# Patient Record
Sex: FEMALE | Race: WHITE | Employment: OTHER | URBAN - METROPOLITAN AREA
[De-identification: names, ages, dates, MRNs, and addresses within clinical notes are randomized per-mention and may not be internally consistent; named-entity substitution may affect disease eponyms.]

---

## 2017-01-11 PROCEDURE — 87077 CULTURE AEROBIC IDENTIFY: CPT | Performed by: INTERNAL MEDICINE

## 2017-01-11 PROCEDURE — 87186 SC STD MICRODIL/AGAR DIL: CPT | Performed by: INTERNAL MEDICINE

## 2017-01-11 PROCEDURE — 81001 URINALYSIS AUTO W/SCOPE: CPT | Performed by: INTERNAL MEDICINE

## 2017-01-11 PROCEDURE — 87086 URINE CULTURE/COLONY COUNT: CPT | Performed by: INTERNAL MEDICINE

## 2017-01-18 ENCOUNTER — HOSPITAL ENCOUNTER (EMERGENCY)
Facility: HOSPITAL | Age: 77
Discharge: HOME | End: 2017-01-18
Attending: EMERGENCY MEDICINE
Payer: MEDICARE

## 2017-01-18 VITALS
OXYGEN SATURATION: 98 % | HEART RATE: 60 BPM | WEIGHT: 180 LBS | DIASTOLIC BLOOD PRESSURE: 62 MMHG | BODY MASS INDEX: 29.99 KG/M2 | RESPIRATION RATE: 16 BRPM | TEMPERATURE: 99 F | HEIGHT: 65 IN | SYSTOLIC BLOOD PRESSURE: 112 MMHG

## 2017-01-18 DIAGNOSIS — R82.79 URINE CULTURE POSITIVE: Primary | ICD-10-CM

## 2017-01-18 LAB
BASOPHILS # BLD AUTO: 0.07 X10(3) UL (ref 0–0.1)
BASOPHILS NFR BLD AUTO: 0.7 %
BILIRUB UR QL STRIP.AUTO: NEGATIVE
BUN BLD-MCNC: 20 MG/DL (ref 8–20)
CALCIUM BLD-MCNC: 9.9 MG/DL (ref 8.3–10.3)
CHLORIDE: 102 MMOL/L (ref 101–111)
CLARITY UR REFRACT.AUTO: CLEAR
CO2: 24 MMOL/L (ref 22–32)
COLOR UR AUTO: YELLOW
CREAT BLD-MCNC: 0.93 MG/DL (ref 0.55–1.02)
EOSINOPHIL # BLD AUTO: 0.11 X10(3) UL (ref 0–0.3)
EOSINOPHIL NFR BLD AUTO: 1.2 %
ERYTHROCYTE [DISTWIDTH] IN BLOOD BY AUTOMATED COUNT: 13.6 % (ref 11.5–16)
GLUCOSE BLD-MCNC: 260 MG/DL (ref 70–99)
GLUCOSE UR STRIP.AUTO-MCNC: NEGATIVE MG/DL
HCT VFR BLD AUTO: 40.3 % (ref 34–50)
HGB BLD-MCNC: 13.4 G/DL (ref 12–16)
HYALINE CASTS #/AREA URNS AUTO: PRESENT /LPF
IMMATURE GRANULOCYTE COUNT: 0.03 X10(3) UL (ref 0–1)
IMMATURE GRANULOCYTE RATIO %: 0.3 %
KETONES UR STRIP.AUTO-MCNC: NEGATIVE MG/DL
LYMPHOCYTES # BLD AUTO: 1.49 X10(3) UL (ref 0.9–4)
LYMPHOCYTES NFR BLD AUTO: 15.9 %
MCH RBC QN AUTO: 28.8 PG (ref 27–33.2)
MCHC RBC AUTO-ENTMCNC: 33.3 G/DL (ref 31–37)
MCV RBC AUTO: 86.5 FL (ref 81–100)
MONOCYTES # BLD AUTO: 0.45 X10(3) UL (ref 0.1–0.6)
MONOCYTES NFR BLD AUTO: 4.8 %
NEUTROPHIL ABS PRELIM: 7.25 X10 (3) UL (ref 1.3–6.7)
NEUTROPHILS # BLD AUTO: 7.25 X10(3) UL (ref 1.3–6.7)
NEUTROPHILS NFR BLD AUTO: 77.1 %
NITRITE UR QL STRIP.AUTO: NEGATIVE
PH UR STRIP.AUTO: 5 [PH] (ref 4.5–8)
PLATELET # BLD AUTO: 253 10(3)UL (ref 150–450)
POTASSIUM SERPL-SCNC: 4.8 MMOL/L (ref 3.6–5.1)
PROT UR STRIP.AUTO-MCNC: NEGATIVE MG/DL
RBC # BLD AUTO: 4.66 X10(6)UL (ref 3.8–5.1)
RBC UR QL AUTO: NEGATIVE
RED CELL DISTRIBUTION WIDTH-SD: 42.7 FL (ref 35.1–46.3)
SODIUM SERPL-SCNC: 135 MMOL/L (ref 136–144)
SP GR UR STRIP.AUTO: 1.01 (ref 1–1.03)
UROBILINOGEN UR STRIP.AUTO-MCNC: <2 MG/DL
WBC # BLD AUTO: 9.4 X10(3) UL (ref 4–13)

## 2017-01-18 PROCEDURE — 36415 COLL VENOUS BLD VENIPUNCTURE: CPT

## 2017-01-18 PROCEDURE — 99283 EMERGENCY DEPT VISIT LOW MDM: CPT

## 2017-01-18 PROCEDURE — 87086 URINE CULTURE/COLONY COUNT: CPT | Performed by: EMERGENCY MEDICINE

## 2017-01-18 PROCEDURE — 85025 COMPLETE CBC W/AUTO DIFF WBC: CPT | Performed by: EMERGENCY MEDICINE

## 2017-01-18 PROCEDURE — 81001 URINALYSIS AUTO W/SCOPE: CPT | Performed by: EMERGENCY MEDICINE

## 2017-01-18 PROCEDURE — 80048 BASIC METABOLIC PNL TOTAL CA: CPT | Performed by: EMERGENCY MEDICINE

## 2017-01-18 NOTE — ED INITIAL ASSESSMENT (HPI)
sts her PCP, Dr. Lourdes Delaney, sent pt to ER for unresolved UTI. Reports hospitalized for kidney infection end of Dec. Pt denies urinary s/s.

## 2017-01-18 NOTE — ED PROVIDER NOTES
Patient Seen in: [facility] Emergency Department    History   Patient presents with:  Urinary Symptoms (urologic)    Stated Complaint: KIDNEY INFECTION    HPI    79-year-old female presents emergency room for evaluation of urinary tract infection, p History    COLONOSCOPY,DIAGNOSTIC  2003    Comment wnl    COLONOSCOPY,BIOPSY  1999    Comment adenomatous polyp    ANGIOPLASTY (CORONARY)  2006    Comment w/2 drug eluting stents    CHOLECYSTECTOMY  1997    HYSTERECTOMY      Comment w/ BSO    OTHER SURGICA age 78   • Diabetes Paternal Grandmother    • gastric cancer [Other] [OTHER] Maternal Grandmother          Smoking Status: Never Smoker                      Smokeless Status: Never Used                        Alcohol Use: No                Review of Sy W/ DIFFERENTIAL - Abnormal; Notable for the following:     Neutrophil Absolute Prelim 7.25 (*)     Neutrophil Absolute 7.25 (*)     All other components within normal limits   CBC WITH DIFFERENTIAL WITH PLATELET    Narrative:      The following orders were [id number]  [phone]    Schedule an appointment as soon as possible for a visit in 2 days        Medications Prescribed:  Discharge Medication List as of 1/18/2017  7:04 PM

## 2017-01-19 NOTE — ED NOTES
Rounded on pt lying on cart, sts she is comfortable. Denies any needs. Updated on POC.  remains at bedside.

## 2017-02-02 ENCOUNTER — PRIOR ORIGINAL RECORDS (OUTPATIENT)
Dept: OTHER | Age: 77
End: 2017-02-02

## 2017-02-13 ENCOUNTER — PRIOR ORIGINAL RECORDS (OUTPATIENT)
Dept: OTHER | Age: 77
End: 2017-02-13

## 2017-02-16 PROCEDURE — 87086 URINE CULTURE/COLONY COUNT: CPT | Performed by: INTERNAL MEDICINE

## 2017-02-16 PROCEDURE — 87077 CULTURE AEROBIC IDENTIFY: CPT | Performed by: INTERNAL MEDICINE

## 2017-02-16 PROCEDURE — 87186 SC STD MICRODIL/AGAR DIL: CPT | Performed by: INTERNAL MEDICINE

## 2017-02-28 ENCOUNTER — PRIOR ORIGINAL RECORDS (OUTPATIENT)
Dept: OTHER | Age: 77
End: 2017-02-28

## 2017-03-02 NOTE — H&P
[facility]    PATIENT'S NAME: Aaron Sales   ATTENDING PHYSICIAN: Anastasiia Baldwin M.D.    PATIENT ACCOUNT#:   [de-identified]    LOCATION:    MEDICAL RECORD #:   [id number]       YOB: 1940  ADMISSION DATE:       03/10/2017    HISTORY AND midline back pain, urinary incontinence, weakness in both lower extremities, neuropathy, urinary tract infection, hematuria, angioneurotic edema, colonic polyps, carpal tunnel syndrome, menopause, muscle weakness.     PAST SURGICAL HISTORY:  Colonoscopy, an

## 2017-03-03 ENCOUNTER — PRIOR ORIGINAL RECORDS (OUTPATIENT)
Dept: OTHER | Age: 77
End: 2017-03-03

## 2017-03-06 ENCOUNTER — PRIOR ORIGINAL RECORDS (OUTPATIENT)
Dept: OTHER | Age: 77
End: 2017-03-06

## 2017-03-06 ENCOUNTER — LABORATORY ENCOUNTER (OUTPATIENT)
Dept: LAB | Facility: HOSPITAL | Age: 77
End: 2017-03-06
Payer: MEDICARE

## 2017-03-06 DIAGNOSIS — M17.12 OSTEOARTHRITIS OF LEFT KNEE: ICD-10-CM

## 2017-03-06 LAB
ANTIBODY SCREEN: NEGATIVE
RH BLOOD TYPE: POSITIVE

## 2017-03-06 PROCEDURE — 86850 RBC ANTIBODY SCREEN: CPT

## 2017-03-06 PROCEDURE — 87081 CULTURE SCREEN ONLY: CPT

## 2017-03-06 PROCEDURE — 36415 COLL VENOUS BLD VENIPUNCTURE: CPT

## 2017-03-06 PROCEDURE — 86900 BLOOD TYPING SEROLOGIC ABO: CPT

## 2017-03-06 PROCEDURE — 86901 BLOOD TYPING SEROLOGIC RH(D): CPT

## 2017-03-08 PROBLEM — M17.12 PRIMARY OSTEOARTHRITIS OF LEFT KNEE: Status: ACTIVE | Noted: 2017-03-08

## 2017-03-09 ENCOUNTER — ANESTHESIA EVENT (OUTPATIENT)
Dept: SURGERY | Facility: HOSPITAL | Age: 77
End: 2017-03-09

## 2017-03-10 ENCOUNTER — APPOINTMENT (OUTPATIENT)
Dept: GENERAL RADIOLOGY | Facility: HOSPITAL | Age: 77
DRG: 470 | End: 2017-03-10
Attending: ORTHOPAEDIC SURGERY
Payer: MEDICARE

## 2017-03-10 ENCOUNTER — HOSPITAL ENCOUNTER (INPATIENT)
Facility: HOSPITAL | Age: 77
LOS: 2 days | Discharge: HOME HEALTH | DRG: 470 | End: 2017-03-12
Attending: ORTHOPAEDIC SURGERY | Admitting: ORTHOPAEDIC SURGERY
Payer: MEDICARE

## 2017-03-10 ENCOUNTER — ANESTHESIA (OUTPATIENT)
Dept: SURGERY | Facility: HOSPITAL | Age: 77
End: 2017-03-10

## 2017-03-10 ENCOUNTER — SURGERY (OUTPATIENT)
Age: 77
End: 2017-03-10

## 2017-03-10 DIAGNOSIS — M17.12 OSTEOARTHRITIS OF LEFT KNEE: Primary | ICD-10-CM

## 2017-03-10 DIAGNOSIS — D64.9 ANEMIA, UNSPECIFIED TYPE: ICD-10-CM

## 2017-03-10 LAB
CREAT BLD-MCNC: 0.53 MG/DL (ref 0.55–1.02)
GLUCOSE BLD-MCNC: 119 MG/DL (ref 65–99)
GLUCOSE BLD-MCNC: 130 MG/DL (ref 65–99)
GLUCOSE BLD-MCNC: 153 MG/DL (ref 65–99)
GLUCOSE BLD-MCNC: 219 MG/DL (ref 65–99)
GLUCOSE BLD-MCNC: 231 MG/DL (ref 65–99)

## 2017-03-10 PROCEDURE — 82565 ASSAY OF CREATININE: CPT | Performed by: PHYSICIAN ASSISTANT

## 2017-03-10 PROCEDURE — 0SRD0J9 REPLACEMENT OF LEFT KNEE JOINT WITH SYNTHETIC SUBSTITUTE, CEMENTED, OPEN APPROACH: ICD-10-PCS | Performed by: ORTHOPAEDIC SURGERY

## 2017-03-10 PROCEDURE — 82962 GLUCOSE BLOOD TEST: CPT

## 2017-03-10 PROCEDURE — 3E0T3CZ INTRODUCTION OF REGIONAL ANESTHETIC INTO PERIPHERAL NERVES AND PLEXI, PERCUTANEOUS APPROACH: ICD-10-PCS | Performed by: ANESTHESIOLOGY

## 2017-03-10 PROCEDURE — 88305 TISSUE EXAM BY PATHOLOGIST: CPT | Performed by: ORTHOPAEDIC SURGERY

## 2017-03-10 PROCEDURE — 76942 ECHO GUIDE FOR BIOPSY: CPT | Performed by: ORTHOPAEDIC SURGERY

## 2017-03-10 PROCEDURE — 88311 DECALCIFY TISSUE: CPT | Performed by: ORTHOPAEDIC SURGERY

## 2017-03-10 PROCEDURE — 73560 X-RAY EXAM OF KNEE 1 OR 2: CPT

## 2017-03-10 DEVICE — PSN TIB STM 5 DEG SZ F L: Type: IMPLANTABLE DEVICE | Site: KNEE | Status: FUNCTIONAL

## 2017-03-10 DEVICE — PSN ASF PS 10MM PLY L 6-9EF: Type: IMPLANTABLE DEVICE | Site: KNEE | Status: FUNCTIONAL

## 2017-03-10 DEVICE — PSN FEM PS CMT CCR STD SZ8 L: Type: IMPLANTABLE DEVICE | Site: KNEE | Status: FUNCTIONAL

## 2017-03-10 DEVICE — PSN ALL POLY PAT PLY 35MM: Type: IMPLANTABLE DEVICE | Site: KNEE | Status: FUNCTIONAL

## 2017-03-10 DEVICE — CEMENT BONE ZIM PALICOS R +G: Type: IMPLANTABLE DEVICE | Site: KNEE | Status: FUNCTIONAL

## 2017-03-10 RX ORDER — NALOXONE HYDROCHLORIDE 0.4 MG/ML
80 INJECTION, SOLUTION INTRAMUSCULAR; INTRAVENOUS; SUBCUTANEOUS AS NEEDED
Status: DISCONTINUED | OUTPATIENT
Start: 2017-03-10 | End: 2017-03-10 | Stop reason: HOSPADM

## 2017-03-10 RX ORDER — HYDROMORPHONE HYDROCHLORIDE 1 MG/ML
0.4 INJECTION, SOLUTION INTRAMUSCULAR; INTRAVENOUS; SUBCUTANEOUS EVERY 2 HOUR PRN
Status: ACTIVE | OUTPATIENT
Start: 2017-03-10 | End: 2017-03-12

## 2017-03-10 RX ORDER — KETOROLAC TROMETHAMINE 30 MG/ML
15 INJECTION, SOLUTION INTRAMUSCULAR; INTRAVENOUS EVERY 6 HOURS
Status: COMPLETED | OUTPATIENT
Start: 2017-03-10 | End: 2017-03-11

## 2017-03-10 RX ORDER — SODIUM CHLORIDE, SODIUM LACTATE, POTASSIUM CHLORIDE, CALCIUM CHLORIDE 600; 310; 30; 20 MG/100ML; MG/100ML; MG/100ML; MG/100ML
INJECTION, SOLUTION INTRAVENOUS CONTINUOUS
Status: DISCONTINUED | OUTPATIENT
Start: 2017-03-10 | End: 2017-03-12

## 2017-03-10 RX ORDER — HYDROMORPHONE HYDROCHLORIDE 1 MG/ML
0.5 INJECTION, SOLUTION INTRAMUSCULAR; INTRAVENOUS; SUBCUTANEOUS EVERY 2 HOUR PRN
Status: ACTIVE | OUTPATIENT
Start: 2017-03-10 | End: 2017-03-12

## 2017-03-10 RX ORDER — CYCLOBENZAPRINE HCL 5 MG
5 TABLET ORAL EVERY 8 HOURS PRN
Status: DISCONTINUED | OUTPATIENT
Start: 2017-03-10 | End: 2017-03-12

## 2017-03-10 RX ORDER — OXYCODONE HCL 10 MG/1
10 TABLET, FILM COATED, EXTENDED RELEASE ORAL
Status: COMPLETED | OUTPATIENT
Start: 2017-03-10 | End: 2017-03-11

## 2017-03-10 RX ORDER — HYDROMORPHONE HYDROCHLORIDE 1 MG/ML
0.2 INJECTION, SOLUTION INTRAMUSCULAR; INTRAVENOUS; SUBCUTANEOUS EVERY 2 HOUR PRN
Status: ACTIVE | OUTPATIENT
Start: 2017-03-10 | End: 2017-03-12

## 2017-03-10 RX ORDER — MELATONIN
325
Status: DISCONTINUED | OUTPATIENT
Start: 2017-03-11 | End: 2017-03-12

## 2017-03-10 RX ORDER — MIDAZOLAM HYDROCHLORIDE 1 MG/ML
1 INJECTION INTRAMUSCULAR; INTRAVENOUS EVERY 5 MIN PRN
Status: DISCONTINUED | OUTPATIENT
Start: 2017-03-10 | End: 2017-03-10 | Stop reason: HOSPADM

## 2017-03-10 RX ORDER — GLIPIZIDE 10 MG/1
10 TABLET ORAL
COMMUNITY
End: 2017-10-12

## 2017-03-10 RX ORDER — CYCLOBENZAPRINE HCL 5 MG
5 TABLET ORAL 3 TIMES DAILY PRN
Status: DISCONTINUED | OUTPATIENT
Start: 2017-03-10 | End: 2017-03-10

## 2017-03-10 RX ORDER — GLIPIZIDE 5 MG/1
10 TABLET ORAL
Status: DISCONTINUED | OUTPATIENT
Start: 2017-03-10 | End: 2017-03-10

## 2017-03-10 RX ORDER — OXYCODONE HYDROCHLORIDE 5 MG/1
5 TABLET ORAL EVERY 4 HOURS PRN
Status: DISPENSED | OUTPATIENT
Start: 2017-03-10 | End: 2017-03-12

## 2017-03-10 RX ORDER — ATORVASTATIN CALCIUM 20 MG/1
20 TABLET, FILM COATED ORAL NIGHTLY
COMMUNITY
End: 2017-12-02

## 2017-03-10 RX ORDER — DIPHENHYDRAMINE HYDROCHLORIDE 50 MG/ML
25 INJECTION INTRAMUSCULAR; INTRAVENOUS ONCE AS NEEDED
Status: ACTIVE | OUTPATIENT
Start: 2017-03-10 | End: 2017-03-10

## 2017-03-10 RX ORDER — SODIUM CHLORIDE 9 MG/ML
INJECTION, SOLUTION INTRAVENOUS CONTINUOUS
Status: DISCONTINUED | OUTPATIENT
Start: 2017-03-10 | End: 2017-03-12

## 2017-03-10 RX ORDER — ZOLPIDEM TARTRATE 5 MG/1
5 TABLET ORAL NIGHTLY PRN
Status: DISCONTINUED | OUTPATIENT
Start: 2017-03-10 | End: 2017-03-12

## 2017-03-10 RX ORDER — OXYCODONE HCL 10 MG/1
TABLET, FILM COATED, EXTENDED RELEASE ORAL
Status: DISPENSED
Start: 2017-03-10 | End: 2017-03-10

## 2017-03-10 RX ORDER — METOCLOPRAMIDE HYDROCHLORIDE 5 MG/ML
10 INJECTION INTRAMUSCULAR; INTRAVENOUS AS NEEDED
Status: DISCONTINUED | OUTPATIENT
Start: 2017-03-10 | End: 2017-03-10 | Stop reason: HOSPADM

## 2017-03-10 RX ORDER — DEXTROSE MONOHYDRATE 25 G/50ML
50 INJECTION, SOLUTION INTRAVENOUS
Status: DISCONTINUED | OUTPATIENT
Start: 2017-03-10 | End: 2017-03-10

## 2017-03-10 RX ORDER — DOCUSATE SODIUM 100 MG/1
100 CAPSULE, LIQUID FILLED ORAL 2 TIMES DAILY
Status: DISCONTINUED | OUTPATIENT
Start: 2017-03-10 | End: 2017-03-12

## 2017-03-10 RX ORDER — DOCUSATE SODIUM 100 MG/1
100 CAPSULE, LIQUID FILLED ORAL 2 TIMES DAILY
Qty: 60 CAPSULE | Refills: 1 | Status: SHIPPED | OUTPATIENT
Start: 2017-03-10 | End: 2017-06-01

## 2017-03-10 RX ORDER — SODIUM CHLORIDE, SODIUM LACTATE, POTASSIUM CHLORIDE, CALCIUM CHLORIDE 600; 310; 30; 20 MG/100ML; MG/100ML; MG/100ML; MG/100ML
INJECTION, SOLUTION INTRAVENOUS CONTINUOUS
Status: DISCONTINUED | OUTPATIENT
Start: 2017-03-10 | End: 2017-03-10

## 2017-03-10 RX ORDER — ONDANSETRON 2 MG/ML
4 INJECTION INTRAMUSCULAR; INTRAVENOUS AS NEEDED
Status: DISCONTINUED | OUTPATIENT
Start: 2017-03-10 | End: 2017-03-10 | Stop reason: HOSPADM

## 2017-03-10 RX ORDER — FERROUS SULFATE 325(65) MG
325 TABLET ORAL
Qty: 30 TABLET | Refills: 1 | Status: SHIPPED | OUTPATIENT
Start: 2017-03-10 | End: 2017-06-01

## 2017-03-10 RX ORDER — MEPERIDINE HYDROCHLORIDE 25 MG/ML
12.5 INJECTION INTRAMUSCULAR; INTRAVENOUS; SUBCUTANEOUS AS NEEDED
Status: DISCONTINUED | OUTPATIENT
Start: 2017-03-10 | End: 2017-03-10 | Stop reason: HOSPADM

## 2017-03-10 RX ORDER — HYDROMORPHONE HYDROCHLORIDE 1 MG/ML
0.4 INJECTION, SOLUTION INTRAMUSCULAR; INTRAVENOUS; SUBCUTANEOUS EVERY 5 MIN PRN
Status: DISCONTINUED | OUTPATIENT
Start: 2017-03-10 | End: 2017-03-10 | Stop reason: HOSPADM

## 2017-03-10 RX ORDER — DEXTROSE MONOHYDRATE 25 G/50ML
50 INJECTION, SOLUTION INTRAVENOUS
Status: DISCONTINUED | OUTPATIENT
Start: 2017-03-10 | End: 2017-03-12

## 2017-03-10 RX ORDER — OXYCODONE HYDROCHLORIDE 10 MG/1
10 TABLET ORAL EVERY 4 HOURS PRN
Status: DISPENSED | OUTPATIENT
Start: 2017-03-10 | End: 2017-03-12

## 2017-03-10 RX ORDER — DIPHENHYDRAMINE HYDROCHLORIDE 50 MG/ML
12.5 INJECTION INTRAMUSCULAR; INTRAVENOUS EVERY 4 HOURS PRN
Status: DISCONTINUED | OUTPATIENT
Start: 2017-03-10 | End: 2017-03-12

## 2017-03-10 RX ORDER — OXYCODONE HCL 10 MG/1
10 TABLET, FILM COATED, EXTENDED RELEASE ORAL
Status: COMPLETED | OUTPATIENT
Start: 2017-03-10 | End: 2017-03-10

## 2017-03-10 RX ORDER — HYDROMORPHONE HYDROCHLORIDE 1 MG/ML
0.3 INJECTION, SOLUTION INTRAMUSCULAR; INTRAVENOUS; SUBCUTANEOUS EVERY 2 HOUR PRN
Status: ACTIVE | OUTPATIENT
Start: 2017-03-10 | End: 2017-03-12

## 2017-03-10 RX ORDER — ONDANSETRON 2 MG/ML
4 INJECTION INTRAMUSCULAR; INTRAVENOUS EVERY 4 HOURS PRN
Status: DISCONTINUED | OUTPATIENT
Start: 2017-03-10 | End: 2017-03-12

## 2017-03-10 RX ORDER — LISINOPRIL 10 MG/1
10 TABLET ORAL DAILY
Status: DISCONTINUED | OUTPATIENT
Start: 2017-03-11 | End: 2017-03-12

## 2017-03-10 RX ORDER — BISACODYL 10 MG
10 SUPPOSITORY, RECTAL RECTAL
Status: DISCONTINUED | OUTPATIENT
Start: 2017-03-10 | End: 2017-03-12

## 2017-03-10 RX ORDER — SODIUM PHOSPHATE, DIBASIC AND SODIUM PHOSPHATE, MONOBASIC 7; 19 G/133ML; G/133ML
1 ENEMA RECTAL ONCE AS NEEDED
Status: ACTIVE | OUTPATIENT
Start: 2017-03-10 | End: 2017-03-10

## 2017-03-10 RX ORDER — POLYETHYLENE GLYCOL 3350 17 G/17G
17 POWDER, FOR SOLUTION ORAL DAILY PRN
Status: DISCONTINUED | OUTPATIENT
Start: 2017-03-10 | End: 2017-03-12

## 2017-03-10 RX ORDER — ATORVASTATIN CALCIUM 20 MG/1
20 TABLET, FILM COATED ORAL NIGHTLY
Status: DISCONTINUED | OUTPATIENT
Start: 2017-03-10 | End: 2017-03-12

## 2017-03-10 RX ORDER — METOCLOPRAMIDE HYDROCHLORIDE 5 MG/ML
10 INJECTION INTRAMUSCULAR; INTRAVENOUS EVERY 6 HOURS PRN
Status: ACTIVE | OUTPATIENT
Start: 2017-03-10 | End: 2017-03-12

## 2017-03-10 RX ORDER — TORSEMIDE 20 MG/1
20 TABLET ORAL DAILY
Status: DISCONTINUED | OUTPATIENT
Start: 2017-03-11 | End: 2017-03-10

## 2017-03-10 RX ORDER — OXYCODONE HYDROCHLORIDE 10 MG/1
20 TABLET ORAL EVERY 4 HOURS PRN
Status: ACTIVE | OUTPATIENT
Start: 2017-03-10 | End: 2017-03-12

## 2017-03-10 RX ORDER — OXYCODONE HYDROCHLORIDE 15 MG/1
15 TABLET ORAL EVERY 4 HOURS PRN
Status: ACTIVE | OUTPATIENT
Start: 2017-03-10 | End: 2017-03-12

## 2017-03-10 RX ORDER — DEXTROSE MONOHYDRATE 25 G/50ML
50 INJECTION, SOLUTION INTRAVENOUS
Status: DISCONTINUED | OUTPATIENT
Start: 2017-03-10 | End: 2017-03-10 | Stop reason: HOSPADM

## 2017-03-10 RX ORDER — DIPHENHYDRAMINE HCL 25 MG
25 CAPSULE ORAL EVERY 4 HOURS PRN
Status: DISCONTINUED | OUTPATIENT
Start: 2017-03-10 | End: 2017-03-12

## 2017-03-10 NOTE — INTERVAL H&P NOTE
Pre-op Diagnosis: OSTEOARTHRITIS LEFT KNEE    The above referenced H&P was reviewed by LON Campos on 3/10/2017, the patient was examined and no significant changes have occurred in the patient's condition since the H&P was performed.   I discussed wi

## 2017-03-10 NOTE — CONSULTS
[facility] Hospitalist Initial Consult       Reason for consult: Medical Management sp left total knee arthroplasty      History of Present Illness: Patient is a 78year old female with PMH sig for CAD, HTN, HL, T2DM who presents sp left TKA.    Pt tolerated the p [id number]   • Type 2 diabetes mellitus, uncontrolled ([facility]    • Diabetes ([facility]    • Coronary atherosclerosis    • High blood pressure    • Vitamin D deficiency 10/6/2016   • Uncontrolled type 2 diabetes mellitus with diabetic polyneuropathy, with long-term kg/m2  SpO2 96%  General:  Alert, no distress, appears stated age. Head:  Normocephalic, without obvious abnormality, atraumatic. Eyes:  Sclera anicteric, No conjunctival pallor, EOMs intact. Nose: Nares normal. Septum midline.  Mucosa normal. No rena levels    CAD/HTN/HL  -Continue ACE-I, Statin  -Resume ASA when ok with ortho  -resume diuretic on discharge    Prevention: Xarelto, SCDs, bowel regimen      Outpatient records and previous hospital records reviewed.      Thank you for allowing me to partic

## 2017-03-10 NOTE — PHYSICAL THERAPY NOTE
Attempted to see patient for P. T. Evaluation this time. Patient presented with left Le numbness & lack of motor control. Unable to progress with evaluation this time. RN - Lashell Medina was aware of this attempt.  Will continue with evaluation in morning of 03/10/1

## 2017-03-10 NOTE — ANESTHESIA PREPROCEDURE EVALUATION
PRE-OP EVALUATION    Patient Name: Víctor Enciso    Pre-op Diagnosis: primary OSTEOARTHRITIS LEFT KNEE    Procedure(s):  LEFT TOTAL KNEE ARTHROPLASTY    Surgeon(s) and Role:     Jewell Meneses MD - Primary    Pre-op vitals reviewed. Temp: 98 °F (36. (REGLAN) injection 10 mg 10 mg Intravenous PRN   Naloxone HCl (NARCAN) 0.4 MG/ML injection 80 mcg 80 mcg Intravenous PRN   Midazolam HCl (VERSED) 2 MG/2ML injection 1 mg 1 mg Intravenous Q5 Min PRN   Meperidine HCl (DEMEROL) 25 MG/ML injection 12.5 mg 12. 5 motion abnormalities. IMPRESSION:  1. Normal SPECT myocardial perfusion imaging.    2.   Normal EKG response to Lexiscan.     3. Normal LV cavity dimension and function with ejection fraction of 58%. No wall motion abnormalities.   4.   No prognosti polyneuropathy associated with type 2 diabetes mellitus ([facility]     Hyperlipidemia due to type 2 diabetes mellitus ([facility]     Hypertension complicating diabetes ([facility]     Benign positional vertigo     Vitamin D deficiency     Uncontrolled type 2 diabetes arnaldo 03/01/2017   HCT 40.3 01/18/2017   MCV 85.4 03/01/2017   MCV 86.5 01/18/2017   MCH 28.0 03/01/2017   MCH 28.8 01/18/2017   MCHC 32.8 03/01/2017   MCHC 33.3 01/18/2017   RDW 13.8 03/01/2017   RDW 13.6 01/18/2017    03/01/2017   .0 01/18/2017 adductor canal block for post operative pain.   Plan/risks discussed with: patient and spouse                Present on Admission:   **None**

## 2017-03-10 NOTE — ANESTHESIA POSTPROCEDURE EVALUATION
[address] Patient Status:  Surgery Admit   Age/Gender 78year old female MRN [id number]   [facility] SURGERY Attending Nadeem Little MD   Hosp Day # 0 PCP Ines Duvall MD       Anesthesia Post-op Note    Procedure(

## 2017-03-10 NOTE — BRIEF OP NOTE
[facility] SURGERY  Brief Op Note     Junious Mingle Location: OR   CSN [id number] MRN [id number]   Admission Date 3/10/2017 Operation Date 3/10/2017   Attending Physician Connie Krueger MD Operating Physician LON Roy       Pre-Operative Marisela Asif

## 2017-03-11 LAB
ERYTHROCYTE [DISTWIDTH] IN BLOOD BY AUTOMATED COUNT: 13.8 % (ref 11.5–16)
GLUCOSE BLD-MCNC: 105 MG/DL (ref 65–99)
GLUCOSE BLD-MCNC: 201 MG/DL (ref 65–99)
GLUCOSE BLD-MCNC: 202 MG/DL (ref 65–99)
GLUCOSE BLD-MCNC: 302 MG/DL (ref 65–99)
HCT VFR BLD AUTO: 34.4 % (ref 34–50)
HGB BLD-MCNC: 11.3 G/DL (ref 12–16)
MCH RBC QN AUTO: 28.7 PG (ref 27–33.2)
MCHC RBC AUTO-ENTMCNC: 32.8 G/DL (ref 31–37)
MCV RBC AUTO: 87.3 FL (ref 81–100)
PLATELET # BLD AUTO: 167 10(3)UL (ref 150–450)
RBC # BLD AUTO: 3.94 X10(6)UL (ref 3.8–5.1)
RED CELL DISTRIBUTION WIDTH-SD: 43.8 FL (ref 35.1–46.3)
WBC # BLD AUTO: 10.3 X10(3) UL (ref 4–13)

## 2017-03-11 PROCEDURE — 97162 PT EVAL MOD COMPLEX 30 MIN: CPT

## 2017-03-11 PROCEDURE — 97116 GAIT TRAINING THERAPY: CPT

## 2017-03-11 PROCEDURE — 97535 SELF CARE MNGMENT TRAINING: CPT

## 2017-03-11 PROCEDURE — 97166 OT EVAL MOD COMPLEX 45 MIN: CPT

## 2017-03-11 PROCEDURE — 97150 GROUP THERAPEUTIC PROCEDURES: CPT

## 2017-03-11 PROCEDURE — 82962 GLUCOSE BLOOD TEST: CPT

## 2017-03-11 PROCEDURE — 85027 COMPLETE CBC AUTOMATED: CPT | Performed by: PHYSICIAN ASSISTANT

## 2017-03-11 PROCEDURE — 97530 THERAPEUTIC ACTIVITIES: CPT

## 2017-03-11 RX ORDER — TRAMADOL HYDROCHLORIDE 50 MG/1
50 TABLET ORAL EVERY 6 HOURS PRN
Status: DISCONTINUED | OUTPATIENT
Start: 2017-03-12 | End: 2017-03-12

## 2017-03-11 RX ORDER — CELECOXIB 100 MG/1
100 CAPSULE ORAL 2 TIMES DAILY
Status: COMPLETED | OUTPATIENT
Start: 2017-03-11 | End: 2017-03-11

## 2017-03-11 NOTE — PROGRESS NOTES
[facility]  Progress Note    Maricruz Beltran Patient Status:  Inpatient    1940 MRN [id number]   [facility] 3SW-A Attending Mac Agustin MD   Hosp Day # 1 PCP Crista Naylor MD     SUBJECTIVE:  INTERVAL HISTORY: S/P  1  Procedu

## 2017-03-11 NOTE — OCCUPATIONAL THERAPY NOTE
OCCUPATIONAL THERAPY EVALUATION - INPATIENT     Room Number: 362/362-A  Evaluation Date: 3/11/2017  Type of Evaluation: Initial  Presenting Problem: s/p L TKA on 3/10/17     Physician Order: IP Consult to Occupational Therapy  Reason for Therapy: ADL/IADL BSO    OTHER SURGICAL HISTORY      Comment vein stripping    OTHER SURGICAL HISTORY      Comment R Knee arthroscopy    REVISE MEDIAN N/CARPAL TUNNEL SURG      Comment left carpal tunnel release    ENDOVENOUS LASER VEIN ADDON      Comment Right Greater Saph shoulder internal rotation due to prior fracture, clasps bra in front     Upper extremity strength is within functional limits     COORDINATION  Gross Motor    [facility]    Fine Motor    WFL      ADDITIONAL TESTS                                    NEUROLOGICAL F Patient is a 78year old female admitted 3/10/2017 and is now s/p L TKA. In this OT evaluation patient presents with the following impairments: pain, BADL/IADL dysfunction, decreased endurance, balance, strength, ROM and functional mobility.   These de

## 2017-03-11 NOTE — CM/SW NOTE
03/11/17 1100   CM/SW Referral Data   Referral Source Physician   Reason for Referral Discharge planning   Informant Patient;Edward Staff   Pertinent Medical Hx   Primary Care Physician Name dr Segovia Bolus Drug/Alcohol Use n

## 2017-03-11 NOTE — PHYSICAL THERAPY NOTE
PHYSICAL THERAPY KNEE TREATMENT NOTE - INPATIENT     Room Number: 362/362-A     Session: 1 and 2   Number of Visits to Meet Established Goals: 5    Presenting Problem: L TKA    Problem List  Active Problems:    Osteoarthritis of left knee      Past Medica meniscus    OTHER      Comment lipoma removed from left arm    OTHER      Comment trigger finger release left hand middle finger    COLONOSCOPY,DIAGNOSTIC  10/10/13    Comment normal    COLONOSCOPY  10/10/2013    Comment Procedure: COLONOSCOPY;  Surgeon: Ramez Phillip railing needs cueing in sequnecing with steps    Skilled Therapy Provided:   AM:Seen for eval and group session with exes as below. Gt training done with increase time and WBAT on the L LE with decrease heel toe pattern.  Assisted in restroom use and cued o RECOMMENDATIONS  PT Discharge Recommendations: Home with home health PT    PLAN  PT Treatment Plan: Bed mobility; Body mechanics; Patient education; Family education;Gait training;Range of motion;Strengthening;Stair training;Transfer training;Balance training

## 2017-03-11 NOTE — PHYSICAL THERAPY NOTE
PHYSICAL THERAPY KNEE EVALUATION - INPATIENT     Room Number: 362/362-A  Evaluation Date: 3/11/2017  Type of Evaluation: Initial  Physician Order: PT Eval and Treat    Presenting Problem: L TKA  Reason for Therapy: Mobility Dysfunction and Discharge Planni N/CARPAL TUNNEL SURG      Comment left carpal tunnel release    ENDOVENOUS LASER VEIN ADDON      Comment Right Greater Saph V  per Dr. Ulysses Mcneil (PBP)      Comment left knee repair torn meniscus    OTHER      Comment lipoma removed from le '6-Clicks' INPATIENT SHORT FORM - BASIC MOBILITY  How much difficulty does the patient currently have. ..  -   Turning over in bed (including adjusting bedclothes, sheets and blankets)?: A Little   -   Sitting down on and standing up from a chair with arms with pain s/p sx. These impairments manifest themselves as functional limitations in  bed mobilities,transfers and ambulation requiring the use of an AD and assistance . The patient is below her baseline and would benefit from skilled inpatient PT to addre

## 2017-03-11 NOTE — PROGRESS NOTES
[facility] Hospitalist Progress Note                                                                   [address]  6/14/1940    CC: f/u s/p tka    SUBJECTIVE:    Sitting in chair.  States PT w DiphenhydrAMINE HCl, oxyCODONE HCl **OR** oxyCODONE HCl **OR** oxyCODONE HCl **OR** oxyCODONE HCl, HYDROmorphone HCl PF **OR** HYDROmorphone HCl PF **OR** HYDROmorphone HCl PF **OR** HYDROmorphone HCl PF, glucose **OR** Glucose-Vitamin C **OR** dextrose **

## 2017-03-11 NOTE — PLAN OF CARE
DISCHARGE PLANNING    • Discharge to home or other facility with appropriate resources Progressing        METABOLIC/FLUID AND ELECTROLYTES - ADULT    • Glucose maintained within prescribed range Progressing        PAIN - ADULT    • Verbalizes/displays adeq

## 2017-03-11 NOTE — PROGRESS NOTES
Post Op Day 1 Ortho Note    Status Post Nerve Block:  Type of Nerve Block: Left adductor canal TKA  Single Injection Nerve Block    Post op review: No evidence of immediate block related complications, No paresthesia noted, Able to lift leg(s), Able to alex

## 2017-03-11 NOTE — HOME CARE LIAISON
Received referral for [facility]. Met with patient who is agreeable to [facility]. Agency brochure provided to patient. Referral sent to [facility] via [address]. [facility] has accepted pt and will provide skilled nurse and physical therapy.        Thank you for this r

## 2017-03-11 NOTE — OPERATIVE REPORT
[facility]    PATIENT'S NAME: Emily Bradley   ATTENDING PHYSICIAN: Abram Moraes M.D. OPERATING PHYSICIAN: Abram Moraes M.D.    PATIENT ACCOUNT#:   [de-identified]    LOCATION:  [address] [place] [address]  MEDICAL RECORD #:   [id number]       DATE OF BIRTH:  2 the medial compartment, with areas of eburnated bone present over the medial femoral condyle and the medial tibial plateau. There was evidence of chondrocalcinosis.   The ACL and PCL were sacrificed, the infrapatellar fat pad removed, the suprapatellar syn drill hole.   The outrigger was attached to the intramedullary device and rotated externally to match the natural external rotation of the tibial tubercle and the tibial crest.  Depth gauge was used to measure the amount of bone to be resected, which was 10 The trials were removed. Drill holes were created for the lugs of the femoral component. A large drill was used to drill for a central stem of the tibial tray. The size F punch was used to punch for the tibial tray.   The bone was prepared with pulsatil Ksenia 57 [id number]  KFW/    cc: CHRISTOPHE Price M.D.

## 2017-03-12 VITALS
SYSTOLIC BLOOD PRESSURE: 154 MMHG | RESPIRATION RATE: 18 BRPM | WEIGHT: 183 LBS | BODY MASS INDEX: 30.49 KG/M2 | TEMPERATURE: 99 F | HEART RATE: 68 BPM | OXYGEN SATURATION: 97 % | HEIGHT: 65 IN | DIASTOLIC BLOOD PRESSURE: 61 MMHG

## 2017-03-12 LAB
ERYTHROCYTE [DISTWIDTH] IN BLOOD BY AUTOMATED COUNT: 13.7 % (ref 11.5–16)
GLUCOSE BLD-MCNC: 263 MG/DL (ref 65–99)
GLUCOSE BLD-MCNC: 288 MG/DL (ref 65–99)
HCT VFR BLD AUTO: 33.3 % (ref 34–50)
HGB BLD-MCNC: 11.2 G/DL (ref 12–16)
MCH RBC QN AUTO: 28.8 PG (ref 27–33.2)
MCHC RBC AUTO-ENTMCNC: 33.6 G/DL (ref 31–37)
MCV RBC AUTO: 85.6 FL (ref 81–100)
PLATELET # BLD AUTO: 175 10(3)UL (ref 150–450)
RBC # BLD AUTO: 3.89 X10(6)UL (ref 3.8–5.1)
RED CELL DISTRIBUTION WIDTH-SD: 42.8 FL (ref 35.1–46.3)
WBC # BLD AUTO: 9.8 X10(3) UL (ref 4–13)

## 2017-03-12 PROCEDURE — 82962 GLUCOSE BLOOD TEST: CPT

## 2017-03-12 PROCEDURE — 97150 GROUP THERAPEUTIC PROCEDURES: CPT

## 2017-03-12 PROCEDURE — 97110 THERAPEUTIC EXERCISES: CPT

## 2017-03-12 PROCEDURE — 97535 SELF CARE MNGMENT TRAINING: CPT

## 2017-03-12 PROCEDURE — 85027 COMPLETE CBC AUTOMATED: CPT | Performed by: PHYSICIAN ASSISTANT

## 2017-03-12 PROCEDURE — 97116 GAIT TRAINING THERAPY: CPT

## 2017-03-12 NOTE — PROGRESS NOTES
[facility]  Progress Note    Kapil Bond Patient Status:  Inpatient    1940 MRN [id number]   [facility] 3SW-A Attending Brenton Garcia MD   Hosp Day # 2 PCP Joanne Redmond MD     SUBJECTIVE:  INTERVAL HISTORY: S/P  2  Procedu

## 2017-03-12 NOTE — PLAN OF CARE
DISCHARGE PLANNING    • Discharge to home or other facility with appropriate resources Progressing        Impaired Activities of Daily Living    • Achieve highest/safest level of independence in self care Progressing        METABOLIC/FLUID AND ELECTROLYTES

## 2017-03-12 NOTE — OCCUPATIONAL THERAPY NOTE
OCCUPATIONAL THERAPY TREATMENT NOTE - INPATIENT     Room Number: 362/362-A  Session: 1  Number of Visits to Meet Established Goals: 3    Presenting Problem: s/p L TKA on 3/10/17     History related to current admission: Pt was admitted from home and is now REVISE MEDIAN N/CARPAL TUNNEL SURG      Comment left carpal tunnel release    ENDOVENOUS LASER VEIN ADDON      Comment Right Greater Saph V  per Dr. Forman Close (PBP)      Comment left knee repair torn meniscus    OTHER      Comment lipom assist as he did after prior sx, attempted to trail RLE under LLE however pt declined), sit to stand (sup w/ rw, no cueing, increased time), functional mobility in room (sup w/ rw).   Pt reports that  will assist w/ pants, shoes and socks and that sh preference for  to help  Patient will transfer from sit to stand:  with supervision- Met 3/12  Patient will transfer to toilet:  with supervision- Met 3/12

## 2017-03-12 NOTE — DISCHARGE SUMMARY
Discharge Summary  Patient ID:  Daniel Da Silva  [id number]  46 year old  6/14/1940    Admit date: 3/10/2017    Discharge date and time: 3/12/17    Attending Physician: No att. providers found     Reason for admission: OSTEOARTHRITIS LEFT KNEE    S/p left

## 2017-03-12 NOTE — PLAN OF CARE
Low grade temp earlier today =100.3, encouraged IS , Dr. Elvira Patton aware, last temp at 1800=98.3.

## 2017-03-12 NOTE — PROGRESS NOTES
03/11/17 1903   Clinical Encounter Type   Visited With Patient   Routine Visit Introduction   Continue Visiting No   Surgical Visit Post-op   Patient's Supportive Strategies/Resources  provided emotional support, including active listening and a

## 2017-03-12 NOTE — PHYSICAL THERAPY NOTE
PHYSICAL THERAPY KNEE TREATMENT NOTE - INPATIENT     Room Number: 362/362-A     Session: 3 and 4   Number of Visits to Meet Established Goals: 5    Presenting Problem: L TKA    Problem List  Active Problems:    Osteoarthritis of left knee      Past Medica meniscus    OTHER      Comment lipoma removed from left arm    OTHER      Comment trigger finger release left hand middle finger    COLONOSCOPY,DIAGNOSTIC  10/10/13    Comment normal    COLONOSCOPY  10/10/2013    Comment Procedure: COLONOSCOPY;  Surgeon: Carlos Concepcion phase clearance)  Stoop/Curb Assistance: Minimum assistance  Comment : B railing needs cueing in sequnecing with steps    Skilled Therapy Provided: AM:  Pt and spouse present for group session. Wheeled to gym in recliner.   Performed ex with (S) and assist Treatment Plan: Bed mobility; Body mechanics; Patient education; Family education;Gait training;Range of motion;Strengthening;Stair training;Transfer training;Balance training  Rehab Potential : Good  Frequency (Obs): BID    CURR   Goal #1       Patient is ab

## 2017-03-12 NOTE — PROGRESS NOTES
[facility]    Post Op Day 2 Ortho Note    Assessed patient in chair. Patient rates pain 3/10 at rest and 5/10 with activity. Patient states Tramadol is working well to manage pain; denies itching/nausea/dizziness.     Patient able to bear weight on

## 2017-03-12 NOTE — PROGRESS NOTES
[facility] Hospitalist Progress Note                                                                   [address]  6/14/1940    CC: f/u s/p tka    SUBJECTIVE:    Sitting in chair.  States she bisacodyl, ondansetron HCl, Metoclopramide HCl, Prochlorperazine Edisylate, diphenhydrAMINE **OR** DiphenhydrAMINE HCl, HYDROmorphone HCl PF **OR** HYDROmorphone HCl PF **OR** HYDROmorphone HCl PF **OR** HYDROmorphone HCl PF, glucose **OR** Glucose-Vitamin

## 2017-03-13 PROBLEM — Z47.89 ORTHOPEDIC AFTERCARE: Status: ACTIVE | Noted: 2017-03-13

## 2017-03-13 NOTE — CM/SW NOTE
03/13/17 0700   Discharge disposition   Discharged to: Home-Health   Name of Facillity/Home Care/Hospice Residential   Discharge transportation Private car   DC 3/12/17

## 2017-03-24 ENCOUNTER — LAB ENCOUNTER (OUTPATIENT)
Dept: LAB | Age: 77
End: 2017-03-24
Attending: HOSPITALIST
Payer: COMMERCIAL

## 2017-03-24 ENCOUNTER — PRIOR ORIGINAL RECORDS (OUTPATIENT)
Dept: OTHER | Age: 77
End: 2017-03-24

## 2017-03-24 DIAGNOSIS — D64.9 ANEMIA, UNSPECIFIED TYPE: ICD-10-CM

## 2017-03-24 PROCEDURE — 85025 COMPLETE CBC W/AUTO DIFF WBC: CPT

## 2017-03-24 PROCEDURE — 36415 COLL VENOUS BLD VENIPUNCTURE: CPT

## 2017-03-25 LAB
BASOPHILS # BLD AUTO: 0.07 X10(3) UL (ref 0–0.1)
BASOPHILS NFR BLD AUTO: 0.5 %
EOSINOPHIL # BLD AUTO: 0.14 X10(3) UL (ref 0–0.3)
EOSINOPHIL NFR BLD AUTO: 0.9 %
ERYTHROCYTE [DISTWIDTH] IN BLOOD BY AUTOMATED COUNT: 13.8 % (ref 11.5–16)
HCT VFR BLD AUTO: 40.4 % (ref 34–50)
HGB BLD-MCNC: 13.1 G/DL (ref 12–16)
IMMATURE GRANULOCYTE COUNT: 0.15 X10(3) UL (ref 0–1)
IMMATURE GRANULOCYTE RATIO %: 1 %
LYMPHOCYTES # BLD AUTO: 1.97 X10(3) UL (ref 0.9–4)
LYMPHOCYTES NFR BLD AUTO: 13.3 %
MCH RBC QN AUTO: 28 PG (ref 27–33.2)
MCHC RBC AUTO-ENTMCNC: 32.4 G/DL (ref 31–37)
MCV RBC AUTO: 86.3 FL (ref 81–100)
MONOCYTES # BLD AUTO: 0.78 X10(3) UL (ref 0.1–0.6)
MONOCYTES NFR BLD AUTO: 5.3 %
NEUTROPHIL ABS PRELIM: 11.72 X10 (3) UL (ref 1.3–6.7)
NEUTROPHILS # BLD AUTO: 11.72 X10(3) UL (ref 1.3–6.7)
NEUTROPHILS NFR BLD AUTO: 79 %
PLATELET # BLD AUTO: 558 10(3)UL (ref 150–450)
RBC # BLD AUTO: 4.68 X10(6)UL (ref 3.8–5.1)
RED CELL DISTRIBUTION WIDTH-SD: 42.6 FL (ref 35.1–46.3)
WBC # BLD AUTO: 14.8 X10(3) UL (ref 4–13)

## 2017-03-25 NOTE — PROGRESS NOTES
Quick Note:    Please explain the following to home health nurse:  1. Anemia better  2. Elevated wbc count.  Recheck CBC on Tuesday    Call if fever or chills      ______

## 2017-04-03 ENCOUNTER — OFFICE VISIT (OUTPATIENT)
Dept: PHYSICAL THERAPY | Age: 77
End: 2017-04-03
Attending: ORTHOPAEDIC SURGERY
Payer: MEDICARE

## 2017-04-03 PROCEDURE — 97163 PT EVAL HIGH COMPLEX 45 MIN: CPT

## 2017-04-03 PROCEDURE — 97530 THERAPEUTIC ACTIVITIES: CPT

## 2017-04-03 NOTE — PROGRESS NOTES
POST-OP KNEE EVALUATION:   Referring Physician: Dr. Marina ref.  provider found  Diagnosis: S/p L TKA 3/10/17     Date of Service: 4/3/2017     PATIENT SUMMARY   Gonzalez Giron is a 78year old y/o female who presents to therapy today s/p L TKA on 3/10/17 w post operative. She has good gait pattern with light use of RW.  She is impaired in strength, balance, gait, stairs, and ROM and Stephen Jensend would benefit from skilled Physical Therapy to address the above impairments to return to PLOF    Precautions:  None  OB during gait and terminal knee extension in stance  · Pt will improve knee AROM flexion to >120 degrees to improve ability to perform stairs reciprocally   · Pt will improve quad strength to 5/5 to ascend 1 flight of stairs reciprocally without UE assist  ·

## 2017-04-05 ENCOUNTER — OFFICE VISIT (OUTPATIENT)
Dept: PHYSICAL THERAPY | Age: 77
End: 2017-04-05
Attending: ORTHOPAEDIC SURGERY
Payer: MEDICARE

## 2017-04-05 PROCEDURE — 97110 THERAPEUTIC EXERCISES: CPT

## 2017-04-05 PROCEDURE — 97116 GAIT TRAINING THERAPY: CPT

## 2017-04-05 PROCEDURE — 97530 THERAPEUTIC ACTIVITIES: CPT

## 2017-04-06 ENCOUNTER — OFFICE VISIT (OUTPATIENT)
Dept: PHYSICAL THERAPY | Age: 77
End: 2017-04-06
Attending: ORTHOPAEDIC SURGERY
Payer: MEDICARE

## 2017-04-06 PROCEDURE — 97110 THERAPEUTIC EXERCISES: CPT

## 2017-04-06 PROCEDURE — 97530 THERAPEUTIC ACTIVITIES: CPT

## 2017-04-06 NOTE — PROGRESS NOTES
Dx: S/p L TKA 3/10/17          Authorized # of Visits:  Jordan Durant; medicare          Next MD visit: to be assessed  Fall Risk: moderate    Precautions: none             Subjective: Pt reports pain 4/10 at start of therapy session.  She feels more swollen an stretch with quad set 10 sec hold 10 rpes        Heel slides 10 reps 10 sec hold x 2 sets Swiss ball knee flexion 10 reps manual OP        Seated manual knee flexion stretch 10 sec hold x 5 reps  Swiss ball TKE push down 10 sec hold 10 reps        Sit to s

## 2017-04-10 ENCOUNTER — OFFICE VISIT (OUTPATIENT)
Dept: PHYSICAL THERAPY | Age: 77
End: 2017-04-10
Attending: ORTHOPAEDIC SURGERY
Payer: MEDICARE

## 2017-04-10 PROCEDURE — 97530 THERAPEUTIC ACTIVITIES: CPT

## 2017-04-10 PROCEDURE — 97110 THERAPEUTIC EXERCISES: CPT

## 2017-04-10 NOTE — PROGRESS NOTES
Dx: S/p L TKA 3/10/17          Authorized # of Visits:  Kelsey Gusman; medicare          Next MD visit: 4/27/17  Fall Risk: moderate    Precautions: none             Subjective: Pt reports pain 0/10 at start of therapy session.  She had discomfort on and off ov quad L 10 reps x 2 sets X 10 reps x 2 sets       Swiss ball rolls 10 reps Supine manual knee extension stretch with quad set 10 sec hold 10 rpes Gait with SPC- 2 point pattern 10 min       Heel slides 10 reps 10 sec hold x 2 sets Swiss ball knee flexion 10

## 2017-04-11 PROBLEM — E11.40 UNCONTROLLED TYPE 2 DIABETES WITH NEUROPATHY (HCC): Status: ACTIVE | Noted: 2017-04-11

## 2017-04-11 PROBLEM — I10 ESSENTIAL HYPERTENSION: Status: ACTIVE | Noted: 2017-04-11

## 2017-04-11 PROBLEM — IMO0002 UNCONTROLLED TYPE 2 DIABETES WITH NEUROPATHY: Status: ACTIVE | Noted: 2017-04-11

## 2017-04-11 PROBLEM — E11.65 UNCONTROLLED TYPE 2 DIABETES WITH NEUROPATHY (HCC): Status: ACTIVE | Noted: 2017-04-11

## 2017-04-12 ENCOUNTER — OFFICE VISIT (OUTPATIENT)
Dept: PHYSICAL THERAPY | Age: 77
End: 2017-04-12
Attending: ORTHOPAEDIC SURGERY
Payer: MEDICARE

## 2017-04-12 PROCEDURE — 97110 THERAPEUTIC EXERCISES: CPT

## 2017-04-12 PROCEDURE — 97530 THERAPEUTIC ACTIVITIES: CPT

## 2017-04-12 NOTE — PROGRESS NOTES
Dx: S/p L TKA 3/10/17          Authorized # of Visits:  Destini Christopher; medicare          Next MD visit: 4/27/17  Fall Risk: moderate    Precautions: none             Subjective: Pt reports muscle soreness after last therapy session ~ 24 hours.  Repots no knee p sec hold x 10 reps OP on L X 30 sec hold x  X 3 sets      Manual patellar and incision mobilization 5 min on L Seated long arc quad L 10 reps x 2 sets X 10 reps x 2 sets  Steps ups 6 inch 15 reps on L      Swiss ball rolls 10 reps Supine manual knee extens

## 2017-04-13 ENCOUNTER — OFFICE VISIT (OUTPATIENT)
Dept: PHYSICAL THERAPY | Age: 77
End: 2017-04-13
Attending: ORTHOPAEDIC SURGERY
Payer: MEDICARE

## 2017-04-13 PROCEDURE — 97140 MANUAL THERAPY 1/> REGIONS: CPT

## 2017-04-13 PROCEDURE — 97110 THERAPEUTIC EXERCISES: CPT

## 2017-04-13 PROCEDURE — 97530 THERAPEUTIC ACTIVITIES: CPT

## 2017-04-13 NOTE — PROGRESS NOTES
Dx: S/p L TKA 3/10/17          Authorized # of Visits:  Shahida Brar; medicare          Next MD visit: 4/27/17  Fall Risk: moderate    Precautions: none             Subjective: Pt reports feeling more tightness this AM upon sitting with knee flexion   Objecti reps Seated manual knee flexion 10 sec hold x 10 reps OP on L X 30 sec hold x  X 3 sets X 3 sets     Manual patellar and incision mobilization 5 min on L Seated long arc quad L 10 reps x 2 sets X 10 reps x 2 sets  Steps ups 6 inch 15 reps on L Sit to stand stair, knee extension stretch   HEP: supine quad set with towel prop, SLR, calf+ gastroc stretch; heel slides; seated self assisted knee flexion stretch; sit to stand; step ups  Charges:  There ex: 1 Manual: 1; There act: 1     Total Timed Treatment: 45 min

## 2017-04-17 ENCOUNTER — OFFICE VISIT (OUTPATIENT)
Dept: PHYSICAL THERAPY | Age: 77
End: 2017-04-17
Attending: ORTHOPAEDIC SURGERY
Payer: MEDICARE

## 2017-04-17 PROCEDURE — 97530 THERAPEUTIC ACTIVITIES: CPT

## 2017-04-17 PROCEDURE — 97140 MANUAL THERAPY 1/> REGIONS: CPT

## 2017-04-17 PROCEDURE — 97110 THERAPEUTIC EXERCISES: CPT

## 2017-04-17 NOTE — PROGRESS NOTES
Dx: S/p L TKA 3/10/17          Authorized # of Visits:  Jam Ruelas; medicare          Next MD visit: 4/27/17  Fall Risk: moderate    Precautions: none             Subjective: Pt reports she wishes swelling would stay down.    Objective: Knee flexion: (seated 10 sets Gait without SPC 5 min, VCs for heel strike    SLR 10 reps Seated manual knee flexion 10 sec hold x 10 reps OP on L X 30 sec hold x  X 3 sets X 3 sets Seated knee flexion stretch 30 sec hold x 10 reps on the L    Manual patellar and incision mobili reps x 2 sets X 10 reps x 2 sets VCs for WB equally -  Pt education: stair negotiation at home, stretching      // Bars step ups 4 inch 10 reps on L; raising foot to stair 10 reps VCs to avoid circumduction - - Pt education: HEP recommendations, progress,

## 2017-04-19 ENCOUNTER — APPOINTMENT (OUTPATIENT)
Dept: PHYSICAL THERAPY | Age: 77
End: 2017-04-19
Attending: ORTHOPAEDIC SURGERY
Payer: MEDICARE

## 2017-04-20 ENCOUNTER — OFFICE VISIT (OUTPATIENT)
Dept: PHYSICAL THERAPY | Age: 77
End: 2017-04-20
Attending: ORTHOPAEDIC SURGERY
Payer: MEDICARE

## 2017-04-20 PROCEDURE — 97530 THERAPEUTIC ACTIVITIES: CPT

## 2017-04-20 PROCEDURE — 97110 THERAPEUTIC EXERCISES: CPT

## 2017-04-20 PROCEDURE — 97140 MANUAL THERAPY 1/> REGIONS: CPT

## 2017-04-20 NOTE — PROGRESS NOTES
Dx: S/p L TKA 3/10/17          Authorized # of Visits:  Bobby Meyers; medicare          Next MD visit: 4/27/17  Fall Risk: moderate    Precautions: none             Subjective: Pt reports knee is doing ok. Just intermittent swelling.  Stairs at home 8 inches a seat 7  Bike recumbent L0; nearly full revolution 10 min seat 7  X 5 min seat 7 full revolutions   Supine quad set with TKE and pillow prop 10 sec hold 10 reps Seated knee flexion stretch 30 sec hold x 3 sets on L X 30 sec hold x 3 sets X 3 sets X 10 sets x 2 sets Shuttle DLP L6 30 reps;  Shuttle SLP L 5 on L 30 reps    Gait training with SPC 10 min: VCs for sequencing, Supervision assistance HS stretch 30 sec hold x 3 sets Manual Extension stretch with OP 10 sec hold 10 reps x 2 sets Balance narrow foam 2 m

## 2017-04-24 ENCOUNTER — OFFICE VISIT (OUTPATIENT)
Dept: PHYSICAL THERAPY | Age: 77
End: 2017-04-24
Attending: ORTHOPAEDIC SURGERY
Payer: MEDICARE

## 2017-04-24 PROCEDURE — 97530 THERAPEUTIC ACTIVITIES: CPT

## 2017-04-24 PROCEDURE — 97140 MANUAL THERAPY 1/> REGIONS: CPT

## 2017-04-24 PROCEDURE — 97110 THERAPEUTIC EXERCISES: CPT

## 2017-04-24 NOTE — PROGRESS NOTES
Dx: S/p L TKA 3/10/17          Authorized # of Visits:  Lori Hutton; medicare          Next MD visit: 4/27/17  Fall Risk: moderate    Precautions: none             Subjective: Pt has been toying with her stairs. She can do 4-5 reciprocally.    Objective: Knee flexion stretch 30 sec hold x 10 reps on the L X 10 reps  X 15 reps    Steps ups 6 inch 15 reps on L Sit to stand, raised surface, no UE assist, VCs demonstrative cues for equal WB 10 reps x 2 sets Supine knee extension stretch 10 sec hold 10 reps X 10 rep ups  4/17/2017: stairs at home leading with LLE  Charges:  There ex: 1 Manual: 1; There act: 1     Total Timed Treatment: 45 min  Total Treatment Time: 45 min

## 2017-04-26 ENCOUNTER — OFFICE VISIT (OUTPATIENT)
Dept: PHYSICAL THERAPY | Age: 77
End: 2017-04-26
Attending: ORTHOPAEDIC SURGERY
Payer: MEDICARE

## 2017-04-26 PROCEDURE — 97110 THERAPEUTIC EXERCISES: CPT

## 2017-04-26 PROCEDURE — 97530 THERAPEUTIC ACTIVITIES: CPT

## 2017-04-26 PROCEDURE — 97140 MANUAL THERAPY 1/> REGIONS: CPT

## 2017-04-26 NOTE — PROGRESS NOTES
Dx: S/p L TKA 3/10/17          Authorized # of Visits:  Isaura Naylor; medicare          Next MD visit: 4/27/17  Fall Risk: moderate    Precautions: none           Progress Note:   Pt attended 10 visits in Physical Therapy. Subjective: Pt reports no pain.  Sh findings, precautions, and treatment options and has agreed to actively participate in planning and for this course of care. Thank you for your referral. If you have any questions, please contact me at Dept: [phone].     Sincerely,  Electronically s reps; Shuttle SLP L 5 on L 30 reps  Step down with pulling leg up and down - retro with end range active flexion functionally 10 reps x 2 sets -   Balance narrow foam 2 min   Seated TKE 20 reps Step ups and down 6 inch 10 reps Reciprocal stairs in [facility]

## 2017-05-03 ENCOUNTER — OFFICE VISIT (OUTPATIENT)
Dept: PHYSICAL THERAPY | Age: 77
End: 2017-05-03
Attending: ORTHOPAEDIC SURGERY
Payer: MEDICARE

## 2017-05-03 PROCEDURE — 97140 MANUAL THERAPY 1/> REGIONS: CPT

## 2017-05-03 PROCEDURE — 97110 THERAPEUTIC EXERCISES: CPT

## 2017-05-03 PROCEDURE — 97530 THERAPEUTIC ACTIVITIES: CPT

## 2017-05-03 NOTE — PROGRESS NOTES
Dx: S/p L TKA 3/10/17          Authorized # of Visits:  Keyon Myers; medicare          Next MD visit: 4/27/17  Fall Risk: moderate    Precautions: none           Subjective: Pt reports no pain. She went to physician and she is on schedule.  She feels the need X level 2 hills- seat 7 full revolution X level 3  Seat 6, level 2 hills 5 min    X 3 sets X 10 sets Gait without SPC 5 min, VCs for heel strike X with and without use of ' X 300' X 500' Seated knee flexion stretch 30 sec hold x 3 sets   X 3 sets X inch 10 reps Reciprocal stairs in gym SPC on L; rail on R, no VCs, improved pace 1 FOS X 1 FOS Shuttle SLP L5 20 reps each side; L 6 20 reps L   X 10 reps x 2 sets X 2 sets Manual patellar mobilization 5 min Reciprocal stairs 1 FOS x 1 reps- SPC on L, rail

## 2017-05-09 ENCOUNTER — OFFICE VISIT (OUTPATIENT)
Dept: PHYSICAL THERAPY | Age: 77
End: 2017-05-09
Attending: ORTHOPAEDIC SURGERY
Payer: MEDICARE

## 2017-05-09 PROCEDURE — 97530 THERAPEUTIC ACTIVITIES: CPT

## 2017-05-09 PROCEDURE — 97140 MANUAL THERAPY 1/> REGIONS: CPT

## 2017-05-09 PROCEDURE — 97110 THERAPEUTIC EXERCISES: CPT

## 2017-05-09 NOTE — PROGRESS NOTES
Dx: S/p L TKA 3/10/17          Authorized # of Visits:  Tanya Castro; medicare          Next MD visit: ~6/8/17  Fall Risk: moderate    Precautions: none           Subjective: Pt reports pain only if she stops taking advil on and off during the day.  She feels revolutions X level 2 hills- seat 7 full revolution X level 3  Seat 6, level 2 hills 5 min  X level 2 hills 5 min seat 6   X with and without use of ' X 300' X 500' Seated knee flexion stretch 30 sec hold x 3 sets SLR 10 reps x 2 sets R/L   X 10 rep for balance, balance and impairments   Pt education: HEP recommendations, progress with stairs, swelling and strategies to reduce swelling.   Pt education: HEP, progress, plan of care Pt education: progress, plan for continued therapy, impairments, goals, S

## 2017-05-11 ENCOUNTER — OFFICE VISIT (OUTPATIENT)
Dept: PHYSICAL THERAPY | Age: 77
End: 2017-05-11
Attending: ORTHOPAEDIC SURGERY
Payer: MEDICARE

## 2017-05-11 PROCEDURE — 97530 THERAPEUTIC ACTIVITIES: CPT

## 2017-05-11 PROCEDURE — 97140 MANUAL THERAPY 1/> REGIONS: CPT

## 2017-05-11 PROCEDURE — 97110 THERAPEUTIC EXERCISES: CPT

## 2017-05-11 NOTE — PROGRESS NOTES
Dx: S/p L TKA 3/10/17          Authorized # of Visits:  [employer]; medicare          Next MD visit: ~6/8/17  Fall Risk: moderate    Precautions: none           Subjective: Pt reports good days and bad day when it comes to \"feeling her knee. \" Some days sh 300' X 500' Seated knee flexion stretch 30 sec hold x 3 sets SLR 10 reps x 2 sets R/L Seated posterior Tibial glide gr III-IV 5 min on the L   X 10 reps  X 15 reps X 15 reps Seated posterior Tibial glide gr III-IV 5 min on the L Swiss ball rolls 20 reps  S Reciprocal stairs 1 FOS x 1 reps- SPC on L, rail on R -  Reassessment 10 min Semi tandem stance 30 sec hold x 3 sets each side - -   -  - - Pt education: HEP recommenations and benefits Pt education: reduced frequency after this week, progress, recommend

## 2017-05-15 ENCOUNTER — OFFICE VISIT (OUTPATIENT)
Dept: PHYSICAL THERAPY | Age: 77
End: 2017-05-15
Attending: ORTHOPAEDIC SURGERY
Payer: MEDICARE

## 2017-05-15 PROCEDURE — 97110 THERAPEUTIC EXERCISES: CPT

## 2017-05-15 PROCEDURE — 97140 MANUAL THERAPY 1/> REGIONS: CPT

## 2017-05-15 PROCEDURE — 97530 THERAPEUTIC ACTIVITIES: CPT

## 2017-05-15 NOTE — PROGRESS NOTES
Dx: S/p L TKA 3/10/17          Authorized # of Visits:  Tanya Castro; medicare          Next MD visit: ~6/8/17  Fall Risk: moderate    Precautions: none           Subjective: Pt was able to get in and out of her sons pool, exercise, walk etc. She notes most l hills- seat 7 full revolution X level 3  Seat 6, level 2 hills 5 min  X level 2 hills 5 min seat 6 X level 3 hills 5 min seat 6  X Level 4 hills 5 min seat 6   X with and without use of ' X 300' X 500' Seated knee flexion stretch 30 sec hold x 3 set environment, talking X 500'    Step ups and down 6 inch 10 reps Reciprocal stairs in gym SPC on L; rail on R, no VCs, improved pace 1 FOS X 1 FOS Shuttle SLP L5 20 reps each side; L 6 20 reps L Standing balance Normal MARYLU on foam, 2 min; head turns 2 min S

## 2017-05-18 ENCOUNTER — APPOINTMENT (OUTPATIENT)
Dept: PHYSICAL THERAPY | Age: 77
End: 2017-05-18
Attending: ORTHOPAEDIC SURGERY
Payer: MEDICARE

## 2017-05-22 ENCOUNTER — APPOINTMENT (OUTPATIENT)
Dept: PHYSICAL THERAPY | Age: 77
End: 2017-05-22
Attending: ORTHOPAEDIC SURGERY
Payer: MEDICARE

## 2017-05-24 ENCOUNTER — OFFICE VISIT (OUTPATIENT)
Dept: PHYSICAL THERAPY | Age: 77
End: 2017-05-24
Attending: ORTHOPAEDIC SURGERY
Payer: MEDICARE

## 2017-05-24 PROCEDURE — 97530 THERAPEUTIC ACTIVITIES: CPT

## 2017-05-24 PROCEDURE — 97110 THERAPEUTIC EXERCISES: CPT

## 2017-05-24 PROCEDURE — 97140 MANUAL THERAPY 1/> REGIONS: CPT

## 2017-05-24 NOTE — PROGRESS NOTES
Dx: S/p L TKA 3/10/17          Authorized # of Visits:  Marjie Grounds; medicare          Next MD visit: ~6/8/17  Fall Risk: moderate    Precautions: none        Discharge Summary; Pt attended 15 session in Physical Therapy      Subjective: Pt denies issues. advised of these findings, precautions, and treatment options and has agreed to actively participate in planning and for this course of care. Thank you for your referral. If you have any questions, please contact me at Dept: [phone].     Sincerely, sets Sit to stand 25 reps Shuttle DLP L5 20 reps; DLP L5 20 reps x 2 sets each side, actually harder subjectively on the RLE Shuttle DLP L 5 20 reps x 2 sets; SLP 20 reps x 2 sets   Sit to stand 10 reps x 2 sets various tactile and verbal  Step up and over progress, plan of care Pt education: progress, plan for continued therapy, impairments, goals, SPC - - -  -   - - -  - -     HEP (written handouts provided) and pt education: Knee flexion stretch on stair, knee extension stretch   HEP: supine quad set with

## 2017-05-31 ENCOUNTER — APPOINTMENT (OUTPATIENT)
Dept: PHYSICAL THERAPY | Age: 77
End: 2017-05-31
Attending: ORTHOPAEDIC SURGERY
Payer: MEDICARE

## 2017-10-12 ENCOUNTER — PRIOR ORIGINAL RECORDS (OUTPATIENT)
Dept: OTHER | Age: 77
End: 2017-10-12

## 2017-10-12 PROBLEM — E11.40 UNCONTROLLED TYPE 2 DIABETES WITH NEUROPATHY (HCC): Status: RESOLVED | Noted: 2017-04-11 | Resolved: 2017-10-12

## 2017-10-12 PROBLEM — IMO0002 UNCONTROLLED TYPE 2 DIABETES WITH NEUROPATHY: Status: RESOLVED | Noted: 2017-04-11 | Resolved: 2017-10-12

## 2017-10-12 PROBLEM — E11.65 UNCONTROLLED TYPE 2 DIABETES WITH NEUROPATHY (HCC): Status: RESOLVED | Noted: 2017-04-11 | Resolved: 2017-10-12

## 2017-10-12 PROCEDURE — 82043 UR ALBUMIN QUANTITATIVE: CPT | Performed by: INTERNAL MEDICINE

## 2017-10-12 PROCEDURE — 36415 COLL VENOUS BLD VENIPUNCTURE: CPT | Performed by: INTERNAL MEDICINE

## 2017-10-12 PROCEDURE — 82570 ASSAY OF URINE CREATININE: CPT | Performed by: INTERNAL MEDICINE

## 2017-11-13 ENCOUNTER — HOSPITAL ENCOUNTER (OUTPATIENT)
Dept: MAMMOGRAPHY | Age: 77
Discharge: HOME | End: 2017-11-13
Attending: INTERNAL MEDICINE
Payer: MEDICARE

## 2017-11-13 DIAGNOSIS — Z12.39 SCREENING FOR BREAST CANCER: ICD-10-CM

## 2017-11-13 PROCEDURE — 77067 SCR MAMMO BI INCL CAD: CPT | Performed by: INTERNAL MEDICINE

## 2017-11-13 PROCEDURE — 77063 BREAST TOMOSYNTHESIS BI: CPT | Performed by: INTERNAL MEDICINE

## 2017-11-26 PROBLEM — M17.12 PRIMARY OSTEOARTHRITIS OF LEFT KNEE: Status: RESOLVED | Noted: 2017-03-08 | Resolved: 2017-11-26

## 2017-11-26 PROBLEM — M17.12 OSTEOARTHRITIS OF LEFT KNEE: Status: RESOLVED | Noted: 2017-03-10 | Resolved: 2017-11-26

## 2017-11-26 PROBLEM — Z47.89 ORTHOPEDIC AFTERCARE: Status: RESOLVED | Noted: 2017-03-13 | Resolved: 2017-11-26

## 2018-02-08 ENCOUNTER — PRIOR ORIGINAL RECORDS (OUTPATIENT)
Dept: OTHER | Age: 78
End: 2018-02-08

## 2018-02-12 ENCOUNTER — PRIOR ORIGINAL RECORDS (OUTPATIENT)
Dept: OTHER | Age: 78
End: 2018-02-12

## 2018-02-12 ENCOUNTER — MYAURORA ACCOUNT LINK (OUTPATIENT)
Dept: OTHER | Age: 78
End: 2018-02-12

## 2018-02-13 LAB
HEMATOCRIT: 40.4 %
HEMOGLOBIN A1C: 8.3 %
HEMOGLOBIN: 13.1 G/DL
PLATELETS: 558 K/UL
RED BLOOD COUNT: 4.68 X 10-6/U
WHITE BLOOD COUNT: 14.8 X 10-3/U

## 2018-03-27 ENCOUNTER — PRIOR ORIGINAL RECORDS (OUTPATIENT)
Dept: OTHER | Age: 78
End: 2018-03-27

## 2018-05-09 PROCEDURE — 82570 ASSAY OF URINE CREATININE: CPT | Performed by: INTERNAL MEDICINE

## 2018-05-09 PROCEDURE — 82043 UR ALBUMIN QUANTITATIVE: CPT | Performed by: INTERNAL MEDICINE

## 2018-08-28 ENCOUNTER — OFFICE VISIT (OUTPATIENT)
Dept: NEUROLOGY | Facility: CLINIC | Age: 78
End: 2018-08-28
Payer: MEDICARE

## 2018-08-28 VITALS
HEART RATE: 66 BPM | SYSTOLIC BLOOD PRESSURE: 130 MMHG | RESPIRATION RATE: 16 BRPM | HEIGHT: 65 IN | WEIGHT: 196 LBS | BODY MASS INDEX: 32.65 KG/M2 | DIASTOLIC BLOOD PRESSURE: 67 MMHG

## 2018-08-28 DIAGNOSIS — R53.1 WEAKNESS: ICD-10-CM

## 2018-08-28 DIAGNOSIS — R26.81 GAIT INSTABILITY: ICD-10-CM

## 2018-08-28 DIAGNOSIS — G62.9 NEUROPATHY: Primary | ICD-10-CM

## 2018-08-28 PROCEDURE — 99215 OFFICE O/P EST HI 40 MIN: CPT | Performed by: OTHER

## 2018-08-28 NOTE — PROGRESS NOTES
[facility] Progress Note    HPI  Patient presents with:  Weakness: Follow up on weakness in the legs    As per my initial H&P from 2/11/15:  \"Evangelina DOAN Chris Gambino is a 69year old, who presents for evaluation of dizziness and imbalance.   P in the right occiput, with no radiation and no sensitivity to light / sound or nausea/emesis.  She also rarely notes spasm of the left side of her neck  Of note, she states she has a history of \"migraine\" headaches in the past with more severe pain and lo 12/06:  PTCA to LAD,  12/08 Nuclear stress neg, EF 62%   • CORONARY ARTERY DISEASE     11/06: ant ischemia on stress,   12/06: PTCA to distal LAD,    12/08 nuclear stress neg, EF 62%   • Coronary atherosclerosis    • Diabetes ([address]    • High blood pressure History   Problem Relation Age of Onset   • colon cancer [OTHER] Mother      age 68   • Heart Disorder Father      age 76   • Diabetes Paternal Grandmother    • gastric cancer [OTHER] Maternal Grandmother      Social History    Marital status:  Disp: 360 tablet, Rfl: 3  •  FREESTYLE LITE TEST In Vitro Strip, Test 3 times daily. Dx:E11. 9. IDDM, Disp: 300 strip, Rfl: 3  •  torsemide (DEMADEX) 20 MG Oral Tab, Take 20 mg by mouth daily. , Disp: , Rfl:     Review of Systems:  No chest pain or palpitatio response was absent  2. Right median sensory response was abnormal due to prolonged   peak latency, reduced amplitude, and slowed conduction velocity.    3. Right ulnar sensory response was abnormal due to prolonged   peak latency, reduced amplitude, and sl entrapment neuropathy vs lower cervical   radiculopathy in the right UE.      Labs:  None New since last visit:       Prior as noted below:    Component      Latest Ref Rng 7/27/2015   HEMOGLOBIN A1C      4.3 - 5.6 %    Cartridge Lot#          Cartridge Exp broad-based disc bulge. There is mild endplate hypertrophy. There is mild facet joint degenerative changes. The AP diameter of the canal is narrowed to 7 mm. There is moderate lateral recess, subarticular zone, neural  foraminal narrowing bilaterally.   L4- semiovale most likely reflect the sequela of chronic microvascular ischemic disease. Less likely differential   diagnostic considerations include demyelinating disease, postinfectious disease, and chronic migraine headaches.     MRA brain:  FINDINGS:   INTE involves the mid right kidney medially most consistent with a cyst.   BONES: Vertebral body height maintained. 6 mm focus of increased T1 and T2 signal within the superior L3 endplate most consistent with an intraosseous hemangioma.    CORD/CAUDA EQUINA: No injections as needed as she does not seem to tolerate even low dose of oral neuropathic pain medications     Also advised of fall safety recommendations, and to avoid walking to the bathroom with the lights off, remove all throw rugs    (G62.9) Neuropathy

## 2018-08-28 NOTE — PATIENT INSTRUCTIONS
Set up appointment with PT for gait training   Have EMG done   Refill policies:    • Allow 2-3 business days for refills; controlled substances may take longer.   • Contact your pharmacy at least 5 days prior to running out of medication and have them send insurance authorization, patient may be responsible for the entire amount billed. Precertification and Prior Authorizations: If your physician has recommended that you have a procedure or additional testing performed.   [facility] (

## 2018-08-31 ENCOUNTER — LAB ENCOUNTER (OUTPATIENT)
Dept: LAB | Age: 78
End: 2018-08-31
Attending: Other
Payer: MEDICARE

## 2018-08-31 DIAGNOSIS — R26.81 GAIT INSTABILITY: ICD-10-CM

## 2018-08-31 DIAGNOSIS — R53.1 WEAKNESS: ICD-10-CM

## 2018-08-31 LAB — CK SERPL-CCNC: 175 IU/L (ref 26–192)

## 2018-08-31 PROCEDURE — 36415 COLL VENOUS BLD VENIPUNCTURE: CPT

## 2018-08-31 PROCEDURE — 82550 ASSAY OF CK (CPK): CPT

## 2018-09-19 ENCOUNTER — OFFICE VISIT (OUTPATIENT)
Dept: PHYSICAL THERAPY | Age: 78
End: 2018-09-19
Attending: Other
Payer: MEDICARE

## 2018-09-19 DIAGNOSIS — R26.81 GAIT INSTABILITY: ICD-10-CM

## 2018-09-19 DIAGNOSIS — G62.9 NEUROPATHY: ICD-10-CM

## 2018-09-19 DIAGNOSIS — R53.1 WEAKNESS: ICD-10-CM

## 2018-09-19 PROCEDURE — 97162 PT EVAL MOD COMPLEX 30 MIN: CPT

## 2018-09-19 PROCEDURE — 97112 NEUROMUSCULAR REEDUCATION: CPT

## 2018-09-19 NOTE — PROGRESS NOTES
FALL SCREEN EVALUATION   Referring Physician: Dr. Daly Amezcua  Diagnosis: gait instability, fall risk, neuropathy, weakness, and chronic LBP, stenosis     Date of Service: 9/19/2018     PATIENT SUMMARY   Lo Quesada is a 70year old y/o female who prese describes prior level of function it has been about 5 mo since she has walked without a cane- she has a lot of anxiety about falling . Pt goals include to get more confident and improve standing/walking tolerance .   Past medical history was reviewed with MAURA 8   71-76 y/o Men: 6, Women: 5   80-81 y/o Men: 8, Women: 5   80-81 y/o Men: 6, Women: 8   80-81 y/o Men 7, Women: 4]    Gait stumbles intermittently with attempts not to use SPC and with speed changes and with direction changes.  She is able to catch h demonstrate ability to lift bag of groceries from floor at indep level with no LOB. · Pt will demonstrate ability to negotiate 1 FOS with use of rail as needed.    · Pt will perform TUG in </=20 seconds with least restrictive AD, demonstrating improved ga

## 2018-09-24 ENCOUNTER — OFFICE VISIT (OUTPATIENT)
Dept: PHYSICAL THERAPY | Age: 78
End: 2018-09-24
Attending: Other
Payer: MEDICARE

## 2018-09-24 PROCEDURE — 97530 THERAPEUTIC ACTIVITIES: CPT

## 2018-09-24 PROCEDURE — 97110 THERAPEUTIC EXERCISES: CPT

## 2018-09-24 PROCEDURE — 97112 NEUROMUSCULAR REEDUCATION: CPT

## 2018-09-24 NOTE — PROGRESS NOTES
Dx: gait instability, fall risk, neuropathy, weakness, and chronic LBP, stenosis           Authorized # of Visits:  Medicare: 30 days or 10/19/18         Next MD visit: October 1st- EMG; none after this   Fall Risk: high          Precautions: none Lateral walks 6 laps          Forward/retro walking 6 laps          Narrow MARYLU 30 sec hold x 2 sets         HEP (written handouts provided) and pt education: Noemi Quijano, heel and toe raises, narrow MARYLU    Charges:  There ex: 1; neuro alejandro: 1 there act:

## 2018-09-26 ENCOUNTER — OFFICE VISIT (OUTPATIENT)
Dept: PHYSICAL THERAPY | Age: 78
End: 2018-09-26
Attending: Other
Payer: MEDICARE

## 2018-09-26 PROCEDURE — 97112 NEUROMUSCULAR REEDUCATION: CPT

## 2018-09-26 PROCEDURE — 97110 THERAPEUTIC EXERCISES: CPT

## 2018-09-26 NOTE — PROGRESS NOTES
Dx: gait instability, fall risk, neuropathy, weakness, and chronic LBP, stenosis           Authorized # of Visits:  Medicare: 30 days or 10/19/18         Next MD visit: October 1st- EMG; none after this   Fall Risk: high          Precautions: none Standing HS stretch 30 sec hold x 2 sets R/L        Forward/retro walking 6 laps  Lateral walks 6 laps light UE assist        Narrow MARYLU 30 sec hold x 2 sets Rockerboard 2 min forward/back with UE assist         Narrow MARYLU 30 sec hold x 2 sets; Narrow MARYLU

## 2018-09-28 ENCOUNTER — PRIOR ORIGINAL RECORDS (OUTPATIENT)
Dept: OTHER | Age: 78
End: 2018-09-28

## 2018-09-29 PROBLEM — E78.5 HYPERLIPIDEMIA, UNSPECIFIED HYPERLIPIDEMIA TYPE: Status: ACTIVE | Noted: 2018-09-29

## 2018-09-29 PROBLEM — E11.29 MICROALBUMINURIA DUE TO TYPE 2 DIABETES MELLITUS: Status: ACTIVE | Noted: 2018-09-29

## 2018-09-29 PROBLEM — R80.9 MICROALBUMINURIA DUE TO TYPE 2 DIABETES MELLITUS (HCC): Status: ACTIVE | Noted: 2018-09-29

## 2018-09-29 PROBLEM — R80.9 MICROALBUMINURIA DUE TO TYPE 2 DIABETES MELLITUS: Status: ACTIVE | Noted: 2018-09-29

## 2018-09-29 PROBLEM — E11.29 MICROALBUMINURIA DUE TO TYPE 2 DIABETES MELLITUS (HCC): Status: ACTIVE | Noted: 2018-09-29

## 2018-10-01 ENCOUNTER — PROCEDURE VISIT (OUTPATIENT)
Dept: NEUROLOGY | Facility: CLINIC | Age: 78
End: 2018-10-01
Payer: MEDICARE

## 2018-10-01 ENCOUNTER — OFFICE VISIT (OUTPATIENT)
Dept: PHYSICAL THERAPY | Age: 78
End: 2018-10-01
Attending: Other
Payer: MEDICARE

## 2018-10-01 DIAGNOSIS — G62.9 NEUROPATHY: Primary | ICD-10-CM

## 2018-10-01 DIAGNOSIS — R26.81 GAIT INSTABILITY: ICD-10-CM

## 2018-10-01 PROCEDURE — 97112 NEUROMUSCULAR REEDUCATION: CPT

## 2018-10-01 PROCEDURE — 95886 MUSC TEST DONE W/N TEST COMP: CPT | Performed by: OTHER

## 2018-10-01 PROCEDURE — 95910 NRV CNDJ TEST 7-8 STUDIES: CPT | Performed by: OTHER

## 2018-10-01 PROCEDURE — 97110 THERAPEUTIC EXERCISES: CPT

## 2018-10-01 NOTE — PROGRESS NOTES
Dx: gait instability, fall risk, neuropathy, weakness, and chronic LBP, stenosis           Authorized # of Visits:  Medicare: 30 days or 10/19/18         Next MD visit: October 1st- EMG; none after this   Fall Risk: high          Precautions: none 1# R/L       Seated PF 20 reps R/L X 20 reps R/L X 25 reps R/L       Seated DF 20 reps R/L X 20 reps R/L X 25 reps R/L       Seated piriformis 30 sec hold x 2 sets R/L X 30 sec hold x 2 sets X 30 sec hold x 2 sets       Standing calf stretch over towel julius

## 2018-10-04 ENCOUNTER — OFFICE VISIT (OUTPATIENT)
Dept: PHYSICAL THERAPY | Age: 78
End: 2018-10-04
Attending: Other
Payer: MEDICARE

## 2018-10-04 PROCEDURE — 97110 THERAPEUTIC EXERCISES: CPT

## 2018-10-04 PROCEDURE — 97530 THERAPEUTIC ACTIVITIES: CPT

## 2018-10-04 PROCEDURE — 97112 NEUROMUSCULAR REEDUCATION: CPT

## 2018-10-04 NOTE — PROGRESS NOTES
Dx: gait instability, fall risk, neuropathy, weakness, and chronic LBP, stenosis           Authorized # of Visits:  Medicare: 30 days or 10/19/18         Next MD visit: Follow up via phone after ENG  Fall Risk: high          Precautions: none             S sets X 30 sec hold x 2 sets R/L      Standing calf stretch over towel step over 15 reps R/L X 15 reps R/L X 15 reps R/L- encouraged for longer hold 5-10 sec and TCs provided to keep heel down Seated flexion swiss ball stretch 10 reps x 2 sets t/o session

## 2018-10-08 ENCOUNTER — OFFICE VISIT (OUTPATIENT)
Dept: PHYSICAL THERAPY | Age: 78
End: 2018-10-08
Attending: Other
Payer: MEDICARE

## 2018-10-08 PROCEDURE — 97530 THERAPEUTIC ACTIVITIES: CPT

## 2018-10-08 PROCEDURE — 97110 THERAPEUTIC EXERCISES: CPT

## 2018-10-08 PROCEDURE — 97112 NEUROMUSCULAR REEDUCATION: CPT

## 2018-10-08 NOTE — PROCEDURES
[facility]  [address], [address] [facility]  Ph: [phone]  FAX: [phone]        Full Name: Jasen Sahu Gender: Female  Patient ID: [id number] YOB: 1940      Visit Date: 10/1/2018 10:53 ms mV % ms mVms  cm ms m/s   R Median - APB      Wrist APB 5.42 5.7 100 6.09 20.0 Wrist - APB 7        Elbow APB 10.16 5.1 88.9 6.67 18.4 Elbow - Wrist 22 4.74 46   R Ulnar - ADM      Wrist ADM 2.92 5.5 100 7.03 14.2 Wrist - ADM 7        B. Elbow ADM 7.29 5. Right common peroneal motor response was absent. 6. Right tibial motor response was absent.    7. Right median motor response was abnormal due to prolonged distal motor latency, with normal amplitude, and mildly slowed conduction velocity; F wave was p

## 2018-10-08 NOTE — PROGRESS NOTES
Dx: gait instability, fall risk, neuropathy, weakness, and chronic LBP, stenosis           Authorized # of Visits:  Medicare: 30 days or 10/19/18         Next MD visit: Follow up via phone after ENG  Fall Risk: high          Precautions: none             S Step level 6; 5 min  X 5 min  X 5 min  X 5 min  X 5 min      Seated marches 15 reps R/L X 20 reps R/L X 25 reps R/L X 20 reps R/L 2# X 25 reps R/L 2#      Seated long arc quads 15 reps R/L X 20 reps R/L X 20 reps 1# R/L X 20 reps 2# R/L X 25 reps R/L 2#

## 2018-10-10 ENCOUNTER — OFFICE VISIT (OUTPATIENT)
Dept: PHYSICAL THERAPY | Age: 78
End: 2018-10-10
Attending: Other
Payer: MEDICARE

## 2018-10-10 PROCEDURE — 97530 THERAPEUTIC ACTIVITIES: CPT

## 2018-10-10 PROCEDURE — 97112 NEUROMUSCULAR REEDUCATION: CPT

## 2018-10-10 PROCEDURE — 97116 GAIT TRAINING THERAPY: CPT

## 2018-10-10 NOTE — PROGRESS NOTES
Dx: gait instability, fall risk, neuropathy, weakness, and chronic LBP, stenosis           Authorized # of Visits:  Medicare: 30 days or 10/19/18         Next MD visit: Follow up via phone after ENG  Fall Risk: high          Precautions: none             S in // bars (10' each) 6 laps no UE assist forward; light retro    Seated long arc quads 15 reps R/L X 20 reps R/L X 20 reps 1# R/L X 20 reps 2# R/L X 25 reps R/L 2#  Lateral walks 6 laps no UE assist    Seated PF 20 reps R/L X 20 reps R/L X 25 reps R/L X 2 provided) and pt education: MING, florina, heel and toe raises, narrow AMRYLU  10/4/2018 walking with RW in open environment- stretch for LBP  10/10/2018 narrow MARYLU with (head turns horizontal and vertical, reaching outside MARYLU); walking practice with quad can

## 2018-10-12 ENCOUNTER — TELEPHONE (OUTPATIENT)
Dept: NEUROLOGY | Facility: CLINIC | Age: 78
End: 2018-10-12

## 2018-10-12 NOTE — TELEPHONE ENCOUNTER
Called patient; neuropathy noted on EMG  But not demyelinating - no clear radiculopathy but would not rule this out; she states pain in back - advised to consider pain management as well as PT - patient will consider      Did not want to try gabapentin;

## 2018-10-15 ENCOUNTER — OFFICE VISIT (OUTPATIENT)
Dept: PHYSICAL THERAPY | Age: 78
End: 2018-10-15
Attending: Other
Payer: MEDICARE

## 2018-10-15 PROCEDURE — 97110 THERAPEUTIC EXERCISES: CPT

## 2018-10-15 PROCEDURE — 97116 GAIT TRAINING THERAPY: CPT

## 2018-10-15 PROCEDURE — 97112 NEUROMUSCULAR REEDUCATION: CPT

## 2018-10-15 NOTE — PROGRESS NOTES
Dx: gait instability, fall risk, neuropathy, weakness, and chronic LBP, stenosis           Authorized # of Visits:  Medicare: 30 days or 10/19/18         Next MD visit: Follow up via phone after ENG  Fall Risk: high          Precautions: none           Pro HEP,   progress balance, work on picking up objects  Patient/Family/Caregiver was advised of these findings, precautions, and treatment options and has agreed to actively participate in planning and for this course of care.     Thank you for your referral. hold x 2 sets Walking forward/retro in // bars (10' each) 6 laps no UE assist forward; light retro X 6 laps  No UE assist Standing narrow head turns horizontal 90 sec; vertical 90 sec -     Forward/retro walking 6 laps  Lateral walks 6 laps light UE assist

## 2018-10-18 ENCOUNTER — OFFICE VISIT (OUTPATIENT)
Dept: PHYSICAL THERAPY | Age: 78
End: 2018-10-18
Attending: Other
Payer: MEDICARE

## 2018-10-18 PROCEDURE — 97116 GAIT TRAINING THERAPY: CPT

## 2018-10-18 PROCEDURE — 97112 NEUROMUSCULAR REEDUCATION: CPT

## 2018-10-18 PROCEDURE — 97530 THERAPEUTIC ACTIVITIES: CPT

## 2018-10-18 NOTE — PROGRESS NOTES
Dx: gait instability, fall risk, neuropathy, weakness, and chronic LBP, stenosis           Authorized # of Visits:  Medicare: 30 days or 10/19/18         Next MD visit: Follow up via phone after ENG  Fall Risk: high          Precautions: none           Dis Patient/Family/Caregiver was advised of these findings, precautions, and treatment options and has agreed to actively participate in planning and for this course of care.     Thank you for your referral. If you have any questions, please contact me at Dep Lateral walks 6 laps  Standing HS stretch 30 sec hold x 2 sets R/L Manual 30 sec hold x 2 sets Walking forward/retro in // bars (10' each) 6 laps no UE assist forward; light retro X 6 laps  No UE assist Standing narrow head turns horizontal 90 sec; sravani

## 2019-02-07 ENCOUNTER — MYAURORA ACCOUNT LINK (OUTPATIENT)
Dept: OTHER | Age: 79
End: 2019-02-07

## 2019-02-07 ENCOUNTER — PRIOR ORIGINAL RECORDS (OUTPATIENT)
Dept: OTHER | Age: 79
End: 2019-02-07

## 2019-02-07 LAB
ALT (SGPT): 28 U/L
AST (SGOT): 18 U/L
BILIRUBIN TOTAL: 0.55 MG/DL
BUN: 13 MG/DL
CALCIUM: 9.5 MG/DL
CHLORIDE: 105 MEQ/L
CHOLESTEROL, TOTAL: 131 MG/DL
CREATININE, SERUM: 0.6 MG/DL
GLUCOSE: 178 MG/DL
GLUCOSE: 178 MG/DL
HDL CHOLESTEROL: 49 MG/DL
HEMOGLOBIN A1C: 8.9 %
LDL CHOLESTEROL: 69 MG/DL
POTASSIUM, SERUM: 4.6 MEQ/L
PROTEIN, TOTAL: 6.6 G/DL
SGOT (AST): 18 IU/L
SGPT (ALT): 28 IU/L
SODIUM: 140 MEQ/L
THYROID STIMULATING HORMONE: 4.34 MLU/L
TRIGLYCERIDES: 65 MG/DL

## 2019-02-28 VITALS
BODY MASS INDEX: 34.99 KG/M2 | HEART RATE: 78 BPM | SYSTOLIC BLOOD PRESSURE: 128 MMHG | HEIGHT: 65 IN | WEIGHT: 210 LBS | DIASTOLIC BLOOD PRESSURE: 72 MMHG

## 2019-02-28 VITALS
SYSTOLIC BLOOD PRESSURE: 110 MMHG | RESPIRATION RATE: 16 BRPM | DIASTOLIC BLOOD PRESSURE: 66 MMHG | BODY MASS INDEX: 31.34 KG/M2 | HEIGHT: 66 IN | WEIGHT: 195 LBS | HEART RATE: 66 BPM

## 2019-02-28 VITALS
BODY MASS INDEX: 33.49 KG/M2 | HEIGHT: 65 IN | WEIGHT: 201 LBS | HEART RATE: 60 BPM | SYSTOLIC BLOOD PRESSURE: 132 MMHG | DIASTOLIC BLOOD PRESSURE: 60 MMHG

## 2019-03-01 VITALS
RESPIRATION RATE: 16 BRPM | WEIGHT: 180 LBS | BODY MASS INDEX: 29.99 KG/M2 | DIASTOLIC BLOOD PRESSURE: 72 MMHG | HEART RATE: 76 BPM | HEIGHT: 65 IN | SYSTOLIC BLOOD PRESSURE: 140 MMHG

## 2019-03-01 VITALS
DIASTOLIC BLOOD PRESSURE: 56 MMHG | HEIGHT: 65 IN | BODY MASS INDEX: 30.66 KG/M2 | SYSTOLIC BLOOD PRESSURE: 126 MMHG | HEART RATE: 88 BPM | WEIGHT: 184 LBS

## 2019-04-05 PROCEDURE — 87086 URINE CULTURE/COLONY COUNT: CPT | Performed by: INTERNAL MEDICINE

## 2019-04-05 PROCEDURE — 87077 CULTURE AEROBIC IDENTIFY: CPT | Performed by: INTERNAL MEDICINE

## 2019-04-05 PROCEDURE — 87186 SC STD MICRODIL/AGAR DIL: CPT | Performed by: INTERNAL MEDICINE

## 2019-04-12 RX ORDER — ASPIRIN 325 MG
325 TABLET ORAL DAILY
Status: ON HOLD | COMMUNITY
End: 2019-04-30

## 2019-04-17 ENCOUNTER — HOSPITAL ENCOUNTER (OUTPATIENT)
Dept: PHYSICAL THERAPY | Facility: HOSPITAL | Age: 79
Discharge: HOME | End: 2019-04-17
Attending: ORTHOPAEDIC SURGERY
Payer: MEDICARE

## 2019-04-17 ENCOUNTER — LABORATORY ENCOUNTER (OUTPATIENT)
Dept: LAB | Facility: HOSPITAL | Age: 79
End: 2019-04-17
Attending: ORTHOPAEDIC SURGERY
Payer: MEDICARE

## 2019-04-17 DIAGNOSIS — M48.061 SPINAL STENOSIS OF LUMBAR REGION WITHOUT NEUROGENIC CLAUDICATION: ICD-10-CM

## 2019-04-17 PROCEDURE — 85730 THROMBOPLASTIN TIME PARTIAL: CPT

## 2019-04-17 PROCEDURE — 36415 COLL VENOUS BLD VENIPUNCTURE: CPT

## 2019-04-17 PROCEDURE — 81001 URINALYSIS AUTO W/SCOPE: CPT

## 2019-04-17 PROCEDURE — 87086 URINE CULTURE/COLONY COUNT: CPT

## 2019-04-17 PROCEDURE — 85610 PROTHROMBIN TIME: CPT

## 2019-04-17 PROCEDURE — 85025 COMPLETE CBC W/AUTO DIFF WBC: CPT

## 2019-04-17 PROCEDURE — 80053 COMPREHEN METABOLIC PANEL: CPT

## 2019-04-23 RX ORDER — GLIPIZIDE 10 MG/1
10 TABLET, FILM COATED, EXTENDED RELEASE ORAL DAILY
COMMUNITY
Start: 2016-11-01

## 2019-04-23 RX ORDER — MELATONIN
COMMUNITY
End: 2020-02-25 | Stop reason: ALTCHOICE

## 2019-04-23 RX ORDER — LISINOPRIL 10 MG/1
TABLET ORAL
COMMUNITY
Start: 2010-01-28

## 2019-04-23 RX ORDER — INSULIN GLARGINE 100 [IU]/ML
46 INJECTION, SOLUTION SUBCUTANEOUS DAILY
COMMUNITY
Start: 2015-06-18

## 2019-04-23 RX ORDER — ASPIRIN 325 MG
TABLET ORAL
COMMUNITY
Start: 2010-01-28

## 2019-04-23 RX ORDER — TORSEMIDE 20 MG/1
TABLET ORAL
COMMUNITY
Start: 2018-08-10 | End: 2019-05-18 | Stop reason: SDUPTHER

## 2019-04-23 RX ORDER — ATORVASTATIN CALCIUM 20 MG/1
20 TABLET, FILM COATED ORAL DAILY
COMMUNITY
Start: 2015-03-27

## 2019-04-29 ENCOUNTER — APPOINTMENT (OUTPATIENT)
Dept: GENERAL RADIOLOGY | Facility: HOSPITAL | Age: 79
DRG: 519 | End: 2019-04-29
Attending: ORTHOPAEDIC SURGERY
Payer: MEDICARE

## 2019-04-29 ENCOUNTER — HOSPITAL ENCOUNTER (INPATIENT)
Facility: HOSPITAL | Age: 79
LOS: 2 days | Discharge: HOME HEALTH | DRG: 519 | End: 2019-05-01
Attending: ORTHOPAEDIC SURGERY | Admitting: ORTHOPAEDIC SURGERY
Payer: MEDICARE

## 2019-04-29 ENCOUNTER — ANESTHESIA EVENT (OUTPATIENT)
Dept: SURGERY | Facility: HOSPITAL | Age: 79
DRG: 519 | End: 2019-04-29
Payer: MEDICARE

## 2019-04-29 ENCOUNTER — ANESTHESIA (OUTPATIENT)
Dept: SURGERY | Facility: HOSPITAL | Age: 79
DRG: 519 | End: 2019-04-29
Payer: MEDICARE

## 2019-04-29 DIAGNOSIS — M48.061 SPINAL STENOSIS OF LUMBAR REGION WITHOUT NEUROGENIC CLAUDICATION: Primary | ICD-10-CM

## 2019-04-29 PROCEDURE — 00BT0ZZ EXCISION OF SPINAL MENINGES, OPEN APPROACH: ICD-10-PCS | Performed by: ORTHOPAEDIC SURGERY

## 2019-04-29 PROCEDURE — 83036 HEMOGLOBIN GLYCOSYLATED A1C: CPT | Performed by: INTERNAL MEDICINE

## 2019-04-29 PROCEDURE — 85027 COMPLETE CBC AUTOMATED: CPT | Performed by: PHYSICIAN ASSISTANT

## 2019-04-29 PROCEDURE — 82962 GLUCOSE BLOOD TEST: CPT

## 2019-04-29 PROCEDURE — 88311 DECALCIFY TISSUE: CPT | Performed by: ORTHOPAEDIC SURGERY

## 2019-04-29 PROCEDURE — 01NB0ZZ RELEASE LUMBAR NERVE, OPEN APPROACH: ICD-10-PCS | Performed by: ORTHOPAEDIC SURGERY

## 2019-04-29 PROCEDURE — 72020 X-RAY EXAM OF SPINE 1 VIEW: CPT | Performed by: ORTHOPAEDIC SURGERY

## 2019-04-29 PROCEDURE — 88304 TISSUE EXAM BY PATHOLOGIST: CPT | Performed by: ORTHOPAEDIC SURGERY

## 2019-04-29 DEVICE — DURAGENXS MATRIX DURAL REGENERATION MATRIX IS AN ABSORBABLE IMPLANT FOR REPAIR OF DURAL DEFECTS. DURAGENXS MATRIX IS AN EASY TO HANDLE, SOFT, WHITE, PLIABLE, NONFRIABLE, POROUS COLLAGEN MATRIX. DURAGENXS MATRIX IS SUPPLIED STERILE, NONPYROGENIC, FOR SINGLE USE IN DOUBLE PEEL PACKAGES IN A VARIETY OF SIZES.
Type: IMPLANTABLE DEVICE | Site: BACK | Status: FUNCTIONAL
Brand: DURAGEN XS™ DURAL REGENERATION MATRIX

## 2019-04-29 RX ORDER — MORPHINE SULFATE 4 MG/ML
2 INJECTION, SOLUTION INTRAMUSCULAR; INTRAVENOUS EVERY 2 HOUR PRN
Status: DISCONTINUED | OUTPATIENT
Start: 2019-04-29 | End: 2019-05-01

## 2019-04-29 RX ORDER — ONDANSETRON 2 MG/ML
4 INJECTION INTRAMUSCULAR; INTRAVENOUS ONCE
Status: COMPLETED | OUTPATIENT
Start: 2019-04-29 | End: 2019-04-29

## 2019-04-29 RX ORDER — GLIPIZIDE 10 MG/1
10 TABLET ORAL
COMMUNITY
End: 2020-02-11

## 2019-04-29 RX ORDER — BUPIVACAINE HYDROCHLORIDE AND EPINEPHRINE 5; 5 MG/ML; UG/ML
INJECTION, SOLUTION EPIDURAL; INTRACAUDAL; PERINEURAL AS NEEDED
Status: DISCONTINUED | OUTPATIENT
Start: 2019-04-29 | End: 2019-04-29 | Stop reason: HOSPADM

## 2019-04-29 RX ORDER — DEXTROSE MONOHYDRATE 25 G/50ML
50 INJECTION, SOLUTION INTRAVENOUS
Status: DISCONTINUED | OUTPATIENT
Start: 2019-04-29 | End: 2019-04-29 | Stop reason: HOSPADM

## 2019-04-29 RX ORDER — HYDROMORPHONE HYDROCHLORIDE 1 MG/ML
0.4 INJECTION, SOLUTION INTRAMUSCULAR; INTRAVENOUS; SUBCUTANEOUS EVERY 5 MIN PRN
Status: DISCONTINUED | OUTPATIENT
Start: 2019-04-29 | End: 2019-04-29 | Stop reason: HOSPADM

## 2019-04-29 RX ORDER — SODIUM PHOSPHATE, DIBASIC AND SODIUM PHOSPHATE, MONOBASIC 7; 19 G/133ML; G/133ML
1 ENEMA RECTAL ONCE AS NEEDED
Status: DISCONTINUED | OUTPATIENT
Start: 2019-04-29 | End: 2019-05-01

## 2019-04-29 RX ORDER — VANCOMYCIN HYDROCHLORIDE 1 G/20ML
INJECTION, POWDER, LYOPHILIZED, FOR SOLUTION INTRAVENOUS AS NEEDED
Status: DISCONTINUED | OUTPATIENT
Start: 2019-04-29 | End: 2019-04-29 | Stop reason: HOSPADM

## 2019-04-29 RX ORDER — DOCUSATE SODIUM 100 MG/1
100 CAPSULE, LIQUID FILLED ORAL 2 TIMES DAILY
Status: DISCONTINUED | OUTPATIENT
Start: 2019-04-29 | End: 2019-05-01

## 2019-04-29 RX ORDER — MORPHINE SULFATE 4 MG/ML
4 INJECTION, SOLUTION INTRAMUSCULAR; INTRAVENOUS EVERY 2 HOUR PRN
Status: DISCONTINUED | OUTPATIENT
Start: 2019-04-29 | End: 2019-05-01

## 2019-04-29 RX ORDER — BISACODYL 10 MG
10 SUPPOSITORY, RECTAL RECTAL
Status: DISCONTINUED | OUTPATIENT
Start: 2019-04-29 | End: 2019-05-01

## 2019-04-29 RX ORDER — DIPHENHYDRAMINE HCL 25 MG
25 CAPSULE ORAL EVERY 4 HOURS PRN
Status: DISCONTINUED | OUTPATIENT
Start: 2019-04-29 | End: 2019-05-01

## 2019-04-29 RX ORDER — LABETALOL HYDROCHLORIDE 5 MG/ML
5 INJECTION, SOLUTION INTRAVENOUS EVERY 5 MIN PRN
Status: DISCONTINUED | OUTPATIENT
Start: 2019-04-29 | End: 2019-04-29 | Stop reason: HOSPADM

## 2019-04-29 RX ORDER — METFORMIN HYDROCHLORIDE 500 MG/1
1000 TABLET, EXTENDED RELEASE ORAL 2 TIMES DAILY
COMMUNITY
End: 2020-01-16 | Stop reason: DRUGHIGH

## 2019-04-29 RX ORDER — MORPHINE SULFATE 15 MG/1
15 TABLET ORAL EVERY 4 HOURS PRN
Status: DISCONTINUED | OUTPATIENT
Start: 2019-04-29 | End: 2019-04-30

## 2019-04-29 RX ORDER — DEXTROSE MONOHYDRATE 25 G/50ML
50 INJECTION, SOLUTION INTRAVENOUS
Status: DISCONTINUED | OUTPATIENT
Start: 2019-04-29 | End: 2019-05-01

## 2019-04-29 RX ORDER — ONDANSETRON 2 MG/ML
4 INJECTION INTRAMUSCULAR; INTRAVENOUS AS NEEDED
Status: DISCONTINUED | OUTPATIENT
Start: 2019-04-29 | End: 2019-04-29 | Stop reason: HOSPADM

## 2019-04-29 RX ORDER — DOXYCYCLINE HYCLATE 50 MG/1
50 CAPSULE ORAL 2 TIMES DAILY
COMMUNITY
End: 2020-01-16 | Stop reason: DRUGHIGH

## 2019-04-29 RX ORDER — CEFAZOLIN SODIUM/WATER 2 G/20 ML
2 SYRINGE (ML) INTRAVENOUS ONCE
Status: COMPLETED | OUTPATIENT
Start: 2019-04-29 | End: 2019-04-29

## 2019-04-29 RX ORDER — OXYCODONE HYDROCHLORIDE 10 MG/1
10 TABLET ORAL EVERY 4 HOURS PRN
Status: DISCONTINUED | OUTPATIENT
Start: 2019-04-29 | End: 2019-04-30

## 2019-04-29 RX ORDER — LISINOPRIL 10 MG/1
10 TABLET ORAL DAILY
Status: DISCONTINUED | OUTPATIENT
Start: 2019-04-30 | End: 2019-05-01

## 2019-04-29 RX ORDER — CELECOXIB 200 MG/1
200 CAPSULE ORAL ONCE
Status: COMPLETED | OUTPATIENT
Start: 2019-04-29 | End: 2019-04-29

## 2019-04-29 RX ORDER — BACITRACIN 50000 [USP'U]/1
INJECTION, POWDER, LYOPHILIZED, FOR SOLUTION INTRAMUSCULAR AS NEEDED
Status: DISCONTINUED | OUTPATIENT
Start: 2019-04-29 | End: 2019-04-29 | Stop reason: HOSPADM

## 2019-04-29 RX ORDER — DIAZEPAM 5 MG/1
5 TABLET ORAL EVERY 6 HOURS PRN
Status: DISCONTINUED | OUTPATIENT
Start: 2019-04-29 | End: 2019-05-01

## 2019-04-29 RX ORDER — POLYETHYLENE GLYCOL 3350 17 G/17G
17 POWDER, FOR SOLUTION ORAL DAILY PRN
Status: DISCONTINUED | OUTPATIENT
Start: 2019-04-29 | End: 2019-05-01

## 2019-04-29 RX ORDER — METOCLOPRAMIDE HYDROCHLORIDE 5 MG/ML
10 INJECTION INTRAMUSCULAR; INTRAVENOUS EVERY 6 HOURS PRN
Status: DISCONTINUED | OUTPATIENT
Start: 2019-04-29 | End: 2019-05-01

## 2019-04-29 RX ORDER — SODIUM CHLORIDE, SODIUM LACTATE, POTASSIUM CHLORIDE, CALCIUM CHLORIDE 600; 310; 30; 20 MG/100ML; MG/100ML; MG/100ML; MG/100ML
INJECTION, SOLUTION INTRAVENOUS CONTINUOUS
Status: DISCONTINUED | OUTPATIENT
Start: 2019-04-29 | End: 2019-05-01

## 2019-04-29 RX ORDER — SENNOSIDES 8.6 MG
17.2 TABLET ORAL NIGHTLY
Status: DISCONTINUED | OUTPATIENT
Start: 2019-04-29 | End: 2019-05-01

## 2019-04-29 RX ORDER — ATORVASTATIN CALCIUM 20 MG/1
20 TABLET, FILM COATED ORAL DAILY
Status: DISCONTINUED | OUTPATIENT
Start: 2019-04-29 | End: 2019-05-01

## 2019-04-29 RX ORDER — MIDAZOLAM HYDROCHLORIDE 1 MG/ML
1 INJECTION INTRAMUSCULAR; INTRAVENOUS EVERY 5 MIN PRN
Status: DISCONTINUED | OUTPATIENT
Start: 2019-04-29 | End: 2019-04-29 | Stop reason: HOSPADM

## 2019-04-29 RX ORDER — OXYCODONE HYDROCHLORIDE 5 MG/1
5 TABLET ORAL EVERY 4 HOURS PRN
Status: DISCONTINUED | OUTPATIENT
Start: 2019-04-29 | End: 2019-04-30

## 2019-04-29 RX ORDER — MORPHINE SULFATE 4 MG/ML
1 INJECTION, SOLUTION INTRAMUSCULAR; INTRAVENOUS EVERY 2 HOUR PRN
Status: DISCONTINUED | OUTPATIENT
Start: 2019-04-29 | End: 2019-05-01

## 2019-04-29 RX ORDER — SODIUM CHLORIDE 9 MG/ML
INJECTION, SOLUTION INTRAVENOUS CONTINUOUS
Status: DISCONTINUED | OUTPATIENT
Start: 2019-04-29 | End: 2019-05-01

## 2019-04-29 RX ORDER — LISINOPRIL 10 MG/1
10 TABLET ORAL DAILY
COMMUNITY
End: 2020-04-12

## 2019-04-29 RX ORDER — ATORVASTATIN CALCIUM 20 MG/1
20 TABLET, FILM COATED ORAL DAILY
COMMUNITY
End: 2019-05-29 | Stop reason: SDUPTHER

## 2019-04-29 RX ORDER — ONDANSETRON 2 MG/ML
4 INJECTION INTRAMUSCULAR; INTRAVENOUS EVERY 4 HOURS PRN
Status: ACTIVE | OUTPATIENT
Start: 2019-04-29 | End: 2019-04-30

## 2019-04-29 RX ORDER — GABAPENTIN 600 MG/1
600 TABLET ORAL ONCE
Status: COMPLETED | OUTPATIENT
Start: 2019-04-29 | End: 2019-04-29

## 2019-04-29 RX ORDER — DIPHENHYDRAMINE HYDROCHLORIDE 50 MG/ML
25 INJECTION INTRAMUSCULAR; INTRAVENOUS EVERY 4 HOURS PRN
Status: DISCONTINUED | OUTPATIENT
Start: 2019-04-29 | End: 2019-05-01

## 2019-04-29 RX ORDER — SODIUM CHLORIDE, SODIUM LACTATE, POTASSIUM CHLORIDE, CALCIUM CHLORIDE 600; 310; 30; 20 MG/100ML; MG/100ML; MG/100ML; MG/100ML
INJECTION, SOLUTION INTRAVENOUS CONTINUOUS
Status: DISCONTINUED | OUTPATIENT
Start: 2019-04-29 | End: 2019-04-29 | Stop reason: HOSPADM

## 2019-04-29 RX ORDER — NALOXONE HYDROCHLORIDE 0.4 MG/ML
80 INJECTION, SOLUTION INTRAMUSCULAR; INTRAVENOUS; SUBCUTANEOUS AS NEEDED
Status: DISCONTINUED | OUTPATIENT
Start: 2019-04-29 | End: 2019-04-29 | Stop reason: HOSPADM

## 2019-04-29 RX ORDER — TIZANIDINE 2 MG/1
2 TABLET ORAL 3 TIMES DAILY PRN
Status: DISCONTINUED | OUTPATIENT
Start: 2019-04-29 | End: 2019-05-01

## 2019-04-29 RX ORDER — CEFAZOLIN SODIUM/WATER 2 G/20 ML
2 SYRINGE (ML) INTRAVENOUS EVERY 8 HOURS
Status: COMPLETED | OUTPATIENT
Start: 2019-04-29 | End: 2019-04-30

## 2019-04-29 NOTE — ANESTHESIA POSTPROCEDURE EVALUATION
[address] Patient Status:  Surgery Admit - Inpt   Age/Gender 70year old female MRN [id number]   [facility] SURGERY Attending Pratik Crawford MD   Hosp Day # 0 PCP Susanna Hernandez MD       Anesthesia Post-op Note    Pro

## 2019-04-29 NOTE — ANESTHESIA PREPROCEDURE EVALUATION
PRE-OP EVALUATION    Patient Name: Rosi Pond    Pre-op Diagnosis: Spinal stenosis of lumbar region without neurogenic claudication [M48.061]    Procedure(s):  Lumbar 3-Lumbar 4, Lumbar 4-Lumbar 5, Lumbar 5-Sacral 1 decompression with discectomy (LANTUS SOLOSTAR) 100 UNIT/ML Subcutaneous Solution Pen-injector INJECT 46 UNITS INTO THE SKIN ONCE DAILY Disp: 45 mL Rfl: 1   MetFORMIN HCl  MG Oral Tablet 24 Hr TAKE TWO TABLETS BY MOUTH TWICE DAILY Disp: 360 tablet Rfl: 1   [DISCONTINUED] LISINOPR 30-39. 9)     Lumbar radiculopathy     Lumbar facet arthropathy     Sacroiliitis, not elsewhere classified (HCC)     Long-term insulin use (HCC)     Benign positional vertigo     Vitamin D deficiency     Uncontrolled type 2 diabetes mellitus with diabetic p Niki Mckeon MD at [facility] MAIN OR   • REVISE MEDIAN N/CARPAL TUNNEL SURG      left carpal tunnel release   • SACROILIAC JOINT INJECTION BILATERAL Bilateral 3/21/2016    Performed by Niki Mckeon MD at [address] [place] [address]

## 2019-04-29 NOTE — H&P
78,F/ who presents for a pre-operative physical exam. Patient is to have Lumbar 3-Lumbar 4, Lumbar 4-Lumbar 5, Lumbar 5-Sacral 1 decompression with discectomy to be done by Dr. Bindu Au at [facility] on  4/29/19.       HPI:   Pt complains of  Low back pain     Co OSTEOARTHRITIS       1/06 Rt menisectomy   • Osteoarthritis     • Overweight and obesity     • Personal history of colonic polyps       2/99 adenoma, 4/03 negative   • Type 2 diabetes mellitus, uncontrolled ([facility] [address]     • Uncontrolled type 2 diabetes mellitus MEALS , Disp: 180 tablet, Rfl: 2  •  MetFORMIN HCl  MG Oral Tablet 24 Hr, TAKE TWO TABLETS BY MOUTH TWICE DAILY, Disp: 360 tablet, Rfl: 1  •  LISINOPRIL 10 MG Oral Tab, TAKE ONE TABLET BY MOUTH ONE TIME DAILY, Disp: 90 tablet, Rfl: 0  •  ATORVASTATIN Not on file        Active member of club or organization: Not on file        Attends meetings of clubs or organizations: Not on file        Relationship status: Not on file      Intimate partner violence:        Fear of current or ex partner: Not on file 98%   Weight: 195 lb 3.2 oz (88.5 kg)            GENERAL: well developed, well nourished,in no apparent distress  SKIN: no rashes,no suspicious lesions  HEENT: atraumatic, normocephalic,ears and throat are clear  EYES:PERRLA, EOMI, conjunctiva are clear  N

## 2019-04-29 NOTE — PROGRESS NOTES
Patient stated that she cannot take Aspart insulin because it's giving her severe headaches. Dr. Nataly Simpson notified, short acting Insulin discontinued.

## 2019-04-29 NOTE — BRIEF OP NOTE
Pre-Operative Diagnosis: Spinal stenosis of lumbar region without neurogenic claudication [M48.061]     Post-Operative Diagnosis: same      Procedure Performed:   Procedure(s):  Lumbar 3-Lumbar 4, Lumbar 4-Lumbar 5, Lumbar 5-Sacral 1 decompression with dis

## 2019-04-29 NOTE — PROGRESS NOTES
S: She has no back pain or leg pain. No numbness    Inspection:  Awake alert No acute distress. No difficulty breathing     Blood pressure 135/82, pulse 61, temperature 98.5 °F (36.9 °C), temperature source Oral, resp.  rate 16, height 5' 5\" (1.651 m), we

## 2019-04-29 NOTE — PROGRESS NOTES
NURSING ADMISSION NOTE      Patient admitted via bed from PACU post Lumbar 3-Lumbar 4, Lumbar 4-Lumbar 5, Lumbar 5-Sacral 1 decompression with discectomy by Dr. Kailey Byrd. Oriented to room.   Received awake, alert and oriented x 4, accompanied by her kimberly

## 2019-04-30 PROCEDURE — 97530 THERAPEUTIC ACTIVITIES: CPT

## 2019-04-30 PROCEDURE — 85027 COMPLETE CBC AUTOMATED: CPT | Performed by: PHYSICIAN ASSISTANT

## 2019-04-30 PROCEDURE — 97535 SELF CARE MNGMENT TRAINING: CPT

## 2019-04-30 PROCEDURE — 82962 GLUCOSE BLOOD TEST: CPT

## 2019-04-30 PROCEDURE — 97165 OT EVAL LOW COMPLEX 30 MIN: CPT

## 2019-04-30 PROCEDURE — 97161 PT EVAL LOW COMPLEX 20 MIN: CPT

## 2019-04-30 RX ORDER — GLIPIZIDE 5 MG/1
10 TABLET ORAL
Status: DISCONTINUED | OUTPATIENT
Start: 2019-04-30 | End: 2019-05-01

## 2019-04-30 RX ORDER — HYDROCODONE BITARTRATE AND ACETAMINOPHEN 10; 325 MG/1; MG/1
1 TABLET ORAL EVERY 6 HOURS PRN
Status: DISCONTINUED | OUTPATIENT
Start: 2019-04-30 | End: 2019-05-01

## 2019-04-30 RX ORDER — HYDROCODONE BITARTRATE AND ACETAMINOPHEN 10; 325 MG/1; MG/1
2 TABLET ORAL EVERY 6 HOURS PRN
Status: DISCONTINUED | OUTPATIENT
Start: 2019-04-30 | End: 2019-05-01

## 2019-04-30 RX ORDER — ASPIRIN 325 MG
325 TABLET ORAL DAILY
Qty: 1 TABLET | Refills: 0 | Status: SHIPPED | OUTPATIENT
Start: 2019-05-05

## 2019-04-30 NOTE — PROGRESS NOTES
Patient and  Taryn Lucas) attended discharge spine education class. Printed discharge education sheet provided and reviewed. Teach back done. Questions solicited and answered. Tolerated activity well.

## 2019-04-30 NOTE — PHYSICAL THERAPY NOTE
PHYSICAL THERAPY EVALUATION - INPATIENT     Room Number: 381/381-A  Evaluation Date: 4/30/2019  Type of Evaluation: Initial  Physician Order: PT Eval and Treat    Presenting Problem: S/p L3-L4,L4-L5,L5-S1 Decompression with discectomy on 04/29/19  Re OF JOINT UNLISTED Right     KNEE   • CATARACT  05/2018    Bilateral   • CATH DRUG ELUTING STENT      X2   • CHOLECYSTECTOMY  1997   • COLONOSCOPY N/A 10/10/2013    Performed by Bobo Bueno MD at [address] [facility] ENDOSCOPY   • [address] [place] [address] Rolling walker;Cane  Patient Regularly Uses: None    Prior Level of [place]: Patient was independent with ADLs & self care. Ambulated with cane or RW as needed. H/o Falls reported - last one was in December 2018.   had TKA in February 2019, walk right   ACTIVITY TOLERANCE     AM-PAC '6-Clicks' INPATIENT SHORT FORM - BASIC MOBILITY  How much difficulty does the patient currently have. ..  -   Turning over in bed (including adjusting bedclothes, sheets and blankets)?: A Little   -   Sitting down on a conservation  Functional activity tolerated  Gait training  Neuromuscular re-educate  Transfer training  stairs training    Patient End of Session: Up in chair;Needs met;Call light within reach;RN aware of session/findings; All patient questions and concern training;Balance training  Rehab Potential : Good  Frequency (Obs): Daily  Number of Visits to Meet Established Goals: 2      CURRENT GOALS    Goal #1 Patient is able to demonstrate supine - sit EOB @ level: modified independent     Goal #2 Patient is able

## 2019-04-30 NOTE — CM/SW NOTE
04/30/19 1500   CM/SW Screening   Referral Source Physician   Information Source Chart review;Nursing rounds   Patient's Mental Status Alert;Oriented   Patient's [address]   Patient lives with Spouse   Patient Status Prior to Admission   In

## 2019-04-30 NOTE — CONSULTS
[facility] Hospitalist Initial Consult       Reason for consult: Medical Management sp lumbar surgery      History of Present Illness: Patient is a 70year old female with PMH sig for CAD, DM2, OA who presents sp TKA.    They tolerated the procedure well without a repair torn meniscus   • ANGIOPLASTY (CORONARY)  2006 w/2 drug eluting stents   • ARTHROSCOPY OF JOINT UNLISTED Right     KNEE   • CATARACT  05/2018    Bilateral   • CATH DRUG ELUTING STENT      X2   • CHOLECYSTECTOMY  1997   • COLONOSCOPY N/A 10/10/201 Used    Alcohol use: No      Alcohol/week: 0.0 oz       Fam Hx  Family History   Problem Relation Age of Onset   • Other (colon cancer) Mother         age 68   • Heart Disorder Father         age 76   • Diabetes Paternal Grandmother    • Other (gastric can 04/29/19  1735 04/29/19  2105 04/30/19  0816   PGLU 116* 103* 127* 230* 190*       No results for input(s): TROP in the last 168 hours. ASSESSMENT / PLAN:    sp L2-5 decompression and removal of mass  - Plan per ortho. Continue PT/OT.   Pain is currentl

## 2019-04-30 NOTE — OPERATIVE REPORT
[facility]    PATIENT'S NAME: Teresitakyle CuellarGabi   ATTENDING PHYSICIAN: Felicia Campbell M.D. OPERATING PHYSICIAN: Felicia Campbell M.D.    PATIENT ACCOUNT#:   [de-identified]    LOCATION:  [address] [place] [address]  MEDICAL RECORD #:   [id number]       DATE OF BIRTH:  0 entirely intact. Self-retaining retractors were applied. We first began at the proximal level of our decompression and removed portions of the interspinous ligament and spinous processes. This was done at all indicated levels.   Curettes were used to g decompression, all areas were free of neurologic compression. Attention then turned to the L4-5 level where the cranial and caudal nerve roots were both explored.   This was done by elevating a plane with the Vu ball and curettes between the ligament foraminotomies, the cranial and caudal nerve roots at this level. Contralateral decompression was undertaken thus creating a bilateral decompression and bilateral microforaminotomy and hemilaminotomy using undercutting technique.   An undercutting facet sp

## 2019-04-30 NOTE — PLAN OF CARE
Patient has been reporting moderate lumbar pain , the pre-op weakness in her legs still present.  She has been medicated with Norco with good pain relieve, incision was soaked this morning with bloody drainage, dressing was changed to pressure dressing and

## 2019-04-30 NOTE — PROGRESS NOTES
S: Moderate back pain with no leg pain. No improvement of her weakness, and still has some numbness to bilateral legs. H/o peripheral neuropathy. No headaches. Some trouble walking yesterday due to increased back pain.       Inspection:  Awake alert No

## 2019-05-01 ENCOUNTER — APPOINTMENT (OUTPATIENT)
Dept: ULTRASOUND IMAGING | Facility: HOSPITAL | Age: 79
DRG: 519 | End: 2019-05-01
Attending: PHYSICIAN ASSISTANT
Payer: MEDICARE

## 2019-05-01 VITALS
TEMPERATURE: 98 F | HEIGHT: 65 IN | BODY MASS INDEX: 32.58 KG/M2 | WEIGHT: 195.56 LBS | HEART RATE: 65 BPM | SYSTOLIC BLOOD PRESSURE: 105 MMHG | RESPIRATION RATE: 18 BRPM | DIASTOLIC BLOOD PRESSURE: 55 MMHG | OXYGEN SATURATION: 95 %

## 2019-05-01 PROCEDURE — 82962 GLUCOSE BLOOD TEST: CPT

## 2019-05-01 PROCEDURE — 97116 GAIT TRAINING THERAPY: CPT

## 2019-05-01 PROCEDURE — 93970 EXTREMITY STUDY: CPT | Performed by: PHYSICIAN ASSISTANT

## 2019-05-01 PROCEDURE — 97530 THERAPEUTIC ACTIVITIES: CPT

## 2019-05-01 NOTE — CM/SW NOTE
AVS sent to agency at this time     05/01/19 1429   Discharge disposition   Expected discharge disposition Home-Health   Name of Facillity/Home Care/Hospice   ([facility]/Terence Cardenas - Srinath Perrin)   Discharge transportation Private car

## 2019-05-01 NOTE — PLAN OF CARE
Patient's pain has been well controlled with Po pain medication, bilateral lower extremities remain weak, and numb, has +2 edema on her feet. Able to ambulate with minimal assistance, voiding freely.  VS remain stable, possible discharge later today if haley

## 2019-05-01 NOTE — OCCUPATIONAL THERAPY NOTE
OCCUPATIONAL THERAPY          OT treatment attempted, the patient is off the unit for testing to r/o LE DVT.  Will re-attempt later as appropriate

## 2019-05-01 NOTE — PLAN OF CARE
Problem: POD 2 L3-S1 decompression Discectomy    Data: Pt is A+Ox4, on room air, VSS. , SCD's & B[employer] applied. MF dressing is C/D/I w/ no drainage present. Change dressing in morning to assess drainage status.  Instructed Pt to use IS 10x per Hr while aw

## 2019-05-01 NOTE — PROGRESS NOTES
NURSING DISCHARGE NOTE    Discharged Home via Wheelchair. Accompanied by Spouse  Belongings Taken by patient/family.   A dressing change teaching was done  And all instructions were discussed with both the patient and the spouse and verbalized Gudelia

## 2019-05-01 NOTE — PROGRESS NOTES
S: Moderate back pain with no leg pain. She still has numbness in bilateral lower legs. Still has weakness in bilateral feet. H/o peripheral neuropathy. No headaches. She walked better with PT, and wants to go home with home health.      Inspection:  A

## 2019-05-01 NOTE — OCCUPATIONAL THERAPY NOTE
OCCUPATIONAL THERAPY EVALUATION - INPATIENT     Room Number: 381/381-A  Evaluation Date: 5/1/2019  Type of Evaluation: Initial  Presenting Problem: (L3-S1 decomp/discectomy)    Physician Order: IP Consult to Occupational Therapy  Reason for Therapy: ADL/IA Bilateral   • CATH DRUG ELUTING STENT      X2   • CHOLECYSTECTOMY  1997   • COLONOSCOPY N/A 10/10/2013    Performed by Nella Eason MD at [facility] ENDOSCOPY   • [address]    adenomatous polyp   • COLONOSCOPY,DIAGNOSTIC  2003    wnl   • C Occupation/Status: (retired)  Hand Dominance: Right  Drives: Yes  Patient Regularly Uses: None    Prior Level of Function: independent, uses cane in community, as needed. Drives. Spouse can assist ,  he uses cane.      SUBJECTIVE       Patient self-stat assistance    Skilled Therapy Provided:   OT role/goals explained, initial assessment completed  Patient was educated on spine precautions, energy conservation, sequencing, and safety for ADLs. Logroll completed with min cues and supervision.   OT educated with this level of impairment often benefit from home at DC. Recommend home with family assistance, patient will benefit form 1 more OT session to reinforce LB dressing with AE.      Patient Complexity  Occupational Profile/Medical History LOW - Brief hist

## 2019-05-01 NOTE — PHYSICAL THERAPY NOTE
PHYSICAL THERAPY TREATMENT NOTE - INPATIENT    Room Number: 381/381-A     Session: 1  Number of Visits to Meet Established Goals: 2    Presenting Problem: S/p L3-L4,L4-L5,L5-S1 Decompression with discectomy on 04/29/19    Problem List  Active Problems: COLONOSCOPY,DIAGNOSTIC  2003    wnl   • COLONOSCOPY,DIAGNOSTIC  10/10/13    normal   • ENDOVENOUS LASER VEIN ADDON      Right Greater Saph V  per Dr. Chinyere Licona   • HYSTERECTOMY      w/ BSO   • KNEE REPLACEMENT SURGERY  03/17/2018    left   • KNEE TOTAL REPLA Activity promotion; Body mechanics;Breathing techniques;Relaxation;Repositioning    BALANCE                                                                                                                     Static Sitting: Good  Dynamic Sitting: Fair + above transfers safely during this session. Once patient was standing with RW, she ambulated with modified independence. Decreased gait speed & bilateral foot drag @ times noted. Continues to demonstrate decreased strength in bilateral dorsiflexors. Patient Potential : Good  Frequency (Obs): Daily  CURRENT GOALS     Goal #1 Patient is able to demonstrate supine - sit EOB @ level: modified independent  Needed  to assist - 05/01/19   Goal #2 Patient is able to demonstrate transfers EOB to/from Chair/Whee

## 2019-05-01 NOTE — CM/SW NOTE
Per RN Haja Perez, pt is now agreeable to home health nursing and PT. [facility] De Souza with [facility] but they are currently not able to staff pts address in [facility]. Referral sent via [facility] to [facility] [phone].      Samia Colin RN

## 2019-05-16 ENCOUNTER — OFFICE VISIT (OUTPATIENT)
Dept: PHYSICAL THERAPY | Age: 79
End: 2019-05-16
Attending: PHYSICIAN ASSISTANT
Payer: MEDICARE

## 2019-05-16 DIAGNOSIS — Z98.890 S/P LUMBAR DISCECTOMY: ICD-10-CM

## 2019-05-16 PROCEDURE — 87186 SC STD MICRODIL/AGAR DIL: CPT | Performed by: EMERGENCY MEDICINE

## 2019-05-16 PROCEDURE — 97161 PT EVAL LOW COMPLEX 20 MIN: CPT

## 2019-05-16 PROCEDURE — 97110 THERAPEUTIC EXERCISES: CPT

## 2019-05-16 PROCEDURE — 87086 URINE CULTURE/COLONY COUNT: CPT | Performed by: EMERGENCY MEDICINE

## 2019-05-16 PROCEDURE — 87088 URINE BACTERIA CULTURE: CPT | Performed by: EMERGENCY MEDICINE

## 2019-05-16 NOTE — PROGRESS NOTES
SPINE EVALUATION:   Referring Physician: Dr. Devi Quiñonez  Diagnosis: s/p L3-S1 discectomy and decompression      Date of Service: 5/16/2019     PATIENT SUMMARY   Raquel Corcoran is a 70year old y/o female who presents to therapy today s/p Lumbar 3-Lumbar history was reviewed with pt.  Significant findings include   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     idiopathic angioedema   • Back problem     sciatica   • Carpal tunnel syndrome    • CORONARY ARTERY DIS stand upright with minimal pain  Incision: intact and scabbing, no drainage  Edema: 2+ pitting BLE  Gait: RW; slightly forward flexed, shuffling, otherwise unremarkable   Neuro Screen: Sensation diminished digits 1-5 bilaterally and forefoot    Hip and kne flight of stairs reciprocally with use of handrail (10 visits)  · Pt will be independent and compliant with comprehensive HEP to maintain progress achieved in PT (ongoing)    Frequency / Duration: Patient will be seen for 1-2 x/week or a total of 10 visits

## 2019-05-21 ENCOUNTER — OFFICE VISIT (OUTPATIENT)
Dept: PHYSICAL THERAPY | Age: 79
End: 2019-05-21
Attending: PHYSICIAN ASSISTANT
Payer: MEDICARE

## 2019-05-21 PROCEDURE — 97116 GAIT TRAINING THERAPY: CPT

## 2019-05-21 PROCEDURE — 97110 THERAPEUTIC EXERCISES: CPT

## 2019-05-21 NOTE — PROGRESS NOTES
Dx: s/p L3-S1 discectomy and decompression          Authorized # of Visits:  Medicare         Next MD visit: Dr. Josesito Beatty June 4th   Fall Risk: high         Precautions: spinal precautions             Subjective: Pt states no pain or pain medication required s education: DF, PF, SAQ, florina  5/21/2019 hip abduction yellow band; hip adduction   Charges: there ex: 1; gait: 1;      Total Timed Treatment: 30 min  Total Treatment Time: 30 min

## 2019-05-22 PROCEDURE — 87086 URINE CULTURE/COLONY COUNT: CPT | Performed by: INTERNAL MEDICINE

## 2019-05-22 PROCEDURE — 87186 SC STD MICRODIL/AGAR DIL: CPT | Performed by: INTERNAL MEDICINE

## 2019-05-22 PROCEDURE — 87077 CULTURE AEROBIC IDENTIFY: CPT | Performed by: INTERNAL MEDICINE

## 2019-05-22 RX ORDER — TORSEMIDE 20 MG/1
TABLET ORAL
Qty: 90 TABLET | Refills: 0 | Status: SHIPPED | OUTPATIENT
Start: 2019-05-22 | End: 2019-09-28 | Stop reason: SDUPTHER

## 2019-05-23 ENCOUNTER — OFFICE VISIT (OUTPATIENT)
Dept: PHYSICAL THERAPY | Age: 79
End: 2019-05-23
Attending: PHYSICIAN ASSISTANT
Payer: MEDICARE

## 2019-05-23 PROCEDURE — 97110 THERAPEUTIC EXERCISES: CPT

## 2019-05-23 PROCEDURE — 97116 GAIT TRAINING THERAPY: CPT

## 2019-05-23 NOTE — PROGRESS NOTES
Dx: s/p L3-S1 discectomy and decompression          Authorized # of Visits:  Medicare         Next MD visit: Dr. Donavan Blum June 4th   Fall Risk: high         Precautions: spinal precautions             Subjective: Pt states no soreness after last therapy milan Seated hip abduction yellow band 20 reps  X 20 reps         Seated hip adduction 20 reps 2 sec hold X 20 reps         Gait- 350' in fitness center with RW lightly for balance  X 500' in fitness center with RW for balance        -  -        Pt education

## 2019-05-28 ENCOUNTER — OFFICE VISIT (OUTPATIENT)
Dept: PHYSICAL THERAPY | Age: 79
End: 2019-05-28
Attending: PHYSICIAN ASSISTANT
Payer: MEDICARE

## 2019-05-28 PROCEDURE — 97116 GAIT TRAINING THERAPY: CPT

## 2019-05-28 PROCEDURE — 97110 THERAPEUTIC EXERCISES: CPT

## 2019-05-28 NOTE — PROGRESS NOTES
Dx: s/p L3-S1 discectomy and decompression          Authorized # of Visits:  Medicare         Next MD visit: Dr. Karlos Hathaway June 4th   Fall Risk: high         Precautions: spinal precautions             Subjective: Pt states no soreness after last therapy milan 20 reps X 20 reps  X 25 reps        Seated hip abduction yellow band 20 reps  X 20 reps  X 25 reps        Seated hip adduction 20 reps 2 sec hold X 20 reps  Step ups 4 inch with BUE assist 10 reps x 1 set R/L; with 1 UE assist contralaterally 10 reps R/L

## 2019-05-30 ENCOUNTER — OFFICE VISIT (OUTPATIENT)
Dept: PHYSICAL THERAPY | Age: 79
End: 2019-05-30
Attending: PHYSICIAN ASSISTANT
Payer: MEDICARE

## 2019-05-30 PROCEDURE — 97110 THERAPEUTIC EXERCISES: CPT

## 2019-05-30 PROCEDURE — 97116 GAIT TRAINING THERAPY: CPT

## 2019-05-30 NOTE — PROGRESS NOTES
Dx: s/p L3-S1 discectomy and decompression          Authorized # of Visits:  Medicare         Next MD visit: Dr. Frida Jacob June 4th   Fall Risk: high         Precautions: spinal precautions             Subjective: Pt states no soreness after last therapy milan Seated LAQ 20 reps R/L X 20 reps  X 25 reps  X 25 reps       Seated yellow band knee flexion 20 reps X 20 reps  X 25 reps  X 25 reps      Seated hip abduction yellow band 20 reps  X 20 reps  X 25 reps  X blue band 20 reps      Seated hip adduction 20 rep

## 2019-05-31 PROCEDURE — 87086 URINE CULTURE/COLONY COUNT: CPT | Performed by: INTERNAL MEDICINE

## 2019-05-31 PROCEDURE — 81003 URINALYSIS AUTO W/O SCOPE: CPT | Performed by: INTERNAL MEDICINE

## 2019-06-04 ENCOUNTER — OFFICE VISIT (OUTPATIENT)
Dept: PHYSICAL THERAPY | Age: 79
End: 2019-06-04
Attending: PHYSICIAN ASSISTANT
Payer: MEDICARE

## 2019-06-04 PROCEDURE — 97110 THERAPEUTIC EXERCISES: CPT

## 2019-06-04 PROCEDURE — 97116 GAIT TRAINING THERAPY: CPT

## 2019-06-04 NOTE — PROGRESS NOTES
Dx: s/p L3-S1 discectomy and decompression          Authorized # of Visits:  Medicare         Next MD visit: Dr. Radha Kenyon June 4th   Fall Risk: high         Precautions: spinal precautions           Progress Note:   Pt attended 6 visits in Physical Therapy   S visits)  · Pt will be independent and compliant with comprehensive HEP to maintain progress achieved in PT (ongoing)    Plan: Continue per plan of care to address balance, and return to normal ADLs as precautions are lifted and appropriate with new primary Gait: 500' with RW X 500' with RW VCs for upright posture     -  - -  -  -      Pt education: HEP update Pt education; HEP update Pt education: HEP review, recommendations for dme and fall risk  Pt edcuation; HEP update, band progression Pt education: Plan

## 2019-07-22 NOTE — DISCHARGE SUMMARY
[facility]  Discharge Summary    [facility] Patient Status:  Inpatient    1940 MRN [id number]   [facility] 3SW-A Attending No att. providers found   [facility] Day # 2 PCP Sathya Cisneros MD     Date of Admission: 2019    Date NURSE NOTES:DR. Ovalles seen patient. with new orders  wound care evaluation  protocol  
right great  toe with blister and no open wound . Redness abdominal fold and groin area  
skin intact.  endorsed to incoming nurse .will continue to monitor. Occupational Therapy making steady progressive gains. Once deemed stable by all services the patient was discharged on the above date. Standard discharge instructions were given and they were asked to follow up in clinic in 2 weeks.      Disposition: Home

## 2019-07-24 ENCOUNTER — TELEPHONE (OUTPATIENT)
Dept: NEUROLOGY | Facility: CLINIC | Age: 79
End: 2019-07-24

## 2019-09-13 PROBLEM — F32.A DEPRESSION, UNSPECIFIED DEPRESSION TYPE: Status: ACTIVE | Noted: 2019-09-13

## 2019-10-01 RX ORDER — TORSEMIDE 20 MG/1
TABLET ORAL
Qty: 90 TABLET | Refills: 1 | Status: SHIPPED | OUTPATIENT
Start: 2019-10-01 | End: 2020-04-15

## 2020-01-01 ENCOUNTER — EXTERNAL RECORD (OUTPATIENT)
Dept: HEALTH INFORMATION MANAGEMENT | Facility: OTHER | Age: 80
End: 2020-01-01

## 2020-02-06 ENCOUNTER — APPOINTMENT (OUTPATIENT)
Dept: CARDIOLOGY | Age: 80
End: 2020-02-06

## 2020-02-25 ENCOUNTER — OFFICE VISIT (OUTPATIENT)
Dept: CARDIOLOGY | Age: 80
End: 2020-02-25

## 2020-02-25 ENCOUNTER — APPOINTMENT (OUTPATIENT)
Dept: CARDIOLOGY | Age: 80
End: 2020-02-25

## 2020-02-25 ENCOUNTER — ANCILLARY PROCEDURE (OUTPATIENT)
Dept: CARDIOLOGY | Age: 80
End: 2020-02-25
Attending: CLINICAL NURSE SPECIALIST

## 2020-02-25 VITALS
WEIGHT: 199 LBS | HEIGHT: 65 IN | HEART RATE: 86 BPM | BODY MASS INDEX: 33.15 KG/M2 | SYSTOLIC BLOOD PRESSURE: 108 MMHG | DIASTOLIC BLOOD PRESSURE: 60 MMHG

## 2020-02-25 DIAGNOSIS — E78.00 PURE HYPERCHOLESTEROLEMIA: ICD-10-CM

## 2020-02-25 DIAGNOSIS — R60.0 EDEMA OF BOTH LEGS: ICD-10-CM

## 2020-02-25 DIAGNOSIS — I49.1 PREMATURE ATRIAL BEATS: ICD-10-CM

## 2020-02-25 DIAGNOSIS — I10 ESSENTIAL HYPERTENSION: ICD-10-CM

## 2020-02-25 DIAGNOSIS — I25.10 CORONARY ARTERY DISEASE INVOLVING NATIVE CORONARY ARTERY OF NATIVE HEART WITHOUT ANGINA PECTORIS: ICD-10-CM

## 2020-02-25 DIAGNOSIS — I87.2 CHRONIC VENOUS INSUFFICIENCY: ICD-10-CM

## 2020-02-25 DIAGNOSIS — I25.10 CORONARY ARTERY DISEASE INVOLVING NATIVE CORONARY ARTERY OF NATIVE HEART WITHOUT ANGINA PECTORIS: Primary | ICD-10-CM

## 2020-02-25 PROCEDURE — 99214 OFFICE O/P EST MOD 30 MIN: CPT | Performed by: CLINICAL NURSE SPECIALIST

## 2020-02-25 PROCEDURE — 93000 ELECTROCARDIOGRAM COMPLETE: CPT | Performed by: CLINICAL NURSE SPECIALIST

## 2020-02-25 PROCEDURE — 93227 XTRNL ECG REC<48 HR R&I: CPT | Performed by: INTERNAL MEDICINE

## 2020-02-25 SDOH — HEALTH STABILITY: PHYSICAL HEALTH: ON AVERAGE, HOW MANY MINUTES DO YOU ENGAGE IN EXERCISE AT THIS LEVEL?: 0 MIN

## 2020-02-25 SDOH — HEALTH STABILITY: PHYSICAL HEALTH: ON AVERAGE, HOW MANY DAYS PER WEEK DO YOU ENGAGE IN MODERATE TO STRENUOUS EXERCISE (LIKE A BRISK WALK)?: 0 DAYS

## 2020-02-25 ASSESSMENT — ENCOUNTER SYMPTOMS
SUSPICIOUS LESIONS: 0
FEVER: 0
ALLERGIC/IMMUNOLOGIC COMMENTS: NO NEW FOOD ALLERGIES
WEIGHT LOSS: 0
BRUISES/BLEEDS EASILY: 0
CHILLS: 0
COUGH: 0
HEMOPTYSIS: 0
WEIGHT GAIN: 0
HEMATOCHEZIA: 0

## 2020-03-04 ENCOUNTER — TELEPHONE (OUTPATIENT)
Dept: CARDIOLOGY | Age: 80
End: 2020-03-04

## 2020-03-10 ENCOUNTER — TELEPHONE (OUTPATIENT)
Dept: CARDIOLOGY | Age: 80
End: 2020-03-10

## 2020-04-13 RX ORDER — TORSEMIDE 20 MG/1
TABLET ORAL
Qty: 90 TABLET | Refills: 0 | OUTPATIENT
Start: 2020-04-13

## 2020-04-15 RX ORDER — TORSEMIDE 20 MG/1
TABLET ORAL
Qty: 90 TABLET | Refills: 3 | Status: SHIPPED | OUTPATIENT
Start: 2020-04-15 | End: 2021-07-09

## 2020-08-04 ENCOUNTER — TELEPHONE (OUTPATIENT)
Dept: CARDIOLOGY | Age: 80
End: 2020-08-04

## 2020-08-13 ENCOUNTER — APPOINTMENT (OUTPATIENT)
Dept: CARDIOLOGY | Age: 80
End: 2020-08-13

## 2020-08-15 ENCOUNTER — LAB ENCOUNTER (OUTPATIENT)
Dept: LAB | Facility: HOSPITAL | Age: 80
DRG: 454 | End: 2020-08-15
Attending: ORTHOPAEDIC SURGERY
Payer: MEDICARE

## 2020-08-15 DIAGNOSIS — M48.061 SPINAL STENOSIS OF LUMBAR REGION WITHOUT NEUROGENIC CLAUDICATION: ICD-10-CM

## 2020-08-16 ENCOUNTER — ANESTHESIA EVENT (OUTPATIENT)
Dept: SURGERY | Facility: HOSPITAL | Age: 80
DRG: 454 | End: 2020-08-16
Payer: MEDICARE

## 2020-08-16 LAB — SARS-COV-2 RNA RESP QL NAA+PROBE: NOT DETECTED

## 2020-08-17 ENCOUNTER — HOSPITAL ENCOUNTER (INPATIENT)
Facility: HOSPITAL | Age: 80
LOS: 2 days | Discharge: HOME HEALTH | DRG: 454 | End: 2020-08-19
Attending: ORTHOPAEDIC SURGERY | Admitting: ORTHOPAEDIC SURGERY
Payer: MEDICARE

## 2020-08-17 ENCOUNTER — APPOINTMENT (OUTPATIENT)
Dept: GENERAL RADIOLOGY | Facility: HOSPITAL | Age: 80
DRG: 454 | End: 2020-08-17
Attending: ORTHOPAEDIC SURGERY
Payer: MEDICARE

## 2020-08-17 ENCOUNTER — ANESTHESIA (OUTPATIENT)
Dept: SURGERY | Facility: HOSPITAL | Age: 80
DRG: 454 | End: 2020-08-17
Payer: MEDICARE

## 2020-08-17 DIAGNOSIS — M48.061 SPINAL STENOSIS OF LUMBAR REGION WITHOUT NEUROGENIC CLAUDICATION: Primary | ICD-10-CM

## 2020-08-17 LAB
ATRIAL RATE: 49 BPM
GLUCOSE BLD-MCNC: 182 MG/DL (ref 70–99)
GLUCOSE BLD-MCNC: 183 MG/DL (ref 70–99)
GLUCOSE BLD-MCNC: 201 MG/DL (ref 70–99)
GLUCOSE BLD-MCNC: 201 MG/DL (ref 70–99)
Q-T INTERVAL: 398 MS
QRS DURATION: 94 MS
QTC CALCULATION (BEZET): 390 MS
R AXIS: -11 DEGREES
T AXIS: 36 DEGREES
VENTRICULAR RATE: 58 BPM

## 2020-08-17 PROCEDURE — 0SG00AJ FUSION OF LUMBAR VERTEBRAL JOINT WITH INTERBODY FUSION DEVICE, POSTERIOR APPROACH, ANTERIOR COLUMN, OPEN APPROACH: ICD-10-PCS | Performed by: ORTHOPAEDIC SURGERY

## 2020-08-17 PROCEDURE — 82962 GLUCOSE BLOOD TEST: CPT

## 2020-08-17 PROCEDURE — 95938 SOMATOSENSORY TESTING: CPT | Performed by: ORTHOPAEDIC SURGERY

## 2020-08-17 PROCEDURE — 93010 ELECTROCARDIOGRAM REPORT: CPT | Performed by: INTERNAL MEDICINE

## 2020-08-17 PROCEDURE — 88304 TISSUE EXAM BY PATHOLOGIST: CPT | Performed by: ORTHOPAEDIC SURGERY

## 2020-08-17 PROCEDURE — 0SG00J1 FUSION OF LUMBAR VERTEBRAL JOINT WITH SYNTHETIC SUBSTITUTE, POSTERIOR APPROACH, POSTERIOR COLUMN, OPEN APPROACH: ICD-10-PCS | Performed by: ORTHOPAEDIC SURGERY

## 2020-08-17 PROCEDURE — 4A11X4G MONITORING OF PERIPHERAL NERVOUS ELECTRICAL ACTIVITY, INTRAOPERATIVE, EXTERNAL APPROACH: ICD-10-PCS | Performed by: ORTHOPAEDIC SURGERY

## 2020-08-17 PROCEDURE — 95940 IONM IN OPERATNG ROOM 15 MIN: CPT | Performed by: ORTHOPAEDIC SURGERY

## 2020-08-17 PROCEDURE — 76000 FLUOROSCOPY <1 HR PHYS/QHP: CPT | Performed by: ORTHOPAEDIC SURGERY

## 2020-08-17 PROCEDURE — 95861 NEEDLE EMG 2 EXTREMITIES: CPT | Performed by: ORTHOPAEDIC SURGERY

## 2020-08-17 PROCEDURE — 93005 ELECTROCARDIOGRAM TRACING: CPT

## 2020-08-17 DEVICE — RELINE MAS MOD REDUCTION EXT: Type: IMPLANTABLE DEVICE | Site: BACK | Status: FUNCTIONAL

## 2020-08-17 DEVICE — RELINE MAS MOD SCREW 6.5X45 2C: Type: IMPLANTABLE DEVICE | Site: BACK | Status: FUNCTIONAL

## 2020-08-17 DEVICE — RELINE MAS TI ROD 5.5X30 LRDTC: Type: IMPLANTABLE DEVICE | Site: BACK | Status: FUNCTIONAL

## 2020-08-17 DEVICE — OSTEOCEL PRO MEDIUM: Type: IMPLANTABLE DEVICE | Site: BACK | Status: FUNCTIONAL

## 2020-08-17 DEVICE — RELINE LCK SCRW 5.5 OPEN TULIP: Type: IMPLANTABLE DEVICE | Site: BACK | Status: FUNCTIONAL

## 2020-08-17 DEVICE — OSTEOCEL PRO LARGE BULK BUY: Type: IMPLANTABLE DEVICE | Site: BACK | Status: FUNCTIONAL

## 2020-08-17 RX ORDER — MORPHINE SULFATE 4 MG/ML
4 INJECTION, SOLUTION INTRAMUSCULAR; INTRAVENOUS EVERY 2 HOUR PRN
Status: DISCONTINUED | OUTPATIENT
Start: 2020-08-17 | End: 2020-08-19

## 2020-08-17 RX ORDER — DOCUSATE SODIUM 100 MG/1
100 CAPSULE, LIQUID FILLED ORAL 2 TIMES DAILY
Status: DISCONTINUED | OUTPATIENT
Start: 2020-08-17 | End: 2020-08-19

## 2020-08-17 RX ORDER — LISINOPRIL 10 MG/1
10 TABLET ORAL DAILY
Status: DISCONTINUED | OUTPATIENT
Start: 2020-08-17 | End: 2020-08-19

## 2020-08-17 RX ORDER — BUPIVACAINE HYDROCHLORIDE AND EPINEPHRINE 5; 5 MG/ML; UG/ML
INJECTION, SOLUTION EPIDURAL; INTRACAUDAL; PERINEURAL AS NEEDED
Status: DISCONTINUED | OUTPATIENT
Start: 2020-08-17 | End: 2020-08-17 | Stop reason: HOSPADM

## 2020-08-17 RX ORDER — BACITRACIN 50000 [USP'U]/1
INJECTION, POWDER, LYOPHILIZED, FOR SOLUTION INTRAMUSCULAR AS NEEDED
Status: DISCONTINUED | OUTPATIENT
Start: 2020-08-17 | End: 2020-08-17 | Stop reason: HOSPADM

## 2020-08-17 RX ORDER — MORPHINE SULFATE 4 MG/ML
2 INJECTION, SOLUTION INTRAMUSCULAR; INTRAVENOUS EVERY 2 HOUR PRN
Status: DISCONTINUED | OUTPATIENT
Start: 2020-08-17 | End: 2020-08-19

## 2020-08-17 RX ORDER — INSULIN ASPART 100 [IU]/ML
INJECTION, SOLUTION INTRAVENOUS; SUBCUTANEOUS
Status: COMPLETED
Start: 2020-08-17 | End: 2020-08-17

## 2020-08-17 RX ORDER — INSULIN ASPART 100 [IU]/ML
INJECTION, SOLUTION INTRAVENOUS; SUBCUTANEOUS ONCE
Status: COMPLETED | OUTPATIENT
Start: 2020-08-17 | End: 2020-08-17

## 2020-08-17 RX ORDER — NALOXONE HYDROCHLORIDE 0.4 MG/ML
80 INJECTION, SOLUTION INTRAMUSCULAR; INTRAVENOUS; SUBCUTANEOUS AS NEEDED
Status: DISCONTINUED | OUTPATIENT
Start: 2020-08-17 | End: 2020-08-17 | Stop reason: HOSPADM

## 2020-08-17 RX ORDER — HYDROCODONE BITARTRATE AND ACETAMINOPHEN 5; 325 MG/1; MG/1
2 TABLET ORAL AS NEEDED
Status: DISCONTINUED | OUTPATIENT
Start: 2020-08-17 | End: 2020-08-17 | Stop reason: HOSPADM

## 2020-08-17 RX ORDER — POLYETHYLENE GLYCOL 3350 17 G/17G
17 POWDER, FOR SOLUTION ORAL DAILY PRN
Status: DISCONTINUED | OUTPATIENT
Start: 2020-08-17 | End: 2020-08-19

## 2020-08-17 RX ORDER — MORPHINE SULFATE 4 MG/ML
1 INJECTION, SOLUTION INTRAMUSCULAR; INTRAVENOUS EVERY 2 HOUR PRN
Status: DISCONTINUED | OUTPATIENT
Start: 2020-08-17 | End: 2020-08-19

## 2020-08-17 RX ORDER — ATORVASTATIN CALCIUM 20 MG/1
20 TABLET, FILM COATED ORAL EVERY EVENING
Status: DISCONTINUED | OUTPATIENT
Start: 2020-08-17 | End: 2020-08-19

## 2020-08-17 RX ORDER — SODIUM PHOSPHATE, DIBASIC AND SODIUM PHOSPHATE, MONOBASIC 7; 19 G/133ML; G/133ML
1 ENEMA RECTAL ONCE AS NEEDED
Status: DISCONTINUED | OUTPATIENT
Start: 2020-08-17 | End: 2020-08-19

## 2020-08-17 RX ORDER — TIZANIDINE 2 MG/1
2 TABLET ORAL 3 TIMES DAILY PRN
Status: DISCONTINUED | OUTPATIENT
Start: 2020-08-17 | End: 2020-08-19

## 2020-08-17 RX ORDER — METOCLOPRAMIDE HYDROCHLORIDE 5 MG/ML
10 INJECTION INTRAMUSCULAR; INTRAVENOUS AS NEEDED
Status: DISCONTINUED | OUTPATIENT
Start: 2020-08-17 | End: 2020-08-17 | Stop reason: HOSPADM

## 2020-08-17 RX ORDER — ONDANSETRON 2 MG/ML
4 INJECTION INTRAMUSCULAR; INTRAVENOUS ONCE
Status: COMPLETED | OUTPATIENT
Start: 2020-08-17 | End: 2020-08-17

## 2020-08-17 RX ORDER — GABAPENTIN 600 MG/1
600 TABLET ORAL ONCE
Status: COMPLETED | OUTPATIENT
Start: 2020-08-17 | End: 2020-08-17

## 2020-08-17 RX ORDER — BISACODYL 10 MG
10 SUPPOSITORY, RECTAL RECTAL
Status: DISCONTINUED | OUTPATIENT
Start: 2020-08-17 | End: 2020-08-19

## 2020-08-17 RX ORDER — METOCLOPRAMIDE HYDROCHLORIDE 5 MG/ML
10 INJECTION INTRAMUSCULAR; INTRAVENOUS EVERY 6 HOURS PRN
Status: DISCONTINUED | OUTPATIENT
Start: 2020-08-17 | End: 2020-08-19

## 2020-08-17 RX ORDER — PHENYLEPHRINE HCL 10 MG/ML
VIAL (ML) INJECTION AS NEEDED
Status: DISCONTINUED | OUTPATIENT
Start: 2020-08-17 | End: 2020-08-17 | Stop reason: SURG

## 2020-08-17 RX ORDER — REMIFENTANIL HYDROCHLORIDE 1 MG/ML
INJECTION, POWDER, LYOPHILIZED, FOR SOLUTION INTRAVENOUS AS NEEDED
Status: DISCONTINUED | OUTPATIENT
Start: 2020-08-17 | End: 2020-08-17

## 2020-08-17 RX ORDER — ONDANSETRON 2 MG/ML
4 INJECTION INTRAMUSCULAR; INTRAVENOUS AS NEEDED
Status: DISCONTINUED | OUTPATIENT
Start: 2020-08-17 | End: 2020-08-17 | Stop reason: HOSPADM

## 2020-08-17 RX ORDER — OXYCODONE HYDROCHLORIDE 5 MG/1
5 TABLET ORAL EVERY 4 HOURS PRN
Status: DISCONTINUED | OUTPATIENT
Start: 2020-08-17 | End: 2020-08-18

## 2020-08-17 RX ORDER — CEFAZOLIN SODIUM/WATER 2 G/20 ML
2 SYRINGE (ML) INTRAVENOUS EVERY 8 HOURS
Status: COMPLETED | OUTPATIENT
Start: 2020-08-17 | End: 2020-08-18

## 2020-08-17 RX ORDER — DEXAMETHASONE SODIUM PHOSPHATE 4 MG/ML
8 VIAL (ML) INJECTION AS NEEDED
Status: DISCONTINUED | OUTPATIENT
Start: 2020-08-17 | End: 2020-08-17 | Stop reason: HOSPADM

## 2020-08-17 RX ORDER — CEFAZOLIN SODIUM/WATER 2 G/20 ML
2 SYRINGE (ML) INTRAVENOUS ONCE
Status: COMPLETED | OUTPATIENT
Start: 2020-08-17 | End: 2020-08-17

## 2020-08-17 RX ORDER — HYDROMORPHONE HYDROCHLORIDE 1 MG/ML
INJECTION, SOLUTION INTRAMUSCULAR; INTRAVENOUS; SUBCUTANEOUS
Status: COMPLETED
Start: 2020-08-17 | End: 2020-08-17

## 2020-08-17 RX ORDER — MEPERIDINE HYDROCHLORIDE 25 MG/ML
12.5 INJECTION INTRAMUSCULAR; INTRAVENOUS; SUBCUTANEOUS AS NEEDED
Status: DISCONTINUED | OUTPATIENT
Start: 2020-08-17 | End: 2020-08-17 | Stop reason: HOSPADM

## 2020-08-17 RX ORDER — SODIUM CHLORIDE, SODIUM LACTATE, POTASSIUM CHLORIDE, CALCIUM CHLORIDE 600; 310; 30; 20 MG/100ML; MG/100ML; MG/100ML; MG/100ML
INJECTION, SOLUTION INTRAVENOUS CONTINUOUS
Status: DISCONTINUED | OUTPATIENT
Start: 2020-08-17 | End: 2020-08-19

## 2020-08-17 RX ORDER — DEXTROSE MONOHYDRATE 25 G/50ML
50 INJECTION, SOLUTION INTRAVENOUS
Status: DISCONTINUED | OUTPATIENT
Start: 2020-08-17 | End: 2020-08-17 | Stop reason: HOSPADM

## 2020-08-17 RX ORDER — PROCHLORPERAZINE EDISYLATE 5 MG/ML
10 INJECTION INTRAMUSCULAR; INTRAVENOUS EVERY 6 HOURS PRN
Status: ACTIVE | OUTPATIENT
Start: 2020-08-17 | End: 2020-08-19

## 2020-08-17 RX ORDER — CEFAZOLIN SODIUM/WATER 2 G/20 ML
SYRINGE (ML) INTRAVENOUS
Status: DISPENSED
Start: 2020-08-17 | End: 2020-08-17

## 2020-08-17 RX ORDER — HYDROMORPHONE HYDROCHLORIDE 1 MG/ML
0.4 INJECTION, SOLUTION INTRAMUSCULAR; INTRAVENOUS; SUBCUTANEOUS EVERY 5 MIN PRN
Status: DISCONTINUED | OUTPATIENT
Start: 2020-08-17 | End: 2020-08-17 | Stop reason: HOSPADM

## 2020-08-17 RX ORDER — DIPHENHYDRAMINE HYDROCHLORIDE 50 MG/ML
25 INJECTION INTRAMUSCULAR; INTRAVENOUS EVERY 4 HOURS PRN
Status: DISCONTINUED | OUTPATIENT
Start: 2020-08-17 | End: 2020-08-19

## 2020-08-17 RX ORDER — LIDOCAINE HYDROCHLORIDE 10 MG/ML
INJECTION, SOLUTION EPIDURAL; INFILTRATION; INTRACAUDAL; PERINEURAL AS NEEDED
Status: DISCONTINUED | OUTPATIENT
Start: 2020-08-17 | End: 2020-08-17 | Stop reason: SURG

## 2020-08-17 RX ORDER — EPHEDRINE SULFATE 50 MG/ML
INJECTION, SOLUTION INTRAVENOUS AS NEEDED
Status: DISCONTINUED | OUTPATIENT
Start: 2020-08-17 | End: 2020-08-17 | Stop reason: SURG

## 2020-08-17 RX ORDER — SODIUM CHLORIDE, SODIUM LACTATE, POTASSIUM CHLORIDE, CALCIUM CHLORIDE 600; 310; 30; 20 MG/100ML; MG/100ML; MG/100ML; MG/100ML
INJECTION, SOLUTION INTRAVENOUS CONTINUOUS
Status: DISCONTINUED | OUTPATIENT
Start: 2020-08-17 | End: 2020-08-17 | Stop reason: HOSPADM

## 2020-08-17 RX ORDER — CELECOXIB 200 MG/1
200 CAPSULE ORAL ONCE
Status: COMPLETED | OUTPATIENT
Start: 2020-08-17 | End: 2020-08-17

## 2020-08-17 RX ORDER — KETAMINE HYDROCHLORIDE 50 MG/ML
INJECTION, SOLUTION, CONCENTRATE INTRAMUSCULAR; INTRAVENOUS AS NEEDED
Status: DISCONTINUED | OUTPATIENT
Start: 2020-08-17 | End: 2020-08-17 | Stop reason: SURG

## 2020-08-17 RX ORDER — MIDAZOLAM HYDROCHLORIDE 1 MG/ML
1 INJECTION INTRAMUSCULAR; INTRAVENOUS EVERY 5 MIN PRN
Status: DISCONTINUED | OUTPATIENT
Start: 2020-08-17 | End: 2020-08-17 | Stop reason: HOSPADM

## 2020-08-17 RX ORDER — MORPHINE SULFATE 15 MG/1
15 TABLET ORAL EVERY 4 HOURS PRN
Status: DISCONTINUED | OUTPATIENT
Start: 2020-08-17 | End: 2020-08-18

## 2020-08-17 RX ORDER — DROPERIDOL 2.5 MG/ML
INJECTION, SOLUTION INTRAMUSCULAR; INTRAVENOUS AS NEEDED
Status: DISCONTINUED | OUTPATIENT
Start: 2020-08-17 | End: 2020-08-17 | Stop reason: SURG

## 2020-08-17 RX ORDER — DIPHENHYDRAMINE HCL 25 MG
25 CAPSULE ORAL EVERY 4 HOURS PRN
Status: DISCONTINUED | OUTPATIENT
Start: 2020-08-17 | End: 2020-08-19

## 2020-08-17 RX ORDER — SODIUM CHLORIDE 9 MG/ML
INJECTION, SOLUTION INTRAVENOUS CONTINUOUS
Status: DISCONTINUED | OUTPATIENT
Start: 2020-08-17 | End: 2020-08-19

## 2020-08-17 RX ORDER — METOCLOPRAMIDE HYDROCHLORIDE 5 MG/ML
INJECTION INTRAMUSCULAR; INTRAVENOUS AS NEEDED
Status: DISCONTINUED | OUTPATIENT
Start: 2020-08-17 | End: 2020-08-17 | Stop reason: SURG

## 2020-08-17 RX ORDER — SENNOSIDES 8.6 MG
17.2 TABLET ORAL NIGHTLY
Status: DISCONTINUED | OUTPATIENT
Start: 2020-08-17 | End: 2020-08-19

## 2020-08-17 RX ORDER — HYDROCODONE BITARTRATE AND ACETAMINOPHEN 5; 325 MG/1; MG/1
1 TABLET ORAL AS NEEDED
Status: DISCONTINUED | OUTPATIENT
Start: 2020-08-17 | End: 2020-08-17 | Stop reason: HOSPADM

## 2020-08-17 RX ORDER — OXYCODONE HYDROCHLORIDE 10 MG/1
10 TABLET ORAL EVERY 4 HOURS PRN
Status: DISCONTINUED | OUTPATIENT
Start: 2020-08-17 | End: 2020-08-18

## 2020-08-17 RX ORDER — ONDANSETRON 2 MG/ML
4 INJECTION INTRAMUSCULAR; INTRAVENOUS EVERY 4 HOURS PRN
Status: ACTIVE | OUTPATIENT
Start: 2020-08-17 | End: 2020-08-18

## 2020-08-17 RX ADMIN — LIDOCAINE HYDROCHLORIDE 50 MG: 10 INJECTION, SOLUTION EPIDURAL; INFILTRATION; INTRACAUDAL; PERINEURAL at 11:39:00

## 2020-08-17 RX ADMIN — PHENYLEPHRINE HCL 100 MCG: 10 MG/ML VIAL (ML) INJECTION at 12:06:00

## 2020-08-17 RX ADMIN — PHENYLEPHRINE HCL 100 MCG: 10 MG/ML VIAL (ML) INJECTION at 12:04:00

## 2020-08-17 RX ADMIN — EPHEDRINE SULFATE 5 MG: 50 INJECTION, SOLUTION INTRAVENOUS at 12:10:00

## 2020-08-17 RX ADMIN — KETAMINE HYDROCHLORIDE 50 MG: 50 INJECTION, SOLUTION, CONCENTRATE INTRAMUSCULAR; INTRAVENOUS at 11:55:00

## 2020-08-17 RX ADMIN — CEFAZOLIN SODIUM/WATER 2 G: 2 G/20 ML SYRINGE (ML) INTRAVENOUS at 11:45:00

## 2020-08-17 RX ADMIN — EPHEDRINE SULFATE 10 MG: 50 INJECTION, SOLUTION INTRAVENOUS at 12:41:00

## 2020-08-17 RX ADMIN — METOCLOPRAMIDE HYDROCHLORIDE 20 MG: 5 INJECTION INTRAMUSCULAR; INTRAVENOUS at 13:44:00

## 2020-08-17 RX ADMIN — PHENYLEPHRINE HCL 100 MCG: 10 MG/ML VIAL (ML) INJECTION at 12:08:00

## 2020-08-17 RX ADMIN — PHENYLEPHRINE HCL 100 MCG: 10 MG/ML VIAL (ML) INJECTION at 13:57:00

## 2020-08-17 RX ADMIN — SODIUM CHLORIDE, SODIUM LACTATE, POTASSIUM CHLORIDE, CALCIUM CHLORIDE: 600; 310; 30; 20 INJECTION, SOLUTION INTRAVENOUS at 14:29:00

## 2020-08-17 RX ADMIN — EPHEDRINE SULFATE 10 MG: 50 INJECTION, SOLUTION INTRAVENOUS at 12:18:00

## 2020-08-17 RX ADMIN — PHENYLEPHRINE HCL 100 MCG: 10 MG/ML VIAL (ML) INJECTION at 13:22:00

## 2020-08-17 RX ADMIN — DROPERIDOL 0.62 MG: 2.5 INJECTION, SOLUTION INTRAMUSCULAR; INTRAVENOUS at 12:02:00

## 2020-08-17 NOTE — ANESTHESIA POSTPROCEDURE EVALUATION
[facility] Patient Status:  Inpatient   Age/Gender 68year old female MRN [id number]   Location [address] Attending Ulysses Vidales MD   Hosp Day # 0 PCP Felix Roberts MD       Anesthesia Post-op Note identifications, history, procedure, anesthesia course, recovery expectations with chance for questions was provided to a responsible recovery RN. Report to Saige Peña RN.     Dental Exam: Unchanged from Preop

## 2020-08-17 NOTE — ANESTHESIA PROCEDURE NOTES
Airway  Urgency: elective    Airway not difficult    General Information and Staff    Patient location during procedure: OR  Anesthesiologist: Clifton Paulson MD  Performed: anesthesiologist     Indications and Patient Condition  Indications for airway manage

## 2020-08-17 NOTE — PROGRESS NOTES
S: she ahs back pain no leg pain. No numbness    Inspection:  Awake alert No acute distress. No difficulty breathing     Blood pressure 136/59, pulse 65, temperature 98 °F (36.7 °C), temperature source Temporal, resp.  rate 17, height 5' 5\" (1.651 m), [facility]

## 2020-08-17 NOTE — PLAN OF CARE
Pt arrived from PACU s/p lumbar revision decompression with fusion. Pt drowsy, easily aroused. Oriented x4. Oriented pt to room. VSS on RA. IS encouraged and demonstrated. Pitting edema to BLE noted. Denies nausea. IVF. Due to void. Last BM 8/16.  Pain mode

## 2020-08-17 NOTE — H&P
Lo Quesada is a 68year old female who presents for a pre-operative physical exam. Patient is to have L4-5 revision decompression with transforaminal interbody fusion, to be done by Dr. Chris Mena on 8/17/20 at [facility].       HPI:   Pt complains of long standin Comment:Tongue/mouth        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       idiopathic angioedema   • Back problem       sciatica   • Carpal tunnel syndrome     • CORONARY ARTERY DISEASE       11/06: Ant WMA on MD at [facility] MAIN OR   • LUMBAR FACET INJECTION Right 4/18/2016     Performed by Shane Higginbotham MD at [facility]   • LUMBAR FACET INJECTION Left 4/4/2016     Performed by Shane Higginbotham MD at [facility] MAIN OR   • LUMBAR INTERLAMINAR EPIDURAL INJECTION N/A 4/27 denies any unusual skin lesions  EYES:denies  double vision  HEENT: denies nasal congestion, sinus pain or ST  LUNGS: denies shortness of breath with exertion  CARDIOVASCULAR: denies chest pain on exertion  GI: denies abdominal pain,denies heartburn  : d She will need to get cardiac clearance from Dr. Sharyle Bjork you for this consultation.     This consult was sent back the referring physician, Dr. Mikki Myers  The above referenced H&P was reviewed by Chantel Rolle MD on 8/17/2020, and no significant sims

## 2020-08-17 NOTE — CONSULTS
[facility] Hospitalist Team  Initial Consult          Assessment/Plan:       S/P  L4-5 TLIF  - Plan per surgery. Continue PT/OT. Pain is currently controlled.        #HL- statin    #DM2- hold oral, start ssi with acuchecks, cont levemir    #HTN/CAD- cont cardiac obesity    • Personal history of colonic polyps     2/99 adenoma, 4/03 negative   • Type 2 diabetes mellitus, uncontrolled ([facility]    • Uncontrolled type 2 diabetes mellitus with diabetic polyneuropathy, with long-term current use of insulin ([facility] 10/6/2016 REVISE MEDIAN N/CARPAL TUNNEL SURG      left carpal tunnel release   • SACROILIAC JOINT INJECTION BILATERAL Bilateral 3/21/2016    Performed by Susan Galeano MD at [facility] MAIN OR   • SACROILIAC JOINT INJECTION BILATERAL Bilateral 6/9/2015    Performed by Robb Frost atraumatic. Eyes:  Sclera anicteric, No conjunctival pallor, EOMs intact. Nose: Nares normal. Septum midline. Mucosa normal. No drainage.    Throat: Lips, mucosa, and tongue normal. Teeth and gums normal.   Neck: Supple, symmetrical, trachea midline

## 2020-08-17 NOTE — ANESTHESIA PREPROCEDURE EVALUATION
PRE-OP EVALUATION    Patient Name: [facility]    Pre-op Diagnosis: Spinal stenosis of lumbar region without neurogenic claudication [M48.061]    Procedure(s):  Lumbar 4-Lumbar 5 revision decompression with transforaminal lumbar interbody fusion    (P (NORCO) 5-325 MG Oral Tab, Take 1 tablet by mouth every 6 (six) hours as needed for Pain., Disp: 30 tablet, Rfl: 0  [DISCONTINUED] LISINOPRIL 10 MG Oral Tab, TAKE ONE TABLET BY MOUTH ONE TIME DAILY , Disp: 30 tablet, Rfl: 0  METFORMIN HCL  MG Oral Ta derangement of knee     Chondromalacia of patella     Type 2 diabetes, uncontrolled, with neuropathy (HCC)     CAD (coronary artery disease)     Obesity (BMI 30-39. 9)     Lumbar radiculopathy     Lumbar facet arthropathy     Sacroiliitis, not elsewhere cla MAIN OR   • OTHER      lipoma removed from left arm   • OTHER      trigger finger release left hand middle finger   • OTHER SURGICAL HISTORY      vein stripping   • RADIOFREQUENCY ABLATION OF SACROILIAC JOINT Right 7/20/2016    Performed by Demetrice Palmer inspection. No notable dental history. Pulmonary  Comment: Unlabored ventilatory effort, no retractions. Pulmonary exam normal.                 Other findings            ASA: 3   Plan: general  NPO status verified and patient meets guidelines.   P

## 2020-08-18 LAB
DEPRECATED RDW RBC AUTO: 43.1 FL (ref 35.1–46.3)
ERYTHROCYTE [DISTWIDTH] IN BLOOD BY AUTOMATED COUNT: 13.2 % (ref 11–15)
GLUCOSE BLD-MCNC: 222 MG/DL (ref 70–99)
GLUCOSE BLD-MCNC: 342 MG/DL (ref 70–99)
GLUCOSE BLD-MCNC: 457 MG/DL (ref 70–99)
GLUCOSE BLD-MCNC: 86 MG/DL (ref 70–99)
HCT VFR BLD AUTO: 32.2 % (ref 35–48)
HGB BLD-MCNC: 10.6 G/DL (ref 12–16)
MCH RBC QN AUTO: 29.1 PG (ref 26–34)
MCHC RBC AUTO-ENTMCNC: 32.9 G/DL (ref 31–37)
MCV RBC AUTO: 88.5 FL (ref 80–100)
PLATELET # BLD AUTO: 178 10(3)UL (ref 150–450)
RBC # BLD AUTO: 3.64 X10(6)UL (ref 3.8–5.3)
WBC # BLD AUTO: 9 X10(3) UL (ref 4–11)

## 2020-08-18 PROCEDURE — 97161 PT EVAL LOW COMPLEX 20 MIN: CPT

## 2020-08-18 PROCEDURE — 82962 GLUCOSE BLOOD TEST: CPT

## 2020-08-18 PROCEDURE — 97165 OT EVAL LOW COMPLEX 30 MIN: CPT

## 2020-08-18 PROCEDURE — 97116 GAIT TRAINING THERAPY: CPT

## 2020-08-18 PROCEDURE — 85027 COMPLETE CBC AUTOMATED: CPT | Performed by: PHYSICIAN ASSISTANT

## 2020-08-18 PROCEDURE — 97535 SELF CARE MNGMENT TRAINING: CPT

## 2020-08-18 PROCEDURE — 97530 THERAPEUTIC ACTIVITIES: CPT

## 2020-08-18 RX ORDER — HYDROCODONE BITARTRATE AND ACETAMINOPHEN 10; 325 MG/1; MG/1
1 TABLET ORAL EVERY 4 HOURS PRN
Status: DISCONTINUED | OUTPATIENT
Start: 2020-08-18 | End: 2020-08-19

## 2020-08-18 RX ORDER — HYDROCODONE BITARTRATE AND ACETAMINOPHEN 10; 325 MG/1; MG/1
2 TABLET ORAL EVERY 6 HOURS PRN
Status: DISCONTINUED | OUTPATIENT
Start: 2020-08-18 | End: 2020-08-19

## 2020-08-18 RX ORDER — DEXTROSE MONOHYDRATE 25 G/50ML
50 INJECTION, SOLUTION INTRAVENOUS
Status: DISCONTINUED | OUTPATIENT
Start: 2020-08-18 | End: 2020-08-19

## 2020-08-18 NOTE — PROGRESS NOTES
Guidebook provided to patient. Assisted with 2 assist up to chair. Needing verbal queues and help moving legs off of bed. Reviewed indications, side effects of pain medication/narcotics and constipation prevention.  Stressed importance of increased fluids/r

## 2020-08-18 NOTE — PROGRESS NOTES
Oxycodone given for surgical site pain with adequate relief. VSS, YONAS tape cdi. Pt able to log roll. States that she starts to feel stiff, offered to walk or sit up in the chair but requesting to have it done early in the morning.

## 2020-08-18 NOTE — PLAN OF CARE
Dressing dry and intact to back. Gel ice to back. Brace at bedside. Moving well in bed. Chronic neuropathy to feet. Oxy prn for pain with relief. Voiding per bedpan. Will continue to monitor.

## 2020-08-18 NOTE — CM/SW NOTE
67 yo sp lumbar spine fusion. PT is recommending home health. RN to ask surgeon for orders. HOME SITUATION  Type of Home: House   Home Layout: Two level  Stairs to Enter : 2  Railing: Yes  Stairs to Bedroom: 14  Railing:  Yes     Lives With: Spouse

## 2020-08-18 NOTE — PROGRESS NOTES
S: She has back pain no leg pain. Feels numbness in legs/  States did not get out of bed last night. Was offered bed pan. Inspection:  Awake alert No acute distress. No difficulty breathing     Blood pressure 131/63, pulse 63, temperature 98.9 °F (37.

## 2020-08-18 NOTE — PHYSICAL THERAPY NOTE
PHYSICAL THERAPY EVALUATION - INPATIENT     Room Number: 373/373-A  Evaluation Date: 8/18/2020  Type of Evaluation: Initial  Physician Order: PT Eval and Treat    Presenting Problem: s/p L4/5 revision decompression with tranforaminal lumbar interbody SURGERY     • CATARACT  05/2018    Bilateral   • CATH DRUG ELUTING STENT      X2   • CHOLECYSTECTOMY  1997   • COLONOSCOPY N/A 10/10/2013    Performed by Taya Vora MD at [facility] ENDOSCOPY   • [address] [address] [facility]    adenomatous polyp   • Breezy Gill Spouse  Drives: Yes  Patient Owned Equipment: Rolling walker  Patient Regularly Uses: Glasses    Prior Level of Hinds: per pt, she was ind with all ADL and functional mobility PTA    SUBJECTIVE  \"I am not ready to go home today. \"    Patient self-s guard assist  Distance (ft): 30  Assistive Device: Rolling walker  Pattern: Shuffle  Stoop/Curb Assistance: Not tested       Skilled Therapy Provided: evaluation; pt and  instructed in the role of PT, POC and D/C plans.  Both verbalized understanding education;Strengthening;Stair training;Transfer training;Balance training  Rehab Potential : Good  Frequency (Obs): Daily  Number of Visits to Meet Established Goals: 2      CURRENT GOALS    Goal #1 Patient is able to demonstrate supine - sit EOB @ level:

## 2020-08-18 NOTE — PLAN OF CARE
Recommend HHPT upon D/C.   Problem: Impaired Functional Mobility  Goal: Achieve highest/safest level of mobility/gait  Description  Interventions:  - Assess patient's functional ability and stability  - Promote increasing activity/tolerance for mobility and

## 2020-08-18 NOTE — HOME CARE LIAISON
MET WITH PTNT AND OFFERED CHOICE  OF AGENCIES. PTNT AGREEABLE TO [facility]. MET WITH PTNT TO DISCUSS HOME HEALTH SERVICES AND COVERAGE CRITERIA. PTNT AGREEABLE TO [facility]. PTNT GIVEN RESIDENTIAL BROCHURE.  RESIDENTIAL WITH PROVIDE SN/PT ON DISC

## 2020-08-18 NOTE — PLAN OF CARE
Sitting up in chair. Chairback brace in place. Dressing to back clean, dry, intact. Bilateral lower extremity weakness noted. Bilateral pedal pulses audible via doppler. 3-4+ pitting edema noted to bilateral feet. States this is chronic.   Instructed

## 2020-08-18 NOTE — OCCUPATIONAL THERAPY NOTE
OCCUPATIONAL THERAPY EVALUATION - INPATIENT     Room Number: 373/373-A  Evaluation Date: 8/18/2020  Type of Evaluation: Initial  Presenting Problem: (L4-5 revision decomp TLIF)    Physician Order: IP Consult to Occupational Therapy  Reason for Therapy: ADL • CHOLECYSTECTOMY  1997   • COLONOSCOPY N/A 10/10/2013    Performed by Romina Be MD at [facility] ENDOSCOPY   • [address] [address] [facility]    adenomatous polyp   • COLONOSCOPY,DIAGNOSTIC  2003    wnl   • COLONOSCOPY,DIAGNOSTIC  10/10/13    normal   • END bench  Other Equipment: Reacher    Occupation/Status: (retired)     Drives: Yes  Patient Regularly Uses: Glasses    Prior Level of Function:   Mod I using cane, drives. Lives in a 2 level house with spouse who can assist as needed.       SUBJECTIVE   \"Oh I Sit to Stand: Minimum assistance    Skilled Therapy Provided:   PPE worn by writer: droplet mask, gloves. OT educated patient on OT role and session goals. Patient was educated on spine precautions, energy conservation, sequencing, and safety for ADLs. ability to participate in ADLs, instrumental activities of daily living, rest and sleep, work, leisure and social participation.      The patient is functioning below her previous functional level and would benefit from skilled inpatient OT to address the a recall all spinal precautions and incorporate into ADLs  Patient will don/doff brace with supervision

## 2020-08-18 NOTE — PROGRESS NOTES
[facility] Hospitalist Team  Progress Note      [place]  6/14/1940    Assessment/Plan:        S/P  L4-5 TLIF  - Plan per surgery. Continue PT/OT. Pain is currently controlled.    Hg ok      #HL- statin    #DM2- hold oral, start ssi with acuchecks, cont mL/hr at 08/17/20 1030   • sodium chloride 100 mL/hr at 08/17/20 1500     PRN: HYDROcodone-acetaminophen **OR** HYDROcodone-acetaminophen, sodium chloride 0.9%, PEG 3350, magnesium hydroxide, bisacodyl, Fleet Enema, ondansetron HCl, Metoclopramide HCl, Pro

## 2020-08-19 ENCOUNTER — APPOINTMENT (OUTPATIENT)
Dept: ULTRASOUND IMAGING | Facility: HOSPITAL | Age: 80
DRG: 454 | End: 2020-08-19
Attending: PHYSICIAN ASSISTANT
Payer: MEDICARE

## 2020-08-19 VITALS
WEIGHT: 188 LBS | SYSTOLIC BLOOD PRESSURE: 122 MMHG | HEIGHT: 65 IN | RESPIRATION RATE: 18 BRPM | BODY MASS INDEX: 31.32 KG/M2 | TEMPERATURE: 98 F | DIASTOLIC BLOOD PRESSURE: 53 MMHG | HEART RATE: 59 BPM | OXYGEN SATURATION: 100 %

## 2020-08-19 LAB
DEPRECATED RDW RBC AUTO: 43 FL (ref 35.1–46.3)
ERYTHROCYTE [DISTWIDTH] IN BLOOD BY AUTOMATED COUNT: 13.2 % (ref 11–15)
GLUCOSE BLD-MCNC: 141 MG/DL (ref 70–99)
GLUCOSE BLD-MCNC: 210 MG/DL (ref 70–99)
HCT VFR BLD AUTO: 29.5 % (ref 35–48)
HGB BLD-MCNC: 9.6 G/DL (ref 12–16)
MCH RBC QN AUTO: 28.7 PG (ref 26–34)
MCHC RBC AUTO-ENTMCNC: 32.5 G/DL (ref 31–37)
MCV RBC AUTO: 88.3 FL (ref 80–100)
PLATELET # BLD AUTO: 177 10(3)UL (ref 150–450)
RBC # BLD AUTO: 3.34 X10(6)UL (ref 3.8–5.3)
WBC # BLD AUTO: 9 X10(3) UL (ref 4–11)

## 2020-08-19 PROCEDURE — 82962 GLUCOSE BLOOD TEST: CPT

## 2020-08-19 PROCEDURE — 93970 EXTREMITY STUDY: CPT | Performed by: PHYSICIAN ASSISTANT

## 2020-08-19 PROCEDURE — 97530 THERAPEUTIC ACTIVITIES: CPT

## 2020-08-19 PROCEDURE — 85027 COMPLETE CBC AUTOMATED: CPT | Performed by: PHYSICIAN ASSISTANT

## 2020-08-19 PROCEDURE — 97535 SELF CARE MNGMENT TRAINING: CPT

## 2020-08-19 PROCEDURE — 97116 GAIT TRAINING THERAPY: CPT

## 2020-08-19 NOTE — PLAN OF CARE
States bilateral calf pain, seen by Mitzi FORREST -Bilateral  Doppler US ordered, down at 0930, denies need for Jellico prior to US, 4+ pitting edema, Doppler needed for pulses bilaterally , PT/OT to see, accucheck this am =141, updated on plan of care, fall preca

## 2020-08-19 NOTE — PLAN OF CARE
Pain well controlled on oral medication. Pt moving well, ambulatory to toilet with min assist. Was up in he chair for dinner.  Hx of neuropathy to ble, able to move toes and dorsi/plantar flex ankles with mod strength. +3 pitting edema to both feet which is

## 2020-08-19 NOTE — PROGRESS NOTES
S: She has improved back pain . She has been out of bed    Inspection:  Awake alert No acute distress. No difficulty breathing     Blood pressure 159/55, pulse 61, temperature 99.2 °F (37.3 °C), temperature source Oral, resp.  rate 18, height 5' 5\" (1.651

## 2020-08-19 NOTE — PROGRESS NOTES
[facility] Hospitalist Team  Progress Note      [place]  6/14/1940    Assessment/Plan:        S/P  L4-5 TLIF  - Plan per surgery. Continue PT/OT. Pain is currently controlled.    Hg ok     #Calf pain- doppler neg     #HL- statin    #DM2- hold oral, star 20 mg Oral QPM   • lisinopril  10 mg Oral Daily   • metoprolol Tartrate  25 mg Oral BID   • insulin detemir  35 Units Subcutaneous Nightly     Continuous Infusions:   • lactated ringers 20 mL/hr at 08/17/20 1030   • sodium chloride 100 mL/hr at 08/17/20 1

## 2020-08-19 NOTE — OCCUPATIONAL THERAPY NOTE
OCCUPATIONAL THERAPY TREATMENT NOTE - INPATIENT     Room Number: 373/373-A  Session:  1  Number of Visits to Meet Established Goals: 2    Presenting Problem: (L4-5 revision decomp TLIF)    History related to current admission:   Admitted for elective L3-S1 ENDOSCOPY   • COLONOSCOPY,BIOPSY  1999    adenomatous polyp   • COLONOSCOPY,DIAGNOSTIC  2003    wnl   • COLONOSCOPY,DIAGNOSTIC  10/10/13    normal   • ENDOVENOUS LASER VEIN ADDON      Right Greater Saph V  per Dr. Wynn Alpers   • HYSTERECTOMY      w/ BSO   • I today    OBJECTIVE  Precautions: Spine    WEIGHT BEARING RESTRICTION  Weight Bearing Restriction: None                PAIN ASSESSMENT  Ratin  Location: (back, below the surgery near top of sacrum)  Management Techniques: Relaxation;Repositioning     AC Understanding voiced. Patient End of Session: Up in chair;Needs met;Call light within reach;RN aware of session/findings;Bracing education provided; All patient questions and concerns addressed; Family present    ASSESSMENT   Patient demonstrated ability to

## 2020-08-19 NOTE — PHYSICAL THERAPY NOTE
PHYSICAL THERAPY TREATMENT NOTE - INPATIENT    Room Number: 373/373-A     Session: 1   Number of Visits to Meet Established Goals: 2    Presenting Problem: s/p L4/5 revision decompression with tranforaminal lumbar interbody fusion    Problem List  Active COLONOSCOPY,DIAGNOSTIC  10/10/13    normal   • ENDOVENOUS LASER VEIN ADDON      Right Greater Saph V  per Dr. Pema Lacey   • HYSTERECTOMY      w/ BSO   • INTRAOPERATIVE NEURO MONITORING N/A 8/17/2020    Performed by Angelic Barlow MD at [facility] MAIN OR   • KNEE R 5  Location: low back  Management Techniques:  Activity promotion;Repositioning    BALANCE                                                                                                                     Static Sitting: Good  Dynamic Sitting: Fair + complete stairs to emulate home environment - Pt able to verbalize sequence and complete without incident.      Standardized Assessment  NA    Axillary Transfers/Environmental Barriers    Toilet/Commode Transfers: NA  Stairs: SBA (4 stairs with bilateral ra

## 2020-08-22 NOTE — OPERATIVE REPORT
[facility]    PATIENT'S NAME: Larinda Hamman   ATTENDING PHYSICIAN: Seda Lopez M.D. OPERATING PHYSICIAN: Seda Lopez M.D.    PATIENT ACCOUNT#:   [de-identified]    LOCATION:  [address] [place] [address]  MEDICAL RECORD #:   [id number]       DATE OF BIRTH:  2 fluoroscopy was wheeled in. We marked pedicles at all levels outlined above bilaterally.   Two separate incisions 1-1/2 fingerbreadths lateral to this approximately an inch and a half long were made with a larger incision on the patient's more symptomatic between pelvic incidence and lumbar lordosis was made, thus documenting clinically significant kyphosis. Our attention first turned to the L4-5 level. The facet was osteotomized and resected. A bur was used to remove additional bone.   Pars was resecte roots.  This required fine curets and Vu used in sequential fashion with 2 and 3 mm Kerrison rongeurs. This procedure required extensive time and expertise, lengthening the time of the operation.   This process was required to complete the necessary de fluoroscopy. Screws were advanced under fluoroscopy. All screws were tested using real-time neuromonitoring. Thresholds were above acceptable values.   Rods and top tighteners were applied bilaterally, and final tightening was applied with compression

## 2020-09-08 NOTE — DISCHARGE SUMMARY
[facility]  Discharge Summary    [facility] Patient Status:  Inpatient    1940 MRN [id number]   [facility] 3SW-A Attending No att. providers found   [address] Day # 2 PCP Amparo Mccullough MD     Date of Admission: 2020    Date hospital stay. The patient participated in Physical and Occupational Therapy making steady progressive gains. Once deemed stable by all services the patient was discharged on the above date.   Standard discharge instructions were given and they were asked MG Oral Tab  Take 1 tablet by mouth every 6 (six) hours as needed for Pain., Normal, Disp-30 tablet, R-0    FREESTYLE LITE TEST In Vitro Strip  TEST THREE TIMES DAILY, Normal, Disp-300 strip, R-1, BECKI    !! - Potential duplicate medications found.  Please d

## 2020-09-15 NOTE — PROGRESS NOTES
[facility] Hospitalist Progress Note                                                                   [address]  6/14/1940    SUBJECTIVE:  BG improved this am.  bl edema (chronic), dopplers stenosis of lumbar region without neurogenic claudication        ASSESSMENT / PLAN:    sp L2-5 decompression and removal of mass  - Plan per ortho spine. Continue PT/OT. Pain is currently controlled.        Acute on chronic pain  - cont IV narctotics as n Yes

## 2020-10-20 ENCOUNTER — TELEPHONE (OUTPATIENT)
Dept: CARDIOLOGY | Age: 80
End: 2020-10-20

## 2020-10-22 ENCOUNTER — OFFICE VISIT (OUTPATIENT)
Dept: CARDIOLOGY | Age: 80
End: 2020-10-22

## 2020-10-22 VITALS
HEIGHT: 65 IN | HEART RATE: 60 BPM | WEIGHT: 188 LBS | SYSTOLIC BLOOD PRESSURE: 120 MMHG | DIASTOLIC BLOOD PRESSURE: 70 MMHG | BODY MASS INDEX: 31.32 KG/M2

## 2020-10-22 DIAGNOSIS — I87.2 CHRONIC VENOUS INSUFFICIENCY: ICD-10-CM

## 2020-10-22 DIAGNOSIS — I49.1 PREMATURE ATRIAL BEATS: ICD-10-CM

## 2020-10-22 DIAGNOSIS — I25.10 CORONARY ARTERY DISEASE INVOLVING NATIVE CORONARY ARTERY OF NATIVE HEART WITHOUT ANGINA PECTORIS: Primary | ICD-10-CM

## 2020-10-22 DIAGNOSIS — I10 ESSENTIAL HYPERTENSION: ICD-10-CM

## 2020-10-22 DIAGNOSIS — E78.00 PURE HYPERCHOLESTEROLEMIA: ICD-10-CM

## 2020-10-22 PROCEDURE — 99214 OFFICE O/P EST MOD 30 MIN: CPT | Performed by: INTERNAL MEDICINE

## 2020-10-22 RX ORDER — ERGOCALCIFEROL 1.25 MG/1
50000 CAPSULE ORAL
COMMUNITY
Start: 2020-08-28 | End: 2020-11-14

## 2020-10-22 ASSESSMENT — PATIENT HEALTH QUESTIONNAIRE - PHQ9
CLINICAL INTERPRETATION OF PHQ2 SCORE: NO FURTHER SCREENING NEEDED
CLINICAL INTERPRETATION OF PHQ9 SCORE: NO FURTHER SCREENING NEEDED
2. FEELING DOWN, DEPRESSED OR HOPELESS: NOT AT ALL
SUM OF ALL RESPONSES TO PHQ9 QUESTIONS 1 AND 2: 0
SUM OF ALL RESPONSES TO PHQ9 QUESTIONS 1 AND 2: 0
1. LITTLE INTEREST OR PLEASURE IN DOING THINGS: NOT AT ALL

## 2020-10-22 ASSESSMENT — ENCOUNTER SYMPTOMS
HEMATOCHEZIA: 0
FEVER: 0
HEMOPTYSIS: 0
BRUISES/BLEEDS EASILY: 0
SUSPICIOUS LESIONS: 0
WEIGHT GAIN: 0
CHILLS: 0
ALLERGIC/IMMUNOLOGIC COMMENTS: NO NEW FOOD ALLERGIES
COUGH: 0
WEIGHT LOSS: 0

## 2020-10-23 ENCOUNTER — TELEPHONE (OUTPATIENT)
Dept: CARDIOLOGY | Age: 80
End: 2020-10-23

## 2020-10-23 DIAGNOSIS — M79.605 PAIN IN BOTH LOWER EXTREMITIES: ICD-10-CM

## 2020-10-23 DIAGNOSIS — R60.9 EDEMA, UNSPECIFIED TYPE: ICD-10-CM

## 2020-10-23 DIAGNOSIS — I87.2 CHRONIC VENOUS INSUFFICIENCY: Primary | ICD-10-CM

## 2020-10-23 DIAGNOSIS — I25.10 CORONARY ARTERY DISEASE INVOLVING NATIVE CORONARY ARTERY OF NATIVE HEART WITHOUT ANGINA PECTORIS: ICD-10-CM

## 2020-10-23 DIAGNOSIS — M79.604 PAIN IN BOTH LOWER EXTREMITIES: ICD-10-CM

## 2020-10-26 ENCOUNTER — TELEPHONE (OUTPATIENT)
Dept: CARDIOLOGY | Age: 80
End: 2020-10-26

## 2020-10-26 DIAGNOSIS — I25.10 CORONARY ARTERY DISEASE INVOLVING NATIVE CORONARY ARTERY OF NATIVE HEART WITHOUT ANGINA PECTORIS: Primary | ICD-10-CM

## 2020-10-26 DIAGNOSIS — M79.604 PAIN IN BOTH LOWER EXTREMITIES: ICD-10-CM

## 2020-10-26 DIAGNOSIS — M79.605 PAIN IN BOTH LOWER EXTREMITIES: ICD-10-CM

## 2020-10-26 DIAGNOSIS — E78.00 PURE HYPERCHOLESTEROLEMIA: ICD-10-CM

## 2020-10-26 DIAGNOSIS — R60.9 EDEMA, UNSPECIFIED TYPE: ICD-10-CM

## 2020-10-27 ENCOUNTER — TELEPHONE (OUTPATIENT)
Dept: CARDIOLOGY | Age: 80
End: 2020-10-27

## 2020-10-27 ENCOUNTER — OFFICE VISIT (OUTPATIENT)
Dept: PHYSICAL THERAPY | Age: 80
End: 2020-10-27
Attending: ORTHOPAEDIC SURGERY
Payer: MEDICARE

## 2020-10-27 DIAGNOSIS — Z98.1 S/P LUMBAR FUSION: ICD-10-CM

## 2020-10-27 PROCEDURE — 97110 THERAPEUTIC EXERCISES: CPT

## 2020-10-27 PROCEDURE — 97162 PT EVAL MOD COMPLEX 30 MIN: CPT

## 2020-10-27 NOTE — PROGRESS NOTES
SPINE EVALUATION:   Referring Physician: Dr. 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 Pt voiced understanding and performs HEP correctly without reported pain. Skilled Physical Therapy is medically necessary to address the above impairments and reach functional goals.      Precautions:  None  OBJECTIVE:   Observation/Posture: l improve tandem stance to >/=10 sec without UE assist (10 visits)   · Pt will improve FOTO to >/= 54/100 (10 visits)    Frequency / Duration: Patient will be seen for 2 x/week or a total of 10 visits over a 90 day period.  Treatment will include: Gait traini

## 2020-10-29 ENCOUNTER — OFFICE VISIT (OUTPATIENT)
Dept: PHYSICAL THERAPY | Age: 80
End: 2020-10-29
Attending: ORTHOPAEDIC SURGERY
Payer: MEDICARE

## 2020-10-29 PROCEDURE — 97112 NEUROMUSCULAR REEDUCATION: CPT

## 2020-10-29 PROCEDURE — 97110 THERAPEUTIC EXERCISES: CPT

## 2020-10-29 NOTE — PROGRESS NOTES
Dx: s/p L4-5 revision decompression with transforaminal lumbar interbody fusion on 8/17/2020   Authorized # of Visits: 10 POC Next MD Visit: 11/10/2020    Fall Risk: ELEVATED Precautions: None     Subjective: Reports increased muscle fatigue with home exer

## 2020-11-02 ENCOUNTER — OFFICE VISIT (OUTPATIENT)
Dept: PHYSICAL THERAPY | Age: 80
End: 2020-11-02
Attending: ORTHOPAEDIC SURGERY
Payer: MEDICARE

## 2020-11-02 PROCEDURE — 97110 THERAPEUTIC EXERCISES: CPT

## 2020-11-02 PROCEDURE — 97112 NEUROMUSCULAR REEDUCATION: CPT

## 2020-11-02 NOTE — PROGRESS NOTES
Dx: s/p L4-5 revision decompression with transforaminal lumbar interbody fusion on 8/17/2020   Authorized # of Visits: 10 POC Next MD Visit: 11/10/2020    Fall Risk: ELEVATED Precautions: None     Subjective: \"I'm feeling a little stiff today in the back [id number]          HEP: supine clams, heel raises, side stepping at counter, LAQ with 1# ankle weights    Charges:  TherEx x2 (30 min), NMR x1 (10 min)       Total Timed Treatment: 40 min  Total Treatment Time: 40 min

## 2020-11-04 ENCOUNTER — OFFICE VISIT (OUTPATIENT)
Dept: PHYSICAL THERAPY | Age: 80
End: 2020-11-04
Attending: ORTHOPAEDIC SURGERY
Payer: MEDICARE

## 2020-11-04 PROCEDURE — 97112 NEUROMUSCULAR REEDUCATION: CPT

## 2020-11-04 PROCEDURE — 97110 THERAPEUTIC EXERCISES: CPT

## 2020-11-04 NOTE — PROGRESS NOTES
Dx: s/p L4-5 revision decompression with transforaminal lumbar interbody fusion on 8/17/2020   Authorized # of Visits: 10 POC Next MD Visit: 11/10/2020    Fall Risk: ELEVATED Precautions: None     Subjective: \"Had some leg stiffness after last session. \" stance, airex, eyes open, Reji UE x1, 3x30 sec  -Standing feet together, airex, eyes open, modA UEx2, [id number] NMR:  -Normal stance, eyes closed, Reji UEx1, 3x30 sec  -Normal stance, airex, eyes open, Reji UE x1, 3x30 sec  -Standing feet together, airex, ey

## 2020-11-09 ENCOUNTER — OFFICE VISIT (OUTPATIENT)
Dept: PHYSICAL THERAPY | Age: 80
End: 2020-11-09
Attending: ORTHOPAEDIC SURGERY
Payer: MEDICARE

## 2020-11-09 PROCEDURE — 97110 THERAPEUTIC EXERCISES: CPT

## 2020-11-09 PROCEDURE — 97112 NEUROMUSCULAR REEDUCATION: CPT

## 2020-11-09 NOTE — PROGRESS NOTES
Dx: s/p L4-5 revision decompression with transforaminal lumbar interbody fusion on 8/17/2020   Authorized # of Visits: 10 POC Next MD Visit: 11/10/2020    Fall Risk: ELEVATED Precautions: None     Subjective: Reports continued posterior thigh stiffness, co bars, 4x10 steps, out of parallel bars x15 ft (contact guard) TherEx:  -NuStep L1 x6 min  -Heel raises, seated, x10 reps  -Step tap ups, 2\", R/L, Reji UE x1, 2x10 reps  -Marching in place, Reji UEx2, x10 reps  -LAQ, seated, 3#, R/L, 2x10 reps  -Hip ABD, R

## 2020-11-10 ENCOUNTER — HOSPITAL ENCOUNTER (OUTPATIENT)
Dept: ULTRASOUND IMAGING | Facility: HOSPITAL | Age: 80
Discharge: HOME | End: 2020-11-10
Attending: INTERNAL MEDICINE
Payer: MEDICARE

## 2020-11-10 DIAGNOSIS — I87.2 CHRONIC VENOUS INSUFFICIENCY: ICD-10-CM

## 2020-11-10 DIAGNOSIS — M79.604 PAIN IN BOTH LOWER EXTREMITIES: ICD-10-CM

## 2020-11-10 DIAGNOSIS — I25.10 CORONARY ATHEROSCLEROSIS OF NATIVE CORONARY ARTERY: ICD-10-CM

## 2020-11-10 DIAGNOSIS — M79.605 PAIN IN BOTH LOWER EXTREMITIES: ICD-10-CM

## 2020-11-10 DIAGNOSIS — R60.9 EDEMA, UNSPECIFIED TYPE: ICD-10-CM

## 2020-11-10 PROCEDURE — 93970 EXTREMITY STUDY: CPT | Performed by: INTERNAL MEDICINE

## 2020-11-11 ENCOUNTER — OFFICE VISIT (OUTPATIENT)
Dept: PHYSICAL THERAPY | Age: 80
End: 2020-11-11
Attending: ORTHOPAEDIC SURGERY
Payer: MEDICARE

## 2020-11-11 PROCEDURE — 97110 THERAPEUTIC EXERCISES: CPT

## 2020-11-11 PROCEDURE — 97112 NEUROMUSCULAR REEDUCATION: CPT

## 2020-11-11 NOTE — PROGRESS NOTES
Dx: s/p L4-5 revision decompression with transforaminal lumbar interbody fusion on 8/17/2020   Authorized # of Visits: 10 POC Next MD Visit: 11/10/2020    Fall Risk: ELEVATED Precautions: None     Subjective: \"I could feel the muscles after last session. \ tap ups, 2\", R/L, Reji UE x1, 2x10 reps  -Marching in place, Reji UEx2, x10 reps  -LAQ, seated, 3#, R/L, 2x10 reps  -Hip ABD, R/L, 3# ankle weight, 2x10 reps TherEx:  -NuStep L1 x6 min  -Step tap ups, 2\", R/L, Reji UE x1, 2x10 reps  -Marching in place, m

## 2020-11-13 ENCOUNTER — TELEPHONE (OUTPATIENT)
Dept: CARDIOLOGY | Age: 80
End: 2020-11-13

## 2020-11-16 ENCOUNTER — OFFICE VISIT (OUTPATIENT)
Dept: PHYSICAL THERAPY | Age: 80
End: 2020-11-16
Attending: ORTHOPAEDIC SURGERY
Payer: MEDICARE

## 2020-11-16 PROCEDURE — 97112 NEUROMUSCULAR REEDUCATION: CPT

## 2020-11-16 PROCEDURE — 97110 THERAPEUTIC EXERCISES: CPT

## 2020-11-16 NOTE — PROGRESS NOTES
Dx: s/p L4-5 revision decompression with transforaminal lumbar interbody fusion on 8/17/2020   Authorized # of Visits: 10 POC Next MD Visit: 12/1/2020   Fall Risk: ELEVATED Precautions: None     Subjective: Pt states the exercise at home seem to be getting (contact guard) TherEx:  -NuStep L1 x6 min  -Heel raises, seated, x10 reps  -Step tap ups, 2\", R/L, Reji UE x1, 2x10 reps  -Marching in place, Reji UEx2, x10 reps  -LAQ, seated, 3#, R/L, 2x10 reps  -Hip ABD, R/L, 3# ankle weight, 2x10 reps TherEx:  -NuSte

## 2020-11-18 ENCOUNTER — OFFICE VISIT (OUTPATIENT)
Dept: PHYSICAL THERAPY | Age: 80
End: 2020-11-18
Attending: ORTHOPAEDIC SURGERY
Payer: MEDICARE

## 2020-11-18 PROCEDURE — 97110 THERAPEUTIC EXERCISES: CPT

## 2020-11-18 PROCEDURE — 97112 NEUROMUSCULAR REEDUCATION: CPT

## 2020-11-18 NOTE — PROGRESS NOTES
Dx: s/p L4-5 revision decompression with transforaminal lumbar interbody fusion on 8/17/2020   Authorized # of Visits: 10 POC Next MD Visit: 12/1/2020   Fall Risk: ELEVATED Precautions: None     Subjective: She states she is feeling good today.  Exercises a R/L, 3# ankle weight, 2x10 reps TherEx:  -NuStep L1 x6 min  -Step tap ups, 2\", R/L, Reji UE x1, 2x10 reps  -Marching in place, Reji UEx2, x10 reps  -Cane walking 5x10 ft  -Backwards walking in parallel bars, 4x10 steps TherEx:  -NuStep L1 x6 min  -Step ta Total Timed Treatment: 40 min  Total Treatment Time: 40 min

## 2020-12-03 ENCOUNTER — OFFICE VISIT (OUTPATIENT)
Dept: PHYSICAL THERAPY | Age: 80
End: 2020-12-03
Attending: ORTHOPAEDIC SURGERY
Payer: MEDICARE

## 2020-12-03 PROCEDURE — 97110 THERAPEUTIC EXERCISES: CPT

## 2020-12-03 PROCEDURE — 97112 NEUROMUSCULAR REEDUCATION: CPT

## 2020-12-03 NOTE — PROGRESS NOTES
Dx: s/p L4-5 revision decompression with transforaminal lumbar interbody fusion on 8/17/2020   Authorized # of Visits: 10 POC Next MD Visit: March 2020   Fall Risk: ELEVATED Precautions: None     Subjective: Pt states her back has been feeling good.  She fe reps  -Marching in place, Reji UEx2, x10 reps  -LAQ, seated, 3#, R/L, 2x10 reps  -Hip ABD, R/L, 3# ankle weight, 2x10 reps TherEx:  -NuStep L1 x6 min  -Step tap ups, 2\", R/L, Reji UE x1, 2x10 reps  -Marching in place, Reji UEx2, x10 reps  -Cane walking 5x R/L     HEP: supine clams, heel raises, hip ABD at counter 2.5# ankle weights, LAQ with 2.5# ankle weights    Charges:  TherEx x2 (30 min), NMR x1 (10 min)       Total Timed Treatment: 40 min  Total Treatment Time: 40 min

## 2020-12-07 ENCOUNTER — OFFICE VISIT (OUTPATIENT)
Dept: PHYSICAL THERAPY | Age: 80
End: 2020-12-07
Attending: ORTHOPAEDIC SURGERY
Payer: MEDICARE

## 2020-12-07 PROCEDURE — 97110 THERAPEUTIC EXERCISES: CPT

## 2020-12-07 PROCEDURE — 97112 NEUROMUSCULAR REEDUCATION: CPT

## 2020-12-07 NOTE — PROGRESS NOTES
Dx: s/p L4-5 revision decompression with transforaminal lumbar interbody fusion on 8/17/2020   Authorized # of Visits: 10 POC Next MD Visit: March 2020   Fall Risk: ELEVATED Precautions: None      Progress Summary  Pt has attended 10 visits in Physical The have any questions, please contact me at Dept: [phone].     Sincerely,  Electronically signed by therapist: Gertrudis Murray PT, DPT     Physician's certification required:  Yes  Please co-sign or sign and return this letter via fax as soon as possible to ups, 2\", R/L, Reji UE x1, 2x10 reps  -Marching in place, Reji UEx2, x10 reps  -Standing ankle DF, R/L, x15 reps  -Standing hamstring curls, 2#, R/L, 2x10 reps   NMR:  -Normal stance, eyes closed, Reji UEx1, 3x30 sec  -Normal stance, airex, eyes open, Reji

## 2020-12-09 ENCOUNTER — OFFICE VISIT (OUTPATIENT)
Dept: PHYSICAL THERAPY | Age: 80
End: 2020-12-09
Attending: ORTHOPAEDIC SURGERY
Payer: MEDICARE

## 2020-12-09 PROCEDURE — 97112 NEUROMUSCULAR REEDUCATION: CPT

## 2020-12-09 PROCEDURE — 97110 THERAPEUTIC EXERCISES: CPT

## 2020-12-09 NOTE — PROGRESS NOTES
Dx: s/p L4-5 revision decompression with transforaminal lumbar interbody fusion on 8/17/2020   Authorized # of Visits: 10 POC Next MD Visit: March 2020   Fall Risk: ELEVATED Precautions: None     Subjective: Pt states she is feeling good today, a little ti reps  -Marching in place, Reji UEx2, x10 reps  -Cane walking 5x10 ft  -Backwards walking in parallel bars, 4x10 steps TherEx:  -NuStep L1 x6 min  -Step tap ups, 2\", R/L, Reji UE x1, 2x10 reps  -Marching in place, Reji UEx2, x10 reps  -Cane walking 6x15 ft

## 2020-12-14 ENCOUNTER — OFFICE VISIT (OUTPATIENT)
Dept: PHYSICAL THERAPY | Age: 80
End: 2020-12-14
Attending: ORTHOPAEDIC SURGERY
Payer: MEDICARE

## 2020-12-14 PROCEDURE — 97110 THERAPEUTIC EXERCISES: CPT

## 2020-12-14 NOTE — PROGRESS NOTES
Dx: s/p L4-5 revision decompression with transforaminal lumbar interbody fusion on 8/17/2020   Authorized # of Visits: 10 POC Next MD Visit: March 2020   Fall Risk: ELEVATED Precautions: None     Subjective: Pt states she is feeling a little weaker today, reps  -Standing ankle DF, R/L, x15 reps  -Standing hamstring curls, 2#, R/L, 2x10 reps TherEx:  -Step tap ups, 2\", R/L, Reji UE x1, 2x10 reps  -Marching in place, Reji UEx2, x10 reps  -Standing ankle DF, R/L, x15 reps  -Standing hamstring curls, 2#, R/L,

## 2020-12-15 ENCOUNTER — OFFICE VISIT (OUTPATIENT)
Dept: CARDIOLOGY | Age: 80
End: 2020-12-15

## 2020-12-15 VITALS
SYSTOLIC BLOOD PRESSURE: 132 MMHG | WEIGHT: 199.13 LBS | BODY MASS INDEX: 33.18 KG/M2 | HEART RATE: 74 BPM | DIASTOLIC BLOOD PRESSURE: 60 MMHG | HEIGHT: 65 IN

## 2020-12-15 DIAGNOSIS — I49.1 PREMATURE ATRIAL BEATS: ICD-10-CM

## 2020-12-15 DIAGNOSIS — I87.2 CHRONIC VENOUS INSUFFICIENCY: ICD-10-CM

## 2020-12-15 DIAGNOSIS — I10 ESSENTIAL HYPERTENSION: ICD-10-CM

## 2020-12-15 DIAGNOSIS — I25.10 CORONARY ARTERY DISEASE INVOLVING NATIVE CORONARY ARTERY OF NATIVE HEART WITHOUT ANGINA PECTORIS: ICD-10-CM

## 2020-12-15 DIAGNOSIS — I82.811 EMBOLISM FROM RIGHT GREATER SAPHENOUS VEIN: Primary | ICD-10-CM

## 2020-12-15 DIAGNOSIS — E78.00 PURE HYPERCHOLESTEROLEMIA: ICD-10-CM

## 2020-12-15 PROCEDURE — 99214 OFFICE O/P EST MOD 30 MIN: CPT | Performed by: NURSE PRACTITIONER

## 2020-12-15 SDOH — HEALTH STABILITY: PHYSICAL HEALTH: ON AVERAGE, HOW MANY MINUTES DO YOU ENGAGE IN EXERCISE AT THIS LEVEL?: 0 MIN

## 2020-12-15 SDOH — HEALTH STABILITY: PHYSICAL HEALTH: ON AVERAGE, HOW MANY DAYS PER WEEK DO YOU ENGAGE IN MODERATE TO STRENUOUS EXERCISE (LIKE A BRISK WALK)?: 0 DAYS

## 2020-12-15 ASSESSMENT — ENCOUNTER SYMPTOMS
BLOATING: 0
COUGH: 0
ORTHOPNEA: 0
ABDOMINAL PAIN: 0
SHORTNESS OF BREATH: 0
NEAR-SYNCOPE: 0
NIGHT SWEATS: 0
SYNCOPE: 0
FEVER: 0

## 2020-12-15 ASSESSMENT — PATIENT HEALTH QUESTIONNAIRE - PHQ9
CLINICAL INTERPRETATION OF PHQ2 SCORE: NO FURTHER SCREENING NEEDED
2. FEELING DOWN, DEPRESSED OR HOPELESS: NOT AT ALL
SUM OF ALL RESPONSES TO PHQ9 QUESTIONS 1 AND 2: 0
SUM OF ALL RESPONSES TO PHQ9 QUESTIONS 1 AND 2: 0
CLINICAL INTERPRETATION OF PHQ9 SCORE: NO FURTHER SCREENING NEEDED
1. LITTLE INTEREST OR PLEASURE IN DOING THINGS: NOT AT ALL

## 2020-12-16 ENCOUNTER — OFFICE VISIT (OUTPATIENT)
Dept: PHYSICAL THERAPY | Age: 80
End: 2020-12-16
Attending: ORTHOPAEDIC SURGERY
Payer: MEDICARE

## 2020-12-16 PROCEDURE — 97110 THERAPEUTIC EXERCISES: CPT

## 2020-12-16 NOTE — PROGRESS NOTES
Dx: s/p L4-5 revision decompression with transforaminal lumbar interbody fusion on 8/17/2020   Authorized # of Visits: 10 POC Next MD Visit: March 2020   Fall Risk: ELEVATED Precautions: None     Subjective: Felt okay after last session.  Did limited exerci reps  -Marching in place, Reji UEx2, x10 reps  -Standing ankle DF, R/L, x15 reps  -Standing hamstring curls, 2#, R/L, 2x10 reps TherEx:  -Step tap ups, 2\", R/L, Reji UE x1, 2x10 reps  -Marching in place, Reji UEx2, x10 reps  -Standing ankle DF, R/L, x15 r

## 2020-12-21 ENCOUNTER — OFFICE VISIT (OUTPATIENT)
Dept: PHYSICAL THERAPY | Age: 80
End: 2020-12-21
Attending: ORTHOPAEDIC SURGERY
Payer: MEDICARE

## 2020-12-21 PROCEDURE — 97110 THERAPEUTIC EXERCISES: CPT

## 2020-12-21 NOTE — PROGRESS NOTES
Dx: s/p L4-5 revision decompression with transforaminal lumbar interbody fusion on 8/17/2020   Authorized # of Visits: 25 POC Next MD Visit: March 2020   Fall Risk: ELEVATED Precautions: None     Subjective: Pt states she felt good after last session.  Walk tap ups, 2\", R/L, Reji UE x1, 2x10 reps  -Marching in place, Reji UEx2, x10 reps  -Standing ankle DF, R/L, x15 reps  -Standing hamstring curls, 2#, R/L, 2x10 reps  -Tilt board AAROM ankle PF, 2x10x5 sec hold TherEx:  -Tilt board AAROM ankle PF, 2x10x5 sec

## 2020-12-23 ENCOUNTER — OFFICE VISIT (OUTPATIENT)
Dept: PHYSICAL THERAPY | Age: 80
End: 2020-12-23
Attending: ORTHOPAEDIC SURGERY
Payer: MEDICARE

## 2020-12-23 PROCEDURE — 97110 THERAPEUTIC EXERCISES: CPT

## 2020-12-23 NOTE — PROGRESS NOTES
Dx: s/p L4-5 revision decompression with transforaminal lumbar interbody fusion on 8/17/2020   Authorized # of Visits: 25 POC Next MD Visit: March 2020   Fall Risk: ELEVATED Precautions: None     Subjective: Felt fatigued after last session, but overall do hold  -Standing with ankle in PF on tilt board, slow eccentric return to neutral, x20 reps  -Lunge onto round BOSU, partial weight shift, bilateral UE support, R/L x20 reps  -Walking in parallel bars, Reji UE x2, x5 min TherEx:  -Tilt board AAROM ankle PF,

## 2020-12-30 ENCOUNTER — APPOINTMENT (OUTPATIENT)
Dept: PHYSICAL THERAPY | Age: 80
End: 2020-12-30
Attending: ORTHOPAEDIC SURGERY
Payer: MEDICARE

## 2021-01-20 ENCOUNTER — OFFICE VISIT (OUTPATIENT)
Dept: CARDIOLOGY | Age: 81
End: 2021-01-20

## 2021-01-20 ENCOUNTER — TELEPHONE (OUTPATIENT)
Dept: CARDIOLOGY | Age: 81
End: 2021-01-20

## 2021-01-20 VITALS
OXYGEN SATURATION: 99 % | HEIGHT: 65 IN | BODY MASS INDEX: 33.19 KG/M2 | SYSTOLIC BLOOD PRESSURE: 132 MMHG | WEIGHT: 199.2 LBS | RESPIRATION RATE: 18 BRPM | DIASTOLIC BLOOD PRESSURE: 64 MMHG | HEART RATE: 64 BPM

## 2021-01-20 DIAGNOSIS — I89.0 LYMPHEDEMA OF BOTH LOWER EXTREMITIES: Primary | ICD-10-CM

## 2021-01-20 DIAGNOSIS — E78.00 PURE HYPERCHOLESTEROLEMIA: ICD-10-CM

## 2021-01-20 DIAGNOSIS — I10 ESSENTIAL HYPERTENSION: ICD-10-CM

## 2021-01-20 DIAGNOSIS — I25.10 CORONARY ARTERY DISEASE INVOLVING NATIVE CORONARY ARTERY OF NATIVE HEART WITHOUT ANGINA PECTORIS: ICD-10-CM

## 2021-01-20 DIAGNOSIS — I87.2 CHRONIC VENOUS INSUFFICIENCY: ICD-10-CM

## 2021-01-20 PROCEDURE — 99204 OFFICE O/P NEW MOD 45 MIN: CPT | Performed by: INTERNAL MEDICINE

## 2021-01-20 SDOH — HEALTH STABILITY: PHYSICAL HEALTH: ON AVERAGE, HOW MANY MINUTES DO YOU ENGAGE IN EXERCISE AT THIS LEVEL?: 0 MIN

## 2021-01-20 SDOH — HEALTH STABILITY: PHYSICAL HEALTH: ON AVERAGE, HOW MANY DAYS PER WEEK DO YOU ENGAGE IN MODERATE TO STRENUOUS EXERCISE (LIKE A BRISK WALK)?: 0 DAYS

## 2021-01-21 ENCOUNTER — TELEPHONE (OUTPATIENT)
Dept: CARDIOLOGY | Age: 81
End: 2021-01-21

## 2021-02-09 DIAGNOSIS — Z23 NEED FOR VACCINATION: ICD-10-CM

## 2021-02-11 ENCOUNTER — IMMUNIZATION (OUTPATIENT)
Dept: LAB | Age: 81
End: 2021-02-11
Attending: HOSPITALIST
Payer: MEDICARE

## 2021-02-11 DIAGNOSIS — Z23 NEED FOR VACCINATION: Primary | ICD-10-CM

## 2021-02-11 PROCEDURE — 0001A SARSCOV2 VAC 30MCG/0.3ML IM: CPT

## 2021-02-19 ENCOUNTER — NURSE ONLY (OUTPATIENT)
Dept: CARDIOLOGY | Age: 81
End: 2021-02-19

## 2021-02-19 DIAGNOSIS — I89.0 LYMPHEDEMA: Primary | ICD-10-CM

## 2021-02-19 PROCEDURE — 99211 OFF/OP EST MAY X REQ PHY/QHP: CPT | Performed by: INTERNAL MEDICINE

## 2021-02-25 ENCOUNTER — TELEPHONE (OUTPATIENT)
Dept: CARDIOLOGY | Age: 81
End: 2021-02-25

## 2021-03-02 ENCOUNTER — TELEPHONE (OUTPATIENT)
Dept: CARDIOLOGY | Age: 81
End: 2021-03-02

## 2021-03-04 ENCOUNTER — IMMUNIZATION (OUTPATIENT)
Dept: LAB | Age: 81
End: 2021-03-04
Attending: HOSPITALIST
Payer: MEDICARE

## 2021-03-04 DIAGNOSIS — Z23 NEED FOR VACCINATION: Primary | ICD-10-CM

## 2021-03-04 PROCEDURE — 0002A SARSCOV2 VAC 30MCG/0.3ML IM: CPT

## 2021-04-16 ENCOUNTER — OFFICE VISIT (OUTPATIENT)
Dept: CARDIOLOGY | Age: 81
End: 2021-04-16

## 2021-04-16 DIAGNOSIS — I87.2 CHRONIC VENOUS INSUFFICIENCY: Primary | ICD-10-CM

## 2021-04-16 DIAGNOSIS — E78.00 PURE HYPERCHOLESTEROLEMIA: ICD-10-CM

## 2021-04-16 DIAGNOSIS — I83.91 VARICOSE VEINS OF RIGHT LOWER EXTREMITY, UNSPECIFIED WHETHER COMPLICATED: ICD-10-CM

## 2021-04-16 DIAGNOSIS — I89.0 LYMPHEDEMA OF BOTH LOWER EXTREMITIES: Primary | ICD-10-CM

## 2021-04-16 DIAGNOSIS — I10 ESSENTIAL HYPERTENSION: ICD-10-CM

## 2021-04-16 DIAGNOSIS — I80.3 PHLEBITIS AND THROMBOPHLEBITIS OF LOWER EXTREMITIES, UNSPECIFIED: ICD-10-CM

## 2021-04-16 DIAGNOSIS — I87.2 CHRONIC VENOUS INSUFFICIENCY: ICD-10-CM

## 2021-04-16 PROCEDURE — 99214 OFFICE O/P EST MOD 30 MIN: CPT | Performed by: INTERNAL MEDICINE

## 2021-04-16 ASSESSMENT — PAIN SCALES - GENERAL: PAINLEVEL: 0

## 2021-04-22 ENCOUNTER — TELEPHONE (OUTPATIENT)
Dept: CARDIOLOGY | Age: 81
End: 2021-04-22

## 2021-04-28 ENCOUNTER — HOSPITAL ENCOUNTER (OUTPATIENT)
Dept: ULTRASOUND IMAGING | Age: 81
Discharge: HOME | End: 2021-04-28
Attending: INTERNAL MEDICINE
Payer: MEDICARE

## 2021-04-28 DIAGNOSIS — I87.2 PERIPHERAL VENOUS INSUFFICIENCY: ICD-10-CM

## 2021-04-28 DIAGNOSIS — I80.01 PHLEBITIS OF SUPERFICIAL VEIN OF RIGHT LOWER EXTREMITY: ICD-10-CM

## 2021-04-28 DIAGNOSIS — I83.91: ICD-10-CM

## 2021-04-28 PROCEDURE — 93971 EXTREMITY STUDY: CPT | Performed by: INTERNAL MEDICINE

## 2021-05-04 ENCOUNTER — TELEPHONE (OUTPATIENT)
Dept: CARDIOLOGY | Age: 81
End: 2021-05-04

## 2021-05-10 DIAGNOSIS — I83.91 VARICOSE VEINS OF RIGHT LOWER EXTREMITY, UNSPECIFIED WHETHER COMPLICATED: ICD-10-CM

## 2021-05-10 DIAGNOSIS — I87.2 CHRONIC VENOUS INSUFFICIENCY: ICD-10-CM

## 2021-05-10 DIAGNOSIS — I80.3 PHLEBITIS AND THROMBOPHLEBITIS OF LOWER EXTREMITIES, UNSPECIFIED: ICD-10-CM

## 2021-05-25 VITALS
OXYGEN SATURATION: 98 % | RESPIRATION RATE: 14 BRPM | HEART RATE: 64 BPM | HEIGHT: 65 IN | WEIGHT: 195.4 LBS | SYSTOLIC BLOOD PRESSURE: 122 MMHG | BODY MASS INDEX: 32.55 KG/M2 | DIASTOLIC BLOOD PRESSURE: 68 MMHG

## 2021-07-09 RX ORDER — TORSEMIDE 20 MG/1
TABLET ORAL
Qty: 120 TABLET | Refills: 0 | Status: SHIPPED | OUTPATIENT
Start: 2021-07-09

## 2021-10-20 ENCOUNTER — APPOINTMENT (OUTPATIENT)
Dept: CARDIOLOGY | Age: 81
End: 2021-10-20

## 2021-10-21 PROBLEM — I89.0 LYMPHEDEMA: Status: ACTIVE | Noted: 2021-10-21

## 2021-10-28 ENCOUNTER — APPOINTMENT (OUTPATIENT)
Dept: CARDIOLOGY | Age: 81
End: 2021-10-28

## 2021-12-07 ENCOUNTER — LAB SERVICES (OUTPATIENT)
Dept: LAB | Age: 81
End: 2021-12-07

## 2021-12-07 ENCOUNTER — OFFICE VISIT (OUTPATIENT)
Dept: CARDIOLOGY | Age: 81
End: 2021-12-07

## 2021-12-07 ENCOUNTER — TELEPHONE (OUTPATIENT)
Dept: CARDIOLOGY | Age: 81
End: 2021-12-07

## 2021-12-07 VITALS
BODY MASS INDEX: 33.26 KG/M2 | DIASTOLIC BLOOD PRESSURE: 76 MMHG | HEIGHT: 65 IN | WEIGHT: 199.6 LBS | RESPIRATION RATE: 16 BRPM | SYSTOLIC BLOOD PRESSURE: 130 MMHG | HEART RATE: 85 BPM | OXYGEN SATURATION: 97 %

## 2021-12-07 DIAGNOSIS — I89.0 LYMPHEDEMA: ICD-10-CM

## 2021-12-07 DIAGNOSIS — I87.2 CHRONIC VENOUS INSUFFICIENCY: ICD-10-CM

## 2021-12-07 DIAGNOSIS — R06.02 SHORTNESS OF BREATH: ICD-10-CM

## 2021-12-07 DIAGNOSIS — I10 ESSENTIAL HYPERTENSION: ICD-10-CM

## 2021-12-07 DIAGNOSIS — I83.91 VARICOSE VEINS OF RIGHT LOWER EXTREMITY, UNSPECIFIED WHETHER COMPLICATED: ICD-10-CM

## 2021-12-07 DIAGNOSIS — E87.70 HYPERVOLEMIA, UNSPECIFIED HYPERVOLEMIA TYPE: ICD-10-CM

## 2021-12-07 DIAGNOSIS — I25.10 CORONARY ARTERY DISEASE INVOLVING NATIVE CORONARY ARTERY OF NATIVE HEART WITHOUT ANGINA PECTORIS: ICD-10-CM

## 2021-12-07 DIAGNOSIS — I89.0 LYMPHEDEMA OF BOTH LOWER EXTREMITIES: ICD-10-CM

## 2021-12-07 DIAGNOSIS — I89.0 LYMPHEDEMA: Primary | ICD-10-CM

## 2021-12-07 DIAGNOSIS — E78.00 PURE HYPERCHOLESTEROLEMIA: ICD-10-CM

## 2021-12-07 LAB — BNP BLD-MCNC: 65.2 PG/ML (ref 0–100)

## 2021-12-07 PROCEDURE — 83880 ASSAY OF NATRIURETIC PEPTIDE: CPT | Performed by: INTERNAL MEDICINE

## 2021-12-07 PROCEDURE — 36415 COLL VENOUS BLD VENIPUNCTURE: CPT | Performed by: INTERNAL MEDICINE

## 2021-12-07 PROCEDURE — 99214 OFFICE O/P EST MOD 30 MIN: CPT | Performed by: INTERNAL MEDICINE

## 2021-12-07 ASSESSMENT — PATIENT HEALTH QUESTIONNAIRE - PHQ9
2. FEELING DOWN, DEPRESSED OR HOPELESS: NOT AT ALL
1. LITTLE INTEREST OR PLEASURE IN DOING THINGS: NOT AT ALL
CLINICAL INTERPRETATION OF PHQ2 SCORE: NO FURTHER SCREENING NEEDED
SUM OF ALL RESPONSES TO PHQ9 QUESTIONS 1 AND 2: 0
SUM OF ALL RESPONSES TO PHQ9 QUESTIONS 1 AND 2: 0

## 2022-01-10 ENCOUNTER — OFFICE VISIT (OUTPATIENT)
Dept: CARDIOLOGY | Age: 82
End: 2022-01-10

## 2022-01-10 VITALS
DIASTOLIC BLOOD PRESSURE: 70 MMHG | RESPIRATION RATE: 18 BRPM | OXYGEN SATURATION: 99 % | WEIGHT: 200 LBS | HEART RATE: 60 BPM | HEIGHT: 65 IN | BODY MASS INDEX: 33.32 KG/M2 | SYSTOLIC BLOOD PRESSURE: 138 MMHG

## 2022-01-10 DIAGNOSIS — I10 ESSENTIAL HYPERTENSION: ICD-10-CM

## 2022-01-10 DIAGNOSIS — I87.2 CHRONIC VENOUS INSUFFICIENCY: ICD-10-CM

## 2022-01-10 DIAGNOSIS — E78.00 PURE HYPERCHOLESTEROLEMIA: ICD-10-CM

## 2022-01-10 DIAGNOSIS — I25.10 CORONARY ARTERY DISEASE INVOLVING NATIVE CORONARY ARTERY OF NATIVE HEART WITHOUT ANGINA PECTORIS: ICD-10-CM

## 2022-01-10 DIAGNOSIS — I89.0 LYMPHEDEMA OF BOTH LOWER EXTREMITIES: Primary | ICD-10-CM

## 2022-01-10 PROCEDURE — 99214 OFFICE O/P EST MOD 30 MIN: CPT | Performed by: PHYSICIAN ASSISTANT

## 2022-03-10 ENCOUNTER — OFFICE VISIT (OUTPATIENT)
Dept: CARDIOLOGY | Age: 82
End: 2022-03-10

## 2022-03-10 VITALS
SYSTOLIC BLOOD PRESSURE: 118 MMHG | WEIGHT: 199 LBS | DIASTOLIC BLOOD PRESSURE: 64 MMHG | BODY MASS INDEX: 33.15 KG/M2 | HEIGHT: 65 IN | HEART RATE: 89 BPM | OXYGEN SATURATION: 98 %

## 2022-03-10 DIAGNOSIS — I89.0 LYMPHEDEMA OF BOTH LOWER EXTREMITIES: ICD-10-CM

## 2022-03-10 DIAGNOSIS — I49.9 IRREGULAR HEART BEAT: Primary | ICD-10-CM

## 2022-03-10 DIAGNOSIS — I87.2 CHRONIC VENOUS INSUFFICIENCY: ICD-10-CM

## 2022-03-10 DIAGNOSIS — I49.1 PREMATURE ATRIAL BEATS: ICD-10-CM

## 2022-03-10 DIAGNOSIS — E78.00 PURE HYPERCHOLESTEROLEMIA: ICD-10-CM

## 2022-03-10 DIAGNOSIS — I10 ESSENTIAL HYPERTENSION: ICD-10-CM

## 2022-03-10 DIAGNOSIS — I25.10 CORONARY ARTERY DISEASE INVOLVING NATIVE CORONARY ARTERY OF NATIVE HEART WITHOUT ANGINA PECTORIS: ICD-10-CM

## 2022-03-10 PROCEDURE — 99214 OFFICE O/P EST MOD 30 MIN: CPT | Performed by: INTERNAL MEDICINE

## 2022-03-10 PROCEDURE — 93000 ELECTROCARDIOGRAM COMPLETE: CPT | Performed by: INTERNAL MEDICINE

## 2022-09-26 ENCOUNTER — APPOINTMENT (OUTPATIENT)
Dept: CARDIOLOGY | Age: 82
End: 2022-09-26

## 2022-09-30 ENCOUNTER — APPOINTMENT (OUTPATIENT)
Dept: CARDIOLOGY | Age: 82
End: 2022-09-30

## 2022-10-28 ENCOUNTER — TELEPHONE (OUTPATIENT)
Dept: CARDIOLOGY | Age: 82
End: 2022-10-28

## 2022-10-28 ENCOUNTER — OFFICE VISIT (OUTPATIENT)
Dept: CARDIOLOGY | Age: 82
End: 2022-10-28

## 2022-10-28 VITALS
HEART RATE: 68 BPM | BODY MASS INDEX: 32.49 KG/M2 | WEIGHT: 195 LBS | SYSTOLIC BLOOD PRESSURE: 126 MMHG | DIASTOLIC BLOOD PRESSURE: 68 MMHG | OXYGEN SATURATION: 99 % | HEIGHT: 65 IN

## 2022-10-28 DIAGNOSIS — I87.2 CHRONIC VENOUS INSUFFICIENCY: ICD-10-CM

## 2022-10-28 DIAGNOSIS — E78.00 PURE HYPERCHOLESTEROLEMIA: ICD-10-CM

## 2022-10-28 DIAGNOSIS — I25.10 CORONARY ARTERY DISEASE INVOLVING NATIVE CORONARY ARTERY OF NATIVE HEART WITHOUT ANGINA PECTORIS: ICD-10-CM

## 2022-10-28 DIAGNOSIS — I10 ESSENTIAL HYPERTENSION: ICD-10-CM

## 2022-10-28 DIAGNOSIS — I89.0 LYMPHEDEMA OF BOTH LOWER EXTREMITIES: Primary | ICD-10-CM

## 2022-10-28 PROCEDURE — 99214 OFFICE O/P EST MOD 30 MIN: CPT | Performed by: INTERNAL MEDICINE

## 2022-12-29 RX ORDER — ASPIRIN 81 MG/1
81 TABLET ORAL DAILY
COMMUNITY

## 2023-01-14 ENCOUNTER — LAB ENCOUNTER (OUTPATIENT)
Dept: LAB | Facility: HOSPITAL | Age: 83
End: 2023-01-14
Attending: ORTHOPAEDIC SURGERY
Payer: MEDICARE

## 2023-01-14 DIAGNOSIS — Z20.822 ENCOUNTER FOR PREPROCEDURE SCREENING LABORATORY TESTING FOR COVID-19: ICD-10-CM

## 2023-01-14 DIAGNOSIS — Z01.812 ENCOUNTER FOR PREPROCEDURE SCREENING LABORATORY TESTING FOR COVID-19: ICD-10-CM

## 2023-01-15 LAB — SARS-COV-2 RNA RESP QL NAA+PROBE: NOT DETECTED

## 2023-01-17 ENCOUNTER — ANESTHESIA (OUTPATIENT)
Dept: SURGERY | Facility: HOSPITAL | Age: 83
End: 2023-01-17
Payer: MEDICARE

## 2023-01-17 ENCOUNTER — HOSPITAL ENCOUNTER (INPATIENT)
Facility: HOSPITAL | Age: 83
LOS: 2 days | Discharge: NURSING, REHAB OR LONG-TERM CARE | End: 2023-01-19
Attending: ORTHOPAEDIC SURGERY | Admitting: ORTHOPAEDIC SURGERY
Payer: MEDICARE

## 2023-01-17 ENCOUNTER — ANESTHESIA EVENT (OUTPATIENT)
Dept: SURGERY | Facility: HOSPITAL | Age: 83
End: 2023-01-17
Payer: MEDICARE

## 2023-01-17 ENCOUNTER — APPOINTMENT (OUTPATIENT)
Dept: GENERAL RADIOLOGY | Facility: HOSPITAL | Age: 83
End: 2023-01-17
Attending: ORTHOPAEDIC SURGERY
Payer: MEDICARE

## 2023-01-17 DIAGNOSIS — Z01.812 ENCOUNTER FOR PREPROCEDURE SCREENING LABORATORY TESTING FOR COVID-19: Primary | ICD-10-CM

## 2023-01-17 DIAGNOSIS — Z20.822 ENCOUNTER FOR PREPROCEDURE SCREENING LABORATORY TESTING FOR COVID-19: Primary | ICD-10-CM

## 2023-01-17 PROBLEM — Z11.52 ENCOUNTER FOR PREPROCEDURE SCREENING LABORATORY TESTING FOR COVID-19: Status: ACTIVE | Noted: 2023-01-17

## 2023-01-17 PROBLEM — M19.012 PRIMARY OSTEOARTHRITIS OF LEFT SHOULDER: Status: ACTIVE | Noted: 2023-01-17

## 2023-01-17 LAB
ANTIBODY SCREEN: NEGATIVE
EST. AVERAGE GLUCOSE BLD GHB EST-MCNC: 197 MG/DL (ref 68–126)
GLUCOSE BLD-MCNC: 110 MG/DL (ref 70–99)
GLUCOSE BLD-MCNC: 145 MG/DL (ref 70–99)
GLUCOSE BLD-MCNC: 256 MG/DL (ref 70–99)
HBA1C MFR BLD: 8.5 % (ref ?–5.7)
RH BLOOD TYPE: POSITIVE

## 2023-01-17 PROCEDURE — 73020 X-RAY EXAM OF SHOULDER: CPT | Performed by: ORTHOPAEDIC SURGERY

## 2023-01-17 PROCEDURE — 86901 BLOOD TYPING SEROLOGIC RH(D): CPT | Performed by: ORTHOPAEDIC SURGERY

## 2023-01-17 PROCEDURE — 86850 RBC ANTIBODY SCREEN: CPT | Performed by: ORTHOPAEDIC SURGERY

## 2023-01-17 PROCEDURE — 0RRK00Z REPLACEMENT OF LEFT SHOULDER JOINT WITH REVERSE BALL AND SOCKET SYNTHETIC SUBSTITUTE, OPEN APPROACH: ICD-10-PCS | Performed by: ORTHOPAEDIC SURGERY

## 2023-01-17 PROCEDURE — 3E0T3BZ INTRODUCTION OF ANESTHETIC AGENT INTO PERIPHERAL NERVES AND PLEXI, PERCUTANEOUS APPROACH: ICD-10-PCS | Performed by: ANESTHESIOLOGY

## 2023-01-17 PROCEDURE — 83036 HEMOGLOBIN GLYCOSYLATED A1C: CPT | Performed by: HOSPITALIST

## 2023-01-17 PROCEDURE — 76942 ECHO GUIDE FOR BIOPSY: CPT | Performed by: ANESTHESIOLOGY

## 2023-01-17 PROCEDURE — 82962 GLUCOSE BLOOD TEST: CPT

## 2023-01-17 PROCEDURE — 86900 BLOOD TYPING SEROLOGIC ABO: CPT | Performed by: ORTHOPAEDIC SURGERY

## 2023-01-17 DEVICE — 5.5X28MM PERIPHERAL SCREW, LOCKING
Type: IMPLANTABLE DEVICE | Site: SHOULDER | Status: FUNCTIONAL
Brand: ARTHREX®

## 2023-01-17 DEVICE — 5.5X32MM PERIPHERAL SCREW, LOCKING
Type: IMPLANTABLE DEVICE | Site: SHOULDER | Status: FUNCTIONAL
Brand: ARTHREX®

## 2023-01-17 DEVICE — UNIVERS REVERS SUTURE CUP, 36 (+2 RIGHT)
Type: IMPLANTABLE DEVICE | Site: SHOULDER | Status: FUNCTIONAL
Brand: ARTHREX®

## 2023-01-17 DEVICE — 36 +6 / 33 COMBO HUMERAL INSERT
Type: IMPLANTABLE DEVICE | Site: SHOULDER | Status: FUNCTIONAL
Brand: ARTHREX®

## 2023-01-17 DEVICE — IMPLANTABLE DEVICE: Type: IMPLANTABLE DEVICE | Site: SHOULDER | Status: FUNCTIONAL

## 2023-01-17 DEVICE — 5.5X16MM PERIPHERAL SCREW, LOCKING
Type: IMPLANTABLE DEVICE | Site: SHOULDER | Status: FUNCTIONAL
Brand: ARTHREX®

## 2023-01-17 DEVICE — UNIVERS REVERS HUMERAL STEM, 5MM
Type: IMPLANTABLE DEVICE | Site: SHOULDER | Status: FUNCTIONAL
Brand: ARTHREX®

## 2023-01-17 DEVICE — 33 +4 LAT/24 GLENOSPHERE
Type: IMPLANTABLE DEVICE | Site: SHOULDER | Status: FUNCTIONAL
Brand: ARTHREX®

## 2023-01-17 RX ORDER — POLYETHYLENE GLYCOL 3350 17 G/17G
17 POWDER, FOR SOLUTION ORAL DAILY PRN
Status: DISCONTINUED | OUTPATIENT
Start: 2023-01-17 | End: 2023-01-19

## 2023-01-17 RX ORDER — CEFAZOLIN SODIUM/WATER 2 G/20 ML
2 SYRINGE (ML) INTRAVENOUS EVERY 8 HOURS
Status: COMPLETED | OUTPATIENT
Start: 2023-01-17 | End: 2023-01-18

## 2023-01-17 RX ORDER — ATORVASTATIN CALCIUM 20 MG/1
20 TABLET, FILM COATED ORAL NIGHTLY
COMMUNITY

## 2023-01-17 RX ORDER — SODIUM CHLORIDE, SODIUM LACTATE, POTASSIUM CHLORIDE, CALCIUM CHLORIDE 600; 310; 30; 20 MG/100ML; MG/100ML; MG/100ML; MG/100ML
INJECTION, SOLUTION INTRAVENOUS CONTINUOUS
Status: DISCONTINUED | OUTPATIENT
Start: 2023-01-17 | End: 2023-01-17 | Stop reason: HOSPADM

## 2023-01-17 RX ORDER — ASPIRIN 81 MG/1
81 TABLET ORAL 2 TIMES DAILY
Status: DISCONTINUED | OUTPATIENT
Start: 2023-01-17 | End: 2023-01-19

## 2023-01-17 RX ORDER — MIDAZOLAM HYDROCHLORIDE 1 MG/ML
INJECTION INTRAMUSCULAR; INTRAVENOUS AS NEEDED
Status: DISCONTINUED | OUTPATIENT
Start: 2023-01-17 | End: 2023-01-17 | Stop reason: SURG

## 2023-01-17 RX ORDER — TRAMADOL HYDROCHLORIDE 50 MG/1
1 TABLET ORAL EVERY 12 HOURS PRN
COMMUNITY
Start: 2023-01-09

## 2023-01-17 RX ORDER — CEFAZOLIN SODIUM/WATER 2 G/20 ML
2 SYRINGE (ML) INTRAVENOUS ONCE
Status: COMPLETED | OUTPATIENT
Start: 2023-01-17 | End: 2023-01-17

## 2023-01-17 RX ORDER — ONDANSETRON 2 MG/ML
INJECTION INTRAMUSCULAR; INTRAVENOUS
Status: COMPLETED
Start: 2023-01-17 | End: 2023-01-17

## 2023-01-17 RX ORDER — ACETAMINOPHEN 325 MG/1
650 TABLET ORAL 4 TIMES DAILY
Status: DISCONTINUED | OUTPATIENT
Start: 2023-01-17 | End: 2023-01-17

## 2023-01-17 RX ORDER — NICOTINE POLACRILEX 4 MG
30 LOZENGE BUCCAL
Status: DISCONTINUED | OUTPATIENT
Start: 2023-01-17 | End: 2023-01-17 | Stop reason: HOSPADM

## 2023-01-17 RX ORDER — MIDAZOLAM HYDROCHLORIDE 1 MG/ML
1 INJECTION INTRAMUSCULAR; INTRAVENOUS EVERY 5 MIN PRN
Status: DISCONTINUED | OUTPATIENT
Start: 2023-01-17 | End: 2023-01-17 | Stop reason: HOSPADM

## 2023-01-17 RX ORDER — SODIUM CHLORIDE, SODIUM LACTATE, POTASSIUM CHLORIDE, CALCIUM CHLORIDE 600; 310; 30; 20 MG/100ML; MG/100ML; MG/100ML; MG/100ML
INJECTION, SOLUTION INTRAVENOUS CONTINUOUS
Status: DISCONTINUED | OUTPATIENT
Start: 2023-01-17 | End: 2023-01-17

## 2023-01-17 RX ORDER — ATORVASTATIN CALCIUM 20 MG/1
20 TABLET, FILM COATED ORAL NIGHTLY
Status: DISCONTINUED | OUTPATIENT
Start: 2023-01-17 | End: 2023-01-19

## 2023-01-17 RX ORDER — NALOXONE HYDROCHLORIDE 0.4 MG/ML
80 INJECTION, SOLUTION INTRAMUSCULAR; INTRAVENOUS; SUBCUTANEOUS AS NEEDED
Status: DISCONTINUED | OUTPATIENT
Start: 2023-01-17 | End: 2023-01-17 | Stop reason: HOSPADM

## 2023-01-17 RX ORDER — METOCLOPRAMIDE HYDROCHLORIDE 5 MG/ML
INJECTION INTRAMUSCULAR; INTRAVENOUS
Status: COMPLETED
Start: 2023-01-17 | End: 2023-01-17

## 2023-01-17 RX ORDER — ONDANSETRON 2 MG/ML
4 INJECTION INTRAMUSCULAR; INTRAVENOUS EVERY 6 HOURS PRN
Status: DISCONTINUED | OUTPATIENT
Start: 2023-01-17 | End: 2023-01-17 | Stop reason: HOSPADM

## 2023-01-17 RX ORDER — ONDANSETRON 2 MG/ML
4 INJECTION INTRAMUSCULAR; INTRAVENOUS EVERY 6 HOURS PRN
Status: DISCONTINUED | OUTPATIENT
Start: 2023-01-17 | End: 2023-01-19

## 2023-01-17 RX ORDER — BISACODYL 10 MG
10 SUPPOSITORY, RECTAL RECTAL
Status: DISCONTINUED | OUTPATIENT
Start: 2023-01-17 | End: 2023-01-19

## 2023-01-17 RX ORDER — SENNOSIDES 8.6 MG
17.2 TABLET ORAL NIGHTLY
Status: DISCONTINUED | OUTPATIENT
Start: 2023-01-17 | End: 2023-01-19

## 2023-01-17 RX ORDER — OXYCODONE HYDROCHLORIDE 5 MG/1
2.5 TABLET ORAL EVERY 4 HOURS PRN
Status: DISCONTINUED | OUTPATIENT
Start: 2023-01-17 | End: 2023-01-19

## 2023-01-17 RX ORDER — TRANEXAMIC ACID 10 MG/ML
1000 INJECTION, SOLUTION INTRAVENOUS ONCE
Status: COMPLETED | OUTPATIENT
Start: 2023-01-17 | End: 2023-01-17

## 2023-01-17 RX ORDER — MEPERIDINE HYDROCHLORIDE 25 MG/ML
12.5 INJECTION INTRAMUSCULAR; INTRAVENOUS; SUBCUTANEOUS
Status: DISCONTINUED | OUTPATIENT
Start: 2023-01-17 | End: 2023-01-17 | Stop reason: HOSPADM

## 2023-01-17 RX ORDER — TRAMADOL HYDROCHLORIDE 50 MG/1
50 TABLET ORAL EVERY 6 HOURS SCHEDULED
Status: DISCONTINUED | OUTPATIENT
Start: 2023-01-17 | End: 2023-01-19

## 2023-01-17 RX ORDER — NICOTINE POLACRILEX 4 MG
30 LOZENGE BUCCAL
Status: DISCONTINUED | OUTPATIENT
Start: 2023-01-17 | End: 2023-01-19

## 2023-01-17 RX ORDER — TRANEXAMIC ACID 10 MG/ML
INJECTION, SOLUTION INTRAVENOUS
Status: COMPLETED
Start: 2023-01-17 | End: 2023-01-17

## 2023-01-17 RX ORDER — DEXAMETHASONE SODIUM PHOSPHATE 10 MG/ML
INJECTION, SOLUTION INTRAMUSCULAR; INTRAVENOUS AS NEEDED
Status: DISCONTINUED | OUTPATIENT
Start: 2023-01-17 | End: 2023-01-17 | Stop reason: SURG

## 2023-01-17 RX ORDER — NICOTINE POLACRILEX 4 MG
15 LOZENGE BUCCAL
Status: DISCONTINUED | OUTPATIENT
Start: 2023-01-17 | End: 2023-01-19

## 2023-01-17 RX ORDER — SODIUM PHOSPHATE, DIBASIC AND SODIUM PHOSPHATE, MONOBASIC 7; 19 G/133ML; G/133ML
1 ENEMA RECTAL ONCE AS NEEDED
Status: DISCONTINUED | OUTPATIENT
Start: 2023-01-17 | End: 2023-01-19

## 2023-01-17 RX ORDER — HYDROMORPHONE HYDROCHLORIDE 1 MG/ML
0.4 INJECTION, SOLUTION INTRAMUSCULAR; INTRAVENOUS; SUBCUTANEOUS EVERY 2 HOUR PRN
Status: DISCONTINUED | OUTPATIENT
Start: 2023-01-17 | End: 2023-01-19

## 2023-01-17 RX ORDER — METOCLOPRAMIDE HYDROCHLORIDE 5 MG/ML
10 INJECTION INTRAMUSCULAR; INTRAVENOUS ONCE
Status: COMPLETED | OUTPATIENT
Start: 2023-01-17 | End: 2023-01-17

## 2023-01-17 RX ORDER — SODIUM CHLORIDE 9 MG/ML
INJECTION, SOLUTION INTRAVENOUS CONTINUOUS
Status: DISCONTINUED | OUTPATIENT
Start: 2023-01-17 | End: 2023-01-19

## 2023-01-17 RX ORDER — DEXAMETHASONE SODIUM PHOSPHATE 10 MG/ML
8 INJECTION, SOLUTION INTRAMUSCULAR; INTRAVENOUS ONCE
Status: COMPLETED | OUTPATIENT
Start: 2023-01-18 | End: 2023-01-18

## 2023-01-17 RX ORDER — NICOTINE POLACRILEX 4 MG
15 LOZENGE BUCCAL
Status: DISCONTINUED | OUTPATIENT
Start: 2023-01-17 | End: 2023-01-17 | Stop reason: HOSPADM

## 2023-01-17 RX ORDER — KETOROLAC TROMETHAMINE 15 MG/ML
15 INJECTION, SOLUTION INTRAMUSCULAR; INTRAVENOUS EVERY 6 HOURS
Status: COMPLETED | OUTPATIENT
Start: 2023-01-17 | End: 2023-01-18

## 2023-01-17 RX ORDER — DEXTROSE MONOHYDRATE 25 G/50ML
50 INJECTION, SOLUTION INTRAVENOUS
Status: DISCONTINUED | OUTPATIENT
Start: 2023-01-17 | End: 2023-01-19

## 2023-01-17 RX ORDER — HYDROMORPHONE HYDROCHLORIDE 1 MG/ML
0.2 INJECTION, SOLUTION INTRAMUSCULAR; INTRAVENOUS; SUBCUTANEOUS EVERY 2 HOUR PRN
Status: DISCONTINUED | OUTPATIENT
Start: 2023-01-17 | End: 2023-01-19

## 2023-01-17 RX ORDER — DIPHENHYDRAMINE HYDROCHLORIDE 50 MG/ML
25 INJECTION INTRAMUSCULAR; INTRAVENOUS ONCE AS NEEDED
Status: ACTIVE | OUTPATIENT
Start: 2023-01-17 | End: 2023-01-17

## 2023-01-17 RX ORDER — BUPRENORPHINE HYDROCHLORIDE 0.32 MG/ML
INJECTION INTRAMUSCULAR; INTRAVENOUS AS NEEDED
Status: DISCONTINUED | OUTPATIENT
Start: 2023-01-17 | End: 2023-01-17 | Stop reason: SURG

## 2023-01-17 RX ORDER — CELECOXIB 200 MG/1
200 CAPSULE ORAL DAILY
COMMUNITY
Start: 2023-01-09

## 2023-01-17 RX ORDER — DEXTROSE MONOHYDRATE 25 G/50ML
50 INJECTION, SOLUTION INTRAVENOUS
Status: DISCONTINUED | OUTPATIENT
Start: 2023-01-17 | End: 2023-01-17 | Stop reason: HOSPADM

## 2023-01-17 RX ORDER — METOCLOPRAMIDE HYDROCHLORIDE 5 MG/ML
10 INJECTION INTRAMUSCULAR; INTRAVENOUS EVERY 8 HOURS PRN
Status: DISCONTINUED | OUTPATIENT
Start: 2023-01-17 | End: 2023-01-19

## 2023-01-17 RX ORDER — LISINOPRIL 10 MG/1
10 TABLET ORAL DAILY
Status: DISCONTINUED | OUTPATIENT
Start: 2023-01-17 | End: 2023-01-19

## 2023-01-17 RX ORDER — DOCUSATE SODIUM 100 MG/1
100 CAPSULE, LIQUID FILLED ORAL 2 TIMES DAILY
Status: DISCONTINUED | OUTPATIENT
Start: 2023-01-17 | End: 2023-01-19

## 2023-01-17 RX ORDER — OXYCODONE HYDROCHLORIDE 5 MG/1
5 TABLET ORAL EVERY 4 HOURS PRN
Status: DISCONTINUED | OUTPATIENT
Start: 2023-01-17 | End: 2023-01-19

## 2023-01-17 RX ADMIN — DEXAMETHASONE SODIUM PHOSPHATE 2 MG: 10 INJECTION, SOLUTION INTRAMUSCULAR; INTRAVENOUS at 09:47:00

## 2023-01-17 RX ADMIN — BUPRENORPHINE HYDROCHLORIDE 150 MCG: 0.32 INJECTION INTRAMUSCULAR; INTRAVENOUS at 09:47:00

## 2023-01-17 RX ADMIN — SODIUM CHLORIDE, SODIUM LACTATE, POTASSIUM CHLORIDE, CALCIUM CHLORIDE: 600; 310; 30; 20 INJECTION, SOLUTION INTRAVENOUS at 11:20:00

## 2023-01-17 RX ADMIN — MIDAZOLAM HYDROCHLORIDE 2 MG: 1 INJECTION INTRAMUSCULAR; INTRAVENOUS at 09:40:00

## 2023-01-17 RX ADMIN — TRANEXAMIC ACID 1000 MG: 10 INJECTION, SOLUTION INTRAVENOUS at 09:50:00

## 2023-01-17 RX ADMIN — CEFAZOLIN SODIUM/WATER 2 G: 2 G/20 ML SYRINGE (ML) INTRAVENOUS at 09:42:00

## 2023-01-17 NOTE — PLAN OF CARE
Patient alert and oriented x4. VSS, on RA. No pain reported.  to left hand moderate, N/T reported to all fingers on left hand. Immobilizer to LUE, microfoam tape dressing in place. Gel ice wrap. Purewick placed. LBM this AM 1/17. Encouraging PO intake, pt reports poor appetite. Blisters noted to left shin under SCD, intact and pt reports no pain.  Pitting edema to LLE      Plan: PT/OT in AM,

## 2023-01-17 NOTE — ADDENDUM NOTE
Addendum  created 01/17/23 1542 by Rina Clarke MD    Child order released for a procedure order, Clinical Note Signed, Intraprocedure Blocks edited, SmartForm saved

## 2023-01-17 NOTE — INTERVAL H&P NOTE
Pre-op Diagnosis: ?    The above referenced H&P was reviewed by Juan Diego Munoz MD on 1/17/2023, the patient was examined and no significant changes have occurred in the patient's condition since the H&P was performed. I discussed with the patient and/or legal representative the potential benefits, risks and side effects of this procedure; the likelihood of the patient achieving goals; and potential problems that might occur during recuperation. I discussed reasonable alternatives to the procedure, including risks, benefits and side effects related to the alternatives and risks related to not receiving this procedure. We will proceed with procedure as planned.

## 2023-01-17 NOTE — ANESTHESIA PROCEDURE NOTES
Airway  Date/Time: 1/17/2023 9:49 AM  Urgency: elective    Airway not difficult    General Information and Staff    Patient location during procedure: OR  Anesthesiologist: Felecia Clarke MD  Performed: anesthesiologist     Indications and Patient Condition  Indications for airway management: anesthesia  Spontaneous ventilation: present  Sedation level: deep  Preoxygenated: yes  Patient position: sniffing  Mask difficulty assessment: 1 - vent by mask    Final Airway Details  Final airway type: supraglottic airway      Successful airway: classic  Size 3       Number of attempts at approach: 1  Ventilation between attempts: none  Number of other approaches attempted: 0

## 2023-01-17 NOTE — ANESTHESIA PROCEDURE NOTES
Regional Block    Date/Time: 1/17/2023 9:44 AM  Performed by: Brooke Clarke MD  Authorized by: Brooke Clarke MD       General Information and Staff    Start Time:  1/17/2023 9:44 AM  End Time:  1/17/2023 9:47 AM  Anesthesiologist:  Steve Carrera MD  Performed by: Anesthesiologist  Patient Location:  OR    Block Placement: Pre Induction  Site Identification: real time ultrasound guided and image stored and retrievable    Block site/laterality marked before start: site marked  Reason for Block: at surgeon's request and post-op pain management    Preanesthetic Checklist: 2 patient identifers, IV checked, risks and benefits discussed, monitors and equipment checked, pre-op evaluation, timeout performed, anesthesia consent, sterile technique used, no prohibitive neurological deficits and no local skin infection at insertion site      Procedure Details    Patient Position:  Supine  Prep: ChloraPrep    Monitoring:  Cardiac monitor, continuous pulse ox and blood pressure cuff  Block Type: Interscalene  Laterality:  Left  Injection Technique:  Single-shot    Needle    Needle Type:  Short-bevel and echogenic  Needle Gauge:  20 G  Needle Length:  50 mm  Needle Localization:  Ultrasound guidance  Reason for Ultrasound Use: appropriate spread of the medication was noted in real time and no ultrasound evidence of intravascular and/or intraneural injection            Assessment    Injection Assessment:  Good spread noted, negative resistance, negative aspiration for heme, incremental injection, low pressure, local visualized surrounding nerve on ultrasound and transient paresthesias  Paresthesia Pain:  Immediately resolved  Heart Rate Change: No    - Patient tolerated block procedure well without evidence of immediate block related complications.      Medications  1/17/2023 9:44 AM      Additional Comments    Medication:  Bupivacaine 0.375% 20mL with PF dex 2mg and buprenorphine 150mcg

## 2023-01-17 NOTE — BRIEF OP NOTE
Pre-Operative Diagnosis: Left shoulder osteoarthritis     Post-Operative Diagnosis: Left shoulder osteoarthritis      Procedure Performed:   LEFT REVERSE SHOULDER ARTHROPLASTY    Surgeon(s) and Role:     * Juma Bob MD - Primary    Assistant(s):  PA:  LON Rascon: GARETT Temple     Surgical Findings: above     Specimen: bone     Estimated Blood Loss:10cc    Jared Krause MD  1/17/2023  11:16 AM

## 2023-01-17 NOTE — OPERATIVE REPORT
[facility]    PATIENT'S NAME: Kyrie Jo   ATTENDING PHYSICIAN: Sharron Cortez M.D. OPERATING PHYSICIAN: Sharron Cortez M.D. PATIENT ACCOUNT#:   [de-identified]    LOCATION:  [address] [place] [address]  MEDICAL RECORD #:   [id number]       YOB: 1940  ADMISSION DATE:       01/17/2023      OPERATION DATE:  01/17/2023    OPERATIVE REPORT      PREOPERATIVE DIAGNOSIS:  Degenerative arthritis, left shoulder. POSTOPERATIVE DIAGNOSIS:  Degenerative arthritis, left shoulder. PROCEDURE:  Left reverse shoulder arthroplasty with VIP imaging. A #5 Revers humeral stem 135 degree with 36 mm +2 right SutureCup and 36+, 6/33 humeral insert. A 24 mm monoblock base plate with 33 mm +4 [id number] glenosphere    ASSISTANT:  Sofía Cabezas PA-C. Her involvement in the case is instrumental in the appropriate, safe, and efficient carrying out of surgical procedure. MODE OF ANESTHETIC:  General with scalene block. ESTIMATED BLOOD LOSS:  20 mL. MATERIALS:  Preoperative TXA and Irrisept. OPERATIVE TECHNIQUE:  Taken to the operating room, placed on the operating room table supine in position. Balanced anesthetic is carried out. Transitioned to the left side of the bed, bony prominences protected. Neck is supported. [address] chair position. A Trimano arm support system is used. Sterile prep and drape, antibiotic and time-out. Deltopectoral incision is carried out. Cephalic vein is identified, mobilized laterally with the deltoid. Upper border of the pectoralis is partially released from the proximal humerus. Clavipectoral fascia is released. Circumflex vessels are identified and ligated. Subscapularis is tagged. There is tearing on the upper border. Supraspinatus shows full-thickness tearing at its insertional site proximally with attenuation and atrophy.   With age, activity level, and concerns about longevity of the prosthesis and rotator cuff, integrity, a reverse prosthesis is chosen. Proximal pin position follows, proximal reaming to a 6 is carried out. Proximal resection with 135 degrees angulation and 20 degrees of retroversion. The humeral head shows collapse, advanced arthritic change and fragmentation. The humeral head is fairly thin. Proximal resection is carried out. A small cap is placed. The glenoid is visualized. Advanced arthritic change with cartilaginous loss is noted. VIP planning reveals -5 version corrected to -4 and inclination is 2 degrees corrected to 0. A 3D targeter is placed. Proximally positioned. Peripheral reaming, followed by central drilling follows. Good position is appreciated. Base plate is inserted with excellent stability. Four locking screws are placed. Copious irrigation as well as Irrisept follows. A 33 mm +4 lateralized glenosphere is positioned and then locked into place. Proximal humerus is visualized. Cautious broaching is carried out. Distal fill is met at 5 mm. Offset proximal reaming is carried out prior to final implant insertion. Good stability is achieved. Trial reduction with a +3 and then a +6 liner reveals good soft tissue balance and stability with a +6 liner. Final liner is placed and then inserted. Copious irrigation is washed. Stability is felt to be excellent in all planes of movement. Deltoid is closed with a running 0 chromic stitch, subcutaneous tissues are closed with 2-0 Vicryl. Skin is closed with sterile skin clips. Sterile compressive dressing is applied. Transferred to recovery room awake and stable.     Dictated By Melecio Esquivel M.D.  d: 01/17/2023 11:25:06  t: 01/17/2023 16:56:19  [facility] [id number]  /

## 2023-01-18 LAB
ANION GAP SERPL CALC-SCNC: 2 MMOL/L (ref 0–18)
BASOPHILS # BLD AUTO: 0.02 X10(3) UL (ref 0–0.2)
BASOPHILS NFR BLD AUTO: 0.2 %
BUN BLD-MCNC: 25 MG/DL (ref 7–18)
CALCIUM BLD-MCNC: 8.5 MG/DL (ref 8.5–10.1)
CHLORIDE SERPL-SCNC: 110 MMOL/L (ref 98–112)
CO2 SERPL-SCNC: 26 MMOL/L (ref 21–32)
CREAT BLD-MCNC: 0.88 MG/DL
EOSINOPHIL # BLD AUTO: 0.08 X10(3) UL (ref 0–0.7)
EOSINOPHIL NFR BLD AUTO: 1 %
ERYTHROCYTE [DISTWIDTH] IN BLOOD BY AUTOMATED COUNT: 13.1 %
GFR SERPLBLD BASED ON 1.73 SQ M-ARVRAT: 66 ML/MIN/1.73M2 (ref 60–?)
GLUCOSE BLD-MCNC: 136 MG/DL (ref 70–99)
GLUCOSE BLD-MCNC: 305 MG/DL (ref 70–99)
GLUCOSE BLD-MCNC: 333 MG/DL (ref 70–99)
GLUCOSE BLD-MCNC: 92 MG/DL (ref 70–99)
GLUCOSE BLD-MCNC: 96 MG/DL (ref 70–99)
HCT VFR BLD AUTO: 30.7 %
HGB BLD-MCNC: 10.3 G/DL
IMM GRANULOCYTES # BLD AUTO: 0.04 X10(3) UL (ref 0–1)
IMM GRANULOCYTES NFR BLD: 0.5 %
LYMPHOCYTES # BLD AUTO: 1.32 X10(3) UL (ref 1–4)
LYMPHOCYTES NFR BLD AUTO: 16.4 %
MCH RBC QN AUTO: 30.4 PG (ref 26–34)
MCHC RBC AUTO-ENTMCNC: 33.6 G/DL (ref 31–37)
MCV RBC AUTO: 90.6 FL
MONOCYTES # BLD AUTO: 0.7 X10(3) UL (ref 0.1–1)
MONOCYTES NFR BLD AUTO: 8.7 %
NEUTROPHILS # BLD AUTO: 5.91 X10 (3) UL (ref 1.5–7.7)
NEUTROPHILS # BLD AUTO: 5.91 X10(3) UL (ref 1.5–7.7)
NEUTROPHILS NFR BLD AUTO: 73.2 %
OSMOLALITY SERPL CALC.SUM OF ELEC: 290 MOSM/KG (ref 275–295)
PLATELET # BLD AUTO: 201 10(3)UL (ref 150–450)
POTASSIUM SERPL-SCNC: 4.5 MMOL/L (ref 3.5–5.1)
RBC # BLD AUTO: 3.39 X10(6)UL
SODIUM SERPL-SCNC: 138 MMOL/L (ref 136–145)
WBC # BLD AUTO: 8.1 X10(3) UL (ref 4–11)

## 2023-01-18 PROCEDURE — 97535 SELF CARE MNGMENT TRAINING: CPT

## 2023-01-18 PROCEDURE — 97166 OT EVAL MOD COMPLEX 45 MIN: CPT

## 2023-01-18 PROCEDURE — 97110 THERAPEUTIC EXERCISES: CPT

## 2023-01-18 PROCEDURE — 82962 GLUCOSE BLOOD TEST: CPT

## 2023-01-18 PROCEDURE — 97530 THERAPEUTIC ACTIVITIES: CPT

## 2023-01-18 PROCEDURE — 97162 PT EVAL MOD COMPLEX 30 MIN: CPT

## 2023-01-18 PROCEDURE — 85025 COMPLETE CBC W/AUTO DIFF WBC: CPT | Performed by: HOSPITALIST

## 2023-01-18 PROCEDURE — 80048 BASIC METABOLIC PNL TOTAL CA: CPT | Performed by: HOSPITALIST

## 2023-01-19 VITALS
TEMPERATURE: 98 F | SYSTOLIC BLOOD PRESSURE: 143 MMHG | HEART RATE: 63 BPM | BODY MASS INDEX: 32.95 KG/M2 | HEIGHT: 65 IN | DIASTOLIC BLOOD PRESSURE: 56 MMHG | OXYGEN SATURATION: 94 % | RESPIRATION RATE: 18 BRPM | WEIGHT: 197.75 LBS

## 2023-01-19 LAB
GLUCOSE BLD-MCNC: 227 MG/DL (ref 70–99)
GLUCOSE BLD-MCNC: 243 MG/DL (ref 70–99)

## 2023-01-19 PROCEDURE — 82962 GLUCOSE BLOOD TEST: CPT

## 2023-01-19 NOTE — PLAN OF CARE
Assumed care of pt @ 12am. A&O x4. VSS. Room air, . Pain controlled. Voids freely. Aquacel dressing C/D/I. Immobilizer in place. Gel ice. Reviewed POC, pain management, IS use, and fall precautions with pt. Bed alarm on w/bed in lowest position. Pt reminded to use call light. Verbalized understanding. PT rec YEN on dc.

## 2023-01-19 NOTE — PROGRESS NOTES
Dc video viewed yesterday , AVS completed by Rosio FREITAS, awaiting script for Tramadol , will dc to The [address] [place] [address] at 230 pm via [id number]

## 2023-01-19 NOTE — CM/SW NOTE
01/18/23 0800   CM/SW Referral Data   Referral Source Social Work (self-referral)   Reason for Referral Discharge planning   Informant EMR;Clinical Staff Member   Discharge Needs   Anticipated D/C needs Home health care       Patient is an [de-identified] y/o woman admitted s/p TSA. Pt with pre-operative plan for [facility] at [place]. Therapy eval pending. Leslye Sim at [facility] [place] confirmed pt accepted for [facility] services at [place]. No other [place] needs/concerns identified at this time. / to remain available for support and/or discharge planning.      Kenyetta De Paz [facility]  Discharge Planner  [phone]
01/19/23 1211   Discharge disposition   Expected discharge disposition [address] [facility]   Discharge transportation [address] [facility]     Pt and  confirmed plans to go to [facility] [place]. [address] [facility] arranged for 230pm. RN to call report to [phone].     Deedee Price LCSW  /Discharge Planner
Department  notified of request for fidelina BARLOW referrals started. Assigned CM/SW to follow up with pt/family on further discharge planning.      Ashok Velásquez  [facility]
Leonid met with pt and her  to discuss therapy recs for YEN. YEN list provided- wife uncertain about wanting to go to [facility]-  feels she needs it. If she agrees to [facility], she would like to go to [facility]-  has been there before. They would like to further discuss YEN vs HHC between themselves and will update LEONID once final decision made. They are aware of anticipated dc today. Will reserve [facility] in [address].     Miki Landau, LCSW  /Discharge Planner
PASSR completed and attached to [address] referral.    Hung Cruz LCSW  /Discharge Planner
PT/OT recommending YEN. Referrals pending [address] PASRR completed. Await responses for accepting facilities for further DC planning. / to remain available for support and/or discharge planning.      Charletta Krabbe, [facility]  [facility] Planner  [phone]
17-Jan-2021 14:03

## 2023-01-19 NOTE — PLAN OF CARE
Pt A&Ox4, VSS on RA, . POD 2 dressing C/D/I. Immobilizer and gel ice in place. Pt denies any pain at this time. Tolerating diet, voiding freely, LBM 1/17. Plan to DC patient to [facility] when bed available. Plan of care reviewed with patient. Patient demonstrates understanding. 1349: report given to ZENA Landry at facility.  Number left with RN if needed

## 2023-01-19 NOTE — PROGRESS NOTES
NURSING DISCHARGE NOTE    Discharged Rehab facility via Wheelchair. Accompanied by Support staff  Belongings Taken by patient/family. PIV removed. All questions answered at this time. Patient demonstrates understanding.

## 2023-01-23 ENCOUNTER — INITIAL APN SNF VISIT (OUTPATIENT)
Dept: INTERNAL MEDICINE CLINIC | Age: 83
End: 2023-01-23

## 2023-01-23 DIAGNOSIS — E78.5 HYPERLIPIDEMIA, UNSPECIFIED HYPERLIPIDEMIA TYPE: ICD-10-CM

## 2023-01-23 DIAGNOSIS — E11.40 TYPE 2 DIABETES MELLITUS WITH DIABETIC NEUROPATHY, WITH LONG-TERM CURRENT USE OF INSULIN (HCC): ICD-10-CM

## 2023-01-23 DIAGNOSIS — K59.03 DRUG-INDUCED CONSTIPATION: ICD-10-CM

## 2023-01-23 DIAGNOSIS — Z79.4 TYPE 2 DIABETES MELLITUS WITH DIABETIC NEUROPATHY, WITH LONG-TERM CURRENT USE OF INSULIN (HCC): ICD-10-CM

## 2023-01-23 DIAGNOSIS — Z78.9 DECREASED ACTIVITIES OF DAILY LIVING (ADL): ICD-10-CM

## 2023-01-23 DIAGNOSIS — I25.10 CORONARY ARTERY DISEASE INVOLVING NATIVE CORONARY ARTERY OF NATIVE HEART WITHOUT ANGINA PECTORIS: ICD-10-CM

## 2023-01-23 DIAGNOSIS — I10 ESSENTIAL HYPERTENSION: Primary | ICD-10-CM

## 2023-01-23 DIAGNOSIS — Z96.612 S/P REVERSE TOTAL SHOULDER ARTHROPLASTY, LEFT: ICD-10-CM

## 2023-01-23 PROCEDURE — 1111F DSCHRG MED/CURRENT MED MERGE: CPT | Performed by: NURSE PRACTITIONER

## 2023-01-23 PROCEDURE — 1123F ACP DISCUSS/DSCN MKR DOCD: CPT | Performed by: NURSE PRACTITIONER

## 2023-01-23 PROCEDURE — 99310 SBSQ NF CARE HIGH MDM 45: CPT | Performed by: NURSE PRACTITIONER

## 2023-01-24 VITALS
TEMPERATURE: 97 F | RESPIRATION RATE: 18 BRPM | DIASTOLIC BLOOD PRESSURE: 82 MMHG | SYSTOLIC BLOOD PRESSURE: 142 MMHG | WEIGHT: 208.69 LBS | BODY MASS INDEX: 35 KG/M2 | OXYGEN SATURATION: 96 % | HEART RATE: 64 BPM

## 2023-01-24 RX ORDER — DOCUSATE SODIUM 100 MG/1
100 CAPSULE, LIQUID FILLED ORAL 2 TIMES DAILY
COMMUNITY

## 2023-01-24 RX ORDER — POLYETHYLENE GLYCOL 3350 17 G/17G
17 POWDER, FOR SOLUTION ORAL DAILY PRN
COMMUNITY

## 2023-01-31 ENCOUNTER — SNF VISIT (OUTPATIENT)
Dept: INTERNAL MEDICINE CLINIC | Age: 83
End: 2023-01-31

## 2023-01-31 VITALS
TEMPERATURE: 98 F | SYSTOLIC BLOOD PRESSURE: 128 MMHG | RESPIRATION RATE: 18 BRPM | BODY MASS INDEX: 34 KG/M2 | OXYGEN SATURATION: 97 % | HEART RATE: 64 BPM | DIASTOLIC BLOOD PRESSURE: 61 MMHG | WEIGHT: 206.38 LBS

## 2023-01-31 DIAGNOSIS — D64.9 ANEMIA, UNSPECIFIED TYPE: ICD-10-CM

## 2023-01-31 DIAGNOSIS — Z96.612 S/P REVERSE TOTAL SHOULDER ARTHROPLASTY, LEFT: ICD-10-CM

## 2023-01-31 DIAGNOSIS — Z78.9 DECREASED ACTIVITIES OF DAILY LIVING (ADL): ICD-10-CM

## 2023-01-31 DIAGNOSIS — Z79.4 TYPE 2 DIABETES MELLITUS WITH DIABETIC NEUROPATHY, WITH LONG-TERM CURRENT USE OF INSULIN (HCC): ICD-10-CM

## 2023-01-31 DIAGNOSIS — E11.40 TYPE 2 DIABETES MELLITUS WITH DIABETIC NEUROPATHY, WITH LONG-TERM CURRENT USE OF INSULIN (HCC): ICD-10-CM

## 2023-01-31 DIAGNOSIS — I10 ESSENTIAL HYPERTENSION: Primary | ICD-10-CM

## 2023-01-31 PROCEDURE — 99309 SBSQ NF CARE MODERATE MDM 30: CPT | Performed by: NURSE PRACTITIONER

## 2023-01-31 PROCEDURE — 1111F DSCHRG MED/CURRENT MED MERGE: CPT | Performed by: NURSE PRACTITIONER

## 2023-02-06 ENCOUNTER — SNF DISCHARGE (OUTPATIENT)
Dept: INTERNAL MEDICINE CLINIC | Age: 83
End: 2023-02-06

## 2023-02-06 DIAGNOSIS — Z78.9 DECREASED ACTIVITIES OF DAILY LIVING (ADL): ICD-10-CM

## 2023-02-06 DIAGNOSIS — I10 ESSENTIAL HYPERTENSION: ICD-10-CM

## 2023-02-06 DIAGNOSIS — Z96.612 S/P REVERSE TOTAL SHOULDER ARTHROPLASTY, LEFT: Primary | ICD-10-CM

## 2023-02-06 DIAGNOSIS — E11.40 TYPE 2 DIABETES MELLITUS WITH DIABETIC NEUROPATHY, WITH LONG-TERM CURRENT USE OF INSULIN (HCC): ICD-10-CM

## 2023-02-06 DIAGNOSIS — D64.9 ANEMIA, UNSPECIFIED TYPE: ICD-10-CM

## 2023-02-06 DIAGNOSIS — Z79.4 TYPE 2 DIABETES MELLITUS WITH DIABETIC NEUROPATHY, WITH LONG-TERM CURRENT USE OF INSULIN (HCC): ICD-10-CM

## 2023-02-07 VITALS
WEIGHT: 200.38 LBS | RESPIRATION RATE: 16 BRPM | OXYGEN SATURATION: 96 % | BODY MASS INDEX: 33 KG/M2 | DIASTOLIC BLOOD PRESSURE: 53 MMHG | HEART RATE: 60 BPM | SYSTOLIC BLOOD PRESSURE: 110 MMHG | TEMPERATURE: 97 F

## 2023-02-15 ENCOUNTER — ORDER TRANSCRIPTION (OUTPATIENT)
Dept: PHYSICAL THERAPY | Facility: HOSPITAL | Age: 83
End: 2023-02-15

## 2023-02-15 ENCOUNTER — TELEPHONE (OUTPATIENT)
Dept: PHYSICAL THERAPY | Facility: HOSPITAL | Age: 83
End: 2023-02-15

## 2023-02-15 DIAGNOSIS — Z96.612 STATUS POST REVERSE ARTHROPLASTY OF LEFT SHOULDER: Primary | ICD-10-CM

## 2023-05-03 ENCOUNTER — OFFICE VISIT (OUTPATIENT)
Dept: CARDIOLOGY | Age: 83
End: 2023-05-03

## 2023-05-03 VITALS
HEART RATE: 77 BPM | SYSTOLIC BLOOD PRESSURE: 134 MMHG | WEIGHT: 201 LBS | BODY MASS INDEX: 33.49 KG/M2 | HEIGHT: 65 IN | DIASTOLIC BLOOD PRESSURE: 70 MMHG | OXYGEN SATURATION: 98 %

## 2023-05-03 DIAGNOSIS — I25.10 CORONARY ARTERY DISEASE INVOLVING NATIVE CORONARY ARTERY OF NATIVE HEART WITHOUT ANGINA PECTORIS: ICD-10-CM

## 2023-05-03 DIAGNOSIS — I87.2 CHRONIC VENOUS INSUFFICIENCY: ICD-10-CM

## 2023-05-03 DIAGNOSIS — I89.0 LYMPHEDEMA OF BOTH LOWER EXTREMITIES: Primary | ICD-10-CM

## 2023-05-03 DIAGNOSIS — I10 ESSENTIAL HYPERTENSION: ICD-10-CM

## 2023-05-03 PROCEDURE — 99214 OFFICE O/P EST MOD 30 MIN: CPT | Performed by: INTERNAL MEDICINE

## 2023-05-03 RX ORDER — ASPIRIN 81 MG/1
81 TABLET ORAL DAILY
COMMUNITY
End: 2023-05-03 | Stop reason: DRUGHIGH

## 2023-05-08 ENCOUNTER — APPOINTMENT (OUTPATIENT)
Dept: CARDIOLOGY | Age: 83
End: 2023-05-08

## 2023-09-18 ENCOUNTER — HOSPITAL ENCOUNTER (OUTPATIENT)
Facility: HOSPITAL | Age: 83
Setting detail: OBSERVATION
Discharge: NURSING, REHAB OR LONG-TERM CARE | End: 2023-09-20
Attending: EMERGENCY MEDICINE | Admitting: INTERNAL MEDICINE
Payer: MEDICARE

## 2023-09-18 ENCOUNTER — APPOINTMENT (OUTPATIENT)
Dept: GENERAL RADIOLOGY | Facility: HOSPITAL | Age: 83
End: 2023-09-18
Attending: EMERGENCY MEDICINE
Payer: MEDICARE

## 2023-09-18 ENCOUNTER — APPOINTMENT (OUTPATIENT)
Dept: CT IMAGING | Facility: HOSPITAL | Age: 83
End: 2023-09-18
Attending: EMERGENCY MEDICINE
Payer: MEDICARE

## 2023-09-18 DIAGNOSIS — S09.90XA INJURY OF HEAD, INITIAL ENCOUNTER: ICD-10-CM

## 2023-09-18 DIAGNOSIS — R53.1 WEAKNESS GENERALIZED: ICD-10-CM
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
## 2023-09-18 DIAGNOSIS — I48.91 ATRIAL FIBRILLATION, NEW ONSET (HCC): Primary | ICD-10-CM

## 2023-09-18 LAB
ALBUMIN SERPL-MCNC: 3.4 G/DL (ref 3.4–5)
ALBUMIN/GLOB SERPL: 0.9 {RATIO} (ref 1–2)
ALP LIVER SERPL-CCNC: 78 U/L
ALT SERPL-CCNC: 25 U/L
ANION GAP SERPL CALC-SCNC: 6 MMOL/L (ref 0–18)
AST SERPL-CCNC: 10 U/L (ref 15–37)
BASOPHILS # BLD AUTO: 0.03 X10(3) UL (ref 0–0.2)
BASOPHILS NFR BLD AUTO: 0.3 %
BILIRUB SERPL-MCNC: 0.7 MG/DL (ref 0.1–2)
BUN BLD-MCNC: 24 MG/DL (ref 7–18)
CALCIUM BLD-MCNC: 9.3 MG/DL (ref 8.5–10.1)
CHLORIDE SERPL-SCNC: 107 MMOL/L (ref 98–112)
CO2 SERPL-SCNC: 24 MMOL/L (ref 21–32)
CREAT BLD-MCNC: 0.81 MG/DL
EGFRCR SERPLBLD CKD-EPI 2021: 73 ML/MIN/1.73M2 (ref 60–?)
EOSINOPHIL # BLD AUTO: 0.15 X10(3) UL (ref 0–0.7)
EOSINOPHIL NFR BLD AUTO: 1.6 %
ERYTHROCYTE [DISTWIDTH] IN BLOOD BY AUTOMATED COUNT: 13.2 %
GLOBULIN PLAS-MCNC: 3.6 G/DL (ref 2.8–4.4)
GLUCOSE BLD-MCNC: 227 MG/DL (ref 70–99)
GLUCOSE BLD-MCNC: 231 MG/DL (ref 70–99)
HCT VFR BLD AUTO: 35.2 %
HGB BLD-MCNC: 12.2 G/DL
IMM GRANULOCYTES # BLD AUTO: 0.04 X10(3) UL (ref 0–1)
IMM GRANULOCYTES NFR BLD: 0.4 %
LYMPHOCYTES # BLD AUTO: 1.08 X10(3) UL (ref 1–4)
LYMPHOCYTES NFR BLD AUTO: 11.3 %
MCH RBC QN AUTO: 29.6 PG (ref 26–34)
MCHC RBC AUTO-ENTMCNC: 34.7 G/DL (ref 31–37)
MCV RBC AUTO: 85.4 FL
MONOCYTES # BLD AUTO: 0.45 X10(3) UL (ref 0.1–1)
MONOCYTES NFR BLD AUTO: 4.7 %
NEUTROPHILS # BLD AUTO: 7.8 X10 (3) UL (ref 1.5–7.7)
NEUTROPHILS # BLD AUTO: 7.8 X10(3) UL (ref 1.5–7.7)
NEUTROPHILS NFR BLD AUTO: 81.7 %
NT-PROBNP SERPL-MCNC: 434 PG/ML (ref ?–450)
OSMOLALITY SERPL CALC.SUM OF ELEC: 295 MOSM/KG (ref 275–295)
PLATELET # BLD AUTO: 249 10(3)UL (ref 150–450)
POTASSIUM SERPL-SCNC: 4 MMOL/L (ref 3.5–5.1)
PROT SERPL-MCNC: 7 G/DL (ref 6.4–8.2)
RBC # BLD AUTO: 4.12 X10(6)UL
SODIUM SERPL-SCNC: 137 MMOL/L (ref 136–145)
TROPONIN I HIGH SENSITIVITY: 14 NG/L
WBC # BLD AUTO: 9.6 X10(3) UL (ref 4–11)

## 2023-09-18 PROCEDURE — 85025 COMPLETE CBC W/AUTO DIFF WBC: CPT | Performed by: EMERGENCY MEDICINE

## 2023-09-18 PROCEDURE — 85730 THROMBOPLASTIN TIME PARTIAL: CPT | Performed by: EMERGENCY MEDICINE

## 2023-09-18 PROCEDURE — 84484 ASSAY OF TROPONIN QUANT: CPT | Performed by: EMERGENCY MEDICINE

## 2023-09-18 PROCEDURE — 82962 GLUCOSE BLOOD TEST: CPT

## 2023-09-18 PROCEDURE — 83880 ASSAY OF NATRIURETIC PEPTIDE: CPT | Performed by: EMERGENCY MEDICINE

## 2023-09-18 PROCEDURE — 72125 CT NECK SPINE W/O DYE: CPT | Performed by: EMERGENCY MEDICINE

## 2023-09-18 PROCEDURE — 70450 CT HEAD/BRAIN W/O DYE: CPT | Performed by: EMERGENCY MEDICINE

## 2023-09-18 PROCEDURE — 71045 X-RAY EXAM CHEST 1 VIEW: CPT | Performed by: EMERGENCY MEDICINE

## 2023-09-18 PROCEDURE — 80053 COMPREHEN METABOLIC PANEL: CPT | Performed by: EMERGENCY MEDICINE

## 2023-09-18 PROCEDURE — 93005 ELECTROCARDIOGRAM TRACING: CPT

## 2023-09-18 RX ORDER — HEPARIN SODIUM AND DEXTROSE 10000; 5 [USP'U]/100ML; G/100ML
1000 INJECTION INTRAVENOUS ONCE
Status: COMPLETED | OUTPATIENT
Start: 2023-09-18 | End: 2023-09-20

## 2023-09-18 RX ORDER — ONDANSETRON 2 MG/ML
4 INJECTION INTRAMUSCULAR; INTRAVENOUS ONCE
Status: COMPLETED | OUTPATIENT
Start: 2023-09-18 | End: 2023-09-18

## 2023-09-18 RX ORDER — HEPARIN SODIUM 1000 [USP'U]/ML
5000 INJECTION, SOLUTION INTRAVENOUS; SUBCUTANEOUS ONCE
Status: COMPLETED | OUTPATIENT
Start: 2023-09-18 | End: 2023-09-18

## 2023-09-19 ENCOUNTER — APPOINTMENT (OUTPATIENT)
Dept: CV DIAGNOSTICS | Facility: HOSPITAL | Age: 83
End: 2023-09-19
Payer: MEDICARE

## 2023-09-19 PROBLEM — S09.90XA INJURY OF HEAD, INITIAL ENCOUNTER: Status: ACTIVE | Noted: 2023-09-19
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
## 2023-09-19 PROBLEM — R53.1 WEAKNESS GENERALIZED: Status: ACTIVE | Noted: 2023-09-19

## 2023-09-19 LAB
ANION GAP SERPL CALC-SCNC: 6 MMOL/L (ref 0–18)
APTT PPP: 24 SECONDS (ref 23.3–35.6)
APTT PPP: 50.9 SECONDS (ref 23.3–35.6)
BUN BLD-MCNC: 19 MG/DL (ref 7–18)
CALCIUM BLD-MCNC: 9.1 MG/DL (ref 8.5–10.1)
CHLORIDE SERPL-SCNC: 105 MMOL/L (ref 98–112)
CO2 SERPL-SCNC: 26 MMOL/L (ref 21–32)
CREAT BLD-MCNC: 0.93 MG/DL
EGFRCR SERPLBLD CKD-EPI 2021: 62 ML/MIN/1.73M2 (ref 60–?)
ERYTHROCYTE [DISTWIDTH] IN BLOOD BY AUTOMATED COUNT: 13.2 %
GLUCOSE BLD-MCNC: 222 MG/DL (ref 70–99)
GLUCOSE BLD-MCNC: 292 MG/DL (ref 70–99)
GLUCOSE BLD-MCNC: 315 MG/DL (ref 70–99)
GLUCOSE BLD-MCNC: 321 MG/DL (ref 70–99)
GLUCOSE BLD-MCNC: 372 MG/DL (ref 70–99)
GLUCOSE BLD-MCNC: 400 MG/DL (ref 70–99)
HCT VFR BLD AUTO: 34.6 %
HGB BLD-MCNC: 11.7 G/DL
MCH RBC QN AUTO: 29.3 PG (ref 26–34)
MCHC RBC AUTO-ENTMCNC: 33.8 G/DL (ref 31–37)
MCV RBC AUTO: 86.5 FL
OSMOLALITY SERPL CALC.SUM OF ELEC: 297 MOSM/KG (ref 275–295)
PLATELET # BLD AUTO: 233 10(3)UL (ref 150–450)
POTASSIUM SERPL-SCNC: 4.6 MMOL/L (ref 3.5–5.1)
RBC # BLD AUTO: 4 X10(6)UL
SODIUM SERPL-SCNC: 137 MMOL/L (ref 136–145)
WBC # BLD AUTO: 8.6 X10(3) UL (ref 4–11)

## 2023-09-19 PROCEDURE — 93306 TTE W/DOPPLER COMPLETE: CPT

## 2023-09-19 PROCEDURE — 97530 THERAPEUTIC ACTIVITIES: CPT

## 2023-09-19 PROCEDURE — 80048 BASIC METABOLIC PNL TOTAL CA: CPT | Performed by: HOSPITALIST

## 2023-09-19 PROCEDURE — 82962 GLUCOSE BLOOD TEST: CPT

## 2023-09-19 PROCEDURE — 97161 PT EVAL LOW COMPLEX 20 MIN: CPT

## 2023-09-19 PROCEDURE — 85027 COMPLETE CBC AUTOMATED: CPT | Performed by: HOSPITALIST

## 2023-09-19 PROCEDURE — 85730 THROMBOPLASTIN TIME PARTIAL: CPT | Performed by: EMERGENCY MEDICINE

## 2023-09-19 PROCEDURE — 97165 OT EVAL LOW COMPLEX 30 MIN: CPT

## 2023-09-19 PROCEDURE — 97116 GAIT TRAINING THERAPY: CPT

## 2023-09-19 RX ORDER — HEPARIN SODIUM AND DEXTROSE 10000; 5 [USP'U]/100ML; G/100ML
INJECTION INTRAVENOUS CONTINUOUS
Status: DISCONTINUED | OUTPATIENT
Start: 2023-09-19 | End: 2023-09-20

## 2023-09-19 RX ORDER — NICOTINE POLACRILEX 4 MG
30 LOZENGE BUCCAL
Status: DISCONTINUED | OUTPATIENT
Start: 2023-09-19 | End: 2023-09-20

## 2023-09-19 RX ORDER — DEXTROSE MONOHYDRATE 25 G/50ML
50 INJECTION, SOLUTION INTRAVENOUS
Status: DISCONTINUED | OUTPATIENT
Start: 2023-09-19 | End: 2023-09-20

## 2023-09-19 RX ORDER — HYDRALAZINE HYDROCHLORIDE 20 MG/ML
10 INJECTION INTRAMUSCULAR; INTRAVENOUS EVERY 6 HOURS PRN
Status: DISCONTINUED | OUTPATIENT
Start: 2023-09-19 | End: 2023-09-20

## 2023-09-19 RX ORDER — TORSEMIDE 20 MG/1
20 TABLET ORAL DAILY
Status: DISCONTINUED | OUTPATIENT
Start: 2023-09-19 | End: 2023-09-20

## 2023-09-19 RX ORDER — LISINOPRIL 20 MG/1
20 TABLET ORAL DAILY
Qty: 90 TABLET | Refills: 3 | Status: SHIPPED | OUTPATIENT
Start: 2023-09-19 | End: 2023-09-20

## 2023-09-19 RX ORDER — MULTIVIT-MIN/IRON/FOLIC ACID/K 18-600-40
CAPSULE ORAL
Status: ON HOLD | COMMUNITY
End: 2023-09-19

## 2023-09-19 RX ORDER — ASPIRIN 81 MG/1
81 TABLET ORAL DAILY
Status: DISCONTINUED | OUTPATIENT
Start: 2023-09-19 | End: 2023-09-20

## 2023-09-19 RX ORDER — ONDANSETRON 2 MG/ML
4 INJECTION INTRAMUSCULAR; INTRAVENOUS EVERY 6 HOURS PRN
Status: DISCONTINUED | OUTPATIENT
Start: 2023-09-19 | End: 2023-09-20

## 2023-09-19 RX ORDER — ATORVASTATIN CALCIUM 20 MG/1
20 TABLET, FILM COATED ORAL NIGHTLY
Status: DISCONTINUED | OUTPATIENT
Start: 2023-09-19 | End: 2023-09-20

## 2023-09-19 RX ORDER — LISINOPRIL 10 MG/1
10 TABLET ORAL DAILY
Status: DISCONTINUED | OUTPATIENT
Start: 2023-09-19 | End: 2023-09-19

## 2023-09-19 RX ORDER — LISINOPRIL 20 MG/1
20 TABLET ORAL DAILY
Status: DISCONTINUED | OUTPATIENT
Start: 2023-09-20 | End: 2023-09-20

## 2023-09-19 RX ORDER — CHOLECALCIFEROL (VITAMIN D3) 125 MCG
2000 CAPSULE ORAL DAILY
COMMUNITY

## 2023-09-19 RX ORDER — DIPHENHYDRAMINE HCL, IBUPROFEN 25; 200 MG/1; MG/1
CAPSULE, LIQUID FILLED ORAL EVERY EVENING
COMMUNITY
End: 2023-09-20

## 2023-09-19 RX ORDER — NICOTINE POLACRILEX 4 MG
15 LOZENGE BUCCAL
Status: DISCONTINUED | OUTPATIENT
Start: 2023-09-19 | End: 2023-09-20

## 2023-09-19 NOTE — PROGRESS NOTES
09/19/23 1453   Provider Notification   Reason for Communication Critical value   Provider Name Carmelo DO Kim   Method of Communication Page   Response Waiting for response   Notification Time [phone]     POC Glucose 372.  MD notified per protocol    MD ordered 10 units Novolog and changed protocol to intermediate sliding scale

## 2023-09-19 NOTE — CM/SW NOTE
09/19/23 1300   CM/SW Referral Data   Referral Source Social Work (self-referral)   Reason for Referral Discharge planning   Informant Patient;EMR  (DIL)   Patient Info   Patient's Current Mental Status at Time of Assessment Alert;Oriented   Patient's [address]   Patient lives with Spouse/Significant other   Discharge Needs   Anticipated D/C needs Home health care     HOME SITUATION  Type of Home: House   Home Layout: Two level; Able to live on main level  Stairs to Enter : 2  Railing: Yes  Stairs to Bedroom: 14  Railing: Yes     Lives With: Spouse  Drives: Yes  Patient Owned Equipment: Rolling walker;Rollator  Patient Regularly Uses: Glasses     Prior Level of [place]: Pt lives with spouse in 2 story home. Pt typically independent with ADL and mobility. Pt ambulates with RW or rollator at all times. Pt has RW on each level of home. Pt reports she can stay on the main level if needed and work towards using stairs, which is what she did after spinal surgery. Pts spouse is currently admitted [facility] s/p TKA. (Per PT evaluation)      Patient is an 83 y/o female who admitted with Weakness generalized, Atrial fibrillation, Injury of head, Fall. PT/OT recommending HH. Met with patient, who is alert and oriented, and daughter-in-law at bedside to discuss discharge planning. She lives in a house with her . Her  is also in the hospital s/p TKA. Discussed PT/OT rec of HH, she is agreeable. She has a history with [facility] but had a poor experience last time she used them. She is interested in looking at other [facility] options at this time. She brought up her and her  going to rehab since they are both hospitalized. Explained PT/OT rec HH, not YEN. Discussed that she is under Observation status and therefore does not meet Medicare criteria of 3 inpatient midnights for YEN placement.  Offered caregiver resources, private pay respite NH stay, and encouraged them to see if family/friends/neighbors would be able to provide support while they are recovering. Sent [place] referrals. Await accepting providers and patient choice. SW/CM to remain available for support and/or discharge planning. Addendum 2:30pm: Met with patient and her son at bedside. She had just finished visiting with her  and DIL. Provided accepting [place] provider list from [facility]. She and her son will review and let SW know choice.       SUDARSHAN Davenport  Discharge Planner  [phone]

## 2023-09-19 NOTE — PHYSICAL THERAPY NOTE
PHYSICAL THERAPY EVALUATION - INPATIENT     Room Number: 5574/3907-B  Evaluation Date: 9/19/2023  Type of Evaluation: Initial  Physician Order: PT Eval and Treat    Presenting Problem: s/p fall, Afib  Co-Morbidities : spinal stenosis, DM 2, essential HTN, benign positional vertigo, CAD, lymphedema  Reason for Therapy: Mobility Dysfunction and Discharge Planning    History related to current admission: Patient is a 82year old female admitted on 9/18/2023 from home for fall. Pt was noted to be in Afib in the ED. Pt presenting with new onset of Afib. ASSESSMENT   In this PT evaluation, the patient presents with the following impairments limited endurance, strength deficits, balance impairments, and decreased activity tolerance. These impairments and comorbidities manifest themselves as functional limitations in independent bed mobility, transfers, and gait. The patient is below baseline and would benefit from skilled inpatient PT to address the above deficits to assist patient in returning to prior to level of function. Functional outcome measures completed include AMPAC. The AM-PAC '6-Clicks' Inpatient Basic Mobility Short Form was completed and this patient is demonstrating a Approx Degree of Impairment: 46.58%  degree of impairment in mobility. Research supports that patients with this level of impairment may benefit from [address] DISCHARGE RECOMMENDATIONS  PT Discharge Recommendations: Home with home health PT    PLAN  PT Treatment Plan: Bed mobility; Endurance; Energy conservation;Patient education;Gait training;Strengthening;Balance training;Transfer training;Stair training  Rehab Potential : Good  Frequency (Obs): 3-5x/week  Number of Visits to Meet Established Goals: 4      CURRENT GOALS    Goal #1 Patient is able to demonstrate supine - sit EOB @ level: supervision     Goal #2 Patient is able to demonstrate transfers EOB to/from [facility] at assistance level: supervision     Goal #3 Patient is able to ambulate 150 feet with assist device: walker - rolling at assistance level: supervision     Goal #4 Assess stair negotiation, as appropriate. Goal #5    Goal #6    Goal Comments: Goals established on 2023    HOME SITUATION  Type of Home: House   Home Layout: Two level; Able to live on main level  Stairs to Enter : 2  Railing: Yes  Stairs to Bedroom: 14  Railing: Yes    Lives With: Spouse  Drives: Yes  Patient Owned Equipment: Rolling walker;Rollator  Patient Regularly Uses: Glasses    Prior Level of [place]: Pt lives with spouse in 2 story home. Pt typically independent with ADL and mobility. Pt ambulates with RW or rollator at all times. Pt has RW on each level of home. Pt reports she can stay on the main level if needed and work towards using stairs, which is what she did after spinal surgery. Pts spouse is currently admitted [facility] s/p TKA. SUBJECTIVE  \"Now I don't go fast.\"        OBJECTIVE  Precautions: Bed/chair alarm  Fall Risk: High fall risk    WEIGHT BEARING RESTRICTION  Weight Bearing Restriction: None                PAIN ASSESSMENT  Ratin          COGNITION  Overall Cognitive Status:  WFL - within functional limits       RANGE OF MOTION AND STRENGTH ASSESSMENT  Upper extremity ROM and strength are within functional limits     Lower extremity ROM is within functional limits     Lower extremity strength is within functional limits except for the following:    Right Knee extension  4/5  Left Knee extension  4/5  Right Dorsiflexion  4/5  Left Dorsiflexion  4/5      BALANCE  Static Sitting: Fair +  Dynamic Sitting: Fair +  Static Standing: Fair -  Dynamic Standing: Poor +    ADDITIONAL TESTS                                    ACTIVITY TOLERANCE                         O2 WALK  Oxygen Therapy  SPO2% on Room Air at Rest: 97    NEUROLOGICAL FINDINGS                        AM-PAC '6-Clicks' INPATIENT SHORT FORM - BASIC MOBILITY  How much difficulty does the patient currently have. ..   Patient Difficulty: Turning over in bed (including adjusting bedclothes, sheets and blankets)?: A Little   Patient Difficulty: Sitting down on and standing up from a chair with arms (e.g., wheelchair, bedside commode, etc.): A Little   Patient Difficulty: Moving from lying on back to sitting on the side of the bed?: A Little   How much help from another person does the patient currently need. .. Help from Another: Moving to and from a bed to a chair (including a wheelchair)?: A Little   Help from Another: Need to walk in hospital room?: A Little   Help from Another: Climbing 3-5 steps with a railing?: A Little       AM-PAC Score:  Raw Score: 18   Approx Degree of Impairment: 46.58%   Standardized Score (AM-PAC Scale): 43.63   CMS Modifier (G-Code): CK    FUNCTIONAL ABILITY STATUS  Gait Assessment   Functional Mobility/Gait Assessment  Gait Assistance: Contact guard assist  Distance (ft): 20  Assistive Device: Rolling walker  Pattern: R Foot flat;L Foot flat (slow brittany)    Skilled Therapy Provided   Pt received supine in bed and is agreeable to therapy. Pt supine-sit slowly with supervision. Pt demonstrates good static sitting balance at EOB. Pt sit-stand with RW and CGA for safety. Pt stood statically for 5 min with supervision. Pt assisted with donning new brief. Pt tolerated well. Pt ambulated 20ft with RW and CGA for balance/safety. Pt ambulates with slow brittany and flat foot initial contact. Pt demonstrates good pacing and safety awareness. Pt reports feeling \"nervous\" due to fall. Pt returned to room sitting up in bedside chair. Pt educated on POC and recommendations. Pt left with call light in reach. RN aware. Bed Mobility:  Rolling: supervision  Supine to sit: supervision   Sit to supine: NT     Transfer Mobility:  Sit to stand: CGA   Stand to sit: CGA  Gait = CGA    Therapist's Comments: Recommend use of RW.      Exercise/Education Provided:  Bed mobility  Energy conservation  Functional activity tolerated  Gait training  Posture  Strengthening  Transfer training    Patient End of Session: Up in chair;Needs met;Call light within reach;RN aware of session/findings; All patient questions and concerns addressed; Family present      Patient Evaluation Complexity Level:  History High - 3 or more personal factors and/or co-morbidities   Examination of body systems Moderate - addressing a total of 3 or more elements   Clinical Presentation Low - Stable   Clinical Decision Making Low - Stable       PT Session Time: 30 minutes  Gait Trainin minutes

## 2023-09-19 NOTE — ED QUICK NOTES
Orders for admission, patient is aware of plan and ready to go upstairs. Any questions, please call ED RN Norah Reid at extension [phone].      Patient Covid vaccination status: Fully vaccinated     COVID Test Ordered in ED: None    COVID Suspicion at Admission: N/A    Running Infusions:      Mental Status/LOC at time of transport: A/Ox3    Other pertinent information:   CIWA score: N/A   NIH score:  N/A

## 2023-09-19 NOTE — ED QUICK NOTES
Pt resting in bed. States pain very minimal 2/10.  Pt denies GEORGE, she was given more warm blankets per request

## 2023-09-19 NOTE — PROGRESS NOTES
09/19/23 0606 09/19/23 0610 09/19/23 0612   Vital Signs   Pulse 61 65 77   Heart Rate Source Monitor Monitor Monitor   BP (!) 180/74 (!) 182/80 (!) 186/84   BP Location Left arm Left arm Left arm   BP Method Automatic Automatic Automatic   Patient Position Lying Sitting Standing

## 2023-09-19 NOTE — ED INITIAL ASSESSMENT (HPI)
Arrived via EMS for c/o pain to the back of head post fall. Pt arrived with C collar in place. States she was standing  in the kitchen when she felt generally weak and fell. States she thought it was her blood sugar being low, pt took 2 glucose tabs prior to EMS arrival.     Accucheck 240 per EMS. Pt denies LOC, she denies pain to neck and back. She denies dizziness, she denies CP, denies GEORGE. Pt Afib on EKG, rate 64.  Pt denies hx AF Klisyri Pregnancy And Lactation Text: It is unknown if this medication can harm a developing fetus or if it is excreted in breast milk.

## 2023-09-19 NOTE — PLAN OF CARE
Received care of pt at 0730. A/Ox4. Heparin gtt at 10units/hr. Awaiting PTT. Afib/NSR rhythm trending on monitor, room air. Lung sounds clear and equal bilaterally. Abdomen soft and non-tender with active bowel sounds to all 4 quadrants. Call light within reach, safety precautions in place. 3313 - IV Hydralazine given per parameters. /69    1018 - awaiting PTT drawn at 0727    1026 - BP recheck 1hr post IV Hydralazine 104/63    1036 - PTT therapeutic 50.9    Problem: CARDIOVASCULAR - ADULT  Goal: Maintains optimal cardiac output and hemodynamic stability  Description: INTERVENTIONS:  - Monitor vital signs, rhythm, and trends  - Monitor for bleeding, hypotension and signs of decreased cardiac output  - Evaluate effectiveness of vasoactive medications to optimize hemodynamic stability  - Monitor arterial and/or venous puncture sites for bleeding and/or hematoma  - Assess quality of pulses, skin color and temperature  - Assess for signs of decreased coronary artery perfusion - ex.  Angina  - Evaluate fluid balance, assess for edema, trend weights  Outcome: Progressing  Goal: Absence of cardiac arrhythmias or at baseline  Description: INTERVENTIONS:  - Continuous cardiac monitoring, monitor vital signs, obtain 12 lead EKG if indicated  - Evaluate effectiveness of antiarrhythmic and heart rate control medications as ordered  - Initiate emergency measures for life threatening arrhythmias  - Monitor electrolytes and administer replacement therapy as ordered  Outcome: Progressing     Problem: METABOLIC/FLUID AND ELECTROLYTES - ADULT  Goal: Glucose maintained within prescribed range  Description: INTERVENTIONS:  - Monitor Blood Glucose as ordered  - Assess for signs and symptoms of hyperglycemia and hypoglycemia  - Administer ordered medications to maintain glucose within target range  - Assess barriers to adequate nutritional intake and initiate nutrition consult as needed  - Instruct patient on self management of diabetes  Outcome: Progressing     Problem: Impaired Functional Mobility  Goal: Achieve highest/safest level of mobility/gait  Description: Interventions:  - Assess patient's functional ability and stability  - Promote increasing activity/tolerance for mobility and gait  - Educate and engage patient/family in tolerated activity level and precautions    Outcome: Progressing

## 2023-09-19 NOTE — PLAN OF CARE
Assumed care for pt. From ED in stable condition. A&Ox4. C/O slight discomfort to the back of her head. Lungs clear on room air. Chest xray WNL. Telemetry shows patient in and out of afib/sinus rhythm. BLE with 2+ pitting edema. Skin intact, verified with Littie Shape PCT upon admission. Patient reports some diarrhea at home, isolation precautions initiated, awaiting stool for sample. Heparin drip infusing. Cardiology consulted from ED. Awaiting orders from hospitalist. Patient oriented to room, instructed not to get out of bed without assistance from staff. Patient verbalized understanding. Problem: CARDIOVASCULAR - ADULT  Goal: Maintains optimal cardiac output and hemodynamic stability  Description: INTERVENTIONS:  - Monitor vital signs, rhythm, and trends  - Monitor for bleeding, hypotension and signs of decreased cardiac output  - Evaluate effectiveness of vasoactive medications to optimize hemodynamic stability  - Monitor arterial and/or venous puncture sites for bleeding and/or hematoma  - Assess quality of pulses, skin color and temperature  - Assess for signs of decreased coronary artery perfusion - ex.  Angina  - Evaluate fluid balance, assess for edema, trend weights  Outcome: Progressing  Goal: Absence of cardiac arrhythmias or at baseline  Description: INTERVENTIONS:  - Continuous cardiac monitoring, monitor vital signs, obtain 12 lead EKG if indicated  - Evaluate effectiveness of antiarrhythmic and heart rate control medications as ordered  - Initiate emergency measures for life threatening arrhythmias  - Monitor electrolytes and administer replacement therapy as ordered  Outcome: Progressing     Problem: METABOLIC/FLUID AND ELECTROLYTES - ADULT  Goal: Glucose maintained within prescribed range  Description: INTERVENTIONS:  - Monitor Blood Glucose as ordered  - Assess for signs and symptoms of hyperglycemia and hypoglycemia  - Administer ordered medications to maintain glucose within target range  - Assess barriers to adequate nutritional intake and initiate nutrition consult as needed  - Instruct patient on self management of diabetes  Outcome: Progressing     Problem: Impaired Functional Mobility  Goal: Achieve highest/safest level of mobility/gait  Description: Interventions:  - Assess patient's functional ability and stability  - Promote increasing activity/tolerance for mobility and gait  - Educate and engage patient/family in tolerated activity level and precautions    Outcome: Progressing

## 2023-09-20 VITALS
OXYGEN SATURATION: 97 % | RESPIRATION RATE: 17 BRPM | HEIGHT: 65 IN | HEART RATE: 81 BPM | BODY MASS INDEX: 32.15 KG/M2 | TEMPERATURE: 98 F | SYSTOLIC BLOOD PRESSURE: 148 MMHG | DIASTOLIC BLOOD PRESSURE: 70 MMHG | WEIGHT: 193 LBS

## 2023-09-20 LAB
APTT PPP: 39.3 SECONDS (ref 23.3–35.6)
GLUCOSE BLD-MCNC: 236 MG/DL (ref 70–99)
GLUCOSE BLD-MCNC: 337 MG/DL (ref 70–99)
GLUCOSE BLD-MCNC: 92 MG/DL (ref 70–99)
PLATELET # BLD AUTO: 226 10(3)UL (ref 150–450)

## 2023-09-20 PROCEDURE — 85049 AUTOMATED PLATELET COUNT: CPT | Performed by: HOSPITALIST

## 2023-09-20 PROCEDURE — 82962 GLUCOSE BLOOD TEST: CPT

## 2023-09-20 PROCEDURE — 85730 THROMBOPLASTIN TIME PARTIAL: CPT | Performed by: INTERNAL MEDICINE

## 2023-09-20 RX ORDER — LISINOPRIL 20 MG/1
20 TABLET ORAL 2 TIMES DAILY
Qty: 180 TABLET | Refills: 3 | Status: SHIPPED | OUTPATIENT
Start: 2023-09-20 | End: 2023-09-20

## 2023-09-20 RX ORDER — LISINOPRIL 20 MG/1
20 TABLET ORAL 2 TIMES DAILY
Status: DISCONTINUED | OUTPATIENT
Start: 2023-09-20 | End: 2023-09-20

## 2023-09-20 RX ORDER — LISINOPRIL 20 MG/1
20 TABLET ORAL 2 TIMES DAILY
Qty: 180 TABLET | Refills: 3 | Status: SHIPPED | OUTPATIENT
Start: 2023-09-20 | End: 2024-09-14

## 2023-09-20 NOTE — PLAN OF CARE
Assumed care of pt at 0730. A/ox4, rm air, flips between SR/afib on tele. No reports of pain. Breath sounds clear upon auscultation. Denies cough. +2 edema present in bilateral legs and feet. Impaired vision - wears glasses. Discussed POC w/ pt and daughter at bedside. 1021: paged cardiology NP to verify if they would like patient to get AM dose of eliquis this morning after heparin drip has been discontinued. Received callback from NP - ok to give morning dose of eliquis today  1141: patient willing to have new prescriptions filled w/ [facility] - however, unable to print new prescriptions due to no official discharge order in place  [phone]: paged hospitalist to notify of walking patient - she ambulated 40 feet and will let staff know what she feels comfortable with regarding discharge placement (SNF vs home)  [phone]: recevied call from meds to beds clarifying if patient is able to discharge to the [facility] with new medications. Called the [facility] and spoke w/ admissions - patient is able to come with her two medications filled by meds to beds (lisinopril and eliquis) since they are new medications    Problem: CARDIOVASCULAR - ADULT  Goal: Maintains optimal cardiac output and hemodynamic stability  Description: INTERVENTIONS:  - Monitor vital signs, rhythm, and trends  - Monitor for bleeding, hypotension and signs of decreased cardiac output  - Evaluate effectiveness of vasoactive medications to optimize hemodynamic stability  - Monitor arterial and/or venous puncture sites for bleeding and/or hematoma  - Assess quality of pulses, skin color and temperature  - Assess for signs of decreased coronary artery perfusion - ex.  Angina  - Evaluate fluid balance, assess for edema, trend weights  Outcome: Progressing  Goal: Absence of cardiac arrhythmias or at baseline  Description: INTERVENTIONS:  - Continuous cardiac monitoring, monitor vital signs, obtain 12 lead EKG if indicated  - Evaluate effectiveness of antiarrhythmic and heart rate control medications as ordered  - Initiate emergency measures for life threatening arrhythmias  - Monitor electrolytes and administer replacement therapy as ordered  Outcome: Progressing     Problem: METABOLIC/FLUID AND ELECTROLYTES - ADULT  Goal: Glucose maintained within prescribed range  Description: INTERVENTIONS:  - Monitor Blood Glucose as ordered  - Assess for signs and symptoms of hyperglycemia and hypoglycemia  - Administer ordered medications to maintain glucose within target range  - Assess barriers to adequate nutritional intake and initiate nutrition consult as needed  - Instruct patient on self management of diabetes  Outcome: Progressing     Problem: Impaired Functional Mobility  Goal: Achieve highest/safest level of mobility/gait  Description: Interventions:  - Assess patient's functional ability and stability  - Promote increasing activity/tolerance for mobility and gait  - Educate and engage patient/family in tolerated activity level and precautions  Outcome: Progressing     Problem: Patient/Family Goals  Goal: Patient/Family Long Term Goal  Description: Patient's Long Term Goal: to go home    Interventions:  - MD rounding, medication management, monitor labs, PO eliquis, monitor BP  - See additional Care Plan goals for specific interventions  Outcome: Progressing  Goal: Patient/Family Short Term Goal  Description: Patient's Short Term Goal: remain pain free    Interventions:   - pain assessments q4, pain meds PRN  - See additional Care Plan goals for specific interventions  Outcome: Progressing

## 2023-09-20 NOTE — CM/SW NOTE
Met with patient and DIL at bedside to discuss discharge planning. Discussed HH, she has not chosen a provider at this time. She has questions regarding [facility] and was agreeable to have liaison visit her. She/DIL brought up private pay/respite stay at SNF and agreeable with referrals being sent. She is interested in the [facility] [place]. Discussed having medications sent to [facility]. Updated RN. Sent referral for respite stay in [facility]. Will need PASRR. SW/CM to remain available for support and/or discharge planning. Addendum 2:30pm: Met with patient, who was eating lunch. Provided accepting respite SNF list from [facility]. Updated RN.    3:30pm: Informed by RN that patient is medically cleared for discharge. 4:10pm: Met with patient/DIL who confirmed plan for patient to go to the [facility] for respite stay. Informed her she will be going to a semi-private room but alone in the room. Informed her 1 week payment due up front, they understood. Spoke with Chucky Fonseca from the [facility] who confirmed bed available today. Spoke with Benson Gonzalez (ambulance at [facility]) and scheduled  for 7pm (soonest available) today. PCS form completed. SW will remain available.     [facility]  [phone] or [facility]  Discharge Planner  [phone]

## 2023-09-20 NOTE — PLAN OF CARE
Patient cleared for discharge by primary and consults. IV removed, tele monitor discontinued. Discharge paperwork/information given, including medications and follow-up appointments. Lisinopril and eliquis sent with patient. All questions answered. All belongings sent with patient. Spoke to [facility] at the [facility] at [place] for report. Transported off unit via ambulance service. Problem: CARDIOVASCULAR - ADULT  Goal: Maintains optimal cardiac output and hemodynamic stability  Description: INTERVENTIONS:  - Monitor vital signs, rhythm, and trends  - Monitor for bleeding, hypotension and signs of decreased cardiac output  - Evaluate effectiveness of vasoactive medications to optimize hemodynamic stability  - Monitor arterial and/or venous puncture sites for bleeding and/or hematoma  - Assess quality of pulses, skin color and temperature  - Assess for signs of decreased coronary artery perfusion - ex.  Angina  - Evaluate fluid balance, assess for edema, trend weights  9/20/2023 1652 by Patrice Hernandez RN  Outcome: Completed  9/20/2023 1138 by Patrice Hernandez RN  Outcome: Progressing  Goal: Absence of cardiac arrhythmias or at baseline  Description: INTERVENTIONS:  - Continuous cardiac monitoring, monitor vital signs, obtain 12 lead EKG if indicated  - Evaluate effectiveness of antiarrhythmic and heart rate control medications as ordered  - Initiate emergency measures for life threatening arrhythmias  - Monitor electrolytes and administer replacement therapy as ordered  9/20/2023 1652 by Patrice Hernandez RN  Outcome: Completed  9/20/2023 1138 by Patrice Hernandez RN  Outcome: Progressing     Problem: METABOLIC/FLUID AND ELECTROLYTES - ADULT  Goal: Glucose maintained within prescribed range  Description: INTERVENTIONS:  - Monitor Blood Glucose as ordered  - Assess for signs and symptoms of hyperglycemia and hypoglycemia  - Administer ordered medications to maintain glucose within target range  - Assess barriers to adequate nutritional intake and initiate nutrition consult as needed  - Instruct patient on self management of diabetes  9/20/2023 1652 by Chandler Jimenez RN  Outcome: Completed  9/20/2023 1138 by Chandler Jimenez RN  Outcome: Progressing     Problem: Impaired Functional Mobility  Goal: Achieve highest/safest level of mobility/gait  Description: Interventions:  - Assess patient's functional ability and stability  - Promote increasing activity/tolerance for mobility and gait  - Educate and engage patient/family in tolerated activity level and precautions  9/20/2023 1652 by Chandler Jimenez RN  Outcome: Completed  9/20/2023 1138 by Chandler Jimenez RN  Outcome: Progressing     Problem: Patient/Family Goals  Goal: Patient/Family Long Term Goal  Description: Patient's Long Term Goal: to go home    Interventions:  - MD rounding, medication management, monitor labs, PO eliquis, monitor BP  - See additional Care Plan goals for specific interventions  9/20/2023 1652 by Chandler Jimenez RN  Outcome: Completed  9/20/2023 1138 by Chandler Jimenez RN  Outcome: Progressing  Goal: Patient/Family Short Term Goal  Description: Patient's Short Term Goal: remain pain free    Interventions:   - pain assessments q4, pain meds PRN  - See additional Care Plan goals for specific interventions  9/20/2023 1652 by Chandler Jimenez RN  Outcome: Completed  9/20/2023 1138 by Chandler Jimenez RN  Outcome: Progressing

## 2023-09-20 NOTE — HOME CARE LIAISON
Received referral via [facility] for Home Health services. Spoke w/ patient and her daughter in law who is agreeable with [facility].  Contact information placed on AVS.

## 2023-09-20 NOTE — PLAN OF CARE
Assumed care of patient around 1. A&Ox4. Remains on room air. NSR/Sinus mark on telemetry, heart rates 50s-60s. Heparin gtt infusing per afib protocol. Edema to bilateral lower extremities noted. Purewick/brief in place for incontinence. Up with x1-2 and walker. Fall precautions and frequent rounding in place. Problem: CARDIOVASCULAR - ADULT  Goal: Maintains optimal cardiac output and hemodynamic stability  Description: INTERVENTIONS:  - Monitor vital signs, rhythm, and trends  - Monitor for bleeding, hypotension and signs of decreased cardiac output  - Evaluate effectiveness of vasoactive medications to optimize hemodynamic stability  - Monitor arterial and/or venous puncture sites for bleeding and/or hematoma  - Assess quality of pulses, skin color and temperature  - Assess for signs of decreased coronary artery perfusion - ex.  Angina  - Evaluate fluid balance, assess for edema, trend weights  Outcome: Progressing  Goal: Absence of cardiac arrhythmias or at baseline  Description: INTERVENTIONS:  - Continuous cardiac monitoring, monitor vital signs, obtain 12 lead EKG if indicated  - Evaluate effectiveness of antiarrhythmic and heart rate control medications as ordered  - Initiate emergency measures for life threatening arrhythmias  - Monitor electrolytes and administer replacement therapy as ordered  Outcome: Progressing

## 2023-09-20 NOTE — DISCHARGE INSTRUCTIONS
Sometimes managing your health at home requires assistance. The [facility] team has recognized your preference to use [facility]. They can be reached by phone at [phone]. The fax number for your reference is [phone]. A representative from the home health agency will contact you or your family to schedule your first visit. Please make follow up appointment with pcp Dr. Abby Tsang within a week of discharge.

## 2023-09-20 NOTE — PROGRESS NOTES
Echo performed 9/19/23 and finalized report below(working with IT staff to get report to transfer to [address] [place] [address], S.. echo software program to [address] [place] [address] Rd)

## 2023-09-21 ENCOUNTER — APPOINTMENT (OUTPATIENT)
Dept: LAB | Age: 83
End: 2023-09-21
Attending: FAMILY MEDICINE
Payer: MEDICARE

## 2023-09-21 ENCOUNTER — INITIAL APN SNF VISIT (OUTPATIENT)
Dept: INTERNAL MEDICINE CLINIC | Age: 83
End: 2023-09-21

## 2023-09-21 VITALS
WEIGHT: 193.88 LBS | DIASTOLIC BLOOD PRESSURE: 78 MMHG | SYSTOLIC BLOOD PRESSURE: 163 MMHG | OXYGEN SATURATION: 96 % | HEART RATE: 66 BPM | BODY MASS INDEX: 32 KG/M2 | TEMPERATURE: 98 F | RESPIRATION RATE: 18 BRPM

## 2023-09-21 DIAGNOSIS — E55.9 VITAMIN D DEFICIENCY: ICD-10-CM

## 2023-09-21 DIAGNOSIS — I25.10 CORONARY ARTERY DISEASE INVOLVING NATIVE CORONARY ARTERY OF NATIVE HEART WITHOUT ANGINA PECTORIS: ICD-10-CM
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
## 2023-09-21 DIAGNOSIS — I48.91 ATRIAL FIBRILLATION, NEW ONSET (HCC): ICD-10-CM

## 2023-09-21 DIAGNOSIS — D64.9 ANEMIA, UNSPECIFIED TYPE: ICD-10-CM

## 2023-09-21 DIAGNOSIS — E11.40 TYPE 2 DIABETES MELLITUS WITH DIABETIC NEUROPATHY, WITH LONG-TERM CURRENT USE OF INSULIN (HCC): ICD-10-CM

## 2023-09-21 DIAGNOSIS — Z79.4 TYPE 2 DIABETES MELLITUS WITH DIABETIC NEUROPATHY, WITH LONG-TERM CURRENT USE OF INSULIN (HCC): ICD-10-CM

## 2023-09-21 DIAGNOSIS — I10 ESSENTIAL HYPERTENSION: Primary | ICD-10-CM

## 2023-09-21 DIAGNOSIS — R53.1 WEAKNESS GENERALIZED: ICD-10-CM

## 2023-09-21 DIAGNOSIS — I87.8 VENOUS STASIS OF BOTH LOWER EXTREMITIES: ICD-10-CM

## 2023-09-21 PROCEDURE — 99310 SBSQ NF CARE HIGH MDM 45: CPT | Performed by: NURSE PRACTITIONER

## 2023-09-21 PROCEDURE — 1111F DSCHRG MED/CURRENT MED MERGE: CPT | Performed by: NURSE PRACTITIONER

## 2023-09-21 PROCEDURE — 1123F ACP DISCUSS/DSCN MKR DOCD: CPT | Performed by: NURSE PRACTITIONER

## 2023-09-22 ENCOUNTER — PATIENT OUTREACH (OUTPATIENT)
Dept: CASE MANAGEMENT | Age: 83
End: 2023-09-22

## 2023-09-22 NOTE — PROGRESS NOTES
Hospital follow up, first attempt. (Dc 9/20)    Madison Armas, NP-C  Specialty: [address]  [address] [address] [phone]  Patient went to [facility]  Monday 11/6 @10 - already set    Gwen Settler  Specialty: [address] [facility]  [id number] [place] [id number]  [phone]  2-3 weeks    No answer, left a voicemail with callback information.

## 2023-09-24 LAB
Q-T INTERVAL: 402 MS
QRS DURATION: 94 MS
QTC CALCULATION (BEZET): 414 MS
R AXIS: -15 DEGREES
T AXIS: 34 DEGREES
VENTRICULAR RATE: 64 BPM

## 2023-09-26 ENCOUNTER — APPOINTMENT (OUTPATIENT)
Dept: LAB | Age: 83
End: 2023-09-26
Attending: FAMILY MEDICINE
Payer: MEDICARE

## 2023-09-28 ENCOUNTER — SNF VISIT (OUTPATIENT)
Dept: INTERNAL MEDICINE CLINIC | Age: 83
End: 2023-09-28

## 2023-09-28 VITALS
DIASTOLIC BLOOD PRESSURE: 78 MMHG | WEIGHT: 197.19 LBS | SYSTOLIC BLOOD PRESSURE: 114 MMHG | OXYGEN SATURATION: 98 % | TEMPERATURE: 97 F | RESPIRATION RATE: 18 BRPM | HEART RATE: 72 BPM | BODY MASS INDEX: 33 KG/M2

## 2023-09-28 DIAGNOSIS — E11.40 TYPE 2 DIABETES MELLITUS WITH DIABETIC NEUROPATHY, WITH LONG-TERM CURRENT USE OF INSULIN (HCC): ICD-10-CM

## 2023-09-28 DIAGNOSIS — Z79.4 TYPE 2 DIABETES MELLITUS WITH DIABETIC NEUROPATHY, WITH LONG-TERM CURRENT USE OF INSULIN (HCC): ICD-10-CM

## 2023-09-28 DIAGNOSIS — M17.11 OSTEOARTHRITIS OF RIGHT KNEE, UNSPECIFIED OSTEOARTHRITIS TYPE: ICD-10-CM

## 2023-09-28 DIAGNOSIS — I10 ESSENTIAL HYPERTENSION: Primary | ICD-10-CM

## 2023-09-28 DIAGNOSIS — I87.8 VENOUS STASIS OF BOTH LOWER EXTREMITIES: ICD-10-CM

## 2023-09-28 DIAGNOSIS — Z78.9 DECREASED ACTIVITIES OF DAILY LIVING (ADL): ICD-10-CM

## 2023-09-28 DIAGNOSIS — R53.1 WEAKNESS GENERALIZED: ICD-10-CM

## 2023-09-28 DIAGNOSIS — D64.9 ANEMIA, UNSPECIFIED TYPE: ICD-10-CM

## 2023-09-28 DIAGNOSIS — I48.91 ATRIAL FIBRILLATION, NEW ONSET (HCC): ICD-10-CM

## 2023-09-28 PROCEDURE — 99309 SBSQ NF CARE MODERATE MDM 30: CPT | Performed by: NURSE PRACTITIONER

## 2023-09-28 PROCEDURE — 1111F DSCHRG MED/CURRENT MED MERGE: CPT | Performed by: NURSE PRACTITIONER

## 2023-10-04 ENCOUNTER — APPOINTMENT (OUTPATIENT)
Dept: LAB | Age: 83
End: 2023-10-04
Attending: NURSE PRACTITIONER
Payer: MEDICARE

## 2023-10-04 ENCOUNTER — SNF VISIT (OUTPATIENT)
Dept: INTERNAL MEDICINE CLINIC | Age: 83
End: 2023-10-04

## 2023-10-04 DIAGNOSIS — I10 ESSENTIAL HYPERTENSION: ICD-10-CM

## 2023-10-04 DIAGNOSIS — I87.8 VENOUS STASIS OF BOTH LOWER EXTREMITIES: ICD-10-CM

## 2023-10-04 DIAGNOSIS — I48.91 ATRIAL FIBRILLATION, NEW ONSET (HCC): Primary | ICD-10-CM

## 2023-10-04 DIAGNOSIS — Z78.9 DECREASED ACTIVITIES OF DAILY LIVING (ADL): ICD-10-CM

## 2023-10-04 DIAGNOSIS — M17.11 OSTEOARTHRITIS OF RIGHT KNEE, UNSPECIFIED OSTEOARTHRITIS TYPE: ICD-10-CM

## 2023-10-04 DIAGNOSIS — E11.40 TYPE 2 DIABETES MELLITUS WITH DIABETIC NEUROPATHY, WITH LONG-TERM CURRENT USE OF INSULIN (HCC): ICD-10-CM

## 2023-10-04 DIAGNOSIS — R53.1 WEAKNESS GENERALIZED: ICD-10-CM

## 2023-10-04 DIAGNOSIS — Z79.4 TYPE 2 DIABETES MELLITUS WITH DIABETIC NEUROPATHY, WITH LONG-TERM CURRENT USE OF INSULIN (HCC): ICD-10-CM

## 2023-10-04 DIAGNOSIS — D64.9 ANEMIA, UNSPECIFIED TYPE: ICD-10-CM

## 2023-10-04 PROCEDURE — 99309 SBSQ NF CARE MODERATE MDM 30: CPT | Performed by: NURSE PRACTITIONER

## 2023-10-04 PROCEDURE — 1111F DSCHRG MED/CURRENT MED MERGE: CPT | Performed by: NURSE PRACTITIONER

## 2023-10-05 VITALS
WEIGHT: 195.19 LBS | RESPIRATION RATE: 18 BRPM | BODY MASS INDEX: 32 KG/M2 | OXYGEN SATURATION: 97 % | HEART RATE: 62 BPM | DIASTOLIC BLOOD PRESSURE: 68 MMHG | SYSTOLIC BLOOD PRESSURE: 120 MMHG | TEMPERATURE: 98 F

## 2023-10-09 ENCOUNTER — SNF DISCHARGE (OUTPATIENT)
Dept: INTERNAL MEDICINE CLINIC | Age: 83
End: 2023-10-09

## 2023-10-09 VITALS
TEMPERATURE: 97 F | DIASTOLIC BLOOD PRESSURE: 59 MMHG | SYSTOLIC BLOOD PRESSURE: 122 MMHG | WEIGHT: 195.19 LBS | HEART RATE: 61 BPM | BODY MASS INDEX: 32 KG/M2 | RESPIRATION RATE: 19 BRPM | OXYGEN SATURATION: 100 %

## 2023-10-09 DIAGNOSIS — E11.40 TYPE 2 DIABETES MELLITUS WITH DIABETIC NEUROPATHY, WITH LONG-TERM CURRENT USE OF INSULIN (HCC): ICD-10-CM

## 2023-10-09 DIAGNOSIS — I10 ESSENTIAL HYPERTENSION: Primary | ICD-10-CM

## 2023-10-09 DIAGNOSIS — Z79.4 TYPE 2 DIABETES MELLITUS WITH DIABETIC NEUROPATHY, WITH LONG-TERM CURRENT USE OF INSULIN (HCC): ICD-10-CM

## 2023-10-09 DIAGNOSIS — I87.8 VENOUS STASIS OF BOTH LOWER EXTREMITIES: ICD-10-CM

## 2023-10-09 DIAGNOSIS — I48.91 ATRIAL FIBRILLATION, NEW ONSET (HCC): ICD-10-CM

## 2023-10-09 DIAGNOSIS — D64.9 ANEMIA, UNSPECIFIED TYPE: ICD-10-CM

## 2023-10-09 DIAGNOSIS — R53.1 WEAKNESS GENERALIZED: ICD-10-CM

## 2023-10-09 DIAGNOSIS — Z78.9 DECREASED ACTIVITIES OF DAILY LIVING (ADL): ICD-10-CM

## 2023-10-09 DIAGNOSIS — M17.11 OSTEOARTHRITIS OF RIGHT KNEE, UNSPECIFIED OSTEOARTHRITIS TYPE: ICD-10-CM

## 2024-05-07 ENCOUNTER — APPOINTMENT (OUTPATIENT)
Dept: CARDIOLOGY | Age: 84
End: 2024-05-07

## 2024-05-07 VITALS
HEIGHT: 65 IN | OXYGEN SATURATION: 98 % | SYSTOLIC BLOOD PRESSURE: 130 MMHG | DIASTOLIC BLOOD PRESSURE: 70 MMHG | WEIGHT: 199.96 LBS | BODY MASS INDEX: 33.31 KG/M2 | RESPIRATION RATE: 18 BRPM | HEART RATE: 75 BPM

## 2024-05-07 DIAGNOSIS — E78.00 PURE HYPERCHOLESTEROLEMIA: ICD-10-CM

## 2024-05-07 DIAGNOSIS — I87.2 CHRONIC VENOUS INSUFFICIENCY: ICD-10-CM

## 2024-05-07 DIAGNOSIS — I25.10 CORONARY ARTERY DISEASE INVOLVING NATIVE CORONARY ARTERY OF NATIVE HEART WITHOUT ANGINA PECTORIS: ICD-10-CM

## 2024-05-07 DIAGNOSIS — I89.0 LYMPHEDEMA OF BOTH LOWER EXTREMITIES: Primary | ICD-10-CM

## 2024-05-07 RX ORDER — ASPIRIN 81 MG/1
81 TABLET ORAL DAILY
COMMUNITY

## 2024-05-07 RX ORDER — POLYETHYLENE GLYCOL 3350 17 G/17G
17 POWDER, FOR SOLUTION ORAL DAILY PRN
COMMUNITY

## 2024-05-07 RX ORDER — INSULIN GLARGINE 100 [IU]/ML
INJECTION, SOLUTION SUBCUTANEOUS DAILY
COMMUNITY
Start: 2024-02-08

## 2024-05-07 RX ORDER — PSEUDOEPHEDRINE HCL 30 MG
100 TABLET ORAL 2 TIMES DAILY
COMMUNITY

## 2024-05-07 RX ORDER — APIXABAN 5 MG/1
5 TABLET, FILM COATED ORAL EVERY 12 HOURS SCHEDULED
COMMUNITY
Start: 2023-09-20

## 2024-05-07 RX ORDER — PEN NEEDLE, DIABETIC 32GX 5/32"
NEEDLE, DISPOSABLE MISCELLANEOUS
COMMUNITY
Start: 2023-12-15

## 2024-05-07 ASSESSMENT — PAIN SCALES - GENERAL: PAINLEVEL: 0

## 2024-07-21 ENCOUNTER — APPOINTMENT (OUTPATIENT)
Dept: GENERAL RADIOLOGY | Facility: HOSPITAL | Age: 84
End: 2024-07-21
Attending: EMERGENCY MEDICINE
Payer: MEDICARE

## 2024-07-21 ENCOUNTER — APPOINTMENT (OUTPATIENT)
Dept: CT IMAGING | Facility: HOSPITAL | Age: 84
End: 2024-07-21
Attending: PHYSICIAN ASSISTANT
Payer: MEDICARE

## 2024-07-21 ENCOUNTER — APPOINTMENT (OUTPATIENT)
Dept: CT IMAGING | Facility: HOSPITAL | Age: 84
End: 2024-07-21
Attending: EMERGENCY MEDICINE
Payer: MEDICARE

## 2024-07-21 ENCOUNTER — ANESTHESIA EVENT (OUTPATIENT)
Dept: EMERGENCY DEPT | Facility: HOSPITAL | Age: 84
End: 2024-07-21
Payer: MEDICARE

## 2024-07-21 ENCOUNTER — ANESTHESIA (OUTPATIENT)
Dept: EMERGENCY DEPT | Facility: HOSPITAL | Age: 84
End: 2024-07-21
Payer: MEDICARE

## 2024-07-21 ENCOUNTER — HOSPITAL ENCOUNTER (INPATIENT)
Facility: HOSPITAL | Age: 84
LOS: 3 days | Discharge: NURSING, REHAB OR LONG-TERM CARE | End: 2024-07-24
Attending: EMERGENCY MEDICINE | Admitting: HOSPITALIST
Payer: MEDICARE

## 2024-07-21 ENCOUNTER — ANESTHESIA EVENT (OUTPATIENT)
Dept: SURGERY | Facility: HOSPITAL | Age: 84
End: 2024-07-21
Payer: MEDICARE

## 2024-07-21 DIAGNOSIS — S06.5XAA SDH (SUBDURAL HEMATOMA) (HCC): Primary | ICD-10-CM

## 2024-07-21 DIAGNOSIS — S72.001A CLOSED FRACTURE OF RIGHT HIP, INITIAL ENCOUNTER (HCC): ICD-10-CM

## 2024-07-21 DIAGNOSIS — T14.8XXA ABRASION: ICD-10-CM

## 2024-07-21 PROBLEM — I10 PRIMARY HYPERTENSION: Status: ACTIVE | Noted: 2017-04-11

## 2024-07-21 PROBLEM — I48.0 PAROXYSMAL ATRIAL FIBRILLATION (HCC): Status: ACTIVE | Noted: 2023-09-18

## 2024-07-21 PROBLEM — E11.65 TYPE 2 DIABETES MELLITUS WITH HYPERGLYCEMIA (HCC): Status: ACTIVE | Noted: 2024-07-21

## 2024-07-21 LAB
ALBUMIN SERPL-MCNC: 4 G/DL (ref 3.2–4.8)
ALBUMIN/GLOB SERPL: 1.6 {RATIO} (ref 1–2)
ALP LIVER SERPL-CCNC: 69 U/L
ALT SERPL-CCNC: 19 U/L
ANION GAP SERPL CALC-SCNC: 8 MMOL/L (ref 0–18)
ANTIBODY SCREEN: NEGATIVE
APTT PPP: 29.8 SECONDS (ref 23–36)
AST SERPL-CCNC: 18 U/L (ref ?–34)
BASOPHILS # BLD AUTO: 0.04 X10(3) UL (ref 0–0.2)
BASOPHILS NFR BLD AUTO: 0.6 %
BILIRUB SERPL-MCNC: 0.4 MG/DL (ref 0.2–1.1)
BUN BLD-MCNC: 18 MG/DL (ref 9–23)
CALCIUM BLD-MCNC: 9.5 MG/DL (ref 8.7–10.4)
CHLORIDE SERPL-SCNC: 108 MMOL/L (ref 98–112)
CO2 SERPL-SCNC: 22 MMOL/L (ref 21–32)
CREAT BLD-MCNC: 0.87 MG/DL
EGFRCR SERPLBLD CKD-EPI 2021: 66 ML/MIN/1.73M2 (ref 60–?)
EOSINOPHIL # BLD AUTO: 0.26 X10(3) UL (ref 0–0.7)
EOSINOPHIL NFR BLD AUTO: 3.8 %
ERYTHROCYTE [DISTWIDTH] IN BLOOD BY AUTOMATED COUNT: 13.2 %
EST. AVERAGE GLUCOSE BLD GHB EST-MCNC: 192 MG/DL (ref 68–126)
GLOBULIN PLAS-MCNC: 2.5 G/DL (ref 2.8–4.4)
GLUCOSE BLD-MCNC: 174 MG/DL (ref 70–99)
GLUCOSE BLD-MCNC: 221 MG/DL (ref 70–99)
HBA1C MFR BLD: 8.3 % (ref ?–5.7)
HCT VFR BLD AUTO: 33.9 %
HGB BLD-MCNC: 11.4 G/DL
IMM GRANULOCYTES # BLD AUTO: 0.1 X10(3) UL (ref 0–1)
IMM GRANULOCYTES NFR BLD: 1.5 %
INR BLD: 1.16 (ref 0.8–1.2)
LYMPHOCYTES # BLD AUTO: 2.37 X10(3) UL (ref 1–4)
LYMPHOCYTES NFR BLD AUTO: 34.6 %
MCH RBC QN AUTO: 29.4 PG (ref 26–34)
MCHC RBC AUTO-ENTMCNC: 33.6 G/DL (ref 31–37)
MCV RBC AUTO: 87.4 FL
MONOCYTES # BLD AUTO: 0.43 X10(3) UL (ref 0.1–1)
MONOCYTES NFR BLD AUTO: 6.3 %
MRSA DNA SPEC QL NAA+PROBE: NEGATIVE
NEUTROPHILS # BLD AUTO: 3.65 X10 (3) UL (ref 1.5–7.7)
NEUTROPHILS # BLD AUTO: 3.65 X10(3) UL (ref 1.5–7.7)
NEUTROPHILS NFR BLD AUTO: 53.2 %
OSMOLALITY SERPL CALC.SUM OF ELEC: 292 MOSM/KG (ref 275–295)
PLATELET # BLD AUTO: 219 10(3)UL (ref 150–450)
POTASSIUM SERPL-SCNC: 4.7 MMOL/L (ref 3.5–5.1)
PROT SERPL-MCNC: 6.5 G/DL (ref 5.7–8.2)
PROTHROMBIN TIME: 14.8 SECONDS (ref 11.6–14.8)
RBC # BLD AUTO: 3.88 X10(6)UL
RH BLOOD TYPE: POSITIVE
SARS-COV-2 RNA RESP QL NAA+PROBE: NOT DETECTED
SODIUM SERPL-SCNC: 138 MMOL/L (ref 136–145)
TROPONIN I SERPL HS-MCNC: 9 NG/L
WBC # BLD AUTO: 6.9 X10(3) UL (ref 4–11)

## 2024-07-21 PROCEDURE — 3E0T3BZ INTRODUCTION OF ANESTHETIC AGENT INTO PERIPHERAL NERVES AND PLEXI, PERCUTANEOUS APPROACH: ICD-10-PCS | Performed by: ANESTHESIOLOGY

## 2024-07-21 PROCEDURE — 72125 CT NECK SPINE W/O DYE: CPT | Performed by: EMERGENCY MEDICINE

## 2024-07-21 PROCEDURE — 70450 CT HEAD/BRAIN W/O DYE: CPT | Performed by: EMERGENCY MEDICINE

## 2024-07-21 PROCEDURE — 99291 CRITICAL CARE FIRST HOUR: CPT | Performed by: STUDENT IN AN ORGANIZED HEALTH CARE EDUCATION/TRAINING PROGRAM

## 2024-07-21 PROCEDURE — 76942 ECHO GUIDE FOR BIOPSY: CPT | Performed by: ANESTHESIOLOGY

## 2024-07-21 PROCEDURE — 99291 CRITICAL CARE FIRST HOUR: CPT | Performed by: OTHER

## 2024-07-21 PROCEDURE — 73552 X-RAY EXAM OF FEMUR 2/>: CPT | Performed by: EMERGENCY MEDICINE

## 2024-07-21 PROCEDURE — 73502 X-RAY EXAM HIP UNI 2-3 VIEWS: CPT | Performed by: EMERGENCY MEDICINE

## 2024-07-21 PROCEDURE — 71045 X-RAY EXAM CHEST 1 VIEW: CPT | Performed by: EMERGENCY MEDICINE

## 2024-07-21 PROCEDURE — 70486 CT MAXILLOFACIAL W/O DYE: CPT | Performed by: EMERGENCY MEDICINE

## 2024-07-21 PROCEDURE — 70450 CT HEAD/BRAIN W/O DYE: CPT | Performed by: PHYSICIAN ASSISTANT

## 2024-07-21 PROCEDURE — 73130 X-RAY EXAM OF HAND: CPT | Performed by: EMERGENCY MEDICINE

## 2024-07-21 RX ORDER — MORPHINE SULFATE 4 MG/ML
4 INJECTION, SOLUTION INTRAMUSCULAR; INTRAVENOUS ONCE
Status: COMPLETED | OUTPATIENT
Start: 2024-07-21 | End: 2024-07-21

## 2024-07-21 RX ORDER — ONDANSETRON 2 MG/ML
4 INJECTION INTRAMUSCULAR; INTRAVENOUS ONCE
Status: COMPLETED | OUTPATIENT
Start: 2024-07-21 | End: 2024-07-21

## 2024-07-21 RX ORDER — LIDOCAINE HYDROCHLORIDE 10 MG/ML
INJECTION, SOLUTION EPIDURAL; INFILTRATION; INTRACAUDAL; PERINEURAL AS NEEDED
Status: DISCONTINUED | OUTPATIENT
Start: 2024-07-21 | End: 2024-07-21 | Stop reason: SURG

## 2024-07-21 RX ORDER — SODIUM CHLORIDE 9 MG/ML
INJECTION, SOLUTION INTRAVENOUS CONTINUOUS
Status: DISCONTINUED | OUTPATIENT
Start: 2024-07-21 | End: 2024-07-22

## 2024-07-21 RX ORDER — NICOTINE POLACRILEX 4 MG
30 LOZENGE BUCCAL
Status: DISCONTINUED | OUTPATIENT
Start: 2024-07-21 | End: 2024-07-24

## 2024-07-21 RX ORDER — TRAMADOL HYDROCHLORIDE 50 MG/1
50 TABLET ORAL EVERY 6 HOURS PRN
Status: DISCONTINUED | OUTPATIENT
Start: 2024-07-21 | End: 2024-07-24

## 2024-07-21 RX ORDER — METOCLOPRAMIDE HYDROCHLORIDE 5 MG/ML
10 INJECTION INTRAMUSCULAR; INTRAVENOUS EVERY 8 HOURS PRN
Status: DISCONTINUED | OUTPATIENT
Start: 2024-07-21 | End: 2024-07-24

## 2024-07-21 RX ORDER — LABETALOL HYDROCHLORIDE 5 MG/ML
10 INJECTION, SOLUTION INTRAVENOUS EVERY 10 MIN PRN
Status: COMPLETED | OUTPATIENT
Start: 2024-07-21 | End: 2024-07-22

## 2024-07-21 RX ORDER — HYDRALAZINE HYDROCHLORIDE 20 MG/ML
10 INJECTION INTRAMUSCULAR; INTRAVENOUS EVERY 2 HOUR PRN
Status: DISCONTINUED | OUTPATIENT
Start: 2024-07-21 | End: 2024-07-24

## 2024-07-21 RX ORDER — LABETALOL HYDROCHLORIDE 5 MG/ML
10 INJECTION, SOLUTION INTRAVENOUS EVERY 10 MIN PRN
Status: COMPLETED | OUTPATIENT
Start: 2024-07-21 | End: 2024-07-21

## 2024-07-21 RX ORDER — LEVETIRACETAM 500 MG/5ML
500 INJECTION, SOLUTION, CONCENTRATE INTRAVENOUS ONCE
Status: COMPLETED | OUTPATIENT
Start: 2024-07-21 | End: 2024-07-21

## 2024-07-21 RX ORDER — DEXTROSE MONOHYDRATE 25 G/50ML
50 INJECTION, SOLUTION INTRAVENOUS
Status: DISCONTINUED | OUTPATIENT
Start: 2024-07-21 | End: 2024-07-24

## 2024-07-21 RX ORDER — ONDANSETRON 2 MG/ML
4 INJECTION INTRAMUSCULAR; INTRAVENOUS EVERY 6 HOURS PRN
Status: DISCONTINUED | OUTPATIENT
Start: 2024-07-21 | End: 2024-07-24

## 2024-07-21 RX ORDER — ROPIVACAINE HYDROCHLORIDE 5 MG/ML
INJECTION, SOLUTION EPIDURAL; INFILTRATION; PERINEURAL AS NEEDED
Status: DISCONTINUED | OUTPATIENT
Start: 2024-07-21 | End: 2024-07-21 | Stop reason: SURG

## 2024-07-21 RX ORDER — NICOTINE POLACRILEX 4 MG
15 LOZENGE BUCCAL
Status: DISCONTINUED | OUTPATIENT
Start: 2024-07-21 | End: 2024-07-24

## 2024-07-21 RX ADMIN — ROPIVACAINE HYDROCHLORIDE 25 ML: 5 INJECTION, SOLUTION EPIDURAL; INFILTRATION; PERINEURAL at 14:07:00

## 2024-07-21 RX ADMIN — LIDOCAINE HYDROCHLORIDE 3 ML: 10 INJECTION, SOLUTION EPIDURAL; INFILTRATION; INTRACAUDAL; PERINEURAL at 14:04:00

## 2024-07-21 NOTE — CONSULTS
[facility] Neurosurgery Consult    Evangelina Ward Patient Status:  Emergency    1940 MRN [id number]   [facility] EMERGENCY DEPARTMENT Attending Melody Zabala DO   Hosp Day # 0 PCP Dorina Higuera MD     REASON FOR CONSULTATION: S/p fall, SDH    HISTORY OF PRESENT ILLNESS:  Evangelina Ward is a(n) 84 year old female with a pertinent past medical history of vertigo, diabetes with neuropathy, spinal stenosis of the lumbar region, hypertension, hyperlipidemia, CAD, a fib on Eliquis.     Patient arrived to the ED status post fall this morning.  Fall after getting out of bed.  CT head with subdural hematoma along the right tentorium measuring approximately 1.5 cm in thickness.  Neurosurgery was consulted.  CT cervical spine negative for acute fracture. Last took Eliquis yesterday evening.     Family at bedside.  Patient endorses no headaches.  She endorses soreness to the right aspect of her head where she has a contusion with associated swelling and bruising.  She endorses no memory changes, speech issues, visual complaints.  She has baseline neuropathy.  She states this contributes to baseline weakness of her feet.  No new numbness, tingling or weakness.  She endorses hip pain secondary to her fall, right.      PAST MEDICAL HISTORY:  Past Medical History:    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 elsewhere classified    idiopathic angioedema    Back problem    sciatica    Carpal tunnel syndrome    CORONARY ARTERY DISEASE    : Ant WMA on stress, : PTCA to LAD,   Nuclear stress neg, EF 62%    CORONARY ARTERY DISEASE    : ant ischemia on stress,   : PTCA to distal LAD,     nuclear stress neg, EF 62%    Coronary atherosclerosis    Diabetes (HCC)    High blood pressure    High cholesterol    Hx of motion sickness    vertigo    HYPERTENSION    MENOPAUSE    ERT [id number]    Muscle weakness    LEGS SHAKE WHEN STANDING OR USING STAIRS    OSTEOARTHRITIS     Rt menisectomy    Osteoarthritis     Overweight and obesity    Personal history of colonic polyps    2/99 adenoma, 4/03 negative    Type 2 diabetes mellitus, uncontrolled    Uncontrolled type 2 diabetes mellitus with diabetic polyneuropathy, with long-term current use of insulin    Unspecified hereditary and idiopathic peripheral neuropathy    Vertigo    Vitamin D deficiency       PAST SURGICAL HISTORY:  Past Surgical History:   Procedure Laterality Date    Anesth,knee arthroscopy      left knee repair torn meniscus    Angioplasty (coronary)  2006    w/2 drug eluting stents    Arthroscopy of joint unlisted Right     KNEE    Back surgery      L4-L5 TLIF    Cataract  05/2018    Bilateral    Cath drug eluting stent      X2    Cholecystectomy  1997    Colonoscopy  10/10/2013    Procedure: COLONOSCOPY;  Surgeon: Lauro Lazo MD;  Location:  ENDOSCOPY    Colonoscopy,biopsy  1999    adenomatous polyp    Colonoscopy,diagnostic  2003    wnl    Colonoscopy,diagnostic  10/10/13    normal    Endovenous laser vein addon      Right Greater Saph V  per Dr. Castaneda    Hysterectomy      w/ BSO    Knee replacement surgery  03/17/2018    left    Other      lipoma removed from left arm    Other      trigger finger release left hand middle finger    Other surgical history      vein stripping    Revise median n/carpal tunnel surg      left carpal tunnel release       FAMILY HISTORY:  family history includes Diabetes in her paternal grandmother; Heart Disorder in her father; colon cancer in her mother; gastric cancer in her maternal grandmother.    SOCIAL HISTORY:   reports that she has never smoked. She has never used smokeless tobacco. She reports that she does not currently use alcohol. She reports that she does not use drugs.    ALLERGIES:  Allergies   Allergen Reactions    Cipro [Ciprofloxacin] HIVES    Insulin Aspart, Human Analog PAIN     Achiness. HA , head pains    Insulin Lispro, Human PAIN     Achiness, HA, head pain    Tylenol [Acetaminophen] SWELLING      Tongue/mouth       MEDICATIONS:  (Not in a hospital admission)    No current facility-administered medications for this encounter.       REVIEW OF SYSTEMS:  Comprehensive Review of Systems obtained, and is negative other than that mentioned in the History of Present Illness.      PHYSICAL EXAMINATION:  VITAL SIGNS: BP (!) 172/72   Pulse 68   Temp 97.6 °F (36.4 °C) (Temporal)   Resp 15   Ht 65\"   Wt 199 lb (90.3 kg)   LMP  (LMP Unknown)   SpO2 99%   BMI 33.12 kg/m²   GENERAL:  Pleasant patient is a 84 year old female in no acute distress.  Lying comfortably in bed  HEENT: Edema to the right aspect of the face with ecchymosis  RESP: Easy and even   NEUROLOGICAL:  This patient is alert and orientated x 3.  Speech fluent. Comprehension intact.   Answers questions appropriately.  Easily follows commands.  EOMs intact.  Face appears symmetric.  Tongue midline.  Negative drift.  Finger-nose intact.  Able to shrug shoulders.    5/5 strength of bilateral upper extremities, proximally and distally  Unable to assess right proximal lower extremity as patient declined examination, secondary to right hip pain from the fall.  Left IP 5/5.  Bilateral dorsiflexion and plantarflexion 4+/5.  Patient endorses neuropathy with baseline weakness.    DIAGNOSTIC DATA:   Lab Results   Component Value Date    WBC 6.9 07/21/2024    HGB 11.4 07/21/2024    HCT 33.9 07/21/2024    .0 07/21/2024    CREATSERUM 0.87 07/21/2024    BUN 18 07/21/2024     07/21/2024    K 4.7 07/21/2024     07/21/2024    CO2 22.0 07/21/2024     07/21/2024    CA 9.5 07/21/2024    ALB 4.0 07/21/2024    ALKPHO 69 07/21/2024    BILT 0.4 07/21/2024    TP 6.5 07/21/2024    AST 18 07/21/2024    ALT 19 07/21/2024    PTT 29.8 07/21/2024    INR 1.16 07/21/2024    PTP 14.8 07/21/2024       IMAGING:    Study Result    Narrative   PROCEDURE:  CT BRAIN OR HEAD (18340)     COMPARISON:  [facility] , CT, CT BRAIN OR HEAD (57487), 9/18/2023, 10:46 PM.      INDICATIONS:  Fall, head injury     TECHNIQUE:  Noncontrast CT scanning is performed through the brain. Dose reduction techniques were used. Dose information is transmitted to the ACR (American College of Radiology) NRDR (National Radiology Data Registry) which includes the Dose Index  Registry.     PATIENT STATED HISTORY: (As transcribed by Technologist)  Patient fell this AM. Right sided head and face trauma.         FINDINGS:    There is cerebral atrophy with symmetric prominence of the ventricles. There are patchy areas of low attenuation in the periventricular and deep white matter which are nonspecific but most consistent with small vessel ischemic changes.     There is attenuation epidural/subdural hematoma along the right tentorium which measures approximately 1.5 centimeters in thickness (overall dimensions approximately 1.5 x 3.6 x 1.1 cm).  There is localized mass effect with mild effacement of the  adjacent right temporal lobe sulci and mild hypoattenuation within the adjacent right temporal lobe, which may reflect edema.  No midline shift.  Basilar cisterns are patent.  The ventricles are normal in caliber.     The visualized paranasal sinuses show no significant findings. No evidence of depressed skull fracture.  There are small scalp hematomas overlying the right frontal calvarium and lateral to the right orbit.                   Impression   CONCLUSION:  1. Epidural/subdural hematoma along the right tentorium measuring approximately 1.5 centimeters in thickness, as described.  Small scalp hematomas around the right periorbital region.  2. Cerebral atrophy with chronic microvascular ischemic changes.     COMMUNICATION:  The findings were communicated with Dr. Zabala at 0856 on 7/21/2024. There was appropriate read back.        LOCATION:  [facility]        Dictated by (CST): Bebeto Harris MD on 7/21/2024 at 8:44 AM      Finalized by (CST): Bebeto Harris MD on 7/21/2024 at 8:57 AM         Study  Result    Narrative   PROCEDURE:  CT SPINE CERVICAL (CPT=72125)     COMPARISON:  [facility] , CT, CT SPINE CERVICAL (CPT=72125), 9/18/2023, 11:04 PM.     INDICATIONS:  Fall head injury     TECHNIQUE:  Noncontrast CT scanning of the cervical spine is performed from the skull base through C7.  Multiplanar reconstructions are generated.  Dose reduction techniques were used. Dose information is transmitted to the ACR (American College of  Radiology) NRDR (National Radiology Data Registry) which includes the Dose Index Registry.     PATIENT STATED HISTORY: (As transcribed by Technologist)  Patient fell this AM. Right sided head and face trauma.         FINDINGS:    The cervical spine shows no evidence of fracture.  The cervical vertebral alignment demonstrates no subluxation. Ligamentous injury cannot be excluded on CT scan. There are normal cervical basilar relationships. The odontoid process is intact. No  destructive osseous lesion is identified. The pre vertebral and luis vertebral soft tissues are normal.  Multilevel degenerative changes with disc osteophyte formation, ligamentous calcification, overall similar to prior.  No high grade canal or neural  foraminal stenosis.                   Impression   CONCLUSION:  No acute cervical spine fracture.  Multilevel degenerative changes, similar to prior.           LOCATION:  [facility]        Dictated by (CST): Bebeto Harris MD on 7/21/2024 at 8:59 AM      Finalized by (CST): Bebeto Harris MD on 7/21/2024 at 9:02 AM     ASSESSMENT:  Patient Active Problem List   Diagnosis    Vertigo, benign paroxysmal    Abnormality of gait    Type 2 diabetes mellitus with diabetic neuropathy, with long-term current use of insulin (HCC)    CAD (coronary artery disease)    Obesity (BMI 30-39.9)    Lumbar radiculopathy    Lumbar facet arthropathy    Sacroiliitis, not elsewhere classified (HCC)    Long-term insulin use (HCC)    Benign positional vertigo    Vitamin D deficiency    Uncontrolled  type 2 diabetes mellitus with diabetic polyneuropathy, with long-term current use of insulin    Urinary incontinence    Essential hypertension    Microalbuminuria due to type 2 diabetes mellitus (HCC)    Hyperlipidemia, unspecified hyperlipidemia type    Spinal stenosis of lumbar region without neurogenic claudication    Depression, unspecified depression type    Lymphedema    Encounter for preprocedure screening laboratory testing for COVID-19    Primary osteoarthritis of left shoulder    Atrial fibrillation, new onset (HCC)    Weakness generalized    Fall, initial encounter    Injury of head, initial encounter    SDH (subdural hematoma) (HCC)     84-year-old female with a pertinent medical history of CAD and A-fib on Eliquis who presents to the ED status post fall, CT head with subdural hematoma.  Recommend holding Eliquis and monitoring subdural hematoma with a short interval head CT.    Plan:  Recommend short interval repeat head CT.  Recommend holding Eliquis, instead of reversal at this time.  Further recommendations on Eliquis pending repeat head CT  Medical management per hospitalist and neurocritical care  No acute neurosurgical intervention recommend time  Discussed with Dr. Quesada    Patient was seen and examined.  Patient agreed to the plan, verbalized understanding was very appreciative.      Grisel Delgado M.S., PA-C  [address], [address]  [place] [address]  [phone]  7/21/2024 9:51 AM    Dragon speech recognition software was used to prepare this note. If a word or phrase is confusing, it is likely due to a failure of recognition. Please contact me with any questions or clarifications.    Is this a shared or split note between Advanced Practice Provider and Physician? Yes     Consent (Marginal Mandibular)/Introductory Paragraph: The rationale for Mohs was explained to the patient and consent was obtained. The risks, benefits and alternatives to therapy were discussed in detail. Specifically, the risks of damage to the marginal mandibular branch of the facial nerve, infection, scarring, bleeding, prolonged wound healing, incomplete removal, allergy to anesthesia, and recurrence were addressed. Prior to the procedure, the treatment site was clearly identified and confirmed by the patient. All components of Universal Protocol/PAUSE Rule completed.

## 2024-07-21 NOTE — ED PROVIDER NOTES
Patient Seen in: [facility] Emergency Department      History     Chief Complaint   Patient presents with    Fall     Stated Complaint:     Subjective:   HPI  Patient is a 84-year-old female with a history of A-fib on Eliquis, CAD, diabetes, hypertension, hyperlipidemia, arthritis who presents  to the ED for evaluation via EMS from home after fall around 7 AM.  Patient states that she was getting out of bed and when she stood up to go to the bathroom she felt nauseous and lightheaded.  She tried to walk to the bathroom using her walker and then lost her balance falling to the right side hitting her head.  She denies any LOC.  She is not minimal ambulate after this.  She denies any preceding chest pain, shortness of breath.  Patient has pain in her head, right hip and left hand.  Unknown last tetanus.    Objective:   Past Medical History:    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 elsewhere classified    idiopathic angioedema    Back problem    sciatica    Carpal tunnel syndrome    CORONARY ARTERY DISEASE    11/06: Ant WMA on stress, 12/06: PTCA to LAD,  12/08 Nuclear stress neg, EF 62%    CORONARY ARTERY DISEASE    11/06: ant ischemia on stress,   12/06: PTCA to distal LAD,    12/08 nuclear stress neg, EF 62%    Coronary atherosclerosis    Diabetes (HCC)    High blood pressure    High cholesterol    Hx of motion sickness    vertigo    HYPERTENSION    MENOPAUSE    ERT [id number]    Muscle weakness    LEGS SHAKE WHEN STANDING OR USING STAIRS    OSTEOARTHRITIS    1/06 Rt menisectomy    Osteoarthritis    Overweight and obesity    Personal history of colonic polyps    2/99 adenoma, 4/03 negative    Type 2 diabetes mellitus, uncontrolled    Uncontrolled type 2 diabetes mellitus with diabetic polyneuropathy, with long-term current use of insulin    Unspecified hereditary and idiopathic peripheral neuropathy    Vertigo    Vitamin D deficiency              Past Surgical History:   Procedure Laterality Date    Anesth,knee arthroscopy       left knee repair torn meniscus    Angioplasty (coronary)  2006    w/2 drug eluting stents    Arthroscopy of joint unlisted Right     KNEE    Back surgery      L4-L5 TLIF    Cataract  05/2018    Bilateral    Cath drug eluting stent      X2    Cholecystectomy  1997    Colonoscopy  10/10/2013    Procedure: COLONOSCOPY;  Surgeon: Lauro Lazo MD;  Location:  ENDOSCOPY    Colonoscopy,biopsy  1999    adenomatous polyp    Colonoscopy,diagnostic  2003    wnl    Colonoscopy,diagnostic  10/10/13    normal    Endovenous laser vein addon      Right Greater Saph V  per Dr. Castaneda    Hysterectomy      w/ BSO    Knee replacement surgery  03/17/2018    left    Other      lipoma removed from left arm    Other      trigger finger release left hand middle finger    Other surgical history      vein stripping    Revise median n/carpal tunnel surg      left carpal tunnel release                Social History     Socioeconomic History    Marital status:    Tobacco Use    Smoking status: Never    Smokeless tobacco: Never   Vaping Use    Vaping status: Never Used   Substance and Sexual Activity    Alcohol use: Not Currently     Comment: Maricruz and New Years    Drug use: Never    Sexual activity: Not Currently     Partners: Male   Other Topics Concern    Caffeine Concern Yes     Comment: 2 cups a day    Exercise No    Seat Belt Yes   Social History Narrative        Health Mnt:    Pap: n/a (SARA/BSO)    Mamm: 6/08, 6/09, 7/10    Breast exam: 6/09, 5/10    DEXA: 6/08 wnl    Cholesterol: flow sheet/statin rx.    Colon: 10/08 (\"5-7 yrs\")    H/O:         calcium: takes    exercise: good, Son in [place] is working over internet as her      Social Determinants of Health     Physical Activity: Inactive (1/20/2021)    [facility]    Exercise Vital Sign     Days of Exercise per Week: 0 days     Minutes of Exercise per Session: 0 min              Review of  Systems    Positive for stated Chief Complaint: Fall    Other systems are as noted in HPI.  Constitutional and vital signs reviewed.      All other systems reviewed and negative except as noted above.    Physical Exam     ED Triage Vitals   BP 07/21/24 0807 (!) 180/74   Pulse 07/21/24 0756 65   Resp 07/21/24 0756 16   Temp 07/21/24 0756 97.6 °F (36.4 °C)   Temp src 07/21/24 0756 Temporal   SpO2 07/21/24 0756 99 %   O2 Device 07/21/24 0756 None (Room air)       Current Vitals:   Vital Signs  BP: (!) 161/83  Pulse: 78  Resp: 18  Temp: 97.4 °F (36.3 °C)  Temp src: Temporal  MAP (mmHg): 87    Oxygen Therapy  SpO2: 99 %  O2 Device: None (Room air)  Pulse Oximetry Type: Continuous  Oximetry Probe Site Changed: Yes  Pulse Ox Probe Location: Left hand            Physical Exam  Vitals and nursing note reviewed.   Constitutional:       General: She is not in acute distress.     Appearance: She is not ill-appearing.   HENT:      Head: Normocephalic and atraumatic.      Mouth/Throat:      Mouth: Mucous membranes are moist.   Eyes:      Extraocular Movements: Extraocular movements intact.      Pupils: Pupils are equal, round, and reactive to light.   Cardiovascular:      Rate and Rhythm: Normal rate and regular rhythm.   Pulmonary:      Effort: Pulmonary effort is normal.   Abdominal:      General: There is no distension.      Palpations: Abdomen is soft.      Tenderness: There is no abdominal tenderness.   Musculoskeletal:      Cervical back: Neck supple.      Right lower leg: No edema.      Left lower leg: No edema.      Comments: Abrasion to dorsal, medial L hand with mild TTP  RLE externally rotated and shortened  Pt unable to lift RLE off bed  Distally NVI   Skin:     General: Skin is warm and dry.      Capillary Refill: Capillary refill takes less than 2 seconds.   Neurological:      General: No focal deficit present.      Mental Status: She is alert and oriented to person, place, and time.      Comments: RLE strength  limited due to R hip pain, but otherwise normal sensation with no FND   Psychiatric:         Mood and Affect: Mood normal.           ED Course     Labs Reviewed   COMP METABOLIC PANEL (14) - Abnormal; Notable for the following components:       Result Value    Glucose 174 (*)     Globulin  2.5 (*)     All other components within normal limits   CBC W/ DIFFERENTIAL - Abnormal; Notable for the following components:    HGB 11.4 (*)     HCT 33.9 (*)     All other components within normal limits   PROTHROMBIN TIME (PT) - Normal   PTT, ACTIVATED - Normal   TROPONIN I HIGH SENSITIVITY - Normal   RAPID SARS-COV-2 BY PCR - Normal   ED/MRSA SCREEN BY PCR-CC - Normal   CBC WITH DIFFERENTIAL WITH PLATELET    Narrative:     The following orders were created for panel order CBC With Differential With Platelet.  Procedure                               Abnormality         Status                     ---------                               -----------         ------                     CBC W/ DIFFERENTIAL[[id number]]          Abnormal            Final result                 Please view results for these tests on the individual orders.   TYPE AND SCREEN    Narrative:     The following orders were created for panel order Type and screen.  Procedure                               Abnormality         Status                     ---------                               -----------         ------                     ABORH (Blood Type)[[id number]]                               Final result               Antibody Screen[[id number]]                                  Final result                 Please view results for these tests on the individual orders.   ABORH (BLOOD TYPE)   ANTIBODY SCREEN   RAINBOW DRAW LAVENDER   RAINBOW DRAW LIGHT GREEN   RAINBOW DRAW BLUE   RAINBOW DRAW GOLD     EKG    Rate, intervals and axes as noted on EKG Report.  Rate: 65  Rhythm: Sinus Rhythm  Reading: NSR with ventricular rate of 65, no acute ST changes, normal  intervals                  MDM      Patient is a 84-year-old female with a history of A-fib on Eliquis, CAD, diabetes, hypertension, hyperlipidemia, arthritis who presents  to the ED for evaluation via EMS from home after fall around 7 AM. Pt hit head, denies LOC. Pt c/o R hip pain, headache and L hand pain. She is alert and oriented. RLE strength limited due to pain in R hip, but otherwise no obvious neurodeficits. Will obtain labs, CTH, CT cervical spine and face. Pt given fentanyl and zofran en route with EMS. Differential diagnosis considered includes but not limited to ICH, fracture, concussion, contusion.    ED Course as of 07/21/24 1602  ------------------------------------------------------------  Time: 07/21 0900  Comment: Discussed with radiology. Pt has R sided ICH (per radiology, epidural hematoma vs. SDH with localized shift, no midline shift, 1.5 cm). Paged Neurosurgery.  ------------------------------------------------------------  Time: 07/21 0909  Comment: CT cervical spine neg. C collar removed  ------------------------------------------------------------  Time: 07/21 0921  Comment: Discussed with NSGY. Recommends holding eliquis. CT appears to be a SDH. No reversal at this time. Will admit to Neuro ICU for repeat CTH and Q1H neurochecks.  ------------------------------------------------------------  Time: 07/21 1210  Comment: Discussed with neuro ICU intensivist. Pt admitted to NCCU. I also discussed with [facility] hospitalist and ortho.     XR shows R intertrochanteric fracture. Pt is NVI. Per ortho, keep NPO at midnight. Pt needs neuro clearance prior to surgery.     CBC, CMP unremarkable. Trop neg. Pt admitted to neuro ICU. She remained neurologically intact with no changes in the ED. BP improved with pain meds.            Medical Decision Making    Critical Care Time:   A total of 50 minutes of critical care time (exclusive of billable procedures) was administered to manage the patient's critical  imaging findings and neurologic instability due to her SDH.  This involved direct patient intervention, complex decision making, and/or extensive discussions with the patient, family, and clinical staff.      Disposition and Plan     Clinical Impression:  1. SDH (subdural hematoma) (HCC)    2. Closed fracture of right hip, initial encounter ([facility])    3. Abrasion         Disposition:  There is no disposition on file for this visit.  There is no disposition time on file for this visit.    Follow-up:  No follow-up provider specified.        Medications Prescribed:  Current Discharge Medication List

## 2024-07-21 NOTE — H&P
[facility] H&P       CC:   Chief Complaint   Patient presents with    Fall        PCP: Dorina Higuera MD    History of Present Illness:  Patient is a 84-year-old female with significant past medical history of type 2 diabetes, atherosclerosis of the aorta, hypertension, hyperlipidemia, paroxysmal A-fib on Eliquis presented after mechanical fall on the right side with head injury as well as right hip injury.  Patient is is currently alert and oriented x 4 stated that she usually uses a walker to get around, she decided to do this at 7 AM this morning try to get up from her bed, denies any lightness dizziness syncope however stated that she fell and hit the dresser and the right side of her hip, called for help for her  who came and called 911.  She currently denies any headaches, did state that she had some nausea when she was being moved around no changes in vision no weakness no numbness no tingling no dysarthria dysphagia.  Pain is relatively well-controlled, last time she took the Eliquis was last night at 10 PM      In the emergency room vital signs were stable, blood pressure was 148/68  She had an EKG that showed normal sinus rhythm, CT head that showed an epidural as well as a subdural hematoma along the right tentorium measuring 1.5 cm in thickness, small scalp hematomas CT cervical spine is negative for any fractures CT facial bones was negative for any fractures chest x-ray was negative x-ray of the right femur showed mildly displaced intertrochanteric femoral fracture x-ray of the hand was negative for any fracture BMP was unremarkable, CBC was also unremarkable except for hemoglobin 11.4      Given the subdural hematoma, neurosurgery and neurocritical care were consulted and patient was admitted to see an CNICU      Repeat CT head was done that showed an enlarging subdural hematoma along the right tentorium  [facility]  Past Medical History:    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 elsewhere classified     idiopathic angioedema    Back problem    sciatica    Carpal tunnel syndrome    CORONARY ARTERY DISEASE    11/06: Ant WMA on stress, 12/06: PTCA to LAD,  12/08 Nuclear stress neg, EF 62%    CORONARY ARTERY DISEASE    11/06: ant ischemia on stress,   12/06: PTCA to distal LAD,    12/08 nuclear stress neg, EF 62%    Coronary atherosclerosis    Diabetes (HCC)    High blood pressure    High cholesterol    Hx of motion sickness    vertigo    HYPERTENSION    MENOPAUSE    ERT [id number]    Muscle weakness    LEGS SHAKE WHEN STANDING OR USING STAIRS    OSTEOARTHRITIS    1/06 Rt menisectomy    Osteoarthritis    Overweight and obesity    Personal history of colonic polyps    2/99 adenoma, 4/03 negative    Type 2 diabetes mellitus, uncontrolled    Uncontrolled type 2 diabetes mellitus with diabetic polyneuropathy, with long-term current use of insulin    Unspecified hereditary and idiopathic peripheral neuropathy    Vertigo    Vitamin D deficiency        PSH  Past Surgical History:   Procedure Laterality Date    Anesth,knee arthroscopy      left knee repair torn meniscus    Angioplasty (coronary)  2006 w/2 drug eluting stents    Arthroscopy of joint unlisted Right     KNEE    Back surgery      L4-L5 TLIF    Cataract  05/2018    Bilateral    Cath drug eluting stent      X2    Cholecystectomy  1997    Colonoscopy  10/10/2013    Procedure: COLONOSCOPY;  Surgeon: Lauro Lazo MD;  Location:  ENDOSCOPY    Colonoscopy,biopsy  1999    adenomatous polyp    Colonoscopy,diagnostic  2003    wnl    Colonoscopy,diagnostic  10/10/13    normal    Endovenous laser vein addon      Right Greater Saph V  per Dr. Castaneda    Hysterectomy      w/ BSO    Knee replacement surgery  03/17/2018    left    Other      lipoma removed from left arm    Other      trigger finger release left hand middle finger    Other surgical history      vein stripping    Revise median n/carpal tunnel surg      left carpal tunnel release        ALL:  Allergies    Allergen Reactions    Cipro [Ciprofloxacin] HIVES    Insulin Aspart, Human Analog PAIN     Achiness. HA , head pains    Insulin Lispro, Human PAIN     Achiness, HA, head pain    Tylenol [Acetaminophen] SWELLING     Tongue/mouth        Home Medications:  Outpatient Medications Marked as Taking for the 7/21/24 encounter (Hospital Encounter)   Medication Sig Dispense Refill    lisinopril 20 MG Oral Tab Take 1 tablet (20 mg total) by mouth in the morning and 1 tablet (20 mg total) before bedtime. 180 tablet 3    apixaban 5 MG Oral Tab Take 1 tablet (5 mg total) by mouth 2 (two) times daily. 180 tablet 3    cholecalciferol 50 MCG (2000 UT) Oral Tab Take 1 tablet (2,000 Units total) by mouth daily.      docusate sodium 100 MG Oral Cap Take 1 capsule (100 mg total) by mouth 2 (two) times daily.      polyethylene glycol, PEG 3350, 17 g Oral Powd Pack Take 17 g by mouth daily as needed.      atorvastatin 20 MG Oral Tab Take 1 tablet (20 mg total) by mouth nightly.      aspirin 81 MG Oral Tab EC Take 1 tablet (81 mg total) by mouth daily.      metFORMIN HCl  MG Oral Tablet 24 Hr Take 2 tablets (1,000 mg total) by mouth 2 (two) times daily. 360 tablet 3    insulin glargine (LANTUS SOLOSTAR) 100 UNIT/ML Subcutaneous Solution Pen-injector INJECT up to 48 UNITS INTO THE SKIN ONCE DAILY. (Patient taking differently: 53 Units every morning. INJECT 40 UNITS INTO THE SKIN in the evening) 45 mL 3    Insulin Pen Needle (BD PEN NEEDLE BIJU 2ND GEN) 32G X 4 MM Does not apply Misc Use once per day. 100 each 3    glipiZIDE 10 MG Oral Tab Take 1 tablet (10 mg total) by mouth 2 (two) times daily before meals. 180 tablet 3    FREESTYLE LITE TEST In Vitro Strip TEST THREE TIMES DAILY 300 strip 3    torsemide (DEMADEX) 20 MG Oral Tab Take 1 tablet (20 mg total) by mouth daily.           Soc Hx  Social History     Tobacco Use    Smoking status: Never    Smokeless tobacco: Never   Substance Use Topics    Alcohol use: Not Currently      Comment: Maricruz and New Years        Fam Hx  Family History   Problem Relation Age of Onset    Other (colon cancer) Mother         age 77    Heart Disorder Father         age 75    Diabetes Paternal Grandmother     Other (gastric cancer) Maternal Grandmother        Review of Systems  General: Denies unintentional weight loss, fevers, or chills-negative except for above  HEENT: Denies vision loss or double vision, denies hearing loss  Cardiovascular: Denies chest pain, palpitations, peripheral edema  Pulmonary: Denies cough, shortness of breath, or wheezing  Gastrointestinal: Denies abdominal pain, melena, or hematochezia  Genitourinary: Denies urinary frequency, urgency, and dysuria  Neurologic: Denies numbness, headaches, focal weakness  Skin: Denies rashes, sores  Endocrine: Denies heat or cold intolerance, denies polydipsia  Hematologic: Denies abnormal bleeding or bruising     OBJECTIVE:  /61 (BP Location: Left arm)   Pulse 67   Temp 97.1 °F (36.2 °C) (Temporal)   Resp 17   Ht 5' 5\" (1.651 m)   Wt 198 lb 6.6 oz (90 kg)   LMP  (LMP Unknown)   SpO2 92%   BMI 33.02 kg/m²     BP Readings from Last 3 Encounters:   07/21/24 137/61   10/09/23 122/59   10/04/23 120/68     Wt Readings from Last 3 Encounters:   07/21/24 198 lb 6.6 oz (90 kg)   10/09/23 195 lb 3.2 oz (88.5 kg)   10/04/23 195 lb 3.2 oz (88.5 kg)       Wt Readings from Last 6 Encounters:   07/21/24 198 lb 6.6 oz (90 kg)   10/09/23 195 lb 3.2 oz (88.5 kg)   10/04/23 195 lb 3.2 oz (88.5 kg)   09/28/23 197 lb 3.2 oz (89.4 kg)   09/21/23 193 lb 14.4 oz (88 kg)   09/18/23 193 lb (87.5 kg)     Gen: No acute distress, alert and oriented x 3, bruising noted on the right scalp with hematomas  Pulm: Lungs clear bilaterally, good inspiratory effort   CV:  nL S1/S2  Abd: soft, NT/ND, no hepatomegaly, +BS  MSK: moving all extremities, no edema  Neuro: no focal deficits  Skin: no rashes/lesions  Psych: normal mood/affect          Diagnostic Data:     CBC/Chem  Recent Labs   Lab 07/21/24  0754   WBC 6.9   HGB 11.4*   MCV 87.4   .0   INR 1.16       Recent Labs   Lab 07/21/24  0754      K 4.7      CO2 22.0   BUN 18   CREATSERUM 0.87   *   CA 9.5       Recent Labs   Lab 07/21/24  0754   ALT 19   AST 18   ALB 4.0       No results for input(s): \"TROP\" in the last 168 hours.      Radiology: CT BRAIN OR HEAD (98311)    Result Date: 7/21/2024  PROCEDURE:  CT BRAIN OR HEAD (86369)  COMPARISON:  [facility] , CT, CT BRAIN OR HEAD (41089), 7/21/2024, 8:35 AM.  INDICATIONS:  SDH  TECHNIQUE:  Noncontrast CT scanning is performed through the brain. Dose reduction techniques were used. Dose information is transmitted to the ACR (American College of Radiology) NRDR (National Radiology Data Registry) which includes the Dose Index Registry.  PATIENT STATED HISTORY: (As transcribed by Technologist)  Patient is here for follow up SDH.    FINDINGS:  Interval increase in size of subdural hematoma along the right tentorium, measuring approximately 1.1 cm in greatest thickness (overall dimension approximately 5.0 x 3.0 x 1.1 cm), previously 1.0 cm in thickness when measured similarly. There is effacement of the adjacent sulci, no significant midline shift.  The basilar cisterns are patent.  Ventricles are stable in caliber. Facial/scalp contusions redemonstrated.  No acute osseous findings.  Orbits, paranasal sinuses, and mastoid air cells are within normal limits.            CONCLUSION:  Enlarging subdural hematoma along the right tentorium.  Findings were communicated with the patient's nurse, Imani, at 15:42 on 7/21/2024.  There was appropriate read back.    LOCATION:  [facility]   Dictated by (CST): Bebeto Harris MD on 7/21/2024 at 3:38 PM     Finalized by (CST): Bebeto Harris MD on 7/21/2024 at 3:43 PM       XR FEMUR MIN 2 VIEWS RIGHT (CPT=73552)    Result Date: 7/21/2024  PROCEDURE:  XR FEMUR MIN 2 VIEWS RIGHT (CPT=73552)  TECHNIQUE:  AP and lateral views were  obtained.  COMPARISON:  None.  INDICATIONS:  R hip pain  PATIENT STATED HISTORY: (As transcribed by Technologist)  Patient stated she fell this morning and is having right hip, and left hand pain.    FINDINGS:  There is a mildly displaced intertrochanteric femoral fracture.  Vascular calcifications are present.  Degenerative changes within the hip and knee joint.            CONCLUSION:  Mildly displaced intertrochanteric femoral fracture.   LOCATION:  [facility]   Dictated by (CST): Bebeto Harris MD on 7/21/2024 at 10:17 AM     Finalized by (CST): Bebeto Harris MD on 7/21/2024 at 10:17 AM       XR HIP W OR WO PELVIS 2 OR 3 VIEWS, RIGHT (CPT=73502)    Result Date: 7/21/2024  PROCEDURE:  XR HIP W OR WO PELVIS 2 OR 3 VIEWS, RIGHT ( CPT=73502)  TECHNIQUE:  Unilateral 2 to 3 views of the hip and pelvis if performed.  COMPARISON:  None.  INDICATIONS:  Fall  PATIENT STATED HISTORY: (As transcribed by Technologist)  Patient stated she fell this morning and is having right hip, and left hand pain.    FINDINGS:  There is a mildly displaced intertrochanteric femoral fracture.  Moderate osteoarthritis of the hip joint.  Vascular calcifications are present.  Pelvis is grossly intact.            CONCLUSION:  Mildly displaced intertrochanteric right femur fracture.   LOCATION:  [facility]   Dictated by (CST): Bebeto Harris MD on 7/21/2024 at 10:16 AM     Finalized by (CST): Bebeto Harris MD on 7/21/2024 at 10:16 AM       XR CHEST AP PORTABLE  (CPT=71045)    Result Date: 7/21/2024  PROCEDURE:  XR CHEST AP PORTABLE  (CPT=71045)  TECHNIQUE:  AP chest radiograph was obtained.  COMPARISON:  [facility] , XR, XR CHEST AP PORTABLE  (CPT=71045), 9/18/2023, 11:37 PM.  [facility] , XR, XR CHEST AP PORTABLE  (CPT=71010), 5/10/2016, 8:30 PM.  INDICATIONS:  Fall  PATIENT STATED HISTORY: (As transcribed by Technologist)  Patient stated she fell this morning and is having right hip, and left hand pain.    FINDINGS:  No focal consolidation.  No pleural  effusion.  No pneumothorax.  No pulmonary edema.  The cardiomediastinal silhouette is within normal limits.  No acute osseous findings.  Left shoulder arthroplasty.            CONCLUSION:  No acute cardiopulmonary findings.   LOCATION:  [facility]      Dictated by (CST): Bebeto Harris MD on 7/21/2024 at 10:15 AM     Finalized by (CST): Bebeto Harris MD on 7/21/2024 at 10:16 AM       XR HAND (MIN 3 VIEWS), LEFT (CPT=73130)    Result Date: 7/21/2024  PROCEDURE:  XR HAND (MIN 3 VIEWS), LEFT (CPT=73130)  TECHNIQUE:  Three views of the left hand were obtained.  COMPARISON:  None.  INDICATIONS:  Hand pain  PATIENT STATED HISTORY: (As transcribed by Technologist)  Patient stated she fell this morning and is having right hip, and left hand pain.    FINDINGS:  Calcific density along the volar surface of the 2nd MCP joint, correlate point tenderness.  This may be related to an acute or remote avulsion injury.  Otherwise osseous structures are intact.  Moderate multifocal osteoarthritis with joint space narrowing and osteophyte formation.            CONCLUSION:  See above   LOCATION:  [facility]   Dictated by (CST): Bebeto Harris MD on 7/21/2024 at 10:13 AM     Finalized by (CST): Bebeto Harris MD on 7/21/2024 at 10:15 AM       CT FACIAL BONES (CPT=70486)    Result Date: 7/21/2024  PROCEDURE:  CT FACIAL BONES (CPT=70486)  COMPARISON:  None.  INDICATIONS:  Pain and swelling to side of face after fall  TECHNIQUE:  Noncontrast CT scanning is performed through the facial bones. 3D shaded surface renderings are created on an independent CT scanner workstation. Dose reduction techniques were used. Dose information is transmitted to the ACR (American College of Radiology) NRDR (National Radiology Data Registry) which includes the Dose Index Registry.  3-D RENDERING:   PATIENT STATED HISTORY:(As transcribed by Technologist)  Patient fell this AM. Right sided head and face trauma.    FINDINGS:  SINUSES:  No visible mass, significant fluid  or mucosal thickening.  NASAL FOSSA:  No mass, fracture, or significant septal deviation.  SKULL BASE:  No mass or bone destruction.  FACIAL BONES:  No bony lesion or fracture  ORBITS:  No visible mass, hematoma, edema or fracture.  CAVERNOUS SINUS:  Symmetric appearance with no visible lesion.  SALIVARY GLANDS:  The parotid and submandibular glands are unremarkable.  OTHER:  Scalp hematoma over the right frontal calvarium superior to the orbit measures up approximately 3 x 0.6 centimeters.  Hematoma lateral to the right orbit measures approximately 2.8 x 1.2 centimeters.  Intracranial hemorrhage as described on CT head report.            CONCLUSION:  No acute facial bone fracture.  Periorbital scalp hematomas.   LOCATION:  [facility]   Dictated by (CST): Bebeto Harris MD on 7/21/2024 at 9:03 AM     Finalized by (CST): Bebeto Harris MD on 7/21/2024 at 9:06 AM       CT SPINE CERVICAL (CPT=72125)    Result Date: 7/21/2024  PROCEDURE:  CT SPINE CERVICAL (CPT=72125)  COMPARISON:  [facility], CT SPINE CERVICAL (CPT=72125), 9/18/2023, 11:04 PM.  INDICATIONS:  Fall head injury  TECHNIQUE:  Noncontrast CT scanning of the cervical spine is performed from the skull base through C7.  Multiplanar reconstructions are generated.  Dose reduction techniques were used. Dose information is transmitted to the ACR (American College of Radiology) NRDR (National Radiology Data Registry) which includes the Dose Index Registry.  PATIENT STATED HISTORY: (As transcribed by Technologist)  Patient fell this AM. Right sided head and face trauma.    FINDINGS:  The cervical spine shows no evidence of fracture.  The cervical vertebral alignment demonstrates no subluxation. Ligamentous injury cannot be excluded on CT scan. There are normal cervical basilar relationships. The odontoid process is intact. No destructive osseous lesion is identified. The pre vertebral and luis vertebral soft tissues are normal.  Multilevel degenerative changes with disc  osteophyte formation, ligamentous calcification, overall similar to prior.  No high grade canal or neural foraminal stenosis.            CONCLUSION:  No acute cervical spine fracture.  Multilevel degenerative changes, similar to prior.    LOCATION:  [facility]   Dictated by (CST): Bebeto Harris MD on 7/21/2024 at 8:59 AM     Finalized by (CST): Bebeto Harris MD on 7/21/2024 at 9:02 AM       CT BRAIN OR HEAD (27871)    Result Date: 7/21/2024  PROCEDURE:  CT BRAIN OR HEAD (60236)  COMPARISON:  [facility] , CT, CT BRAIN OR HEAD (52166), 9/18/2023, 10:46 PM.  INDICATIONS:  Fall, head injury  TECHNIQUE:  Noncontrast CT scanning is performed through the brain. Dose reduction techniques were used. Dose information is transmitted to the ACR (American College of Radiology) NRDR (National Radiology Data Registry) which includes the Dose Index Registry.  PATIENT STATED HISTORY: (As transcribed by Technologist)  Patient fell this AM. Right sided head and face trauma.    FINDINGS:  There is cerebral atrophy with symmetric prominence of the ventricles. There are patchy areas of low attenuation in the periventricular and deep white matter which are nonspecific but most consistent with small vessel ischemic changes.  There is attenuation epidural/subdural hematoma along the right tentorium which measures approximately 1.5 centimeters in thickness (overall dimensions approximately 1.5 x 3.6 x 1.1 cm).  There is localized mass effect with mild effacement of the adjacent right temporal lobe sulci and mild hypoattenuation within the adjacent right temporal lobe, which may reflect edema.  No midline shift.  Basilar cisterns are patent.  The ventricles are normal in caliber.  The visualized paranasal sinuses show no significant findings. No evidence of depressed skull fracture.  There are small scalp hematomas overlying the right frontal calvarium and lateral to the right orbit.            CONCLUSION: 1. Epidural/subdural hematoma along the  right tentorium measuring approximately 1.5 centimeters in thickness, as described.  Small scalp hematomas around the right periorbital region. 2. Cerebral atrophy with chronic microvascular ischemic changes.  COMMUNICATION:  The findings were communicated with Dr. Zabala at 0856 on 7/21/2024. There was appropriate read back.   LOCATION:  [facility]   Dictated by (CST): Bebeto Harris MD on 7/21/2024 at 8:44 AM     Finalized by (CST): Bebeto Harris MD on 7/21/2024 at 8:57 AM              ASSESSMENT / PLAN:     Patient is a 84-year-old female with significant past medical history of type 2 diabetes, atherosclerosis of the aorta, hypertension, hyperlipidemia, paroxysmal A-fib on Eliquis presented after mechanical fall on the right side with head injury as well as right hip injury      # Acute right subdural hematoma as well as epidural hematoma  -CT head initially reviewed with small right subdural and epidural hematoma, repeat CT head with enlarging right subdural hematoma  -Neurology, neurosurgery on board  -Defer surgical plans for to neurosurgery  -Neurochecks, blood pressure management per neurocritical care goal SBP of less than 150  -Defer reversal of Eliquis to neurosurgery and neurocritical care.    # Accelerated hypertension  -Goal SBP less than 150, defer management to neurosurgery  -IV labetalol, hydralazine, nicardipine drip per neuro critical care    # Paroxysmal A-fib  -On Eliquis at home, now fall hold Eliquis and all anticoagulation  -Since bleed is enlarging may need reversal of agent however will defer to neurology and neurosurgery    # Hyperlipidemia  # Type 2 diabetes-sliding scale insulin    # CAD continue to hold aspirin-      Advanced Care Planning    While discussing goals of care with the patient and their family, patient voluntarily participated in an advanced care planning discussion.  Patient at this time is very much with it and understands the risks benefits alternatives and would like to  continue to be full code    18 minutes were spent in discussing advanced care planning. This time was exclusive of the documented time for this visit.    Prophy:  DVT: SCD  Deconditioning prevention: PT OT    Dispo: admit to CICU    Outpatient records reviewed confirming patient's medical history and medications.     Further recommendations pending patient's clinical course.  [facility] hospitalist to continue to follow patient while in house    Time spent: greater than 95 minutes spent in d/w pt/family, coordination of care, and d/w staff.     Carlos moore MD   Internal Medicine  [facility] Hospitalist  Pager: [phone]      **Certification      PHYSICIAN Certification of Need for Inpatient Hospitalization - Initial Certification    Patient will require inpatient services that will reasonably be expected to span two midnight's based on the clinical documentation in H+P.   Based on patients current state of illness, I anticipate that, after discharge, patient will require TBD.OT

## 2024-07-21 NOTE — CONSULTS
[facility]   NEUROCRITICAL CARE   CONSULT NOTE    Admission date: 7/21/2024  Reason for Consult: TBI  Chief Complaint:   Chief Complaint   Patient presents with    Fall   ________________________________________________________________    History     History of Presenting Illness  84 year old female with PMH of afib on AC, HTN, HLD, CAD, and DM who presents after a fall this morning while trying to get out of bed.  She states that she stood up and waited for a little bit before walking with folic so knocked her over, hit the right side of her head before falling to the ground.  No obvious loss of consciousness.  Right hip pain during fall.  Last dose of anticoagulation night prior.  CT head demonstrating small right tentorial subdural hemorrhage and mildly displaced right femoral fracture.  Evaluated by neurosurgery, neurologically stable per report.  Blood pressure elevated to 180 on arrival to ED.     Past Medical History:    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 elsewhere classified    idiopathic angioedema    Back problem    sciatica    Carpal tunnel syndrome    CORONARY ARTERY DISEASE    11/06: Ant WMA on stress, 12/06: PTCA to LAD,  12/08 Nuclear stress neg, EF 62%    CORONARY ARTERY DISEASE    11/06: ant ischemia on stress,   12/06: PTCA to distal LAD,    12/08 nuclear stress neg, EF 62%    Coronary atherosclerosis    Diabetes (HCC)    High blood pressure    High cholesterol    Hx of motion sickness    vertigo    HYPERTENSION    MENOPAUSE    ERT [id number]    Muscle weakness    LEGS SHAKE WHEN STANDING OR USING STAIRS    OSTEOARTHRITIS    1/06 Rt menisectomy    Osteoarthritis    Overweight and obesity    Personal history of colonic polyps    2/99 adenoma, 4/03 negative    Type 2 diabetes mellitus, uncontrolled    Uncontrolled type 2 diabetes mellitus with diabetic polyneuropathy, with long-term current use of insulin    Unspecified hereditary and idiopathic peripheral neuropathy    Vertigo    Vitamin D  deficiency     Past Surgical History:   Procedure Laterality Date    Anesth,knee arthroscopy      left knee repair torn meniscus    Angioplasty (coronary)  2006    w/2 drug eluting stents    Arthroscopy of joint unlisted Right     KNEE    Back surgery      L4-L5 TLIF    Cataract  05/2018    Bilateral    Cath drug eluting stent      X2    Cholecystectomy  1997    Colonoscopy  10/10/2013    Procedure: COLONOSCOPY;  Surgeon: Lauro Lazo MD;  Location:  ENDOSCOPY    Colonoscopy,biopsy  1999    adenomatous polyp    Colonoscopy,diagnostic  2003    wnl    Colonoscopy,diagnostic  10/10/13    normal    Endovenous laser vein addon      Right Greater Saph V  per Dr. Castaneda    Hysterectomy      w/ BSO    Knee replacement surgery  03/17/2018    left    Other      lipoma removed from left arm    Other      trigger finger release left hand middle finger    Other surgical history      vein stripping    Revise median n/carpal tunnel surg      left carpal tunnel release     Social History     Socioeconomic History    Marital status:    Tobacco Use    Smoking status: Never    Smokeless tobacco: Never   Vaping Use    Vaping status: Never Used   Substance and Sexual Activity    Alcohol use: Not Currently     Comment: [place] and New Years    Drug use: Never    Sexual activity: Not Currently     Partners: Male   Other Topics Concern    Caffeine Concern Yes     Comment: 2 cups a day    Exercise No    Seat Belt Yes   Social History Narrative        Health Mnt:    Pap: n/a (SARA/BSO)    Mamm: 6/08, 6/09, 7/10    Breast exam: 6/09, 5/10    DEXA: 6/08 wnl    Cholesterol: flow sheet/statin rx.    Colon: 10/08 (\"5-7 yrs\")    H/O:         calcium: takes    exercise: good, Son in [place] is working over internet as her      Social Determinants of Health     Physical Activity: Inactive (1/20/2021)    Received from [facility]    Exercise Vital Sign     Days of Exercise per Week: 0  days     Minutes of Exercise per Session: 0 min     Family History   Problem Relation Age of Onset    Other (colon cancer) Mother         age 77    Heart Disorder Father         age 75    Diabetes Paternal Grandmother     Other (gastric cancer) Maternal Grandmother      Allergies   Allergies   Allergen Reactions    Cipro [Ciprofloxacin] HIVES    Insulin Aspart, Human Analog PAIN     Achiness. HA , head pains    Insulin Lispro, Human PAIN     Achiness, HA, head pain    Tylenol [Acetaminophen] SWELLING     Tongue/mouth       Home Meds  Current Outpatient Medications   Medication Instructions    apixaban (ELIQUIS) 5 mg, Oral, 2 times daily    aspirin 81 mg, Oral, Daily    atorvastatin (LIPITOR) 20 mg, Oral, Nightly    cholecalciferol (VITAMIN D3) 2,000 Units, Oral, Daily    docusate sodium (COLACE) 100 mg, Oral, 2 times daily    FREESTYLE LITE TEST In Vitro Strip TEST THREE TIMES DAILY    glipiZIDE (GLUCOTROL) 10 mg, Oral, 2 times daily before meals    insulin glargine (LANTUS SOLOSTAR) 100 UNIT/ML Subcutaneous Solution Pen-injector INJECT up to 48 UNITS INTO THE SKIN ONCE DAILY.    Insulin Pen Needle (BD PEN NEEDLE BIJU 2ND GEN) 32G X 4 MM Does not apply Misc Use once per day.    lisinopril (PRINIVIL; ZESTRIL) 20 mg, Oral, 2 times daily    metFORMIN ER (GLUCOPHAGE XR) 1,000 mg, Oral, 2 times daily    polyethylene glycol (PEG 3350) (MIRALAX) 17 g, Oral, DAILY PRN    torsemide (DEMADEX) 20 mg, Oral, Daily     Scheduled Meds:  Continuous Infusions:  PRN Meds:    OBJECTIVE   VITAL SIGNS:   Temp:  [97.6 °F (36.4 °C)] 97.6 °F (36.4 °C)  Pulse:  [63-69] 63  Resp:  [15-16] 15  BP: (148-180)/(68-74) 148/68  SpO2:  [98 %-100 %] 98 %    INTAKE/OUTPUT:  No intake or output data in the 24 hours ending 07/21/24 1137    PHYSICAL EXAM:  GENERAL APPEARANCE: Patient resting comfortably in bed, NAD  HEENT: Normocephalic, R facial ecchymosis .   LUNGS: Normal respiratory rate and effort   HEART: Regular rate and rhythm   ABDOMEN: Soft.  No tenderness, guarding, or rebound.   EXTREMITIES: BL LE swelling, L wrist wrapped. No obvious cyanosis, clubbing    NEUROLOGIC:    Mental Status:  A&O x 4, Follows simple commands, no obvious aphasia or dysarthria  Cranial nerves: PERRL.  Visual fields full.  EOMI.  Face symmetric. Hearing grossly intact. Tongue midline   Motor: Drift:  Absent in BLUE; Full strength in BL UE, LLE antigravity, RLE extremely limited by pain, 2-/5  Sensation: Intact to light touch bilaterally except decreased sensation in LUE  Cerebellar: Normal Finger-To-Nose test    LABORATORY DATA:  Last 24 hour labs were reviewed in detail.  Recent Labs   Lab 07/21/24  0754      K 4.7      CO2 22.0   *   BUN 18   CREATSERUM 0.87     Recent Labs   Lab 07/21/24  0754   WBC 6.9   HGB 11.4*   .0     Recent Labs   Lab 07/21/24  0754   ALT 19   AST 18     No results for input(s): \"MG\", \"PHOS\" in the last 168 hours.    Radiology:    XR FEMUR MIN 2 VIEWS RIGHT (CPT=73552)    Result Date: 7/21/2024  CONCLUSION:  Mildly displaced intertrochanteric femoral fracture.   LOCATION:  [facility]   Dictated by (CST): Bebeto Harris MD on 7/21/2024 at 10:17 AM     Finalized by (CST): Bebeto Harris MD on 7/21/2024 at 10:17 AM       XR HIP W OR WO PELVIS 2 OR 3 VIEWS, RIGHT (CPT=73502)    Result Date: 7/21/2024  CONCLUSION:  Mildly displaced intertrochanteric right femur fracture.   LOCATION:  [facility]   Dictated by (CST): Bebeto Harris MD on 7/21/2024 at 10:16 AM     Finalized by (CST): Bebeto Harris MD on 7/21/2024 at 10:16 AM       XR CHEST AP PORTABLE  (CPT=71045)    Result Date: 7/21/2024  CONCLUSION:  No acute cardiopulmonary findings.   LOCATION:  [facility]      Dictated by (CST): Bebeto Harris MD on 7/21/2024 at 10:15 AM     Finalized by (CST): Bebeto Harris MD on 7/21/2024 at 10:16 AM       XR HAND (MIN 3 VIEWS), LEFT (CPT=73130)    Result Date: 7/21/2024  CONCLUSION:  See above   LOCATION:  [facility]   Dictated by (CST): Bebeto Harris MD  on 7/21/2024 at 10:13 AM     Finalized by (CST): Bebeto Harris MD on 7/21/2024 at 10:15 AM       CT FACIAL BONES (CPT=70486)    Result Date: 7/21/2024  CONCLUSION:  No acute facial bone fracture.  Periorbital scalp hematomas.   LOCATION:  [facility]   Dictated by (CST): Bebeto Harris MD on 7/21/2024 at 9:03 AM     Finalized by (CST): Bebeto Harris MD on 7/21/2024 at 9:06 AM       CT SPINE CERVICAL (CPT=72125)    Result Date: 7/21/2024  CONCLUSION:  No acute cervical spine fracture.  Multilevel degenerative changes, similar to prior.    LOCATION:  [facility]   Dictated by (CST): Bebeto Harris MD on 7/21/2024 at 8:59 AM     Finalized by (CST): Bebeto Harris MD on 7/21/2024 at 9:02 AM       CT BRAIN OR HEAD (39817)    Result Date: 7/21/2024  CONCLUSION: 1. Epidural/subdural hematoma along the right tentorium measuring approximately 1.5 centimeters in thickness, as described.  Small scalp hematomas around the right periorbital region. 2. Cerebral atrophy with chronic microvascular ischemic changes.  COMMUNICATION:  The findings were communicated with Dr. Zabala at 0856 on 7/21/2024. There was appropriate read back.   LOCATION:  [facility]   Dictated by (CST): Bebeto Harris MD on 7/21/2024 at 8:44 AM     Finalized by (CST): Bebeto Harris MD on 7/21/2024 at 8:57 AM      ASSESSMENT/PLAN   84 year old female with PMH of afib on AC, HTN, HLD, CAD, and DM who presents after a fall this morning while trying to get out of bed.    Neurological:  R tentorial Subdural Hematoma   - Initial CT with above   - INR 1.16 and Plt 219    - Holding on AC, repeat CT now    - Reversal  of AC if worsening bleed  on CT or exam noted   - Repeat CT in 24 hours as well for stability    - Nsg following, appreciate recs    - PT/OT evals     Fall - C/b above and R hip fx, ortho consulted, s/p pain block per anesthesia in ED      Cardiovascular:  Accelerated HTN   - SBP goal < 150   - Control with prn meds for today. Likely resume home medications in AM  depending on clinical course    - IV labetalol/hydralazine pushes and Nicardipine gtt prn      CAD - Hold asa until cleared by nsg     Afib - Currently in sinus, hold AC until cleared by surgical teams, prn lopressor for HR goal < 120    HLD - Continue home med    Pulmonary:  - Satting well on RA, encourage IS     Renal:  - Monitor I&Os      Gastrointestinal:  Nutrition:        - NPO pending speech eval and stability scan now         - Bowel regimen: ordered prn    Infectious Disease: - No leukocytosis, afebrile, continue to monitor     Heme/Onc   Anemia - Hb goal > 7, continue to monitor daily     Endocrine:  DM Type II, uncontrolled    - Hold PO Meds in the hospital   - HbA1c 8.5 in 2023   - Accuchecks q6 with SSI prn     Checklist   - Lines: PIVs   - DVT Prophylaxis: SCDs    - Diet: NPO pending stability scan    - IVF: NS   - Electrolytes: Replete per protocol   - Code Status: FULL    Dispo: CNICU    Greater than 40 minutes of critical care time (exclusive of billable procedures) was administered to manage and/or prevent neurologic instability. This involved direct patient intervention, complex decision making, and/or extensive discussions with the patient, family, and clinical staff.    Lebron Velasco MD  Neurocritical Care  [facility]    Disclaimer: This record was dictated using Dragon software. There may be errors due to voice recognition problems that were not realized and corrected during the completion of the note.

## 2024-07-21 NOTE — ED INITIAL ASSESSMENT (HPI)
Pt arrives to ED via EMS s/p fall this morning, pt arrives with right hip rotation, swelling to her right temple, and a skin tear to left hand. Pt states she fell after getting out of bed, felt slight dizziness prior to falling.   Pt is on thinners, pt arrives A&Ox4.     90 mcg Fent given by EMS  4mg zofran given by EMS

## 2024-07-21 NOTE — ANESTHESIA PROCEDURE NOTES
Regional Block    Date/Time: 7/21/2024 2:04 PM    Performed by: Cale Chang MD  Authorized by: Cale Chang MD      General Information and Staff    Start Time:  7/21/2024 2:04 PM  End Time:  7/21/2024 2:09 PM  Anesthesiologist:  Cale Chang MD  Performed by:  Anesthesiologist  Patient Location:  ED    Block Placement: Pre Induction  Site Identification: real time ultrasound guided and image stored and retrievable    Block site/laterality marked before start: site marked  Reason for Block: procedure for pain and at request of ED Physician    Preanesthetic Checklist: 2 patient identifers, IV checked, risks and benefits discussed, monitors and equipment checked, pre-op evaluation, timeout performed, anesthesia consent, sterile technique used, no prohibitive neurological deficits and no local skin infection at insertion site      Procedure Details    Patient Position:  Supine  Prep: ChloraPrep    Monitoring:  Cardiac monitor, continuous pulse ox and blood pressure cuff  Block Type:  Femoral  Laterality:  Right  Injection Technique:  Single-shot    Needle    Needle Type:  Short-bevel and echogenic  Needle Gauge:  21 G  Needle Length:  100 mm  Needle Localization:  Ultrasound guidance and anatomical landmarks  Reason for Ultrasound Use: appropriate spread of the medication was noted in real time and no ultrasound evidence of intravascular and/or intraneural injection            Assessment    Injection Assessment:  Good spread noted, negative aspiration for heme, negative resistance, no pain on injection and incremental injection  Heart Rate Change: No    - Patient tolerated block procedure well without evidence of immediate block related complications.     Medications  7/21/2024 2:04 PM      Additional Comments    Medication:  Ropivacaine 0.5% 25mL

## 2024-07-21 NOTE — ANESTHESIA PREPROCEDURE EVALUATION
PRE-OP EVALUATION    Patient Name: Evangelina Ward    Admit Diagnosis: SDH (subdural hematoma) ([facility])    Pre-op Diagnosis: Femur fracture, right (HCC) [S72.91XA]    OPEN REDUCTION INTERNAL FIXATION /INTRAMEDULLARY NAIL FEMUR FRACTURE- RIGHT    Anesthesia Procedure: OPEN REDUCTION INTERNAL FIXATION /INTRAMEDULLARY NAIL FEMUR FRACTURE- RIGHT (Right)    Surgeons and Role:     * Brian Celis MD - Primary    Pre-op vitals reviewed.  Temp: 97.1 °F (36.2 °C)  Pulse: 67  Resp: 17  BP: 137/61  SpO2: 92 %  Body mass index is 33.02 kg/m².    Current medications reviewed.  Hospital Medications:   [COMPLETED] Tetanus-Diphth-Acell Pertussis (Tdap) (Boostrix) injection 0.5 mL  0.5 mL Intramuscular Once    [COMPLETED] ondansetron (Zofran) 4 MG/2ML injection 4 mg  4 mg Intravenous Once    [COMPLETED] levETIRAcetam (Keppra) 500 mg/5mL injection 500 mg  500 mg Intravenous Once    [COMPLETED] fentaNYL (Sublimaze) 50 mcg/mL injection 50 mcg  50 mcg Intravenous Once    [COMPLETED] morphINE PF 4 MG/ML injection 4 mg  4 mg Intravenous Once    sodium chloride 0.9% infusion   Intravenous Continuous    [COMPLETED] labetalol (Trandate) 5 mg/mL injection 10 mg  10 mg Intravenous Q10 Min PRN    hydrALAzine (Apresoline) 20 mg/mL injection 10 mg  10 mg Intravenous Q2H PRN    ondansetron (Zofran) 4 MG/2ML injection 4 mg  4 mg Intravenous Q6H PRN    metoclopramide (Reglan) 5 mg/mL injection 10 mg  10 mg Intravenous Q8H PRN    desmopressin (DDAVP) 27 mcg in sodium chloride 0.9% 50 mL IVPB  0.3 mcg/kg Intravenous Once       Outpatient Medications:     Medications Prior to Admission   Medication Sig Dispense Refill Last Dose    lisinopril 20 MG Oral Tab Take 1 tablet (20 mg total) by mouth in the morning and 1 tablet (20 mg total) before bedtime. 180 tablet 3 7/20/2024    apixaban 5 MG Oral Tab Take 1 tablet (5 mg total) by mouth 2 (two) times daily. 180 tablet 3 7/20/2024    cholecalciferol 50 MCG (2000 UT) Oral Tab Take 1 tablet (2,000 Units  total) by mouth daily.   7/20/2024    docusate sodium 100 MG Oral Cap Take 1 capsule (100 mg total) by mouth 2 (two) times daily.   Past Month    polyethylene glycol, PEG 3350, 17 g Oral Powd Pack Take 17 g by mouth daily as needed.   Past Month    atorvastatin 20 MG Oral Tab Take 1 tablet (20 mg total) by mouth nightly.   7/20/2024    aspirin 81 MG Oral Tab EC Take 1 tablet (81 mg total) by mouth daily.   7/20/2024    metFORMIN HCl  MG Oral Tablet 24 Hr Take 2 tablets (1,000 mg total) by mouth 2 (two) times daily. 360 tablet 3 7/20/2024    insulin glargine (LANTUS SOLOSTAR) 100 UNIT/ML Subcutaneous Solution Pen-injector INJECT up to 48 UNITS INTO THE SKIN ONCE DAILY. (Patient taking differently: 53 Units every morning. INJECT 40 UNITS INTO THE SKIN in the evening) 45 mL 3 7/20/2024    Insulin Pen Needle (BD PEN NEEDLE BIJU 2ND GEN) 32G X 4 MM Does not apply Misc Use once per day. 100 each 3 7/20/2024    glipiZIDE 10 MG Oral Tab Take 1 tablet (10 mg total) by mouth 2 (two) times daily before meals. 180 tablet 3 7/20/2024    FREESTYLE LITE TEST In Vitro Strip TEST THREE TIMES DAILY 300 strip 3 7/20/2024    torsemide (DEMADEX) 20 MG Oral Tab Take 1 tablet (20 mg total) by mouth daily.   7/20/2024       Allergies: Cipro [ciprofloxacin]; Insulin aspart, human analog; Insulin lispro, human; and Tylenol [acetaminophen]      Anesthesia Evaluation        Anesthetic Complications  (-) history of anesthetic complications         GI/Hepatic/Renal                                 Cardiovascular                  (+) hypertension     (+) CAD             (+) dysrhythmias                   Endo/Other      (+) diabetes                            Pulmonary                           Neuro/Psych                                      Past Surgical History:   Procedure Laterality Date    Anesth,knee arthroscopy      left knee repair torn meniscus    Angioplasty (coronary)  2006    w/2 drug eluting stents    Arthroscopy of joint  unlisted Right     KNEE    Back surgery      L4-L5 TLIF    Cataract  05/2018    Bilateral    Cath drug eluting stent      X2    Cholecystectomy  1997    Colonoscopy  10/10/2013    Procedure: COLONOSCOPY;  Surgeon: Lauro Lazo MD;  Location:  ENDOSCOPY    Colonoscopy,biopsy  1999    adenomatous polyp    Colonoscopy,diagnostic  2003    wnl    Colonoscopy,diagnostic  10/10/13    normal    Endovenous laser vein addon      Right Greater Saph V  per Dr. Castaneda    Hysterectomy      w/ BSO    Knee replacement surgery  03/17/2018    left    Other      lipoma removed from left arm    Other      trigger finger release left hand middle finger    Other surgical history      vein stripping    Revise median n/carpal tunnel surg      left carpal tunnel release     Social History     Socioeconomic History    Marital status:    Tobacco Use    Smoking status: Never    Smokeless tobacco: Never   Vaping Use    Vaping status: Never Used   Substance and Sexual Activity    Alcohol use: Not Currently     Comment: [place] and New Years    Drug use: Never    Sexual activity: Not Currently     Partners: Male   Other Topics Concern    Caffeine Concern Yes     Comment: 2 cups a day    Exercise No    Seat Belt Yes     History   Drug Use Unknown     Available pre-op labs reviewed.  Lab Results   Component Value Date    WBC 6.9 07/21/2024    RBC 3.88 07/21/2024    HGB 11.4 (L) 07/21/2024    HCT 33.9 (L) 07/21/2024    MCV 87.4 07/21/2024    MCH 29.4 07/21/2024    MCHC 33.6 07/21/2024    RDW 13.2 07/21/2024    .0 07/21/2024     Lab Results   Component Value Date     07/21/2024    K 4.7 07/21/2024     07/21/2024    CO2 22.0 07/21/2024    BUN 18 07/21/2024    CREATSERUM 0.87 07/21/2024     (H) 07/21/2024    CA 9.5 07/21/2024     Lab Results   Component Value Date    INR 1.16 07/21/2024         Airway      Mallampati: II  Mouth opening: >3 FB  TM distance: 4 - 6 cm  Neck ROM: full  Cardiovascular    Cardiovascular exam normal.         Dental  Comment: Dentition appears grossly intact. Patient denies any loose, chipped or missing teeth other than as noted. Risks of dental trauma related to anesthesia including intubation and during emergence explained.             Pulmonary    Pulmonary exam normal.                 Other findings              ASA: 3   Plan: general and regional  NPO status verified and patient meets guidelines.    Post-procedure pain management plan discussed with surgeon and patient.    Comment: Options, risks and benefits of anesthesia as outlined in the anesthesia consent were reviewed with the patient. Risks and benefits of GA including sore throat, allergy, nausea, vomiting, dental trauma, pain management modalities were all discussed. Particularly the risk of dental trauma with weakened teeth or crowns, partials, fillings and any non natural teeth due to instrumentation of oral cavity and airway. Patient understands risks and verbally agreed to proceed. All questions answered.The consent was signed without further questions.      Plan/risks discussed with: patient and spouse  Use of blood product(s) discussed with: patient    Consented to blood products.          Present on Admission:   Primary hypertension   Paroxysmal atrial fibrillation (HCC)

## 2024-07-21 NOTE — PROGRESS NOTES
Paged by ED for a new CNICU consult.   When I returned the page and spoke to the ED physician, was told the ED is looking for the neuro-intensivist and do not need pulmonary critical care at this time. Please re-consult our service if needed.     Thank you,      HERB Resendez DO, MPH  Pulmonary Critical Care Medicine  [facility]

## 2024-07-22 ENCOUNTER — APPOINTMENT (OUTPATIENT)
Dept: GENERAL RADIOLOGY | Facility: HOSPITAL | Age: 84
End: 2024-07-22
Attending: NURSE PRACTITIONER
Payer: MEDICARE

## 2024-07-22 ENCOUNTER — APPOINTMENT (OUTPATIENT)
Dept: GENERAL RADIOLOGY | Facility: HOSPITAL | Age: 84
End: 2024-07-22
Attending: ORTHOPAEDIC SURGERY
Payer: MEDICARE

## 2024-07-22 ENCOUNTER — APPOINTMENT (OUTPATIENT)
Dept: CT IMAGING | Facility: HOSPITAL | Age: 84
End: 2024-07-22
Attending: STUDENT IN AN ORGANIZED HEALTH CARE EDUCATION/TRAINING PROGRAM
Payer: MEDICARE

## 2024-07-22 ENCOUNTER — APPOINTMENT (OUTPATIENT)
Dept: CT IMAGING | Facility: HOSPITAL | Age: 84
End: 2024-07-22
Attending: Other
Payer: MEDICARE

## 2024-07-22 ENCOUNTER — APPOINTMENT (OUTPATIENT)
Dept: CT IMAGING | Facility: HOSPITAL | Age: 84
End: 2024-07-22
Attending: NURSE PRACTITIONER
Payer: MEDICARE

## 2024-07-22 ENCOUNTER — ANESTHESIA (OUTPATIENT)
Dept: SURGERY | Facility: HOSPITAL | Age: 84
End: 2024-07-22
Payer: MEDICARE

## 2024-07-22 PROBLEM — S72.001A CLOSED FRACTURE OF RIGHT HIP (HCC): Status: ACTIVE | Noted: 2024-07-22

## 2024-07-22 LAB
ANION GAP SERPL CALC-SCNC: 4 MMOL/L (ref 0–18)
ATRIAL RATE: 64 BPM
BILIRUB UR QL STRIP.AUTO: NEGATIVE
BUN BLD-MCNC: 19 MG/DL (ref 9–23)
CALCIUM BLD-MCNC: 9 MG/DL (ref 8.7–10.4)
CHLORIDE SERPL-SCNC: 110 MMOL/L (ref 98–112)
CO2 SERPL-SCNC: 25 MMOL/L (ref 21–32)
COLOR UR AUTO: YELLOW
CREAT BLD-MCNC: 0.89 MG/DL
EGFRCR SERPLBLD CKD-EPI 2021: 65 ML/MIN/1.73M2 (ref 60–?)
ERYTHROCYTE [DISTWIDTH] IN BLOOD BY AUTOMATED COUNT: 13.2 %
GLUCOSE BLD-MCNC: 243 MG/DL (ref 70–99)
GLUCOSE BLD-MCNC: 256 MG/DL (ref 70–99)
GLUCOSE BLD-MCNC: 261 MG/DL (ref 70–99)
GLUCOSE BLD-MCNC: 262 MG/DL (ref 70–99)
GLUCOSE BLD-MCNC: 281 MG/DL (ref 70–99)
GLUCOSE BLD-MCNC: 328 MG/DL (ref 70–99)
GLUCOSE UR STRIP.AUTO-MCNC: 100 MG/DL
HCT VFR BLD AUTO: 28.3 %
HCT VFR BLD AUTO: 28.6 %
HGB BLD-MCNC: 9 G/DL
HGB BLD-MCNC: 9.5 G/DL
KETONES UR STRIP.AUTO-MCNC: 10 MG/DL
LEUKOCYTE ESTERASE UR QL STRIP.AUTO: 500
MAGNESIUM SERPL-MCNC: 1.7 MG/DL (ref 1.6–2.6)
MCH RBC QN AUTO: 28.7 PG (ref 26–34)
MCHC RBC AUTO-ENTMCNC: 31.8 G/DL (ref 31–37)
MCV RBC AUTO: 90.1 FL
OSMOLALITY SERPL CALC.SUM OF ELEC: 299 MOSM/KG (ref 275–295)
P-R INTERVAL: 208 MS
PH UR STRIP.AUTO: 6 [PH] (ref 5–8)
PHOSPHATE SERPL-MCNC: 4.2 MG/DL (ref 2.4–5.1)
PLATELET # BLD AUTO: 190 10(3)UL (ref 150–450)
POTASSIUM SERPL-SCNC: 5.4 MMOL/L (ref 3.5–5.1)
PROT UR STRIP.AUTO-MCNC: 100 MG/DL
Q-T INTERVAL: 390 MS
QRS DURATION: 92 MS
QTC CALCULATION (BEZET): 405 MS
R AXIS: -20 DEGREES
RBC # BLD AUTO: 3.14 X10(6)UL
RBC #/AREA URNS AUTO: >10 /HPF
SODIUM SERPL-SCNC: 139 MMOL/L (ref 136–145)
SP GR UR STRIP.AUTO: 1.01 (ref 1–1.03)
T AXIS: 72 DEGREES
UROBILINOGEN UR STRIP.AUTO-MCNC: NORMAL MG/DL
VENTRICULAR RATE: 65 BPM
WBC # BLD AUTO: 7 X10(3) UL (ref 4–11)
WBC #/AREA URNS AUTO: >50 /HPF
WBC CLUMPS UR QL AUTO: PRESENT /HPF

## 2024-07-22 PROCEDURE — 0QS636Z REPOSITION RIGHT UPPER FEMUR WITH INTRAMEDULLARY INTERNAL FIXATION DEVICE, PERCUTANEOUS APPROACH: ICD-10-PCS | Performed by: ORTHOPAEDIC SURGERY

## 2024-07-22 PROCEDURE — 70450 CT HEAD/BRAIN W/O DYE: CPT | Performed by: OTHER

## 2024-07-22 PROCEDURE — 70450 CT HEAD/BRAIN W/O DYE: CPT | Performed by: STUDENT IN AN ORGANIZED HEALTH CARE EDUCATION/TRAINING PROGRAM

## 2024-07-22 PROCEDURE — 73552 X-RAY EXAM OF FEMUR 2/>: CPT | Performed by: ORTHOPAEDIC SURGERY

## 2024-07-22 PROCEDURE — 76000 FLUOROSCOPY <1 HR PHYS/QHP: CPT | Performed by: ORTHOPAEDIC SURGERY

## 2024-07-22 PROCEDURE — 99291 CRITICAL CARE FIRST HOUR: CPT | Performed by: OTHER

## 2024-07-22 PROCEDURE — 99231 SBSQ HOSP IP/OBS SF/LOW 25: CPT | Performed by: NEUROLOGICAL SURGERY

## 2024-07-22 PROCEDURE — 70450 CT HEAD/BRAIN W/O DYE: CPT | Performed by: NURSE PRACTITIONER

## 2024-07-22 PROCEDURE — 73620 X-RAY EXAM OF FOOT: CPT | Performed by: NURSE PRACTITIONER

## 2024-07-22 DEVICE — ZNN CMN LAG SCREW 10.5X100
Type: IMPLANTABLE DEVICE | Site: HIP | Status: FUNCTIONAL
Brand: ZIMMER® NATURAL NAIL® SYSTEM

## 2024-07-22 DEVICE — IMPLANTABLE DEVICE
Type: IMPLANTABLE DEVICE | Site: HIP | Status: FUNCTIONAL
Brand: NATURAL NAIL®

## 2024-07-22 DEVICE — ZNN CMN NAIL 10MMX21.5CM 130R
Type: IMPLANTABLE DEVICE | Site: HIP | Status: FUNCTIONAL
Brand: ZIMMER® NATURAL NAIL® SYSTEM

## 2024-07-22 RX ORDER — NEOSTIGMINE METHYLSULFATE 1 MG/ML
INJECTION INTRAVENOUS AS NEEDED
Status: DISCONTINUED | OUTPATIENT
Start: 2024-07-22 | End: 2024-07-22 | Stop reason: SURG

## 2024-07-22 RX ORDER — CEFAZOLIN SODIUM 1 G/3ML
INJECTION, POWDER, FOR SOLUTION INTRAMUSCULAR; INTRAVENOUS AS NEEDED
Status: DISCONTINUED | OUTPATIENT
Start: 2024-07-22 | End: 2024-07-22 | Stop reason: SURG

## 2024-07-22 RX ORDER — METOCLOPRAMIDE HYDROCHLORIDE 5 MG/ML
10 INJECTION INTRAMUSCULAR; INTRAVENOUS EVERY 8 HOURS PRN
Status: DISCONTINUED | OUTPATIENT
Start: 2024-07-22 | End: 2024-07-22

## 2024-07-22 RX ORDER — ROCURONIUM BROMIDE 10 MG/ML
INJECTION, SOLUTION INTRAVENOUS AS NEEDED
Status: DISCONTINUED | OUTPATIENT
Start: 2024-07-22 | End: 2024-07-22 | Stop reason: SURG

## 2024-07-22 RX ORDER — HYDROMORPHONE HYDROCHLORIDE 1 MG/ML
0.4 INJECTION, SOLUTION INTRAMUSCULAR; INTRAVENOUS; SUBCUTANEOUS EVERY 5 MIN PRN
Status: ACTIVE | OUTPATIENT
Start: 2024-07-22 | End: 2024-07-23

## 2024-07-22 RX ORDER — ENEMA 19; 7 G/133ML; G/133ML
1 ENEMA RECTAL ONCE AS NEEDED
Status: DISCONTINUED | OUTPATIENT
Start: 2024-07-22 | End: 2024-07-24

## 2024-07-22 RX ORDER — SENNOSIDES 8.6 MG
17.2 TABLET ORAL NIGHTLY
Status: DISCONTINUED | OUTPATIENT
Start: 2024-07-22 | End: 2024-07-24

## 2024-07-22 RX ORDER — DOXEPIN HYDROCHLORIDE 50 MG/1
1 CAPSULE ORAL DAILY
Status: DISCONTINUED | OUTPATIENT
Start: 2024-07-23 | End: 2024-07-24

## 2024-07-22 RX ORDER — DOCUSATE SODIUM 100 MG/1
100 CAPSULE, LIQUID FILLED ORAL 2 TIMES DAILY
Status: DISCONTINUED | OUTPATIENT
Start: 2024-07-22 | End: 2024-07-24

## 2024-07-22 RX ORDER — LIDOCAINE HYDROCHLORIDE 10 MG/ML
INJECTION, SOLUTION EPIDURAL; INFILTRATION; INTRACAUDAL; PERINEURAL AS NEEDED
Status: DISCONTINUED | OUTPATIENT
Start: 2024-07-22 | End: 2024-07-22 | Stop reason: SURG

## 2024-07-22 RX ORDER — INSULIN ASPART 100 [IU]/ML
INJECTION, SOLUTION INTRAVENOUS; SUBCUTANEOUS ONCE
Status: DISCONTINUED | OUTPATIENT
Start: 2024-07-22 | End: 2024-07-23

## 2024-07-22 RX ORDER — ATORVASTATIN CALCIUM 20 MG/1
20 TABLET, FILM COATED ORAL NIGHTLY
Status: DISCONTINUED | OUTPATIENT
Start: 2024-07-22 | End: 2024-07-24

## 2024-07-22 RX ORDER — HYDROMORPHONE HYDROCHLORIDE 1 MG/ML
0.4 INJECTION, SOLUTION INTRAMUSCULAR; INTRAVENOUS; SUBCUTANEOUS EVERY 2 HOUR PRN
Status: DISCONTINUED | OUTPATIENT
Start: 2024-07-22 | End: 2024-07-24

## 2024-07-22 RX ORDER — ONDANSETRON 2 MG/ML
4 INJECTION INTRAMUSCULAR; INTRAVENOUS EVERY 6 HOURS PRN
Status: DISCONTINUED | OUTPATIENT
Start: 2024-07-22 | End: 2024-07-22

## 2024-07-22 RX ORDER — SODIUM CHLORIDE 9 MG/ML
INJECTION, SOLUTION INTRAVENOUS CONTINUOUS
Status: DISCONTINUED | OUTPATIENT
Start: 2024-07-22 | End: 2024-07-23

## 2024-07-22 RX ORDER — BISACODYL 10 MG
10 SUPPOSITORY, RECTAL RECTAL
Status: DISCONTINUED | OUTPATIENT
Start: 2024-07-22 | End: 2024-07-24

## 2024-07-22 RX ORDER — MAGNESIUM OXIDE 400 MG/1
400 TABLET ORAL ONCE
Status: COMPLETED | OUTPATIENT
Start: 2024-07-22 | End: 2024-07-22

## 2024-07-22 RX ORDER — OXYCODONE HYDROCHLORIDE 5 MG/1
5 TABLET ORAL EVERY 4 HOURS PRN
Status: DISCONTINUED | OUTPATIENT
Start: 2024-07-22 | End: 2024-07-24

## 2024-07-22 RX ORDER — ONDANSETRON 2 MG/ML
4 INJECTION INTRAMUSCULAR; INTRAVENOUS EVERY 6 HOURS PRN
Status: DISCONTINUED | OUTPATIENT
Start: 2024-07-22 | End: 2024-07-24

## 2024-07-22 RX ORDER — SODIUM CHLORIDE, SODIUM LACTATE, POTASSIUM CHLORIDE, CALCIUM CHLORIDE 600; 310; 30; 20 MG/100ML; MG/100ML; MG/100ML; MG/100ML
INJECTION, SOLUTION INTRAVENOUS CONTINUOUS PRN
Status: DISCONTINUED | OUTPATIENT
Start: 2024-07-22 | End: 2024-07-22 | Stop reason: SURG

## 2024-07-22 RX ORDER — TRAMADOL HYDROCHLORIDE 50 MG/1
50 TABLET ORAL EVERY 6 HOURS SCHEDULED
Status: DISCONTINUED | OUTPATIENT
Start: 2024-07-22 | End: 2024-07-24

## 2024-07-22 RX ORDER — HYDROMORPHONE HYDROCHLORIDE 1 MG/ML
0.6 INJECTION, SOLUTION INTRAMUSCULAR; INTRAVENOUS; SUBCUTANEOUS EVERY 5 MIN PRN
Status: ACTIVE | OUTPATIENT
Start: 2024-07-22 | End: 2024-07-23

## 2024-07-22 RX ORDER — SODIUM CHLORIDE, SODIUM LACTATE, POTASSIUM CHLORIDE, CALCIUM CHLORIDE 600; 310; 30; 20 MG/100ML; MG/100ML; MG/100ML; MG/100ML
INJECTION, SOLUTION INTRAVENOUS CONTINUOUS
Status: DISCONTINUED | OUTPATIENT
Start: 2024-07-22 | End: 2024-07-23

## 2024-07-22 RX ORDER — LISINOPRIL 10 MG/1
10 TABLET ORAL DAILY
Status: DISCONTINUED | OUTPATIENT
Start: 2024-07-22 | End: 2024-07-24

## 2024-07-22 RX ORDER — DEXAMETHASONE SODIUM PHOSPHATE 4 MG/ML
VIAL (ML) INJECTION AS NEEDED
Status: DISCONTINUED | OUTPATIENT
Start: 2024-07-22 | End: 2024-07-22 | Stop reason: SURG

## 2024-07-22 RX ORDER — GLYCOPYRROLATE 0.2 MG/ML
INJECTION, SOLUTION INTRAMUSCULAR; INTRAVENOUS AS NEEDED
Status: DISCONTINUED | OUTPATIENT
Start: 2024-07-22 | End: 2024-07-22 | Stop reason: SURG

## 2024-07-22 RX ORDER — INSULIN DEGLUDEC 100 U/ML
30 INJECTION, SOLUTION SUBCUTANEOUS NIGHTLY
Status: DISCONTINUED | OUTPATIENT
Start: 2024-07-22 | End: 2024-07-23

## 2024-07-22 RX ORDER — POLYETHYLENE GLYCOL 3350 17 G/17G
17 POWDER, FOR SOLUTION ORAL DAILY PRN
Status: DISCONTINUED | OUTPATIENT
Start: 2024-07-22 | End: 2024-07-24

## 2024-07-22 RX ORDER — ONDANSETRON 2 MG/ML
INJECTION INTRAMUSCULAR; INTRAVENOUS AS NEEDED
Status: DISCONTINUED | OUTPATIENT
Start: 2024-07-22 | End: 2024-07-22 | Stop reason: SURG

## 2024-07-22 RX ORDER — NALOXONE HYDROCHLORIDE 0.4 MG/ML
0.08 INJECTION, SOLUTION INTRAMUSCULAR; INTRAVENOUS; SUBCUTANEOUS AS NEEDED
Status: ACTIVE | OUTPATIENT
Start: 2024-07-22 | End: 2024-07-23

## 2024-07-22 RX ORDER — HYDROMORPHONE HYDROCHLORIDE 1 MG/ML
0.2 INJECTION, SOLUTION INTRAMUSCULAR; INTRAVENOUS; SUBCUTANEOUS EVERY 2 HOUR PRN
Status: DISCONTINUED | OUTPATIENT
Start: 2024-07-22 | End: 2024-07-24

## 2024-07-22 RX ORDER — HYDROMORPHONE HYDROCHLORIDE 1 MG/ML
0.2 INJECTION, SOLUTION INTRAMUSCULAR; INTRAVENOUS; SUBCUTANEOUS EVERY 5 MIN PRN
Status: ACTIVE | OUTPATIENT
Start: 2024-07-22 | End: 2024-07-23

## 2024-07-22 RX ADMIN — CEFAZOLIN SODIUM 2 G: 1 INJECTION, POWDER, FOR SOLUTION INTRAMUSCULAR; INTRAVENOUS at 17:49:00

## 2024-07-22 RX ADMIN — LIDOCAINE HYDROCHLORIDE 70 MG: 10 INJECTION, SOLUTION EPIDURAL; INFILTRATION; INTRACAUDAL; PERINEURAL at 17:32:00

## 2024-07-22 RX ADMIN — GLYCOPYRROLATE 0.6 MG: 0.2 INJECTION, SOLUTION INTRAMUSCULAR; INTRAVENOUS at 18:45:00

## 2024-07-22 RX ADMIN — DEXAMETHASONE SODIUM PHOSPHATE 4 MG: 4 MG/ML VIAL (ML) INJECTION at 17:53:00

## 2024-07-22 RX ADMIN — ROCURONIUM BROMIDE 40 MG: 10 INJECTION, SOLUTION INTRAVENOUS at 17:32:00

## 2024-07-22 RX ADMIN — ONDANSETRON 4 MG: 2 INJECTION INTRAMUSCULAR; INTRAVENOUS at 18:39:00

## 2024-07-22 RX ADMIN — SODIUM CHLORIDE, SODIUM LACTATE, POTASSIUM CHLORIDE, CALCIUM CHLORIDE: 600; 310; 30; 20 INJECTION, SOLUTION INTRAVENOUS at 19:08:00

## 2024-07-22 RX ADMIN — NEOSTIGMINE METHYLSULFATE 3 MG: 1 INJECTION INTRAVENOUS at 18:45:00

## 2024-07-22 RX ADMIN — SODIUM CHLORIDE, SODIUM LACTATE, POTASSIUM CHLORIDE, CALCIUM CHLORIDE: 600; 310; 30; 20 INJECTION, SOLUTION INTRAVENOUS at 17:20:00

## 2024-07-22 NOTE — CONSULTS
ORTHO CONSULT NOTE    Patient Identification:        Name: Evangelina Ward  Age: 84 year old  Sex: female  :  1940  MRN: [id number]                                                HPI:        Evangelina Ward is a 84 year old year old female who presented to the ED after a fall onto the right hip.  She was unable to bear weight on the hip after the fall.  The patient developed immediate pain in the hip after the fall.  The patient was evaulated in the ER and was found to have a hip fracture. The patient was admitted to the hospitalist service and orthopaedics was consulted for management of the fracture. The patient currently complains of pain in the hip.  No numbness or tingling.  No chest pain or shortness of breath.  Of note, she has a subdural hematoma that is asymptomatic and being followed with serial Cts in neuro icu. Also, she is baseline Ambulator with walker given baseline neuropathy.     Problem List:  Patient Active Problem List   Diagnosis    Vertigo, benign paroxysmal    Abnormality of gait    Type 2 diabetes mellitus with diabetic neuropathy, with long-term current use of insulin (HCC)    CAD (coronary artery disease)    Obesity (BMI 30-39.9)    Lumbar radiculopathy    Lumbar facet arthropathy    Sacroiliitis, not elsewhere classified (HCC)    Long-term insulin use (HCC)    Benign positional vertigo    Vitamin D deficiency    Uncontrolled type 2 diabetes mellitus with diabetic polyneuropathy, with long-term current use of insulin    Urinary incontinence    Primary hypertension    Microalbuminuria due to type 2 diabetes mellitus (HCC)    Hyperlipidemia, unspecified hyperlipidemia type    Spinal stenosis of lumbar region without neurogenic claudication    Depression, unspecified depression type    Lymphedema    Encounter for preprocedure screening laboratory testing for COVID-19    Primary osteoarthritis of left shoulder    Paroxysmal atrial fibrillation (HCC)    Weakness generalized    Fall,  initial encounter    Injury of head, initial encounter    SDH (subdural hematoma) (HCC)    Type 2 diabetes mellitus with hyperglycemia (HCC)    Closed fracture of right hip ([facility])         Past Medical History:  Past Medical History:    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 elsewhere classified    idiopathic angioedema    Back problem    sciatica    Carpal tunnel syndrome    CORONARY ARTERY DISEASE    11/06: Ant WMA on stress, 12/06: PTCA to LAD,  12/08 Nuclear stress neg, EF 62%    CORONARY ARTERY DISEASE    11/06: ant ischemia on stress,   12/06: PTCA to distal LAD,    12/08 nuclear stress neg, EF 62%    Coronary atherosclerosis    Diabetes (HCC)    High blood pressure    High cholesterol    Hx of motion sickness    vertigo    HYPERTENSION    MENOPAUSE    ERT [id number]    Muscle weakness    LEGS SHAKE WHEN STANDING OR USING STAIRS    OSTEOARTHRITIS    1/06 Rt menisectomy    Osteoarthritis    Overweight and obesity    Personal history of colonic polyps    2/99 adenoma, 4/03 negative    Type 2 diabetes mellitus, uncontrolled    Uncontrolled type 2 diabetes mellitus with diabetic polyneuropathy, with long-term current use of insulin    Unspecified hereditary and idiopathic peripheral neuropathy    Vertigo    Vitamin D deficiency       Past Surgical History:  Past Surgical History:   Procedure Laterality Date    Anesth,knee arthroscopy      left knee repair torn meniscus    Angioplasty (coronary)  2006 w/2 drug eluting stents    Arthroscopy of joint unlisted Right     KNEE    Back surgery      L4-L5 TLIF    Cataract  05/2018    Bilateral    Cath drug eluting stent      X2    Cholecystectomy  1997    Colonoscopy  10/10/2013    Procedure: COLONOSCOPY;  Surgeon: Lauro Lazo MD;  Location:  ENDOSCOPY    Colonoscopy,biopsy  1999    adenomatous polyp    Colonoscopy,diagnostic  2003    wnl    Colonoscopy,diagnostic  10/10/13    normal    Endovenous laser vein addon      Right Greater Saph V  per Dr. Castaneda    Hysterectomy       w/ BSO    Knee replacement surgery  03/17/2018    left    Other      lipoma removed from left arm    Other      trigger finger release left hand middle finger    Other surgical history      vein stripping    Revise median n/carpal tunnel surg      left carpal tunnel release       Home Meds:  Medications Prior to Admission   Medication Sig Dispense Refill Last Dose    lisinopril 20 MG Oral Tab Take 1 tablet (20 mg total) by mouth in the morning and 1 tablet (20 mg total) before bedtime. 180 tablet 3 7/20/2024    apixaban 5 MG Oral Tab Take 1 tablet (5 mg total) by mouth 2 (two) times daily. 180 tablet 3 7/20/2024    cholecalciferol 50 MCG (2000 UT) Oral Tab Take 1 tablet (2,000 Units total) by mouth daily.   7/20/2024    docusate sodium 100 MG Oral Cap Take 1 capsule (100 mg total) by mouth 2 (two) times daily.   Past Month    polyethylene glycol, PEG 3350, 17 g Oral Powd Pack Take 17 g by mouth daily as needed.   Past Month    atorvastatin 20 MG Oral Tab Take 1 tablet (20 mg total) by mouth nightly.   7/20/2024    aspirin 81 MG Oral Tab EC Take 1 tablet (81 mg total) by mouth daily.   7/20/2024    metFORMIN HCl  MG Oral Tablet 24 Hr Take 2 tablets (1,000 mg total) by mouth 2 (two) times daily. 360 tablet 3 7/20/2024    insulin glargine (LANTUS SOLOSTAR) 100 UNIT/ML Subcutaneous Solution Pen-injector INJECT up to 48 UNITS INTO THE SKIN ONCE DAILY. (Patient taking differently: 53 Units every morning. INJECT 40 UNITS INTO THE SKIN in the evening) 45 mL 3 7/20/2024    Insulin Pen Needle (BD PEN NEEDLE BIJU 2ND GEN) 32G X 4 MM Does not apply Misc Use once per day. 100 each 3 7/20/2024    glipiZIDE 10 MG Oral Tab Take 1 tablet (10 mg total) by mouth 2 (two) times daily before meals. 180 tablet 3 7/20/2024    FREESTYLE LITE TEST In Vitro Strip TEST THREE TIMES DAILY 300 strip 3 7/20/2024    torsemide (DEMADEX) 20 MG Oral Tab Take 1 tablet (20 mg total) by mouth daily.   7/20/2024       Current Meds:    [COMPLETED]  magnesium oxide (Mag-Ox) tab 400 mg  400 mg Oral Once    lisinopril (Prinivil; Zestril) tab 20 mg  20 mg Oral Daily    sodium chloride 0.9% infusion   Intravenous Continuous    [COMPLETED] Tetanus-Diphth-Acell Pertussis (Tdap) (Boostrix) injection 0.5 mL  0.5 mL Intramuscular Once    [COMPLETED] ondansetron (Zofran) 4 MG/2ML injection 4 mg  4 mg Intravenous Once    [COMPLETED] levETIRAcetam (Keppra) 500 mg/5mL injection 500 mg  500 mg Intravenous Once    [COMPLETED] fentaNYL (Sublimaze) 50 mcg/mL injection 50 mcg  50 mcg Intravenous Once    [COMPLETED] morphINE PF 4 MG/ML injection 4 mg  4 mg Intravenous Once    [COMPLETED] labetalol (Trandate) 5 mg/mL injection 10 mg  10 mg Intravenous Q10 Min PRN    hydrALAzine (Apresoline) 20 mg/mL injection 10 mg  10 mg Intravenous Q2H PRN    ondansetron (Zofran) 4 MG/2ML injection 4 mg  4 mg Intravenous Q6H PRN    metoclopramide (Reglan) 5 mg/mL injection 10 mg  10 mg Intravenous Q8H PRN    [COMPLETED] desmopressin (DDAVP) 27 mcg in sodium chloride 0.9% 50 mL IVPB  0.3 mcg/kg Intravenous Once    glucose (Dex4) 15 GM/59ML oral liquid 15 g  15 g Oral Q15 Min PRN    Or    glucose (Glutose) 40% oral gel 15 g  15 g Oral Q15 Min PRN    Or    glucose-vitamin C (Dex-4) chewable tab 4 tablet  4 tablet Oral Q15 Min PRN    Or    dextrose 50% injection 50 mL  50 mL Intravenous Q15 Min PRN    Or    glucose (Dex4) 15 GM/59ML oral liquid 30 g  30 g Oral Q15 Min PRN    Or    glucose (Glutose) 40% oral gel 30 g  30 g Oral Q15 Min PRN    Or    glucose-vitamin C (Dex-4) chewable tab 8 tablet  8 tablet Oral Q15 Min PRN    insulin aspart (NovoLOG) 100 Units/mL FlexPen 1-5 Units  1-5 Units Subcutaneous TID AC and HS    labetalol (Trandate) 5 mg/mL injection 10 mg  10 mg Intravenous Q10 Min PRN    traMADol (Ultram) tab 50 mg  50 mg Oral Q6H PRN         Allergies:  Allergies   Allergen Reactions    Cipro [Ciprofloxacin] HIVES    Insulin Aspart, Human Analog PAIN     Achiness. HA , head pains     Insulin Lispro, Human PAIN     Achiness, HA, head pain    Tylenol [Acetaminophen] SWELLING     Tongue/mouth       Social History:   Social History     Tobacco Use    Smoking status: Never    Smokeless tobacco: Never   Substance Use Topics    Alcohol use: Not Currently     Comment: Maricruz and New Years       Family History:  Family History   Problem Relation Age of Onset    Other (colon cancer) Mother         age 77    Heart Disorder Father         age 75    Diabetes Paternal Grandmother     Other (gastric cancer) Maternal Grandmother        Review of Systems:      Negative for fever, chills, nausea, vomiting, chest pain, shortness of breath, headache, dizziness, vision changes, or slurred speech.  All other pertinent positives and negatives as noted above in HPI.      Vitals:   Blood pressure 139/55, pulse 64, temperature 97.6 °F (36.4 °C), temperature source Temporal, resp. rate 15, height 5' 5\" (1.651 m), weight 198 lb 6.6 oz (90 kg), SpO2 93%, not currently breastfeeding.    I/O:     Intake/Output Summary (Last 24 hours) at 7/22/2024 1054  Last data filed at 7/22/2024 0600  Gross per 24 hour   Intake 940.5 ml   Output 550 ml   Net 390.5 ml       Physical Exam:        General - awake, alert, and oriented x 3, answers questions appropriately, well nourished, no acute distress  HEENT: atraumatic  Neck: supple  Skin: no rash  Lung: unlabored breathing  CV: palpable dp/pt pulses    right lower extremity:  Extremity held in externally rotated position  Slightly shortened compared to contralateral extremity  Tender to palpation over greater trochanter  Able to dorsiflex ankle and move toes  Pain with any IR/ER of hip  Unable to test ROM due to pain  Sensation grossly intact to light touch throughout  Distal pulses intact  No calf swelling  Geno's sign negative  Compartments soft  No signs or symptoms of DVT or compartment syndrome    Exam of the contralateral hip is normal:  No atrophy, erythema, ecchymosis, or  gross deformity noted  No tenderness to palpation  Full active range of motion  5/5 strength in hip flexion, abduction, and adduction  Stinchfield and straight leg raise negative  SHAHEEN negative  Sensation grossly intact to light tough throughout  Skin and distal pulses intact      Labs:      Lab Results   Component Value Date    WBC 7.0 07/22/2024    HGB 9.0 07/22/2024    HCT 28.3 07/22/2024    .0 07/22/2024    CREATSERUM 0.89 07/22/2024    BUN 19 07/22/2024     07/22/2024    K 5.4 07/22/2024     07/22/2024    CO2 25.0 07/22/2024     07/22/2024    CA 9.0 07/22/2024    MG 1.7 07/22/2024    PHOS 4.2 07/22/2024         Diagnostic Studies:      AP of the pelvis and lateral of the right hip were reviewed from the ED.  There is an intertrochanteric fracture of the hip with minimal comminution at the fracture site.  The fracture is displaced. There are no periosteal reactions or medullary lesions seen.      Assessment:     right hip intertrochanteric fracture     Plan:      I have discussed the nature of intertrochanteric fractures with the patient and family.  This fracture will benefit from intramedullary fixation.  The surgical procedure will be scheduled once all medical and any necessary cardiac clearances have been obtained.    The risks, benefits, and alternatives of hip fracture surgery were discussed extensively.  The risks include but are not limited to infection, DVT, PE, bleeding and blood loss, damage to nerves or blood vessels, malunion or nonunion, dislocation, continued pain, hip stiffness/loss of motion, hardware irritation, and the possibility of future arthrosis of the hip.  The risks of anesthesia including heart attack, stroke, and even death were discussed.  The typical post-operative course and rehab plan were discussed as well.  Activity restrictions following the surgery were emphasized and the patient expressed understanding.  All the patient's questions were answered.   A consent form was signed and placed on the chart.  The patient will receive perioperative antibiotics.  The patient will be placed on DVT prophylaxis after surgery.    Per the Secondary Fracture Prevention: Consensus Clinical Recommendations from a Multistakeholder Coalition, it was communicated that the patient's fracture likely means they have osteoporosis and are at high risk for breaking more bones, especially over the next 1 to 2 years. Additionally, it was expressed that their injury means they may suffer declines in mobility or independence--for example, have to use a walker, cane, or wheelchair, or move from their home to a residential facility, or stop participating in favorite activities (two thirds to one half do not regain prior ambulatory function with over 10% not being to ambulate at all). The odds of dying prematurely was again discussed. Emphasized the need to treat the scope of this problem through medical management.    Minimize use of medications associated w falls while in house and after discharge.    Patient will need close follow up w pcp and endocrinologist within a week of discharge given this fragility fracture. Patients who have had repair of a hip fracture or are hospitalized for a vertebral fracture can begin taking oral anti-osteoporosis pharmacotherapy in the hospital.    Initiate a daily supplement of at least 800 IU vitamin D per day for people aged 65 years or older with a hip or vertebral fracture. Initiate a daily calcium supplement for people aged 65 years or older with a hip or vertebral fracture who are unable to achieve an intake of 1200 mg/d of calcium from food sources.      Admit to hospitalist, appreciate risk stratification and medical optimization  Discussed conservative vs operative treatment, patient would benefit from operative intervention  PAKO MORGAN  Npo, hold anticoagulation for possible OR today   Plan for right hip IMN   Appreciate neuro/icu recs- discussed with  nurse who reports he is cleared to proceed with surgery today from neuro standpoint   Discussed risks, benefits and alternatives to treatment  Pt marked, consented and all questions answered   Proceed with surgery as planned      Electronically Signed By: Brian eClis MD, 7/22/2024, 10:54 AM        Brian Celis MD  7/22/2024

## 2024-07-22 NOTE — ANESTHESIA PROCEDURE NOTES
Airway  Date/Time: 7/22/2024 5:34 PM  Urgency: elective    Airway not difficult    General Information and Staff    Patient location during procedure: OR  Anesthesiologist: Niranjan Salcido DO  Performed: anesthesiologist   Performed by: Niranjan Salcido DO  Authorized by: Niranjan Salcido DO      Indications and Patient Condition  Indications for airway management: anesthesia  Spontaneous ventilation: present  Sedation level: deep  Preoxygenated: yes  Patient position: sniffing  Mask difficulty assessment: 1 - vent by mask    Final Airway Details  Final airway type: endotracheal airway      Successful airway: ETT  Cuffed: yes   Successful intubation technique: direct laryngoscopy  Endotracheal tube insertion site: oral  Blade: Stu  Blade size: #3  ETT size (mm): 7.5    Cormack-Lehane Classification: grade I - full view of glottis  Placement verified by: capnometry   Measured from: lips  ETT to lips (cm): 23  Number of attempts at approach: 1  Ventilation between attempts: none    Additional Comments  Dentition, lips, gums intact as preinduction

## 2024-07-22 NOTE — PROGRESS NOTES
[facility]   part of [facility]     Hospitalist Progress Note     Evangelina Ward Patient Status:  Inpatient    1940 MRN [id number]   Location [facility] 6NE-A Attending Carlos Pollard MD   Hosp Day # 1 PCP Dorina Higuera MD     Chief Complaint: Subdural hematoma and right hip fracture    Subjective:     Patient is doing well, no headaches no nausea no vomiting still with bruising under the right eye, sugars have been elevated patient has been n.p.o. plan for ORIF today  Had a repeat CT head this morning that showed stable subdural hematoma however did have a CT head last night that showed increased subdural hematoma and was given DDAVP    Blood pressure being managed by neurocritical care and neurosurgery  Objective:    Review of Systems:   A comprehensive review of systems was completed; pertinent positive and negatives stated in subjective.    Vital signs:  Temp:  [96.8 °F (36 °C)-98.1 °F (36.7 °C)] 96.8 °F (36 °C)  Pulse:  [64-86] 69  Resp:  [15-24] 16  BP: (126-176)/() 134/78  SpO2:  [87 %-100 %] 93 %    Physical Exam:      Gen: No acute distress, alert and oriented x 3, bruising noted on the right scalp with hematomas  Pulm: Lungs clear bilaterally, good inspiratory effort   CV:  nL S1/S2  Abd: soft, NT/ND, no hepatomegaly, +BS  MSK: moving all extremities, no edema  Neuro: no focal deficits  Skin: no rashes/lesions  Psych: normal mood/affect    Diagnostic Data:    Labs:  Recent Labs   Lab 24  0754 24  0444 24  1512   WBC 6.9 7.0  --    HGB 11.4* 9.0* 9.5*   MCV 87.4 90.1  --    .0 190.0  --    INR 1.16  --   --        Recent Labs   Lab 24  0754 24  0444   * 261*   BUN 18 19   CREATSERUM 0.87 0.89   CA 9.5 9.0   ALB 4.0  --     139   K 4.7 5.4*    110   CO2 22.0 25.0   ALKPHO 69  --    AST 18  --    ALT 19  --    BILT 0.4  --    TP 6.5  --        Estimated Creatinine Clearance: 43.9 mL/min (based on SCr of 0.89  mg/dL).    Recent Labs   Lab 07/21/24  0754   TROPHS 9       Recent Labs   Lab 07/21/24  0754   PTP 14.8   INR 1.16                  Microbiology    No results found for this visit on 07/21/24.      Imaging: Reviewed in Epic.    Medications:    lisinopril  20 mg Oral Daily    clonidine/epinephrine/ropivacaine/ketorolac in 0.9% NaCl 60 mL pain cocktail syringe for hip arthroplasty   Infiltration Once (Intra-Op)    atorvastatin  20 mg Oral Nightly    insulin degludec  30 Units Subcutaneous Nightly    insulin aspart  1-5 Units Subcutaneous TID AC and HS       Assessment & Plan:             # Acute right subdural hematoma as well as epidural hematoma  -CT head initially reviewed with small right subdural and epidural hematoma, repeat CT head with enlarging right subdural hematoma last night  -Repeat CT head on 7/22/2024 shows stable hematoma, clinically intact  -Neurology, neurosurgery on board  -Defer surgical plans for to neurosurgery, no plans for surgery at this time  -Neurochecks, blood pressure management per neurocritical care goal SBP of less than 150  -Defer reversal of Eliquis to neurosurgery and neurocritical care.  -Continue to hold antiplatelets and anticoagulants at this time-neurosurgery cleared for patient to have orthopedic surgery    # Right hip fracture  -Status post fall, plan for ORIF today n.p.o.  -Postop fluids, antibiotics, DVT prophylaxis per orthopedic surgery  -Repeat CT head after surgery to assess bleed stability  -Ortho on board         # Accelerated hypertension  -Goal SBP less than 150, defer management to neurosurgery  -IV labetalol, hydralazine, nicardipine drip per neuro critical care and neurosurgery     # Paroxysmal A-fib  -On Eliquis at home, now fall hold Eliquis and all anticoagulation  -Since bleed is enlarging may need reversal of agent however will defer to neurology and neurosurgery     # Hyperlipidemia  # Type 2 diabetes-sliding scale insulin  -Patient takes with units of  insulin at night long-acting, will start with 30 units at night after patient comes back from surgery from [facility]  -Currently glucose is uncontrolled     # CAD continue to hold aspirin-      Carlos Pollard MD    Supplementary Documentation:     Quality:  DVT Mechanical Prophylaxis:   SCDs,    DVT Pharmacologic Prophylaxis   Medication   None                Code Status: Full Code  Thacker: External urinary catheter in place  Thacker Duration (in days):   Central line:      The 21st Century Cures Act makes medical notes like these available to patients in the interest of transparency. Please be advised this is a medical document. Medical documents are intended to carry relevant information, facts as evident, and the clinical opinion of the practitioner. The medical note is intended as peer to peer communication and may appear blunt or direct. It is written in medical language and may contain abbreviations or verbiage that are unfamiliar.

## 2024-07-22 NOTE — OCCUPATIONAL THERAPY NOTE
Received order for OT evaluation. Patient is scheduled for R ORIF surgery today.  OT will evaluate the patient post-op.

## 2024-07-22 NOTE — PHYSICAL THERAPY NOTE
Reviewed pt's chart. Pt had a fall with SDH.  At this time pt has a planned R ORIF surgery today. PT Will follow up when appropriate.

## 2024-07-22 NOTE — SLP NOTE
Order received, note patient NPO for surgery later today. Will hold and continue to follow, completing evaluation as clinically appropriate.    Aníbal Overton MS CCC-SLP  Pager [phone]

## 2024-07-22 NOTE — OPERATIVE REPORT
OPERATIVE REPORT    Facility:  [facility]    Patient Name:  Evangelina Ward    Age/Gender:  84 year old female  :  1940    MRN:  [id number]    Date of Operation:  2024    Preoperative Diagnosis:  RIGHT INTERTROCHANTERIC FEMUR FRACTURE    Postoperative Diagnosis:  RIGHT INTERTROCHANTERIC FEMUR FRACTURE    Procedure Performed:  RIGHT FEMUR INTRAMEDULLARY NAILING    Surgeon:  BERNARDO HEWITT M.D.    Assistant:  ISABELLE Mercer, FNP-BC    Anesthesia:  GENERAL    Estimated Blood Loss:  80cc    Drain:  NONE    Complications:  NONE    Implants:   1.  Andrzej Natural short cephalomedullary nail, 130 degree, 10 mm x 21.5 cm   2.  Lag screw 10.5 mm x 100 mm    Indications for Procedure:  Evangelina Ward is a 84 year old female with a rightright intertrochanteric femur fracture.  Medical clearance was obtained and surgery was scheduled.    Description of Procedure:  The patient was taken to the operating room after the correct surgical site was marked in the preop holding area.  The patient was placed under anesthesia and placed in a supine position on the [place] orthopaedic table.  Preoperative antibiotics were given.  All bony prominences were padded.  A reduction was obtained on the orthopaedic table verified by fluoroscopy.  The right hip was prepped and draped in usual sterile surgical fashion.  An incision was made proximal to the greater trochanter and a guidewire was used to locate the tip of the trochanter and advanced into the intramedullary canal and confirmed on fluoroscopy (AP and lateral views). The opening reamer was used to open the proximal femur. A short nail was selected given the relatively stable fracture pattern.  The nail was passed using imaging.  A guidepin was placed into the femoral head using the insertion handle guide and verified with flouroscopy.  Measurements were taken from the guidewire and a lag screw was placed after reaming. Position of the screw was verified on AP  ans lateral views.  The locking screw was placed into the top of the nail. Next using the insertion guide from the handle the distal locking screw was placed through a lateral incision. The depth was measured form the drill guide and appropriate length screw placed and confirmed length and position with fluoroscopy. The insertion handle removed.  Fluoroscopy verified reduction and position of the implants.  Wounds were irrigated and closed with 0 vicryl, 2-0 vicryl and staples. No drain used.  The surgical assistant was present for the entire procedure and assisted with the approach, exposure, definitive surgery and closure.  The patient left the operating room in stable condition.  There were no complications.    mobilize with PT, WBAT on operative extremity with walker  pain controlled with meds  Typically ok to resume preop eliquis tomorrow evening, but will defer to primary/neuro given subdural hematoma   24 hours abx    BERNARDO HEWITT M.D.

## 2024-07-22 NOTE — PROGRESS NOTES
[facility]  Neurosurgery Progress Note    Evangelina Ward Patient Status:  Inpatient    1940 MRN [id number]   Location [facility] 6NE-A Attending Carlos Pollard MD   Hosp Day # 1 PCP Dorina Higuera MD     Chief Complaint:  S/p fall, SDH     Subjective:  No acute overnight events. Pt examined, she denies headache, blurry or double vision, or dizziness. She has some residual numbness along the inferior aspect of her right eye but she feels this is improving. She continues to endorse significant right leg pain.     Objective/Physical Exam:    Vital Signs:  Blood pressure 139/55, pulse 64, temperature 97.6 °F (36.4 °C), temperature source Temporal, resp. rate 15, height 65\", weight 198 lb 6.6 oz (90 kg), SpO2 93%, not currently breastfeeding.    Respiratory:  Respirations nonlabored    CV:  NSR on tele    General: NAD, Speech Fluent, Mood Appropriate    HEENT: Right facial ecchymosis     Neurologic: This patient is alert and orientated x 3.  Able to follow commands.  Face is symmetric. PERRLA +3 brisk, EOMI.  CN 2-12 GI,  Sensation to light touch is intact to upper extremities bilaterally. Sensation to light touch of right lower extremity is diminished. Finger-to-nose coordination is intact.  No Pronator Drift.      Upper extremity strength:      Deltoid  Biceps  Triceps        Right 5 5 5 5     Left 5 5 5 5    Lower extremity strength:      Iliospoas  Hamstrings  Quads  D-flexion  P-flexion     Right 2 limited by pain 2 limited by pain 2 limited by pain 4 4     Left 4+ 4+ 4+ 5 5          Labs:  Lab Results   Component Value Date    WBC 7.0 2024    HGB 9.0 2024    HCT 28.3 2024    .0 2024    CREATSERUM 0.89 2024    BUN 19 2024     2024    K 5.4 2024     2024    CO2 25.0 2024     2024    CA 9.0 2024    MG 1.7 2024    PHOS 4.2 2024    PGLU 262 2024       Imaging:    CT BRAIN OR HEAD  ([id number])    Result Date: 7/22/2024  CONCLUSION:  1. Acute subdural hemorrhage is again noted along the right tentorium, not significantly changed. 2. Mild to moderate chronic small vessel ischemic disease. If there is clinical concern for acute ischemia/infarction, an MRI of the brain would be recommended for further evaluation.    LOCATION:  [facility]   Dictated by (CST): Stromberg, LeRoy, MD on 7/22/2024 at 8:21 AM     Finalized by (CST): Stromberg, LeRoy, MD on 7/22/2024 at 8:26 AM       CT BRAIN OR HEAD ([id number])    Result Date: 7/22/2024  CONCLUSION:  No significant change in extra-axial hemorrhage overlying the right tentorial leaflet for which continued follow-up is suggested.  A preliminary report was provided by [facility].     LOCATION:  [facility]   Dictated by (CST): Dick Carballo MD on 7/22/2024 at 7:32 AM     Finalized by (CST): Dick Carballo MD on 7/22/2024 at 7:35 AM       CT BRAIN OR HEAD ([id number])    Result Date: 7/21/2024  CONCLUSION:  Enlarging subdural hematoma along the right tentorium.  Findings were communicated with the patient's nurse, Imani, at 15:42 on 7/21/2024.  There was appropriate read back.    LOCATION:  [facility]   Dictated by (CST): Bebeto Harris MD on 7/21/2024 at 3:38 PM     Finalized by (CST): Bebeto Harris MD on 7/21/2024 at 3:43 PM        CT FACIAL BONES (CPT=70486)    Result Date: 7/21/2024  CONCLUSION:  No acute facial bone fracture.  Periorbital scalp hematomas.   LOCATION:  [facility]   Dictated by (CST): Bebeto Harris MD on 7/21/2024 at 9:03 AM     Finalized by (CST): Bebeto Harris MD on 7/21/2024 at 9:06 AM       CT SPINE CERVICAL (CPT=72125)    Result Date: 7/21/2024  CONCLUSION:  No acute cervical spine fracture.  Multilevel degenerative changes, similar to prior.    LOCATION:  [facility]   Dictated by (CST): Bebeto Harris MD on 7/21/2024 at 8:59 AM     Finalized by (CST): Bebeto Harris MD on 7/21/2024 at 9:02 AM       CT BRAIN OR HEAD ([id number])    Result Date: 7/21/2024  CONCLUSION: 1.  Epidural/subdural hematoma along the right tentorium measuring approximately 1.5 centimeters in thickness, as described.  Small scalp hematomas around the right periorbital region. 2. Cerebral atrophy with chronic microvascular ischemic changes.  COMMUNICATION:  The findings were communicated with Dr. Zabala at 0856 on 7/21/2024. There was appropriate read back.   LOCATION:  [facility]   Dictated by (CST): Bebeto Harris MD on 7/21/2024 at 8:44 AM     Finalized by (CST): Bebeto Harris MD on 7/21/2024 at 8:57 AM         Assessment:  85 y/o female with pertinent PMH of CAD and AFIB on eliquis who presents to the ED s/p fall, CT head with subdural hematoma    Repeat scan from this morning demonstrates stable right tentorial SDH     Plan:  OK to proceed with ORIF for right femur fracture from neurosurgical standpoint  Head CT to be completed after surgery to monitor SDH   Continue to hold Eliquis   Medical management per hospitalist and neurocritical care   No acute neurosurgical intervention recommended at this time     Patient seen and examined with Dr. Schwarz. Patient agreed to the plan, verbalized understanding and was very appreciative.       Trena Villanueva PA-C   [facility]  7/22/2024, 10:59 AM      A total of 30 minutes of visit time (exclusible of billable procedures) was administered.  > 50 % of time spent counseling/coordinating care     Is this a shared or split note between Advanced Practice Provider and Physician? Yes

## 2024-07-22 NOTE — PLAN OF CARE
Received pt from ED at 1430. Q1 neuro checks - stable, see flowsheets. Aox4, follows commands. Reported mild headache and some pain in right hip. Pt declined pain medication at this time, managed with non-pharmacological means. PRN labetalol x2 to maintain SBP < 150.

## 2024-07-22 NOTE — PROGRESS NOTES
[facility]   NEUROCRITICAL CARE   PROGRESS NOTE    Admission date: 7/21/2024  Reason for Consult: TBI  Chief Complaint:   Chief Complaint   Patient presents with    Fall   ________________________________________________________________    SUBJECTIVE     24 Hour Significant Events: Follow-up CT scan yesterday evening and this morning demonstrates stability.  Blood pressure elevated, restarting home BP medication. Waiting for OR this PM.     OBJECTIVE   VITAL SIGNS:   Temp:  [97.1 °F (36.2 °C)-98.1 °F (36.7 °C)] 97.6 °F (36.4 °C)  Pulse:  [63-86] 68  Resp:  [15-24] 16  BP: (126-181)/() 162/64  SpO2:  [92 %-100 %] 95 %    INTAKE/OUTPUT:  Intake/Output Summary (Last 24 hours) at 7/22/2024 0834  Last data filed at 7/22/2024 0600  Gross per 24 hour   Intake 940.5 ml   Output 550 ml   Net 390.5 ml     PHYSICAL EXAM:  GENERAL APPEARANCE: Patient resting comfortably in bed, NAD  HEENT: Normocephalic, R facial ecchymosis .   LUNGS: Normal respiratory rate and effort   HEART: Regular rate and rhythm   ABDOMEN: Soft. No tenderness, guarding, or rebound.   EXTREMITIES: BL LE swelling, L wrist wrapped. No obvious cyanosis, clubbing     NEUROLOGIC:    Mental Status:  A&O x 4, Follows simple commands, no obvious aphasia or dysarthria  Cranial nerves: PERRL.  Visual fields full.  EOMI.  Face symmetric. Hearing grossly intact. Tongue midline   Motor: Drift:  Absent in BLUE; Full strength in BL UE, LLE antigravity, RLE extremely limited by pain, 2-/5  Sensation: Intact to light touch bilaterally except decreased sensation in LUE and LLE  Cerebellar: Normal Finger-To-Nose test     LABORATORY DATA:  Last 24 hour labs were reviewed in detail.  Recent Labs   Lab 07/21/24  0754 07/22/24  0444    139   K 4.7 5.4*    110   CO2 22.0 25.0   * 261*   BUN 18 19   CREATSERUM 0.87 0.89     Recent Labs   Lab 07/21/24  0754 07/22/24  0444   WBC 6.9 7.0   HGB 11.4* 9.0*   .0 190.0     Recent Labs    Lab 07/21/24  0754   ALT 19   AST 18     Recent Labs   Lab 07/22/24  0444   MG 1.7   PHOS 4.2       Scheduled Meds:   lisinopril  20 mg Oral Daily    insulin aspart  1-5 Units Subcutaneous TID AC and HS     Continuous Infusions:  PRN Meds:  hydrALAzine    ondansetron    metoclopramide    glucose **OR** glucose **OR** glucose-vitamin C **OR** dextrose **OR** glucose **OR** glucose **OR** glucose-vitamin C    labetalol    traMADol    Radiology:    CT BRAIN OR HEAD ([id number])    Result Date: 7/22/2024  CONCLUSION:  1. Acute subdural hemorrhage is again noted along the right tentorium, not significantly changed. 2. Mild to moderate chronic small vessel ischemic disease. If there is clinical concern for acute ischemia/infarction, an MRI of the brain would be recommended for further evaluation.    LOCATION:  [facility]   Dictated by (CST): Stromberg, LeRoy, MD on 7/22/2024 at 8:21 AM     Finalized by (CST): Stromberg, LeRoy, MD on 7/22/2024 at 8:26 AM       CT BRAIN OR HEAD ([id number])    Result Date: 7/22/2024  CONCLUSION:  No significant change in extra-axial hemorrhage overlying the right tentorial leaflet for which continued follow-up is suggested.  A preliminary report was provided by [facility].     LOCATION:  [facility]   Dictated by (CST): Dick Carballo MD on 7/22/2024 at 7:32 AM     Finalized by (CST): Dick Carballo MD on 7/22/2024 at 7:35 AM       CT BRAIN OR HEAD ([id number])    Result Date: 7/21/2024  CONCLUSION:  Enlarging subdural hematoma along the right tentorium.  Findings were communicated with the patient's nurse, Imani, at 15:42 on 7/21/2024.  There was appropriate read back.    LOCATION:  [facility]   Dictated by (CST): Bebeto Harris MD on 7/21/2024 at 3:38 PM     Finalized by (CST): Bebeto Harris MD on 7/21/2024 at 3:43 PM       XR FEMUR MIN 2 VIEWS RIGHT (CPT=73552)    Result Date: 7/21/2024  CONCLUSION:  Mildly displaced intertrochanteric femoral fracture.   LOCATION:  [facility]   Dictated by (CST): Bebeto Harris MD on  7/21/2024 at 10:17 AM     Finalized by (CST): Bebeto Harris MD on 7/21/2024 at 10:17 AM       XR HIP W OR WO PELVIS 2 OR 3 VIEWS, RIGHT (CPT=73502)    Result Date: 7/21/2024  CONCLUSION:  Mildly displaced intertrochanteric right femur fracture.   LOCATION:  [facility]   Dictated by (CST): Bebeto Harris MD on 7/21/2024 at 10:16 AM     Finalized by (CST): Bebeto Harris MD on 7/21/2024 at 10:16 AM       XR CHEST AP PORTABLE  (CPT=71045)    Result Date: 7/21/2024  CONCLUSION:  No acute cardiopulmonary findings.   LOCATION:  [facility]      Dictated by (CST): Bebeto Harris MD on 7/21/2024 at 10:15 AM     Finalized by (CST): Bebeto Harris MD on 7/21/2024 at 10:16 AM       XR HAND (MIN 3 VIEWS), LEFT (CPT=73130)    Result Date: 7/21/2024  CONCLUSION:  See above   LOCATION:  [facility]   Dictated by (CST): Bebeto Harris MD on 7/21/2024 at 10:13 AM     Finalized by (CST): Bebeto Harris MD on 7/21/2024 at 10:15 AM       CT FACIAL BONES (CPT=70486)    Result Date: 7/21/2024  CONCLUSION:  No acute facial bone fracture.  Periorbital scalp hematomas.   LOCATION:  [facility]   Dictated by (CST): Bebeto Harris MD on 7/21/2024 at 9:03 AM     Finalized by (CST): Bebeto Harris MD on 7/21/2024 at 9:06 AM       CT SPINE CERVICAL (CPT=72125)    Result Date: 7/21/2024  CONCLUSION:  No acute cervical spine fracture.  Multilevel degenerative changes, similar to prior.    LOCATION:  [facility]   Dictated by (CST): Bebeto Harris MD on 7/21/2024 at 8:59 AM     Finalized by (CST): Bebeto Harris MD on 7/21/2024 at 9:02 AM       CT BRAIN OR HEAD ([id number])    Result Date: 7/21/2024  CONCLUSION: 1. Epidural/subdural hematoma along the right tentorium measuring approximately 1.5 centimeters in thickness, as described.  Small scalp hematomas around the right periorbital region. 2. Cerebral atrophy with chronic microvascular ischemic changes.  COMMUNICATION:  The findings were communicated with Dr. Zabala at 0856 on 7/21/2024. There was appropriate read back.    LOCATION:  [facility]   Dictated by (CST): Bebeto Harris MD on 7/21/2024 at 8:44 AM     Finalized by (CST): Bebeto Harris MD on 7/21/2024 at 8:57 AM      ASSESSMENT/PLAN   84 year old female with PMH of afib on AC, HTN, HLD, CAD, and DM who presents after a fall this morning while trying to get out of bed.     Neurological:  R tentorial Subdural Hematoma         - Initial CT with above         - INR 1.16 and Plt 219          - 6 hr CT with slight worsening s/p ddAVP, exam stable         - Midnight and AM scans demonstrating stability          - Nsg following, appreciate recs          - PT/OT evals          - Will follow peripherally, call if further questions or concerns     Fall - C/b above and R hip fx, ortho consulted, s/p nerve block per anesthesia in ED, plan for OR this afternoon       Cardiovascular:  Accelerated HTN         - SBP goal < 150         - Hold oral meds for OR, resume post op as tolerated          - IV labetalol/hydralazine pushes prn            CAD - Hold asa until cleared by nsg      Afib - Currently in sinus, hold AC until cleared by surgical teams, prn lopressor for HR goal < 120     HLD - Continue home med     Pulmonary:  - Satting well on 2L NC, wean as tolerated, encourage IS      Renal:  - Monitor I&Os      Hypomagnesemia - Mag being replaced this AM     Gastrointestinal:  Nutrition:        - NPO pending ortho eval         - Bowel regimen: ordered prn     Infectious Disease: - No leukocytosis, afebrile, continue to monitor      Heme/Onc   Anemia - Down this AM, repeat in PM, Hb goal > 7, continue to monitor daily      Endocrine:  DM Type II, uncontrolled          - Hold PO Meds in the hospital         - HbA1c 8.5 in 2023         - Accuchecks q6 with SSI prn      Checklist         - Lines: PIVs         - DVT Prophylaxis: SCDs          - Diet: NPO pending ortho plans         - IVF: NS - Dc if advancing diet          - Electrolytes: Replete per protocol         - Code Status: FULL     Dispo:  CNICU pending nsg clearance      Greater than 35 minutes of critical care time (exclusive of billable procedures) was administered to manage and/or prevent neurologic instability. This involved direct patient intervention, complex decision making, and/or extensive discussions with the patient, family, and clinical staff.     Lebron Velasco MD  Neurocritical Care  [facility]     Disclaimer: This record was dictated using Dragon software. There may be errors due to voice recognition problems that were not realized and corrected during the completion of the note.

## 2024-07-22 NOTE — PLAN OF CARE
Upon assessment, Pt alert and oriented x4. Pt able to voice needs and follow commands. Pt deny's being in any pain when not moving around, but complaints of pain level 3/10 when moving. POC discussed, NCC and NSGY okay with sx for ORIF R, FX. CT scan of the head post sx. CT head order in place. Discussed lisinopril dose, but pt did not know dose at this time. Secured message hospitalist to let her know pt stated 50 units of Lantus in the AM daily. Orders received. Notified hospitalist of pt's cloudy urine, order received for urinalysis. Call light within reach. POC ongoing.     1115: Consent for procedure signed and placed in pt's chart.       1600: Pt's wedding ring in lockbox and with Security, with ticket in pt's chart.     1900: Pt returned to floor, elevated sbp 170's, labetalol administered by incoming nurse ZENA Gamino. Pt previously given zofran and fentanyl per anesthesiology. Pt alert and oriented x4, following commands and able to voice needs. Pt with SCD's in place, call light within reach. POC ongoing.

## 2024-07-22 NOTE — CDS QUERY
CLINICAL DOCUMENTATION CLARIFICATION FORM    Dear Doctor Pollard  Is the right subdural hematoma and epidural hematoma associated with or due to the Eliquis use?  (x  ) Yes, the right subdural hematoma and epidural hematoma are associated with or due to the Eliquis use   (  ) No, the right subdural hematoma and epidural hematoma are not associated with or due to Eliquis use  (   ) Clinically Unable to Determine  (   ) Other      Documentation from the Medical Record:   Clinical Indicators/possible risk factors: 84-year-old female on Eliquis for Atrial fibrillation to ED after striking her head during a fall. Takes baby aspirin daily.    CT brain 7/21/24 8:39 am Epidural/subdural hematoma along the right tentorium measuring approximately 1.5 centimeters in thickness, as described.  Small scalp hematoma around the right periorbital region.  CT brain 7/21/24 15:34 Enlarging subdural hematoma along the right tentorium.   H&P: Since bleed is enlarging may need reversal of agent however will defer to neurology and neurosurgery  Neurocritical consult note: R tentorial Subdural Hematoma         - Initial CT with above         - INR 1.16 and Plt 219          - 6 hr CT with slight worsening s/p ddAVP, exam stable         - Midnight and AM scans demonstrating stability      Treatment: Eliquis held, serial CT scans to monitor bleed; DDAVP given; neuro checks q 1 hour, neuro critical and neuro surgery consult    For questions regarding this query, please contact Clinical Documentation : Pat Maloney RN [phone] [email or web address]  Thank you!                                                                           THIS FORM IS A PERMANENT PART OF THE MEDICAL RECORD

## 2024-07-22 NOTE — PLAN OF CARE
Neuro checks q 1 hr. No new deficits noted. Repeat CT scan 0000 stable per radiologist report. 2-L while sleeping. Labetelol given x 1 to keep -150. Edema R>L to LE. NPO for possible surgery today. Accu checks q 6 hrs. Unable to cover pt with current medication d/t pt allergy to med. [place] with yellow, cloudy urine. C/O rt hip pain with mvmt and right 4th toe pain. Ultram given with relief. Refusing SCD's to legs (too painful per pt). Plan for repeat CT at 0800. No family present overnight

## 2024-07-23 LAB
GLUCOSE BLD-MCNC: 322 MG/DL (ref 70–99)
GLUCOSE BLD-MCNC: 330 MG/DL (ref 70–99)
GLUCOSE BLD-MCNC: 356 MG/DL (ref 70–99)
GLUCOSE BLD-MCNC: 362 MG/DL (ref 70–99)
GLUCOSE BLD-MCNC: 400 MG/DL (ref 70–99)
HCT VFR BLD AUTO: 25.1 %
HGB BLD-MCNC: 8.1 G/DL
MAGNESIUM SERPL-MCNC: 1.7 MG/DL (ref 1.6–2.6)

## 2024-07-23 PROCEDURE — 99231 SBSQ HOSP IP/OBS SF/LOW 25: CPT | Performed by: NEUROLOGICAL SURGERY

## 2024-07-23 RX ORDER — INSULIN DEGLUDEC 100 U/ML
50 INJECTION, SOLUTION SUBCUTANEOUS DAILY
Status: DISCONTINUED | OUTPATIENT
Start: 2024-07-24 | End: 2024-07-24

## 2024-07-23 RX ORDER — MAGNESIUM OXIDE 400 MG/1
400 TABLET ORAL ONCE
Status: COMPLETED | OUTPATIENT
Start: 2024-07-23 | End: 2024-07-23

## 2024-07-23 RX ORDER — INSULIN DEGLUDEC 100 U/ML
50 INJECTION, SOLUTION SUBCUTANEOUS DAILY
Status: DISCONTINUED | OUTPATIENT
Start: 2024-07-23 | End: 2024-07-23

## 2024-07-23 RX ORDER — INSULIN DEGLUDEC 100 U/ML
20 INJECTION, SOLUTION SUBCUTANEOUS ONCE
Status: COMPLETED | OUTPATIENT
Start: 2024-07-23 | End: 2024-07-23

## 2024-07-23 NOTE — SLP NOTE
ADULT SWALLOWING EVALUATION    ASSESSMENT    ASSESSMENT/OVERALL IMPRESSION:  Order received as per protocol, chart reviewed. Patient presented s/p fall with right hip fracture and right SDH. S/P ORIF yesterday.  No acute neurosurgical intervention recommended due to stable head CT findings.  Patient in CNICU and RN approved for SLP to proceed.   Patient received awake, alert, and seated upright in bedside chair. Spouse present. Patient denied difficulty chewing/swallowing; c/o dry mouth.  Oral motor exam WFL. Vocal quality clear. Patient reported baseline diet texture is regular with thin liquids.   Patient exhibited intact oropharyngeal swallow with no overt clinical s/s aspiration observed or suspected. Recommend regular diet with thin liquids. No dysphagia follow-up indicated.   Cursory cognitive communication screen completed. Patient oriented across all spheres and exhibited intact language functions for min complex conversational exchange. No evidence of motor speech impairment. Patient able to read through medication list and even identified medication error on list; RN aware.  Suspect patient at baseline cognitive communication function - patent/spouse in agreement. No skilled SLP needs identified at this time.     Adams Assessment of Swallow Function Score: No abnormality detected    RECOMMENDATIONS   Diet Recommendations - Solids: Regular  Diet Recommendations - Liquids: Thin Liquids  Medication Administration Recommendations: No restrictions  Treatment Plan/Recommendations: No further inpatient SLP service warranted    HISTORY   MEDICAL HISTORY  Reason for Referral: Stroke protocol    Problem List  Active Problems:    Primary hypertension    Paroxysmal atrial fibrillation (HCC)    SDH (subdural hematoma) (HCC)    Type 2 diabetes mellitus with hyperglycemia (HCC)    Closed fracture of right hip (HCC)      Past Medical History  Past Medical History:    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 elsewhere classified     idiopathic angioedema    Back problem    sciatica    Carpal tunnel syndrome    CORONARY ARTERY DISEASE    11/06: Ant WMA on stress, 12/06: PTCA to LAD,  12/08 Nuclear stress neg, EF 62%    CORONARY ARTERY DISEASE    11/06: ant ischemia on stress,   12/06: PTCA to distal LAD,    12/08 nuclear stress neg, EF 62%    Coronary atherosclerosis    Diabetes (HCC)    High blood pressure    High cholesterol    Hx of motion sickness    vertigo    HYPERTENSION    MENOPAUSE    ERT [id number]    Muscle weakness    LEGS SHAKE WHEN STANDING OR USING STAIRS    OSTEOARTHRITIS    1/06 Rt menisectomy    Osteoarthritis    Overweight and obesity    Personal history of colonic polyps    2/99 adenoma, 4/03 negative    Type 2 diabetes mellitus, uncontrolled    Uncontrolled type 2 diabetes mellitus with diabetic polyneuropathy, with long-term current use of insulin    Unspecified hereditary and idiopathic peripheral neuropathy    Vertigo    Vitamin D deficiency       Prior Living Situation: Home with spouse  Diet Prior to Admission: Regular;Thin liquids       Patient/Family Goals: rehab    SWALLOWING HISTORY  Current Diet Consistency: Regular;Thin liquids  Dysphagia History: none  Imaging Results: head CT 7/22/24  Stable subdural hematoma along the right cerebellar tentorium.       OBJECTIVE   ORAL MOTOR EXAMINATION  Dentition: Functional  Symmetry: Within Functional Limits  Strength: Within Functional Limits  Tone: Within Functional Limits  Range of Motion: Within Functional Limits  Rate of Motion: Within Functional Limits    Voice Quality: Clear  Respiratory Status: Unlabored  Consistencies Trialed: Thin liquids  Method of Presentation: Self presentation  Patient Positioning: Upright;Midline;Standard chair    Oral Phase of Swallow: Within Functional Limits                      Pharyngeal Phase of Swallow: Within Functional Limits           (Please note: Silent aspiration cannot be evaluated clinically. Videofluoroscopic Swallow Study is  required to rule-out silent aspiration.)    Esophageal Phase of Swallow: No complaints consistent with possible esophageal involvement        FOLLOW UP  Treatment Plan/Recommendations: No further inpatient SLP service warranted     Follow Up Needed (Documentation Required): No      Thank you for your referral.   If you have any questions, please contact SUSANA Morton, MS CCC-SLP/L, pager [phone]  Speech-LanguagePathologist

## 2024-07-23 NOTE — PLAN OF CARE
PT aox4, resting in bed.   NSR on monitor, normotensive.   Neuro checks q4; weakness present in BLE R > L.   Good PO intake.   PT/OT eval this AM; pt had difficulty standing at bedside so RN got pt up to chair using cindy lift.   Bladder scanned/straight cathed per protocol.   CTU orders, bed received at 1600 on CTU 7, report called to Argelia receiving RN, belongings gathered and transport request placed.     Problem: Diabetes/Glucose Control  Goal: Glucose maintained within prescribed range  Description: INTERVENTIONS:  - Monitor Blood Glucose as ordered  - Assess for signs and symptoms of hyperglycemia and hypoglycemia  - Administer ordered medications to maintain glucose within target range  - Assess barriers to adequate nutritional intake and initiate nutrition consult as needed  - Instruct patient on self management of diabetes  Outcome: Progressing     Problem: NEUROLOGICAL - ADULT  Goal: Achieves stable or improved neurological status  Description: INTERVENTIONS  - Assess for and report changes in neurological status  - Initiate measures to prevent increased intracranial pressure  - Maintain blood pressure and fluid volume within ordered parameters to optimize cerebral perfusion and minimize risk of hemorrhage  - Monitor temperature, glucose, and sodium. Initiate appropriate interventions as ordered  Outcome: Progressing  Goal: Absence of seizures  Description: INTERVENTIONS  - Monitor for seizure activity  - Administer anti-seizure medications as ordered  - Monitor neurological status  Outcome: Progressing  Goal: Remains free of injury related to seizure activity  Description: INTERVENTIONS:  - Maintain airway, patient safety  and administer oxygen as ordered  - Monitor patient for seizure activity, document and report duration and description of seizure to MD/LIP  - If seizure occurs, turn patient to side and suction secretions as needed  - Reorient patient post seizure  - Seizure pads on all 4 side rails  -  Instruct patient/family to notify RN of any seizure activity  - Instruct patient/family to call for assistance with activity based on assessment  Outcome: Progressing

## 2024-07-23 NOTE — PROGRESS NOTES
[facility]  Neurosurgery Progress Note    Evangelina Ward Patient Status:  Inpatient    1940 MRN [id number]   Location [facility] 6NE-A Attending Carlos Pollard MD   Hosp Day # 2 PCP Dorina Higuera MD     Chief Complaint:  S/p fall, SDH     Subjective:  No acute overnight events. Underwent ORIF for R femur fracture yesterday. Patient states she has no pain unless she is moving. She continues to deny headache, blurry or double vision or dizziness. She states the numbness under her right eye has resolved.     Objective/Physical Exam:    Vital Signs:  Blood pressure 136/51, pulse 73, temperature 97.1 °F (36.2 °C), temperature source Temporal, resp. rate 15, height 65\", weight 198 lb 6.6 oz (90 kg), SpO2 98%, not currently breastfeeding.    Respiratory:  Respirations nonlabored    CV:  NSR on tele    General: NAD, Speech Fluent, Mood Appropriate    Neurologic: This patient is alert and orientated x 3.  Able to follow commands.  Face is symmetric. PERRLA +3 brisk, EOMI.  CN 2-12 GI,  Finger-to-nose coordination is intact.  No Pronator Drift.      Upper extremity strength:      Deltoid  Biceps  Triceps   W.flexion  W.extension    Finger abduction     Right 5 5 5 5  5 5 5     Left 5 5 5 5 5 5 5    Lower extremity strength:      Iliospoas  Hamstrings  Quads  D-flexion  P-flexion     Right Refusing 2/2 pain Refusing 2/2 pain Refusing 2/2 pain 2 2     Left 4 4+ 4+ 3 3          Labs:  Lab Results   Component Value Date    HGB 8.1 2024    HCT 25.1 2024    MG 1.7 2024    PGLU 330 2024       Imaging:    CT BRAIN OR HEAD ([id number])    Result Date: 2024  CONCLUSION:  Stable subdural hematoma along the right cerebellar tentorium.    LOCATION:  [place]   Dictated by (CST): Gavin Martinez MD on 2024 at 10:09 PM     Finalized by (CST): Gavin Martinez MD on 2024 at 10:12 PM        CT BRAIN OR HEAD ([id number])    Result Date: 2024  CONCLUSION:  1. Acute subdural hemorrhage is again  noted along the right tentorium, not significantly changed. 2. Mild to moderate chronic small vessel ischemic disease. If there is clinical concern for acute ischemia/infarction, an MRI of the brain would be recommended for further evaluation.    LOCATION:  [facility]   Dictated by (CST): Stromberg, LeRoy, MD on 7/22/2024 at 8:21 AM     Finalized by (CST): Stromberg, LeRoy, MD on 7/22/2024 at 8:26 AM       CT BRAIN OR HEAD ([id number])    Result Date: 7/22/2024  CONCLUSION:  No significant change in extra-axial hemorrhage overlying the right tentorial leaflet for which continued follow-up is suggested.  A preliminary report was provided by [facility].     LOCATION:  [facility]   Dictated by (CST): Dick Carballo MD on 7/22/2024 at 7:32 AM     Finalized by (CST): Dick Carballo MD on 7/22/2024 at 7:35 AM       CT BRAIN OR HEAD ([id number])    Result Date: 7/21/2024  CONCLUSION:  Enlarging subdural hematoma along the right tentorium.  Findings were communicated with the patient's nurse, Imani, at 15:42 on 7/21/2024.  There was appropriate read back.    LOCATION:  [facility]   Dictated by (CST): Bebeto Harris MD on 7/21/2024 at 3:38 PM     Finalized by (CST): Bebeto Harris MD on 7/21/2024 at 3:43 PM       CT FACIAL BONES (CPT=70486)    Result Date: 7/21/2024  CONCLUSION:  No acute facial bone fracture.  Periorbital scalp hematomas.   LOCATION:  [facility]   Dictated by (CST): Bebeto Harris MD on 7/21/2024 at 9:03 AM     Finalized by (CST): Bebeto Harris MD on 7/21/2024 at 9:06 AM       CT SPINE CERVICAL (CPT=72125)    Result Date: 7/21/2024  CONCLUSION:  No acute cervical spine fracture.  Multilevel degenerative changes, similar to prior.    LOCATION:  [facility]   Dictated by (CST): Bebeto Harris MD on 7/21/2024 at 8:59 AM     Finalized by (CST): Bebeto Harris MD on 7/21/2024 at 9:02 AM       CT BRAIN OR HEAD ([id number])    Result Date: 7/21/2024  CONCLUSION: 1. Epidural/subdural hematoma along the right tentorium measuring approximately 1.5  centimeters in thickness, as described.  Small scalp hematomas around the right periorbital region. 2. Cerebral atrophy with chronic microvascular ischemic changes.  COMMUNICATION:  The findings were communicated with Dr. Zabala at 0856 on 7/21/2024. There was appropriate read back.   LOCATION:  [facility]   Dictated by (CST): Bebeto Harris MD on 7/21/2024 at 8:44 AM     Finalized by (CST): Bebeto Harris MD on 7/21/2024 at 8:57 AM         Assessment:  85 y/o female with pertinent PMH of CAD and AFIB on eliquis who presents to the ED s/p fall, CT head with subdural hematoma    S/p ORIF for R femur fx. Repeat head CT obtained post-op with stable right tentorial SDH.     Plan:  Repeat head CT continues to demonstrate stability of SDH. No acute neurosurgical intervention recommended at this time. OK to discharge to rehab from neurosurgical standpoint.   Follow up in outpatient neurosurgery clinic in 1 month with repeat head CT.  Continue to hold Eliquis   Medical management per hospitalist and neurocritical care   DVT prophylaxis: SCD's       Patient seen and examined. Plan of care discussed with patient who agrees to the plan and verbalizes understanding. Will discuss with Dr. Schwarz.     Trena Villanueva PA-C   [facility]  7/23/2024, 9:18 AM      A total of 15 minutes of visit time (exclusible of billable procedures) was administered.  > 50 % of time spent counseling/coordinating care     Is this a shared or split note between Advanced Practice Provider and Physician? Yes

## 2024-07-23 NOTE — PHYSICAL THERAPY NOTE
PHYSICAL THERAPY EVALUATION - INPATIENT     Room Number: 6612/6612-A  Evaluation Date: 7/23/2024  Type of Evaluation: Initial  Physician Order: PT Eval and Treat    Presenting Problem: SDH c closed fx s/p 7/22 IM nailing for right intertrochanteric femur fracture  Co-Morbidities : A-fib, HTN, DM, CAD  Reason for Therapy: Mobility Dysfunction and Discharge Planning    PHYSICAL THERAPY ASSESSMENT   Patient is currently functioning near baseline with bed mobility, transfers, and gait.  Prior to admission, patient's baseline is Mod I.  Patient is requiring moderate assist and maximum assist as a result of the following impairments: decreased endurance/aerobic capacity, pain, and decreased muscular endurance.  Physical Therapy will continue to follow for duration of hospitalization.    Patient will benefit from continued skilled PT Services to promote return to prior level of function and safety with continuous assistance and gradual rehabilitative therapy .    PLAN  PT Treatment Plan: Bed mobility;Body mechanics;Family education;Gait training;Strengthening;Transfer training;Balance training;Stair training  Rehab Potential : Good  Frequency (Obs): 3-5x/week  Number of Visits to Meet Established Goals: 3      CURRENT GOALS    Goal #1 Patient is able to demonstrate supine - sit EOB @ level: minimum assistance     Goal #2 Patient is able to demonstrate transfers EOB to/from BSC at assistance level: minimum assistance     Goal #3 Patient is able to ambulate 150 feet with assist device: walker - rolling at assistance level: minimum assistance     Goal #4    Goal #5    Goal #6    Goal Comments: Goals established on 7/23/2024      PHYSICAL THERAPY MEDICAL/SOCIAL HISTORY  History related to current admission: Patient is a 84 year old female admitted on 7/21/2024 from home  for SDH c closed fx s/p 7/22 IM nailing for right intertrochanteric femur fracture.      HOME SITUATION  Type of Home: House   Home Layout: Two  level  Stairs to Enter : 2     Stairs to Bedroom: 12  Railing: Yes    Lives With: Spouse  Drives: Yes  Patient Owned Equipment: Rolling walker  Patient Regularly Uses: Reading glasses    Prior Level of [place]: Pt states that she lives with her spouse in a house. Pt reports that she is able to ambulate with a RW. Pt reports that she sometimes receives assistance from her  for showering. But is able to perform her ADLs without assistance. Pt reports that she sometimes has dizzy spells that causes her to retropulse.     SUBJECTIVE  \"I don't think I can move my Left leg\"      OBJECTIVE  Precautions: Bed/chair alarm;Seizure (SBP <150)  Fall Risk: High fall risk    WEIGHT BEARING RESTRICTION  Weight Bearing Restriction: R lower extremity        R Lower Extremity: Weight Bearing as Tolerated       PAIN ASSESSMENT  Rating: Unable to rate  Location: R hip on the site of the incision       COGNITION  Overall Cognitive Status:  WFL - within functional limits    RANGE OF MOTION AND STRENGTH ASSESSMENT  Upper extremity ROM and strength are within functional limits     Lower extremity ROM is within functional limits     Lower extremity strength is within functional limits except for the following:    Right Hip flexion  3-/5  Left Hip flexion  4+/5  Right Knee extension  3-/5  Left Knee extension  4+/5  Right Knee flexion  3-/5  Left Knee flexion  4+/5  Right Dorsiflexion  3+/5  Left Dorsiflexion  4+/5      BALANCE  Static Sitting: Fair -  Dynamic Sitting: Poor +  Static Standing: Poor +  Dynamic Standing: Not tested    ADDITIONAL TESTS  Additional Tests: Modified [place]              Modified Sean: 4                  ACTIVITY TOLERANCE                         O2 WALK       NEUROLOGICAL FINDINGS                        AM-PAC '6-Clicks' INPATIENT SHORT FORM - BASIC MOBILITY  How much difficulty does the patient currently have...  Patient Difficulty: Turning over in bed (including adjusting bedclothes, sheets and  blankets)?: A Lot   Patient Difficulty: Sitting down on and standing up from a chair with arms (e.g., wheelchair, bedside commode, etc.): A Lot   Patient Difficulty: Moving from lying on back to sitting on the side of the bed?: A Lot   How much help from another person does the patient currently need...   Help from Another: Moving to and from a bed to a chair (including a wheelchair)?: A Lot   Help from Another: Need to walk in hospital room?: A Lot   Help from Another: Climbing 3-5 steps with a railing?: Total       AM-PAC Score:  Raw Score: 11   Approx Degree of Impairment: 72.57%   Standardized Score (AM-PAC Scale): 33.86   CMS Modifier (G-Code): CL    FUNCTIONAL ABILITY STATUS  Gait Assessment   Functional Mobility/Gait Assessment  Gait Assistance: Not tested    Skilled Therapy Provided     Bed Mobility:  Rolling: NT  Supine to sit: Mod A    Sit to supine: Max A     Transfer Mobility:  Sit to stand: Mod A x 2   Stand to sit: Mod A  Gait = NT    Therapist's Comments: Pt was observed to be guarded when moving the RLE from supine to sit. When attempting sit to stand pt was observed to place all her Weight on her LLE as pt was standing. Pt had difficulty weight shifting towards the R preventing pt from performing a swing phase. Attempted side stepping however pt was unable to complete. Pt was able to standing for 15secs x 2.     Exercise/Education Provided:  Bed mobility  Body mechanics  Gait training  Transfer training    Patient End of Session: In bed;Needs met;Call light within reach;RN aware of session/findings;All patient questions and concerns addressed      Patient Evaluation Complexity Level:  History Moderate - 1 or 2 personal factors and/or co-morbidities   Examination of body systems Moderate - addressing a total of 3 or more elements   Clinical Presentation Low - Stable   Clinical Decision Making Low - Stable       PT Session Time: 23 minutes  Therapeutic Activity: 15 minutes

## 2024-07-23 NOTE — OCCUPATIONAL THERAPY NOTE
OCCUPATIONAL THERAPY EVALUATION - INPATIENT     Room Number: 6612/6612-A  Evaluation Date: 7/23/2024  Type of Evaluation: Initial  Presenting Problem: Fell when walking to bathroom with RW and los balance, subdural hematoma, epidural hematoma, R hip fracture, 7/22 s/p R femur IM nailing    Physician Order: IP Consult to Occupational Therapy  Reason for Therapy: ADL/IADL Dysfunction and Discharge Planning    OCCUPATIONAL THERAPY ASSESSMENT   Patient is currently functioning below baseline with toileting, bathing, lower body dressing, bed mobility, transfers, static standing balance, dynamic standing balance, and energy conservation strategies. Prior to admission, patient's baseline is independent. Uses RW.  Patient is requiring moderate assist and maximum assist as a result of the following impairments: decreased functional strength, decreased endurance, and impaired standing balance. Occupational Therapy will continue to follow for duration of hospitalization.    Patient will benefit from continued skilled OT Services to promote return to prior level of function and safety with continuous assistance and gradual rehabilitative therapy      History Related to Current Admission: Patient is a 84 year old female admitted on 7/21/2024 after falling at home.  Pt fell when she was walking to bathroom with RW and lost balance. Admitted for  subdural hematoma, epidural hematoma, R hip fracture, 7/22 s/p R femur IM nailing.     Date of Operation:  7/22/2024  Preoperative Diagnosis:  RIGHT INTERTROCHANTERIC FEMUR FRACTURE  Postoperative Diagnosis:  RIGHT INTERTROCHANTERIC FEMUR FRACTURE  Procedure Performed:  RIGHT FEMUR INTRAMEDULLARY NAILING    Co-Morbidities : A-fib, HTN, DM, CAD    WEIGHT BEARING RESTRICTION  Weight Bearing Restriction: R lower extremity        R Lower Extremity: Weight Bearing as Tolerated       Recommendations for nursing staff:   Transfers: cindy lift  Toileting location: bed pan or commode    EVALUATION  SESSION:  Patient Start of Session: supine  FUNCTIONAL TRANSFER ASSESSMENT  Sit to Stand: Edge of Bed  Edge of Bed: Maximum Assist (max A x 1 and min A x 1)    BED MOBILITY  Supine to Sit : Maximum Assist  Sit to Supine (OT): Dependent    O2 SATURATIONS  Oxygen Therapy  SPO2% on Oxygen at Rest: 98  At rest oxygen flow (liters per minute): 2    COGNITION  Overall Cognitive Status:  WFL - within functional limits    Upper Extremity   ROM: within functional limits   Strength: within functional limits   Coordination  Gross motor:   Fine motor: intact  Sensation: Light touch:  intact    EDUCATION PROVIDED  Patient: Plan of Care; Role of Occupational Therapy; Functional Transfer Techniques; Fall Prevention; Posture/Positioning; Proper Body Mechanics  Patient's Response to Education: Requires Further Education    Equipment used: rw  Demonstrates functional use, Would benefit from additional trial      Therapist comments: pleasant.  Fine motor and visual perception intact.   HOB elevated to about 70 degrees. Supine to sit max a x 1 with PT.   SBA static sitting balance.  Pt told OT/PT, \"If you touch there, it hurts\" about L lateral thigh.  Max A to shift weight to scoot forward. Total A to guide pull up brief over legs.  Educated the pt about foot/hand/rw placement.  Max A x 1 and min A x 1 to stand, but pt kept leaning to L side and appeared to be avoiding placing weight through R LE.   After sitting down, attempted again to stand, but unable to achieve upright standing posture. Per PT, unsafe to use lara stedy lift for now, since pt is not placing weight through R LE. This transfer was completed in preparation for bedside commode transfer. Rn to use cindy lift. Total A sit to supine. Alarm on.    Patient End of Session: In bed;Needs met;Call light within reach;RN aware of session/findings;All patient questions and concerns addressed;SCDs in place;Alarm set    OCCUPATIONAL PROFILE    HOME SITUATION  Type of Home:  House  Home Layout: Two level  Lives With: Spouse    Toilet and Equipment: Toilet riser  Shower/Tub and Equipment: Tub-shower combo;Grab bar;Shower chair  Other Equipment: Reacher (RW)          Drives: Yes  Patient Regularly Uses: Reading glasses    Prior Level of Function: independent with ADL and IADL. Uses RW.   Enjoys reading.     PAIN ASSESSMENT  Rating: Unable to rate  Location: \"if you touch there, it hurts\" pointing to L lateral thigh  Management Techniques: Repositioning    OBJECTIVE  Precautions: Bed/chair alarm;Seizure (SBP <150)  Fall Risk: High fall risk      ASSESSMENTS    AM-PAC ‘6-Clicks’ Inpatient Daily Activity Short Form  -   Putting on and taking off regular lower body clothing?: A Lot  -   Bathing (including washing, rinsing, drying)?: A Lot  -   Toileting, which includes using toilet, bedpan or urinal? : Total  -   Putting on and taking off regular upper body clothing?: A Little  -   Taking care of personal grooming such as brushing teeth?: None  -   Eating meals?: None    AM-PAC Score:  Score: 16  Approx Degree of Impairment: 53.32%  Standardized Score (AM-PAC Scale): 35.96    ADDITIONAL TESTS     NEUROLOGICAL FINDINGS      COGNITION ASSESSMENTS       PLAN  OT Treatment Plan: Balance activities;Energy conservation/work simplification techniques;ADL training;Functional transfer training;UE strengthening/ROM;Patient/Family education;Equipment eval/education;Compensatory technique education  Rehab Potential : Good  Frequency: 3-5x/week  Number of Visits to Meet Established Goals: 5    ADL Goals   Patient will perform upper body dressing:  with setup  Patient will perform lower body dressing:  with mod assist, while in chair, and with adaptive equipment PRN  Patient will perform toileting: with mod assist    Functional Transfer Goals  Patient will transfer from supine to sit:  with min assist  Patient will transfer to toilet:  with min assist    UE Exercise Program Goal  Patient will be  independent with bilateral AROM HEP (home exercise program).    Patient Evaluation Complexity Level:   Occupational Profile/Medical History MODERATE - Expanded review of history including review of medical or therapy record   Specific performance deficits impacting engagement in ADL/IADL HIGH  5+ performance deficits    Client Assessment/Performance Deficits MODERATE - Comorbidities and min to mod modifications of tasks    Clinical Decision Making LOW - Analysis of occupational profile, problem-focused assessments, limited treatment options    Overall Complexity LOW     OT Session Time: 20 minutes  Self-Care Home Management:  minutes  Therapeutic Activity: 10 minutes

## 2024-07-23 NOTE — PLAN OF CARE
Pt tele status. Post ORIF yesterday. 2-L while sleeping for sleep apnea. VSS. Labetelol given x1 to keep -150. Tolerating clear liquids. Degludec given last night. U/A sent. No c/o pain unless moving. Drsg to hip and leg c/d/I. PT/OT to see today. See flow sheets

## 2024-07-23 NOTE — PLAN OF CARE
Assumed care at approx 1730.  Alert and oriented x4. Neuro assessments Q4, no changes during shift, see flowsheets.  On 2L O2 NC. Respirations even and unlabored.  NSR on tele.  Pain controlled with meds.  Total lift for transfers.  Safety precautions maintained, patient reports needs met, call light within reach.      Problem: Diabetes/Glucose Control  Goal: Glucose maintained within prescribed range  Description: INTERVENTIONS:  - Monitor Blood Glucose as ordered  - Assess for signs and symptoms of hyperglycemia and hypoglycemia  - Administer ordered medications to maintain glucose within target range  - Assess barriers to adequate nutritional intake and initiate nutrition consult as needed  - Instruct patient on self management of diabetes  Outcome: Progressing     Problem: NEUROLOGICAL - ADULT  Goal: Achieves stable or improved neurological status  Description: INTERVENTIONS  - Assess for and report changes in neurological status  - Initiate measures to prevent increased intracranial pressure  - Maintain blood pressure and fluid volume within ordered parameters to optimize cerebral perfusion and minimize risk of hemorrhage  - Monitor temperature, glucose, and sodium. Initiate appropriate interventions as ordered  Outcome: Progressing  Goal: Absence of seizures  Description: INTERVENTIONS  - Monitor for seizure activity  - Administer anti-seizure medications as ordered  - Monitor neurological status  Outcome: Progressing  Goal: Remains free of injury related to seizure activity  Description: INTERVENTIONS:  - Maintain airway, patient safety  and administer oxygen as ordered  - Monitor patient for seizure activity, document and report duration and description of seizure to MD/LIP  - If seizure occurs, turn patient to side and suction secretions as needed  - Reorient patient post seizure  - Seizure pads on all 4 side rails  - Instruct patient/family to notify RN of any seizure activity  - Instruct patient/family to  call for assistance with activity based on assessment  Outcome: Progressing

## 2024-07-23 NOTE — PROGRESS NOTES
[facility]   part of [facility]     Hospitalist Progress Note     Evangelina Ward Patient Status:  Inpatient    1940 MRN [id number]   Location [facility] 6NE-A Attending Carlos Pollard MD   Hosp Day # 2 PCP Dorina Higuera MD     Chief Complaint: Subdural hematoma and right hip fracture    Subjective:     Patient is doing well, status post ORIF and intra medullary nailing, patient is asymptomatic no headache no nausea no vomiting, had a CT scan last night which showed the stable bleed, was transferred back to the ICU after the ORIF    Has been working with physical therapy and may need rehab  Patient's biggest concern has been that she has elevated glucose and a UA that is positive for UTI,  at bedside  Patient stated that she has severe allergy to short acting insulin and is not amenable to taking it    Objective:    Review of Systems:   A comprehensive review of systems was completed; pertinent positive and negatives stated in subjective.    Vital signs:  Temp:  [97 °F (36.1 °C)-98 °F (36.7 °C)] 97.3 °F (36.3 °C)  Pulse:  [60-77] 77  Resp:  [10-17] 16  BP: (116-177)/() 116/58  SpO2:  [92 %-99 %] 95 %    Physical Exam:      Gen: No acute distress, alert and oriented x 3, bruising noted on the right scalp with hematomas  Pulm: Lungs clear bilaterally, good inspiratory effort   CV:  nL S1/S2  Abd: soft, NT/ND, no hepatomegaly, +BS  MSK: moving all extremities, no edema  Neuro: no focal deficits  Skin: no rashes/lesions  Psych: normal mood/affect    Diagnostic Data:    Labs:  Recent Labs   Lab 24  0754 24  0444 24  1512 24  0446   WBC 6.9 7.0  --   --    HGB 11.4* 9.0* 9.5* 8.1*   MCV 87.4 90.1  --   --    .0 190.0  --   --    INR 1.16  --   --   --        Recent Labs   Lab 24  0754 24  0444   * 261*   BUN 18 19   CREATSERUM 0.87 0.89   CA 9.5 9.0   ALB 4.0  --     139   K 4.7 5.4*    110   CO2 22.0 25.0   ALKPHO 69  --     AST 18  --    ALT 19  --    BILT 0.4  --    TP 6.5  --        Estimated Creatinine Clearance: 43.9 mL/min (based on SCr of 0.89 mg/dL).    Recent Labs   Lab 07/21/24  0754   TROPHS 9       Recent Labs   Lab 07/21/24  0754   PTP 14.8   INR 1.16                  Microbiology    No results found for this visit on 07/21/24.      Imaging: Reviewed in Epic.    Medications:    metFORMIN  500 mg Oral BID with meals    lisinopril  10 mg Oral Daily    atorvastatin  20 mg Oral Nightly    insulin degludec  30 Units Subcutaneous Nightly    sennosides  17.2 mg Oral Nightly    docusate sodium  100 mg Oral BID    multivitamin  1 tablet Oral Daily    traMADol  50 mg Oral 4 times per day       Assessment & Plan:             # Acute right subdural hematoma as well as epidural hematoma  -CT head initially reviewed with small right subdural and epidural hematoma, repeat CT head with enlarging right subdural hematoma that has since been stable  -Repeat CT head on 7/22/2024 shows stable hematoma, clinically intact  -Neurology, neurosurgery on board  -Defer surgical plans for to neurosurgery, no plans for surgery at this time  -Neurochecks, blood pressure management per neurocritical care goal SBP of less than 150  -Holding Eliquis secondary to above    # Right hip fracture  -Status post fall, status post ORIF postop day 1  -Postop fluids, antibiotics, DVT prophylaxis per orthopedic surgery  Holding postop DVT prophylaxis secondary to acute subdural hematoma-    #UTI  -UA positive today  -started rocephin       # Accelerated hypertension  -Goal SBP less than 150, defer management to neurosurgery  -IV labetalol, hydralazine, nicardipine drip per neuro critical care and neurosurgery     # Paroxysmal A-fib  -On Eliquis at home, now fall hold Eliquis and all anticoagulation  -Since bleed is enlarging may need reversal of agent however will defer to neurology and neurosurgery     # Hyperlipidemia  # Type 2 diabetes-sliding scale  insulin  -Uncontrolled at this time  -Patient has allergy to short acting insulin hence currently on 50 units of insulin daily along with metformin  -States that her sugars usually controlled with this, will try this regimen at this time     # CAD continue to hold aspirin-      Carlos Pollard MD    Supplementary Documentation:     Quality:  DVT Mechanical Prophylaxis:   SCDs, Early ambuation  DVT Pharmacologic Prophylaxis   Medication   None                Code Status: Full Code  Thacker: External urinary catheter in place  Thacker Duration (in days):   Central line:      The 21st Century Cures Act makes medical notes like these available to patients in the interest of transparency. Please be advised this is a medical document. Medical documents are intended to carry relevant information, facts as evident, and the clinical opinion of the practitioner. The medical note is intended as peer to peer communication and may appear blunt or direct. It is written in medical language and may contain abbreviations or verbiage that are unfamiliar.

## 2024-07-23 NOTE — PROGRESS NOTES
Progress Note    Patient: Evangelina Ward  Medical record #: [id number]    Evangelina Ward is a 84 year old y/o female who is POD #1 IM nailing for right intertrochanteric femur fracture.  The patient's main complaint today is surgical pain at the operative site.  The pain is controlled.  The patient denies chest pain or shortness of breath.    Hospital Problem List:  Active Problems:    Primary hypertension    Paroxysmal atrial fibrillation (HCC)    SDH (subdural hematoma) (HCC)    Type 2 diabetes mellitus with hyperglycemia (HCC)    Closed fracture of right hip (HCC)      Current Medications:   [COMPLETED] magnesium oxide (Mag-Ox) tab 400 mg  400 mg Oral Once    [COMPLETED] insulin degludec (Tresiba) 100 units/mL flextouch 20 Units  20 Units Subcutaneous Once    metFORMIN (Glucophage) tab 500 mg  500 mg Oral BID with meals    [COMPLETED] magnesium oxide (Mag-Ox) tab 400 mg  400 mg Oral Once    lisinopril (Zestril) tab 10 mg  10 mg Oral Daily    [COMPLETED] clonidine/epinephrine/ropivacaine/ketorolac in 0.9% NaCl 60 mL pain cocktail syringe for hip arthroplasty   Infiltration Once (Intra-Op)    atorvastatin (Lipitor) tab 20 mg  20 mg Oral Nightly    insulin degludec (Tresiba) 100 units/mL flextouch 30 Units  30 Units Subcutaneous Nightly    [] sodium chloride 0.9 % IV bolus 500 mL  500 mL Intravenous Once PRN    sennosides (Senokot) tab 17.2 mg  17.2 mg Oral Nightly    docusate sodium (Colace) cap 100 mg  100 mg Oral BID    polyethylene glycol (PEG 3350) (Miralax) 17 g oral packet 17 g  17 g Oral Daily PRN    magnesium hydroxide (Milk of Magnesia) 400 MG/5ML oral suspension 30 mL  30 mL Oral Daily PRN    bisacodyl (Dulcolax) 10 MG rectal suppository 10 mg  10 mg Rectal Daily PRN    fleet enema (Fleet) 7-19 GM/118ML rectal enema 133 mL  1 enema Rectal Once PRN    multivitamin (Tab-A-Lopez/Beta Carotene) tab 1 tablet  1 tablet Oral Daily    oxyCODONE immediate release partial tab 2.5 mg  2.5 mg Oral Q4H PRN     Or    oxyCODONE immediate release tab 5 mg  5 mg Oral Q4H PRN    HYDROmorphone (Dilaudid) 1 MG/ML injection 0.2 mg  0.2 mg Intravenous Q2H PRN    Or    HYDROmorphone (Dilaudid) 1 MG/ML injection 0.4 mg  0.4 mg Intravenous Q2H PRN    traMADol (Ultram) tab 50 mg  50 mg Oral 4 times per day    [COMPLETED] ceFAZolin (Ancef) 2g in 10mL IV syringe premix  2 g Intravenous Q8H    [] lactated ringers IV bolus 500 mL  500 mL Intravenous Once PRN    [] atropine 0.1 MG/ML injection 0.5 mg  0.5 mg Intravenous PRN    [] naloxone (Narcan) 0.4 MG/ML injection 0.08 mg  0.08 mg Intravenous PRN    [] fentaNYL (Sublimaze) 50 mcg/mL injection 25 mcg  25 mcg Intravenous Q5 Min PRN    Or    [] fentaNYL (Sublimaze) 50 mcg/mL injection 50 mcg  50 mcg Intravenous Q5 Min PRN    [] HYDROmorphone (Dilaudid) 1 MG/ML injection 0.2 mg  0.2 mg Intravenous Q5 Min PRN    Or    [] HYDROmorphone (Dilaudid) 1 MG/ML injection 0.4 mg  0.4 mg Intravenous Q5 Min PRN    Or    [] HYDROmorphone (Dilaudid) 1 MG/ML injection 0.6 mg  0.6 mg Intravenous Q5 Min PRN    ondansetron (Zofran) 4 MG/2ML injection 4 mg  4 mg Intravenous Q6H PRN    [COMPLETED] Tetanus-Diphth-Acell Pertussis (Tdap) (Boostrix) injection 0.5 mL  0.5 mL Intramuscular Once    [COMPLETED] ondansetron (Zofran) 4 MG/2ML injection 4 mg  4 mg Intravenous Once    [COMPLETED] levETIRAcetam (Keppra) 500 mg/5mL injection 500 mg  500 mg Intravenous Once    [COMPLETED] fentaNYL (Sublimaze) 50 mcg/mL injection 50 mcg  50 mcg Intravenous Once    [COMPLETED] morphINE PF 4 MG/ML injection 4 mg  4 mg Intravenous Once    [COMPLETED] labetalol (Trandate) 5 mg/mL injection 10 mg  10 mg Intravenous Q10 Min PRN    hydrALAzine (Apresoline) 20 mg/mL injection 10 mg  10 mg Intravenous Q2H PRN    ondansetron (Zofran) 4 MG/2ML injection 4 mg  4 mg Intravenous Q6H PRN    metoclopramide (Reglan) 5 mg/mL injection 10 mg  10 mg Intravenous Q8H PRN    [COMPLETED]  desmopressin (DDAVP) 27 mcg in sodium chloride 0.9% 50 mL IVPB  0.3 mcg/kg Intravenous Once    glucose (Dex4) 15 GM/59ML oral liquid 15 g  15 g Oral Q15 Min PRN    Or    glucose (Glutose) 40% oral gel 15 g  15 g Oral Q15 Min PRN    Or    glucose-vitamin C (Dex-4) chewable tab 4 tablet  4 tablet Oral Q15 Min PRN    Or    dextrose 50% injection 50 mL  50 mL Intravenous Q15 Min PRN    Or    glucose (Dex4) 15 GM/59ML oral liquid 30 g  30 g Oral Q15 Min PRN    Or    glucose (Glutose) 40% oral gel 30 g  30 g Oral Q15 Min PRN    Or    glucose-vitamin C (Dex-4) chewable tab 8 tablet  8 tablet Oral Q15 Min PRN    [COMPLETED] labetalol (Trandate) 5 mg/mL injection 10 mg  10 mg Intravenous Q10 Min PRN    traMADol (Ultram) tab 50 mg  50 mg Oral Q6H PRN         Input/Output:    Intake/Output Summary (Last 24 hours) at 7/23/2024 1207  Last data filed at 7/23/2024 1000  Gross per 24 hour   Intake 2852 ml   Output 800 ml   Net 2052 ml       Vital signs:  Blood pressure 136/51, pulse 73, temperature 97.1 °F (36.2 °C), temperature source Temporal, resp. rate 15, height 5' 5\" (1.651 m), weight 198 lb 6.6 oz (90 kg), SpO2 98%, not currently breastfeeding.    Physical Exam:     right lower extremity:  Dressing: clean and dry  Able to dorsiflex ankle and move toes  Sensation grossly intact to light touch throughout  Distal pulses intact  No calf swelling  Geno's sign negative  Compartments soft  No signs or symptoms of DVT or compartment syndrome  Leg lengths equal    Labs:   Lab Results   Component Value Date    HGB 8.1 07/23/2024    HCT 25.1 07/23/2024    MG 1.7 07/23/2024         Assessment: 84 year old  female POD #1 IM nailing for right intertrochanteric femur fracture    Plan:    Hgb - 8.1 - acute blood loss anemia, consistent with post op changes, stable/asymptomatic  Mobilize with PT, WBAT on operative extremity with walker  Pain controlled with meds  Typically ok to resume preop eliquis for dvt ppx at this point post op, but  will defer to primary/neuro given subdural hematoma   TEDs, SCDs, IS  Ice and elevation  Abdi/sutures out in 14 days  Disposition: plan transfer to rehab when stable vs home with home health    Follow up with Dr. Celis - 2 weeks     Followed by [facility] for medical conditions to include Active Problems:    Primary hypertension    Paroxysmal atrial fibrillation (HCC)    SDH (subdural hematoma) (HCC)    Type 2 diabetes mellitus with hyperglycemia (HCC)    Closed fracture of right hip (HCC)          Signed:  Brian Celis MD  7/23/2024  12:07 PM

## 2024-07-23 NOTE — ANESTHESIA POSTPROCEDURE EVALUATION
[facility] Patient Status:  Inpatient   Age/Gender 84 year old female MRN [id number]   Location [facility] 6NE-A Attending Carlos Pollard MD   Hosp Day # 1 PCP Dorina Higuera MD       Anesthesia Post-op Note    OPEN REDUCTION INTERNAL FIXATION /INTRAMEDULLARY NAIL FEMUR FRACTURE- RIGHT    Procedure Summary       Date: 07/22/24 Room / Location:  MAIN OR  /  MAIN OR    Anesthesia Start: 1720 Anesthesia Stop:     Procedure: OPEN REDUCTION INTERNAL FIXATION /INTRAMEDULLARY NAIL FEMUR FRACTURE- RIGHT (Right: Hip) Diagnosis:       Femur fracture, right (HCC)      (Femur fracture, right (HCC) [S72.91XA])    Surgeons: Brian Celis MD Anesthesiologist: Niranjan Salcido DO    Anesthesia Type: general ASA Status: 3            Anesthesia Type: general    Vitals Value Taken Time   /94 07/22/24 1908   Temp 98 °F (36.7 °C) 07/22/24 1908   Pulse 72 07/22/24 1908   Resp 12 07/22/24 1908   SpO2 94 % 07/22/24 1908   Vitals shown include unfiled device data.    Patient Location: ICU    Anesthesia Type: general    Airway Patency: patent    Postop Pain Control: adequate    Mental Status: mildly sedated but able to meaningfully participate in the post-anesthesia evaluation    Nausea/Vomiting: inadequate, being treated    Cardiopulmonary/Hydration status: stable euvolemic    Complications: no apparent anesthesia related complications    Postop vital signs: stable    Dental Exam: Unchanged from Preop    Sign out given to ICU staff.

## 2024-07-23 NOTE — CM/SW NOTE
Met with patient, accompanied by spouse Rafat to discuss discharge planning.  Patient and her spouse Rafat reside in a two story home in [place].  The couple has been  for 59 years.  Prior to admission, patient independent with ADL's--has a walker and cane, no respiratory DME.  Patient shared that she has neuropathy.  [facility] with [facility].  PCP is Dr Dorina Higuera; uses [facility] to fill scripts    Patient worked with OT today--discussed how starting to move after surgery could be tough, with each day improvements and goals met.  Per spouse, patient would like to go OP for therapies.  Ongoing work/assessment with therapies.  OT recommendation of gradual--initial [facility] referral sent.  CM/SW to continue to follow therapy progress   07/23/24 1500   CM/SW Referral Data   Referral Source Physician;   Reason for Referral Discharge planning   Informant Patient;Spouse/Significant Other   Medical Hx   Does patient have an established PCP? Yes   Patient Info   Patient's Current Mental Status at Time of Assessment Alert;Oriented   Patient's Home Environment House   Number of Levels in Home 2   Patient lives with Spouse/Significant other   Patient Status Prior to Admission   Independent with ADLs and Mobility Yes   Discharge Needs   Anticipated D/C needs To be determined   Services Requested   Submitted to [facility] Yes

## 2024-07-24 VITALS
SYSTOLIC BLOOD PRESSURE: 144 MMHG | HEIGHT: 65 IN | TEMPERATURE: 99 F | BODY MASS INDEX: 35.9 KG/M2 | WEIGHT: 215.5 LBS | DIASTOLIC BLOOD PRESSURE: 60 MMHG | RESPIRATION RATE: 16 BRPM | HEART RATE: 71 BPM | OXYGEN SATURATION: 90 %

## 2024-07-24 LAB
GLUCOSE BLD-MCNC: 203 MG/DL (ref 70–99)
GLUCOSE BLD-MCNC: 241 MG/DL (ref 70–99)
GLUCOSE BLD-MCNC: 281 MG/DL (ref 70–99)
GLUCOSE BLD-MCNC: 319 MG/DL (ref 70–99)
MAGNESIUM SERPL-MCNC: 1.9 MG/DL (ref 1.6–2.6)

## 2024-07-24 RX ORDER — TRAMADOL HYDROCHLORIDE 50 MG/1
50 TABLET ORAL EVERY 6 HOURS PRN
Status: DISCONTINUED | OUTPATIENT
Start: 2024-07-24 | End: 2024-07-24

## 2024-07-24 RX ORDER — TRAMADOL HYDROCHLORIDE 50 MG/1
50 TABLET ORAL EVERY 6 HOURS PRN
Qty: 12 TABLET | Refills: 0 | Status: SHIPPED | OUTPATIENT
Start: 2024-07-24

## 2024-07-24 RX ORDER — LISINOPRIL 10 MG/1
10 TABLET ORAL DAILY
Qty: 30 TABLET | Refills: 0 | Status: SHIPPED | OUTPATIENT
Start: 2024-07-25

## 2024-07-24 RX ORDER — CEFPODOXIME PROXETIL 200 MG/1
200 TABLET, FILM COATED ORAL 2 TIMES DAILY
Qty: 10 TABLET | Refills: 0 | Status: SHIPPED | OUTPATIENT
Start: 2024-07-24 | End: 2024-07-29

## 2024-07-24 NOTE — CONGREGATE LIVING REVIEW
[facility] Living Authorization    The [facility] Review Committee has reviewed this case and the patient IS APPROVED for discharge to a facility for Short Term Skilled once the following procedure is followed:     - The physician discharge instructions (contained within the CHRISTOPHER note for SNF) must inlcude the below appropriate and approved COVID instructions to the facility    For questions regarding CLRC approval process, please contact the CM assigned to the case.  For questions regarding RN discharge workflow, please contact the unit Clinical Leader.

## 2024-07-24 NOTE — PLAN OF CARE
NURSING DISCHARGE NOTE    Discharged Rehab facility via Ambulance.  Accompanied by Support staff  Belongings Taken by patient/family.    Discharge instructions given, patient verbalizes understanding. Report called to [place] at 1444. Wedding ring returned from public safety. Signed scripts sent with transfer paperwork.

## 2024-07-24 NOTE — PLAN OF CARE
Assumed care at approx 0730.  Alert and oriented x4. Neuro assessments Q4, no changes during shift, see flowsheets.  On 2L O2 NC vs RA intermittently. Respirations even and unlabored.  NSR on tele.  Pain controlled with meds.  Patient retaining urine, straight cath x1.  Total lift for transfers.  Safety precautions maintained, patient reports needs met, call light within reach.      Problem: Diabetes/Glucose Control  Goal: Glucose maintained within prescribed range  Description: INTERVENTIONS:  - Monitor Blood Glucose as ordered  - Assess for signs and symptoms of hyperglycemia and hypoglycemia  - Administer ordered medications to maintain glucose within target range  - Assess barriers to adequate nutritional intake and initiate nutrition consult as needed  - Instruct patient on self management of diabetes  Outcome: Progressing     Problem: NEUROLOGICAL - ADULT  Goal: Achieves stable or improved neurological status  Description: INTERVENTIONS  - Assess for and report changes in neurological status  - Initiate measures to prevent increased intracranial pressure  - Maintain blood pressure and fluid volume within ordered parameters to optimize cerebral perfusion and minimize risk of hemorrhage  - Monitor temperature, glucose, and sodium. Initiate appropriate interventions as ordered  Outcome: Progressing  Goal: Absence of seizures  Description: INTERVENTIONS  - Monitor for seizure activity  - Administer anti-seizure medications as ordered  - Monitor neurological status  Outcome: Progressing  Goal: Remains free of injury related to seizure activity  Description: INTERVENTIONS:  - Maintain airway, patient safety  and administer oxygen as ordered  - Monitor patient for seizure activity, document and report duration and description of seizure to MD/LIP  - If seizure occurs, turn patient to side and suction secretions as needed  - Reorient patient post seizure  - Seizure pads on all 4 side rails  - Instruct patient/family  to notify RN of any seizure activity  - Instruct patient/family to call for assistance with activity based on assessment  Outcome: Progressing

## 2024-07-24 NOTE — OCCUPATIONAL THERAPY NOTE
OCCUPATIONAL THERAPY TREATMENT NOTE - INPATIENT     Room Number: 7610/7610-A  Session: 1   Number of Visits to Meet Established Goals: 5    Presenting Problem: Fell when walking to bathroom with RW and los balance, subdural hematoma, epidural hematoma, R hip fracture, 7/22 s/p R femur IM nailing      ASSESSMENT   Patient demonstrates limited progress this session, goals remain in progress.    Patient continues to function below baseline with  all ADL . Contributing factors to remaining limitations include  decreased functional strength, decreased endurance, and impaired standing balance. Occupational Therapy will continue to follow for duration of hospitalization.     Patient will benefit from continued skilled OT Services to promote return to prior level of function and safety with continuous assistance and gradual rehabilitative therapy        History Related to Current Admission: Patient is a 84 year old female admitted on 7/21/2024 after falling at home.  Pt fell when she was walking to bathroom with RW and lost balance. Admitted for  subdural hematoma, epidural hematoma, R hip fracture, 7/22 s/p R femur IM nailing.      Date of Operation:  7/22/2024  Preoperative Diagnosis:  RIGHT INTERTROCHANTERIC FEMUR FRACTURE  Postoperative Diagnosis:  RIGHT INTERTROCHANTERIC FEMUR FRACTURE  Procedure Performed:  RIGHT FEMUR INTRAMEDULLARY NAILING     Co-Morbidities : A-fib, HTN, DM, CAD      WEIGHT BEARING RESTRICTION  Weight Bearing Restriction: R lower extremity        R Lower Extremity: Weight Bearing as Tolerated       Recommendations for nursing staff:   Transfers: cindy lift    TREATMENT SESSION:  Patient Start of Session: supine  FUNCTIONAL TRANSFER ASSESSMENT  Sit to Stand: Edge of Bed  Edge of Bed: Dependent    BED MOBILITY  Supine to Sit : Moderate Assist  Sit to Supine (OT): Maximum Assist    EDUCATION PROVIDED  Patient: Role of Occupational Therapy; Plan of Care; Functional Transfer Techniques;  Posture/Positioning  Patient's Response to Education: Verbalized Understanding      Equipment used: vanessa, lara booker  Therapist comments:  present. Pt mentioned, \"I am not moving this leg,\" however, motivated. Increased time and cueing for hand/leg placement during supine to sit, mod A.   Cueing for hand/foot placement. Lara Ga. Max A x 2 provided for weight shifting, but unable to extend her knees. Attempted to stand twice. Per pt, \"It's me, I am just afraid of leaning forward, because there isn't anything there.\" Referring to Lara Ga.   This transfer was completed in preparation for bedside commode transfer.      Patient End of Session: In bed;Needs met;Call light within reach;RN aware of session/findings;All patient questions and concerns addressed;Family present;SCDs in place (SCD on L LE, R LE scd was leaving marks)      PAIN ASSESSMENT  Rating: Unable to rate  Location: R hip  Management Techniques: Repositioning     OBJECTIVE  Precautions: Bed/chair alarm;Seizure (SBP <150)    AM-PAC ‘6-Clicks’ Inpatient Daily Activity Short Form  -   Putting on and taking off regular lower body clothing?: A Lot  -   Bathing (including washing, rinsing, drying)?: A Lot  -   Toileting, which includes using toilet, bedpan or urinal? : Total  -   Putting on and taking off regular upper body clothing?: A Little  -   Taking care of personal grooming such as brushing teeth?: A Little  -   Eating meals?: None    AM-PAC Score:  Score: 15  Approx Degree of Impairment: 56.46%  Standardized Score (AM-PAC Scale): 34.69    PLAN  OT Treatment Plan: Balance activities;Energy conservation/work simplification techniques;ADL training;Functional transfer training;UE strengthening/ROM;Patient/Family education;Equipment eval/education;Compensatory technique education  Rehab Potential : Good  Frequency: 3-5x/week    OT Goals:   Ongoing 7/24  ADL Goals   Patient will perform upper body dressing:  with setup  Patient will perform lower  body dressing:  with mod assist, while in chair, and with adaptive equipment PRN  Patient will perform toileting: with mod assist     Functional Transfer Goals  Patient will transfer from supine to sit:  with min assist  Patient will transfer to toilet:  with min assist     UE Exercise Program Goal  Patient will be independent with bilateral AROM HEP (home exercise program).    OT Session Time: 24 minutes  Self-Care Home Management:  minutes  Therapeutic Activity: 24 minutes

## 2024-07-24 NOTE — CM/SW NOTE
07/24/24 1338   Discharge disposition   Expected discharge disposition subacute   Post Acute Care Provider [facility]   Discharge transportation [facility]     Ambulance scheduled for 4:30 PM, PCS completed. Pt/spouse updated and agreeable.     RN to call [place] at [place] at [phone] for report.     SUDARSHAN Mello

## 2024-07-24 NOTE — PLAN OF CARE
Assumed care at 1900.   Alert and oriented x4; neuros completed with no new deficits noted.   NSR; 2L NC; PRN and scheduled meds for pain management.   Purewick in place; no bowel movement noted on shift.   Dressings to R hip intact.   Straight cath x1; UA collected and pending results.   Fall precautions in place and call light within reach.

## 2024-07-24 NOTE — PHYSICAL THERAPY NOTE
PHYSICAL THERAPY TREATMENT NOTE - INPATIENT    Room Number: 7610/7610-A     Session: 1     Number of Visits to Meet Established Goals: 3    Presenting Problem: SDH c closed fx s/p 7/22 IM nailing for right intertrochanteric femur fracture  Co-Morbidities : A-fib, HTN, DM, CAD    PHYSICAL THERAPY MEDICAL/SOCIAL HISTORY  History related to current admission: Patient is a 84 year old female admitted on 7/21/2024 from home  for SDH c closed fx s/p 7/22 IM nailing for right intertrochanteric femur fracture.       HOME SITUATION  Type of Home: House   Home Layout: Two level  Stairs to Enter : 2  Stairs to Bedroom: 12  Railing: Yes     Lives With: Spouse  Drives: Yes  Patient Owned Equipment: Rolling walker  Patient Regularly Uses: Reading glasses     Prior Level of [place]: Pt states that she lives with her spouse in a house. Pt reports that she is able to ambulate with a RW. Pt reports that she sometimes receives assistance from her  for showering. But is able to perform her ADLs without assistance. Pt reports that she sometimes has dizzy spells that causes her to retropulse.        ASSESSMENT   Patient demonstrates good  progress this session, goals remain in progress.    Patient continues to function below baseline with bed mobility, transfers, gait, maintaining seated position, and standing prolonged periods. Contributing factors to remaining limitations include pain, impaired standing balance, impaired coordination, impaired motor planning, and decreased muscular endurance.  Next session anticipate patient to progress transfers.  Physical Therapy will continue to follow patient for duration of hospitalization.    Patient continues to benefit from continued skilled PT services: to promote return to prior level of function and safety with continuous assistance and gradual rehabilitative therapy .    PLAN  PT Treatment Plan: Bed mobility;Body mechanics;Family education;Gait training;Strengthening;Transfer  training;Balance training;Stair training  Rehab Potential : Good  Frequency (Obs): 3-5x/week    CURRENT GOALS     Goal #1 Patient is able to demonstrate supine - sit EOB @ level: minimum assistance      Goal #2 Patient is able to demonstrate transfers EOB to/from BSC at assistance level: minimum assistance      Goal #3 Patient is able to ambulate 150 feet with assist device: walker - rolling at assistance level: minimum assistance      Goal #4     Goal #5     Goal #6     Goal Comments: Goals established on 7/23/2024 7/24/2024 all goals ongoing     SUBJECTIVE  \"Yeah I want to read more of her books\" - writer and patient discussing book club books    OBJECTIVE  Precautions: Bed/chair alarm;Seizure (SBP <150)    WEIGHT BEARING RESTRICTION  Weight Bearing Restriction: R lower extremity        R Lower Extremity: Weight Bearing as Tolerated       PAIN ASSESSMENT   Rating: Unable to rate  Location: R hip       BALANCE                                                                                                                       Static Sitting: Fair -  Dynamic Sitting: Poor +           Static Standing: Poor +  Dynamic Standing: Not tested    ACTIVITY TOLERANCE                         O2 WALK         AM-PAC '6-Clicks' INPATIENT SHORT FORM - BASIC MOBILITY  How much difficulty does the patient currently have...  Patient Difficulty: Turning over in bed (including adjusting bedclothes, sheets and blankets)?: A Lot   Patient Difficulty: Sitting down on and standing up from a chair with arms (e.g., wheelchair, bedside commode, etc.): Unable   Patient Difficulty: Moving from lying on back to sitting on the side of the bed?: A Lot   How much help from another person does the patient currently need...   Help from Another: Moving to and from a bed to a chair (including a wheelchair)?: A Lot   Help from Another: Need to walk in hospital room?: A Lot   Help from Another: Climbing 3-5 steps with a railing?: Total       AM-PAC  Score:  Raw Score: 10   Approx Degree of Impairment: 76.75%   Standardized Score (AM-PAC Scale): 32.29   CMS Modifier (G-Code): CL    FUNCTIONAL ABILITY STATUS  Gait Assessment   Functional Mobility/Gait Assessment  Gait Assistance: Not tested  Distance (ft): 0    Skilled Therapy Provided    Bed Mobility:  Rolling: NT   Supine<>Sit: Mod A   Sit<>Supine: Max A     Transfer Mobility:  Sit<>Stand: Max A  x 2   Stand<>Sit: Max A x 2   Gait: NT    Therapist's Comments: attempted scooting activity, sit<>stand to lara steady with pull to stand method, and encouraged forward weight bearing.  Pt reported \"fear of falling through the opening\", but even with writer utilizing glute strap (flat sheet), was unable to come to full erect standing position.  Continue to recommend use of cindy lift for OOB transfers with nursing staff.       THERAPEUTIC EXERCISES  Lower Extremity Ankle pumps  Glut sets  Knee extension  LAQ     Upper Extremity none     Position Sitting     Repetitions   10   Sets   1     Patient End of Session: In bed;Needs met;Call light within reach;RN aware of session/findings;All patient questions and concerns addressed;Alarm set;Family present    PT Session Time: 25 minutes  Gait Trainin minutes  Therapeutic Activity: 15 minutes  Therapeutic Exercise: 0 minutes   Neuromuscular Re-education: 9 minutes

## 2024-07-24 NOTE — PROGRESS NOTES
[facility]   part of [facility]     Hospitalist Progress Note     Evangelina Ward Patient Status:  Inpatient    1940 MRN [id number]   Location [facility] 6NE-A Attending Carlos Pollard MD   Hosp Day # 3 PCP Dorina Higuera MD     Chief Complaint: Subdural hematoma and right hip fracture    Subjective:     Patient seen and examined.   Feeling well.   Taking deep breaths.  Denies CP/SOB.   NAD.      Objective:    Review of Systems:   A comprehensive review of systems was completed; pertinent positive and negatives stated in subjective.    Vital signs:  Temp:  [97.3 °F (36.3 °C)-98.6 °F (37 °C)] 98.6 °F (37 °C)  Pulse:  [71-86] 71  Resp:  [15-21] 15  BP: (116-150)/(54-91) 144/60  SpO2:  [90 %-98 %] 90 %    Physical Exam:      Gen: No acute distress, alert and oriented x 3, bruising noted on the right scalp with hematomas  Pulm: Lungs clear bilaterally, good inspiratory effort   CV:  nL S1/S2  Abd: soft, NT/ND, no hepatomegaly, +BS  MSK: moving all extremities, no edema  Neuro: no focal deficits  Skin: no rashes/lesions  Psych: normal mood/affect    Diagnostic Data:    Labs:  Recent Labs   Lab 24  0754 24  0444 24  1512 24  0446   WBC 6.9 7.0  --   --    HGB 11.4* 9.0* 9.5* 8.1*   MCV 87.4 90.1  --   --    .0 190.0  --   --    INR 1.16  --   --   --        Recent Labs   Lab 24  0754 24  0444   * 261*   BUN 18 19   CREATSERUM 0.87 0.89   CA 9.5 9.0   ALB 4.0  --     139   K 4.7 5.4*    110   CO2 22.0 25.0   ALKPHO 69  --    AST 18  --    ALT 19  --    BILT 0.4  --    TP 6.5  --        Estimated Creatinine Clearance: 43.9 mL/min (based on SCr of 0.89 mg/dL).    Recent Labs   Lab 24  0754   TROPHS 9       Recent Labs   Lab 24  0754   PTP 14.8   INR 1.16                  Microbiology    No results found for this visit on 24.      Imaging: Reviewed in Epic.    Medications:    insulin degludec  50 Units Subcutaneous Daily     metFORMIN  1,000 mg Oral BID with meals    cefTRIAXone  1 g Intravenous Q24H    lisinopril  10 mg Oral Daily    atorvastatin  20 mg Oral Nightly    sennosides  17.2 mg Oral Nightly    docusate sodium  100 mg Oral BID    multivitamin  1 tablet Oral Daily       Assessment & Plan:             # Acute right subdural hematoma as well as epidural hematoma  -CT head initially reviewed with small right subdural and epidural hematoma, repeat CT head with enlarging right subdural hematoma that has since been stable  -Repeat CT head on 7/22/2024 shows stable hematoma, clinically intact  -Neurology, neurosurgery on board  -Defer surgical plans for to neurosurgery, no plans for surgery at this time  -Neurochecks, blood pressure management per neurocritical care goal SBP of less than 150  -Holding Eliquis secondary to above - will resume after f/u with NSG as OP     # Right hip fracture  -Status post fall, status post ORIF   -Postop fluids, antibiotics, DVT prophylaxis per orthopedic surgery  Holding postop DVT prophylaxis secondary to acute subdural hematoma-    #UTI  -UA positive today  -started rocephin  - await cx  - if needs to dc prior to cx can dc with vantin BID to complete 5d course        # Accelerated hypertension  -Goal SBP less than 150, defer management to neurosurgery  -IV labetalol, hydralazine, nicardipine drip per neuro critical care and neurosurgery     # Paroxysmal A-fib  -On Eliquis at home, now fall hold Eliquis and all anticoagulation  -Since bleed is enlarging may need reversal of agent however will defer to neurology and neurosurgery     # Hyperlipidemia  # Type 2 diabetes-sliding scale insulin  -Uncontrolled at this time  -Patient has allergy to short acting insulin hence currently on 50 units of insulin daily along with metformin  -States that her sugars usually controlled with this, will try this regimen at this time     # CAD continue to hold aspirin-      Sabine Farias MD    Supplementary  Documentation:     Quality:  DVT Mechanical Prophylaxis:   SCDs, Early ambuation  DVT Pharmacologic Prophylaxis   Medication   None                Code Status: Full Code  Thacker: External urinary catheter in place  Thacker Duration (in days):   Central line:      The 21st Century Cures Act makes medical notes like these available to patients in the interest of transparency. Please be advised this is a medical document. Medical documents are intended to carry relevant information, facts as evident, and the clinical opinion of the practitioner. The medical note is intended as peer to peer communication and may appear blunt or direct. It is written in medical language and may contain abbreviations or verbiage that are unfamiliar.

## 2024-07-24 NOTE — CM/SW NOTE
LEONID briefly met with pt/spouse this morning for DC. Agreeable and aware to recommendations for [facility] at [facility]. Pt has hx at [facility] and would be happy to return if bed available.     Referral sent, [facility] able to accept. PASRR and CLRC completed/uploaded. Able to accept today as medically cleared for DC.     DC plan:   DC to [facility] at [place].    SUDARSHAN Mello

## 2024-07-29 ENCOUNTER — INITIAL APN SNF VISIT (OUTPATIENT)
Dept: INTERNAL MEDICINE CLINIC | Age: 84
End: 2024-07-29

## 2024-07-29 VITALS
WEIGHT: 206.19 LBS | DIASTOLIC BLOOD PRESSURE: 78 MMHG | BODY MASS INDEX: 34 KG/M2 | SYSTOLIC BLOOD PRESSURE: 133 MMHG | TEMPERATURE: 98 F | HEART RATE: 80 BPM | RESPIRATION RATE: 18 BRPM | OXYGEN SATURATION: 97 %

## 2024-07-29 DIAGNOSIS — S06.4XAA EPIDURAL HEMATOMA (HCC): ICD-10-CM

## 2024-07-29 DIAGNOSIS — S06.5XAA SDH (SUBDURAL HEMATOMA) (HCC): Primary | ICD-10-CM

## 2024-07-29 DIAGNOSIS — Z79.4 TYPE 2 DIABETES MELLITUS WITH DIABETIC NEUROPATHY, WITH LONG-TERM CURRENT USE OF INSULIN (HCC): ICD-10-CM

## 2024-07-29 DIAGNOSIS — E11.40 TYPE 2 DIABETES MELLITUS WITH DIABETIC NEUROPATHY, WITH LONG-TERM CURRENT USE OF INSULIN (HCC): ICD-10-CM

## 2024-07-29 DIAGNOSIS — K59.09 OTHER CONSTIPATION: ICD-10-CM

## 2024-07-29 DIAGNOSIS — S72.001S CLOSED FRACTURE OF RIGHT HIP, SEQUELA: ICD-10-CM

## 2024-07-29 DIAGNOSIS — I10 PRIMARY HYPERTENSION: ICD-10-CM

## 2024-07-29 DIAGNOSIS — N30.00 ACUTE CYSTITIS WITHOUT HEMATURIA: ICD-10-CM

## 2024-07-29 DIAGNOSIS — E78.2 MIXED HYPERLIPIDEMIA: ICD-10-CM

## 2024-07-29 DIAGNOSIS — Z78.9 DECREASED ACTIVITIES OF DAILY LIVING (ADL): ICD-10-CM

## 2024-07-29 DIAGNOSIS — R53.1 WEAKNESS: ICD-10-CM

## 2024-07-29 NOTE — PROGRESS NOTES
Evangelina Ward  : 1940  Age 84 year old  female patient is admitted to Facility: [facility] for rehabilitation and strengthening.     [place] [facility] Admit date:  24  Discharge date to [facility]:  24  ELOS:  14 days   Anticipated discharge date:  24    Chief Complaint   Patient presents with    Follow - Up     Fall, acute right subdural hematoma, epidural hematoma, right hip fracture s/p IM nailing, UTI, R dorsal foot injury        Discharge Diagnoses:  Fall  Acute right subdural hematoma   Epidural hematoma   Right intertrochanteric femur fracture s/p IM nailing   UTI  Accelerated HTN  pAF   T2DM  HL  CAD    HPI  Evangelina Ward is an 84-year-old female with a past medical history significant for T2DM, HTN, HL, Paroxysmal atrial fibrillation on Eliquis, atherosclerosis of the aorta, CAD, and OA. Presented to [place] ED with c/o right hip and right side head injury s/p mechanical fall at home. CT head revealed right SDH and epidural hematoma. Xray of right femur showed mildly displaced intertrochanteric femoral fracture. Ortho and neurosurgery were consulted. She was admitted to the hospital for acute right SDH and epidural hematoma without surgical intervention and R hip fracture s/p IM nailing by Dr. Celis on . Hospital course complicated by hyperglycemia, HTN, and UTI s/p IV Rocephin. She was discharged on PO Cefpodoxime. She was stabilized and discharged to [facility] for rehabilitation and strengthening     Today:  She was seen today for initial aprn visit. She was seen laying in bed sleeping. Patient reports did not sleep well last night. Appetite is also poor and is having constipation. States last BM was last week. She is passing gas, does have some bloating, no abdominal pain, nausea, or vomiting. Denies body aches, fever, chills, dizziness, chest pain, SOB, cough, or urinary complaints. Is currently wearing oxygen on 2L nasal cannula, does not wear any home oxygen.  Patient states they applied oxygen last night prior to bedtime. She is having some generalized pain and received Tramadol this morning. She feels as if her pain is controlled. Will be working with therapy later this morning. DW patient, declining scheduled Miralax at this time for her constipation. States she drank prune juice this morning and feels like she may have a BM early this afternoon.    Patient seen for additional follow up in request of wound care RN. Per nursing, noticed new localized erythema and swelling noted to R dorsal foot after therapy. Patient seen sitting up in her WC watching television. Patient is not aware of hitting her right foot when being transferred from the bed to her WC during therapy. Denies R foot pain but does reports some tenderness to the site with palpation. Area demarcated to monitor. DW nursing - ok to apply ice packs pr and monitor closely.     Past Medical History:    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 elsewhere classified    idiopathic angioedema    Back problem    sciatica    Carpal tunnel syndrome    CORONARY ARTERY DISEASE    11/06: Ant WMA on stress, 12/06: PTCA to LAD,  12/08 Nuclear stress neg, EF 62%    CORONARY ARTERY DISEASE    11/06: ant ischemia on stress,   12/06: PTCA to distal LAD,    12/08 nuclear stress neg, EF 62%    Coronary atherosclerosis    Diabetes (HCC)    High blood pressure    High cholesterol    Hx of motion sickness    vertigo    HYPERTENSION    MENOPAUSE    ERT [id number]    Muscle weakness    LEGS SHAKE WHEN STANDING OR USING STAIRS    OSTEOARTHRITIS    1/06 Rt menisectomy    Osteoarthritis    Overweight and obesity    Personal history of colonic polyps    2/99 adenoma, 4/03 negative    Type 2 diabetes mellitus, uncontrolled    Uncontrolled type 2 diabetes mellitus with diabetic polyneuropathy, with long-term current use of insulin    Unspecified hereditary and idiopathic peripheral neuropathy    Vertigo    Vitamin D deficiency     Past Surgical History:    Procedure Laterality Date    Anesth,knee arthroscopy      left knee repair torn meniscus    Angioplasty (coronary)  2006    w/2 drug eluting stents    Arthroscopy of joint unlisted Right     KNEE    Back surgery      L4-L5 TLIF    Cataract  05/2018    Bilateral    Cath drug eluting stent      X2    Cholecystectomy  1997    Colonoscopy  10/10/2013    Procedure: COLONOSCOPY;  Surgeon: Lauro Lazo MD;  Location:  ENDOSCOPY    Colonoscopy,biopsy  1999    adenomatous polyp    Colonoscopy,diagnostic  2003    wnl    Colonoscopy,diagnostic  10/10/13    normal    Endovenous laser vein addon      Right Greater Saph V  per Dr. Castaneda    Hysterectomy      w/ BSO    Knee replacement surgery  03/17/2018    left    Other      lipoma removed from left arm    Other      trigger finger release left hand middle finger    Other surgical history      vein stripping    Revise median n/carpal tunnel surg      left carpal tunnel release     Family History   Problem Relation Age of Onset    Other (colon cancer) Mother         age 77    Heart Disorder Father         age 75    Diabetes Paternal Grandmother     Other (gastric cancer) Maternal Grandmother      Social History     Socioeconomic History    Marital status:    Tobacco Use    Smoking status: Never    Smokeless tobacco: Never   Vaping Use    Vaping status: Never Used   Substance and Sexual Activity    Alcohol use: Not Currently     Comment: Blue and New Years    Drug use: Never    Sexual activity: Not Currently     Partners: Male   Other Topics Concern    Caffeine Concern Yes     Comment: 2 cups a day    Exercise No    Seat Belt Yes   Social History Narrative        Health Mnt:    Pap: n/a (SARA/BSO)    Mamm: 6/08, 6/09, 7/10    Breast exam: 6/09, 5/10    DEXA: 6/08 wnl    Cholesterol: flow sheet/statin rx.    Colon: 10/08 (\"5-7 yrs\")    H/O:         calcium: takes    exercise: good, Son in [place] is working over [employer] as her      Social  Determinants of Health     Food Insecurity: No Food Insecurity (7/21/2024)    Food Insecurity     Food Insecurity: Never true   Transportation Needs: No Transportation Needs (7/21/2024)    Transportation Needs     Lack of Transportation: No   Physical Activity: Inactive (1/20/2021)    [facility]    Exercise Vital Sign     Days of Exercise per Week: 0 days     Minutes of Exercise per Session: 0 min   Housing Stability: Low Risk  (7/21/2024)    Housing Stability     Housing Instability: No       ALLERGIES:  Allergies   Allergen Reactions    Cipro [Ciprofloxacin] HIVES    Insulin Aspart, Human Analog PAIN     Achiness. HA , head pains    Insulin Lispro, Human PAIN     Achiness, HA, head pain    Tylenol [Acetaminophen] SWELLING     Tongue/mouth       CARLOS Ward     CODE STATUS:  Full Code    ADVANCED CARE PLANNING TEAM: None      CURRENT MEDICATIONS   Current Outpatient Medications   Medication Sig Dispense Refill    lisinopril 10 MG Oral Tab Take 1 tablet (10 mg total) by mouth daily. 30 tablet 0    traMADol 50 MG Oral Tab Take 1 tablet (50 mg total) by mouth every 6 (six) hours as needed. 12 tablet 0    cefpodoxime 200 MG Oral Tab Take 1 tablet (200 mg total) by mouth 2 (two) times daily for 5 days. 10 tablet 0    cholecalciferol 50 MCG (2000 UT) Oral Tab Take 1 tablet (2,000 Units total) by mouth daily.      docusate sodium 100 MG Oral Cap Take 1 capsule (100 mg total) by mouth 2 (two) times daily.      polyethylene glycol, PEG 3350, 17 g Oral Powd Pack Take 17 g by mouth daily as needed.      atorvastatin 40 MG Oral Tab Take 1 tablet (40 mg total) by mouth nightly.      metFORMIN HCl  MG Oral Tablet 24 Hr Take 2 tablets (1,000 mg total) by mouth 2 (two) times daily. 360 tablet 3    insulin glargine (LANTUS SOLOSTAR) 100 UNIT/ML Subcutaneous Solution Pen-injector INJECT up to 48 UNITS INTO THE SKIN ONCE DAILY. (Patient taking differently: 53 Units every  morning. INJECT 40 UNITS INTO THE SKIN in the evening) 45 mL 3    glipiZIDE 10 MG Oral Tab Take 1 tablet (10 mg total) by mouth 2 (two) times daily before meals. 180 tablet 3    torsemide (DEMADEX) 20 MG Oral Tab Take 1 tablet (20 mg total) by mouth daily.         VITALS:  /78   Pulse 80   Temp 97.5 °F (36.4 °C)   Resp 18   Wt 206 lb 3.2 oz (93.5 kg)   LMP  (LMP Unknown)   SpO2 97%   BMI 34.31 kg/m²      REVIEW OF SYSTEMS:  GENERAL HEALTH:feels well otherwise  SKIN: denies any unusual skin lesions or rashes, bruising to LUE and L hand, R eye, R lateral face and neck  WOUNDS: R hip surgical incision - dressing CDI  EYES:no visual complaints or deficits  HENT: denies nasal congestion, sinus pain or sore throat;  RESPIRATORY: denies shortness of breath, wheezing or cough   CARDIOVASCULAR:denies chest pain, no palpitations , denies cough  GI: constipation, bloating, poor appetite. denies abdominal pain, nausea, vomiting,   Gu: No urinary frequency, urgency or dysuria  MUSCULOSKELETAL: RLE limited d/t surgery  NEURO:no sensory or motor complaint  PSYCHE: no symptoms of depression or anxiety  HEMATOLOGY:denies hx anemia  ENDOCRINE: denies excessive thirst or urination; denies unexpected wt gain or wt loss  ALLERGY/IMM.: denies food or seasonal allergies      PHYSICAL EXAM:  GENERAL HEALTH: well developed, well nourished, in no apparent distress  LINES, TUBES, DRAINS:  none  SKIN: no rashes, no suspicious lesions, ecchymosis to  R eye, R lateral face and neck, L hand and LUE. Localized erythema and swelling to R dorsal foot.   WOUND:  R hip surgical incision. See wound care RN assessment right leg DTI  EYES: sclera anicteric, conjunctiva normal; there is no nystagmus, no drainage from eyes  HENT: normocephalic; normal nose, no nasal drainage, mucous membranes pink, moist.  NECK: supple, non tender, FROM  BREAST: deferred  RESPIRATORY:clear, no crackles, no wheezing, no dyspnea, no cough, room  air  CARDIOVASCULAR: RRR, S1 and S2, no murmurs, no edema  ABDOMEN:  normal active BS+, soft, nondistended; no organomegaly, no masses; nontender, no guarding, no rebound tenderness.  :no suprapubic distension  LYMPHATIC:no lymphedema  MUSCULOSKELETAL: no acute synovitis upper or lower extremity 4/5 strength bilateral LE, 5/5 strength bilateral UE  EXTREMITIES/VASCULAR: no cyanosis or clubbing, radial pulses 2+ and dorsalis pedal pulses 2+, pitting b/l LE edema +1  NEUROLOGIC: A&OX3, no focal deficits, follows commands  PSYCHIATRIC: calm, cooperative, mood and affect appropriate to situation    Therapy Update  Ambulation:  TBD  ADLs/Transfers:  TBD  DC planning:  lives home with spouse.     DIAGNOSTICS REVIEWED AT THIS VISIT:    Lab Results   Component Value Date    WBC 6.6 07/25/2024    RBC 2.62 (L) 07/25/2024    HGB 7.5 (L) 07/25/2024    HCT 22.7 (L) 07/25/2024    MCV 86.6 07/25/2024    MCH 28.6 07/25/2024    MCHC 33.0 07/25/2024    RDW 13.3 07/25/2024    .0 07/25/2024     Lab Results   Component Value Date     (H) 07/25/2024    BUN 29 (H) 07/25/2024    BUNCREA 42.0 (H) 10/15/2021    CREATSERUM 0.81 07/25/2024    ANIONGAP 4 07/25/2024    GFR 94 03/10/2017    GFRNAA 87 04/17/2019    GFRAA 101 04/17/2019    CA 8.7 07/25/2024    OSMOCALC 295 07/25/2024    ALKPHO 63 07/25/2024    AST 23 07/25/2024    ALT 8 (L) 07/25/2024    BILT 0.5 07/25/2024    TP 5.3 (L) 07/25/2024    ALB 3.3 07/25/2024    GLOBULIN 2.0 (L) 07/25/2024    AGRATIO 1.9 05/21/2013     07/25/2024    K 4.8 07/25/2024     07/25/2024    CO2 25.0 07/25/2024       [facility] medical records reviewed.  Medication reconciliation completed.        SEE PLAN BELOW    Acute right SDH   Epidural hematoma   - s/p mechanical fall at home   - CT brain + right SDH and epidural hematoma. Repeat 7/22 stable hematoma  - neurosurgery consulted - [facility] no surgical intervention  - eliquis remains on hold, will need to f/up with neurosx to  resume   - PT/OT  - fall risk precautions   - follow up with neurosurgery Dr Leandro Keen in 1 month - apt needed  - monitor     R hip fracture   - s/p IM nailing with Dr Celis on 7/22  - WBAT   - PT/OT   - pain management with Tylenol and Tramadol prn   - follow up with ortho Dr Brian Celis for staple/suture removal in 2 weeks - apt needed   - monitor     R dorsal foot injury   - noted by wound care RN after therapy today  - localized ecchymosis and swelling to R dorsal foot demarcated   - ice packs prn   - monitor     UTI   - Ucx reviewed with 10-50k CFU mixed gram negative joe  - s/p IV Rocephin  - discharged on PO Cefpodoxime 200 bid x 5 days. EOT 7/29  - remains asymptomatic today   - monitor     HTN  - s/p IV labetalol, hydralazine, and nicardipine drip while in hospital for SBP goal < 150   - vital signs qshift and prn   - SBP ranging 130-150  - continue lisinopril 10 daily   - continue torsemide 20 daily   - monitor     pAF  - eliquis remains on hold, will need to f/up with neurosx to resume   - vital signs q shift and prn  - HR controlled     HL  Atherosclerosis of the aorta  - continue atorvastatin 40 hs     T2DM insulin dependent   - 7/21/24 Hgba1c = 8.3%  - recorded allergy to short acting insulin with headaches   - accuchecks QID  - fasting glucose 97 - 257, preprandial glucose 124-328  - currently on lantus 48 units daily, but takes 50 units daily at home. Also with poor appetite but sugars stable. Will resume home dose lantus 50 units daily.   - continue metformin 500 bid  - continue glipizide 10 bid   - follow up with endocrinology outpatient   - monitor     CAD  - previously on ASA 81 daily, currently on hold d/t bleed     Weakness, ADL dysfunction, falls  Prevention strategies  PT/OT eval and treat  ELOS 14 days   DC planning with MDT, SW, therapies, anticipated DC date : 8/7  Services on DC: HHC RN/PT/OT/SLP/SW  Equipment on DC: tbd    Vitamins/supplements as r/t deficiencies  YEN RD to  follow while in rehab; supplementation/diet as per [facility] RD  May continue home supplements  Vitamin D3    At risk for malnutrition  Dietician consult  Weekly weights  Supplements as indicated  Encourage po intake    Pain management  Monitor and assess pain  Allergy to tylenol  No NSAIDS d/t recent bleed   Tramadol 50 q6hrs prn     Bowel management  Constipation  - currently on docusate 100 bid. will change to senna plus bid   - continue Miralax 17g daily prn  - monitor for bms     DVT prophylaxis  None - off a/c d/t bleed     GI prophylaxis  None     Labs  CBC, CMP weekly and prn. Next labs 8/1    Follow Ups:  PCP within 7 days of DC  Follow up with neurosurgery Dr Leandro Keen in 1 month   Follow up with ortho Dr Brian Celis for staple/suture removal in 2 weeks  Follow up with endocrinology outpatient as scheduled     Future Appointments   Date Time Provider Department Center   8/27/2024  2:30 PM Trena Villanueva PA [facility]       Please note, voice recognition software ([email or web address]) was used to create this document. An attempt at proofreading has been made to minimize errors. Errors may still exist.       75 spent w/ patient and reviewing medical records, labs, completing medication reconciliation and entering orders to establish plan of care in [facility].      Note to patient: The 21st Century Cures Act makes medical notes like these available to patients in the interest of transparency. However, this is a medical document intended as peer to peer communication. It is written in medical language and may contain abbreviations or verbiage that are unfamiliar. It may appear blunt or direct. Medical documents are intended to carry relevant information, facts as evident, and the clinical opinion of the practitioner who signs the document.    Isha Palomares, FLAVIO  07/29/24   10:11 AM

## 2024-07-31 ENCOUNTER — SNF VISIT (OUTPATIENT)
Dept: INTERNAL MEDICINE CLINIC | Age: 84
End: 2024-07-31

## 2024-07-31 VITALS
TEMPERATURE: 97 F | SYSTOLIC BLOOD PRESSURE: 138 MMHG | OXYGEN SATURATION: 95 % | WEIGHT: 206.19 LBS | DIASTOLIC BLOOD PRESSURE: 66 MMHG | RESPIRATION RATE: 18 BRPM | BODY MASS INDEX: 34 KG/M2 | HEART RATE: 81 BPM

## 2024-07-31 DIAGNOSIS — R55 VASOVAGAL SYNCOPE: Primary | ICD-10-CM

## 2024-07-31 DIAGNOSIS — S06.5XAA SDH (SUBDURAL HEMATOMA) (HCC): ICD-10-CM

## 2024-07-31 DIAGNOSIS — S72.001S CLOSED FRACTURE OF RIGHT HIP, SEQUELA: ICD-10-CM

## 2024-07-31 DIAGNOSIS — K59.09 OTHER CONSTIPATION: ICD-10-CM

## 2024-07-31 DIAGNOSIS — N30.00 ACUTE CYSTITIS WITHOUT HEMATURIA: ICD-10-CM

## 2024-07-31 DIAGNOSIS — E11.40 TYPE 2 DIABETES MELLITUS WITH DIABETIC NEUROPATHY, WITH LONG-TERM CURRENT USE OF INSULIN (HCC): ICD-10-CM

## 2024-07-31 DIAGNOSIS — Z78.9 DECREASED ACTIVITIES OF DAILY LIVING (ADL): ICD-10-CM

## 2024-07-31 DIAGNOSIS — Z79.4 TYPE 2 DIABETES MELLITUS WITH DIABETIC NEUROPATHY, WITH LONG-TERM CURRENT USE OF INSULIN (HCC): ICD-10-CM

## 2024-07-31 DIAGNOSIS — R53.1 WEAKNESS: ICD-10-CM

## 2024-07-31 DIAGNOSIS — I10 ESSENTIAL HYPERTENSION: ICD-10-CM

## 2024-07-31 DIAGNOSIS — I10 PRIMARY HYPERTENSION: ICD-10-CM

## 2024-07-31 DIAGNOSIS — S06.4XAA EPIDURAL HEMATOMA (HCC): ICD-10-CM

## 2024-07-31 DIAGNOSIS — E78.2 MIXED HYPERLIPIDEMIA: ICD-10-CM

## 2024-07-31 PROCEDURE — 99310 SBSQ NF CARE HIGH MDM 45: CPT | Performed by: NURSE PRACTITIONER

## 2024-07-31 NOTE — PROGRESS NOTES
Evangelina Ward, 6/14/1940, 84 year old, female    Chief Complaint:    Chief Complaint   Patient presents with    Follow - Up     Syncopal episode, acute R subdural hematoma, epidural hematoma, R hip fracture s/p IM nailing, UTI, poor appetite         Subjective:   TODAY:  Patient seen today for aprn follow up visit. She was seen laying in bed after participating with therapy. Per nursing and therapy staff, patient had a syncopal episode during her therapy session. She was trying to stand and transfer in the bathroom and passed out. Patient did not know she passed out, did not have any feelings of lightheadedness or dizziness prior, she just felt very weak and wanted to sit down. She is feeling fatigued and nauseous. Repeat BP in 140s, HR 61, blood glucose 217. Appetite still remains poor. Sleeping OK. Still no BM, per nursing she did have a small smear yesterday. Denies chest pain, SOB, abdominal pain, vomiting, bowel or urinary complaints. Encouraged small frequent meals throughout the day.     Objective:  /66   Pulse 81   Temp 97.3 °F (36.3 °C)   Resp 18   Wt 206 lb 3.2 oz (93.5 kg)   LMP  (LMP Unknown)   SpO2 95%   BMI 34.31 kg/m²     PHYSICAL EXAM:  GENERAL HEALTH: diaphoretic, pale, laying in bed, in no acute distress  LINES, TUBES, DRAINS:  none  SKIN: no rashes, no suspicious lesions, ecchymosis to R eye, R lateral face and neck, L hand and LUE. Localized erythema and swelling to R dorsal foot - stable   WOUND: R hip surgical incision. R leg DTI. See wound care RN assessment  EYES:  sclera anicteric, conjunctiva normal; there is no nystagmus, no drainage from eyes  HENT:  normocephalic; normal nose, no nasal drainage, mucous membranes pink, moist.  NECK:  supple, non tender, FROM  BREAST:  deferred  RESPIRATORY: clear, no crackles, no wheezing, no dyspnea, no cough, room air  CARDIOVASCULAR: RRR, S1 and S2, no murmurs, no edema  ABDOMEN:  normal active BS+, soft, nondistended; no organomegaly,  no masses; nontender, no guarding, no rebound tenderness.  :no suprapubic distension  LYMPHATIC:no lymphedema  MUSCULOSKELETAL: no acute synovitis upper or lower extremity. 4/5 strength bilateral LE, 5/5 strength bilateral UE   EXTREMITIES/VASCULAR: no cyanosis or clubbing, radial pulses 2+ and dorsalis pedal pulses 2+, pitting b/l LE edema +1   NEUROLOGIC: A&OX3, no focal deficits, follows commands  PSYCHIATRIC: calm, cooperative, mood and affect appropriate to situation      Therapy update:  Transfers:max assist x2 or cindy  ADLS:  max assist  Ambulation:  none  DC plan: lives home with spouse    Medications reviewed: Yes      Current Outpatient Medications:     lisinopril 10 MG Oral Tab, Take 1 tablet (10 mg total) by mouth daily., Disp: 30 tablet, Rfl: 0    traMADol 50 MG Oral Tab, Take 1 tablet (50 mg total) by mouth every 6 (six) hours as needed., Disp: 12 tablet, Rfl: 0    cholecalciferol 50 MCG (2000 UT) Oral Tab, Take 1 tablet (2,000 Units total) by mouth daily., Disp: , Rfl:     docusate sodium 100 MG Oral Cap, Take 1 capsule (100 mg total) by mouth 2 (two) times daily., Disp: , Rfl:     polyethylene glycol, PEG 3350, 17 g Oral Powd Pack, Take 17 g by mouth daily as needed., Disp: , Rfl:     atorvastatin 40 MG Oral Tab, Take 1 tablet (40 mg total) by mouth nightly., Disp: , Rfl:     metFORMIN HCl  MG Oral Tablet 24 Hr, Take 2 tablets (1,000 mg total) by mouth 2 (two) times daily., Disp: 360 tablet, Rfl: 3    insulin glargine (LANTUS SOLOSTAR) 100 UNIT/ML Subcutaneous Solution Pen-injector, INJECT up to 48 UNITS INTO THE SKIN ONCE DAILY. (Patient taking differently: Inject 50 Units into the skin every morning.), Disp: 45 mL, Rfl: 3    glipiZIDE 10 MG Oral Tab, Take 1 tablet (10 mg total) by mouth 2 (two) times daily before meals., Disp: 180 tablet, Rfl: 3    torsemide (DEMADEX) 20 MG Oral Tab, Take 1 tablet (20 mg total) by mouth daily., Disp: , Rfl:       Diagnostics reviewed:    Lab Results    Component Value Date    WBC 6.6 07/25/2024    RBC 2.62 (L) 07/25/2024    HGB 7.5 (L) 07/25/2024    HCT 22.7 (L) 07/25/2024    MCV 86.6 07/25/2024    MCH 28.6 07/25/2024    MCHC 33.0 07/25/2024    RDW 13.3 07/25/2024    .0 07/25/2024     Lab Results   Component Value Date     (H) 07/25/2024    BUN 29 (H) 07/25/2024    BUNCREA 42.0 (H) 10/15/2021    CREATSERUM 0.81 07/25/2024    ANIONGAP 4 07/25/2024    GFR 94 03/10/2017    GFRNAA 87 04/17/2019    GFRAA 101 04/17/2019    CA 8.7 07/25/2024    OSMOCALC 295 07/25/2024    ALKPHO 63 07/25/2024    AST 23 07/25/2024    ALT 8 (L) 07/25/2024    BILT 0.5 07/25/2024    TP 5.3 (L) 07/25/2024    ALB 3.3 07/25/2024    GLOBULIN 2.0 (L) 07/25/2024    AGRATIO 1.9 05/21/2013     07/25/2024    K 4.8 07/25/2024     07/25/2024    CO2 25.0 07/25/2024     Assessment and plan:    Syncopal episode likely vasovagal   - with therapy when attempting to transfer on 7/31  - nursing and therapy staff reporting patient passed out, diaphoretic and nauseous after  - repeat VSS. SBP 140s, HR 60s, blood glucose 200s   - patients symptoms resolved   - monitor     Acute right SDH   Epidural hematoma   - s/p mechanical fall at home   - CT brain + right SDH and epidural hematoma. Repeat 7/22 stable hematoma  - neurosurgery consulted - recc no surgical intervention  - eliquis remains on hold, will need to f/up with neurosx to resume   - PT/OT  - fall risk precautions   - follow up with neurosurgery Dr Leandro Keen in 1 month - apt needed  - monitor      R hip fracture   - s/p IM nailing with Dr Celis on 7/22  - WBAT   - PT/OT   - pain management with Tylenol and Tramadol prn   - follow up with ortho Dr Brian Celis for staple/suture removal in 2 weeks - apt needed   - monitor      R dorsal foot injury   - noted by wound care RN after therapy   - localized ecchymosis and swelling to R dorsal foot demarcated - remains stable   - ice packs prn   - monitor      UTI - resolved    - Ucx reviewed with 10-50k CFU mixed gram negative joe  - s/p IV Rocephin  - discharged on PO Cefpodoxime 200 bid x 5 days. EOT 7/29  - remains asymptomatic   - monitor      HTN  - s/p IV labetalol, hydralazine, and nicardipine drip while in hospital for SBP goal < 150   - vital signs qshift and prn   - SBP ranging 130-150  - continue lisinopril 10 daily   - continue torsemide 20 daily   - monitor      pAF  - eliquis remains on hold, will need to f/up with neurosx to resume   - vital signs q shift and prn  - HR controlled      HL  Atherosclerosis of the aorta  - continue atorvastatin 40 hs      T2DM insulin dependent   - 7/21/24 Hgba1c = 8.3%  - recorded allergy to short acting insulin with headaches   - accuchecks QID  - fasting glucose 97 - 257, preprandial glucose 124-328  - previous order lantus 48 units daily, but takes 50 units daily at home. Also with poor appetite but sugars stable. Will resume home dose lantus 50 units daily.   - continue metformin 500 bid  - continue glipizide 10 bid   - follow up with endocrinology outpatient   - monitor      CAD  - previously on ASA 81 daily, currently on hold d/t bleed      Weakness, ADL dysfunction, falls  Prevention strategies  PT/OT eval and treat  DC planning with MDT, SW, therapies  Services on DC: [facility] RN/PT/OT/SLP/SW  Equipment on DC: tbd     Vitamins/supplements as r/t deficiencies  [facility] to follow while in rehab; supplementation/diet as per [facility]  May continue home supplements  Vitamin D3     At risk for malnutrition  Dietician consult  Weekly weights  Supplements as indicated  Encourage po intake     Pain management  Monitor and assess pain  Allergy to tylenol  No NSAIDS d/t recent bleed   Tramadol 50 q6hrs prn      Bowel management  Constipation  - continue senna plus bid   - continue miralax 17g daily prn  - monitor for bms      DVT prophylaxis  None - off a/c d/t bleed      GI prophylaxis  None      Labs  CBC, CMP weekly and prn. Next labs 8/1      Follow Ups:  PCP within 7 days of DC  Follow up with neurosurgery Dr Leandro Keen in 1 month   Follow up with ortho Dr Brian Celis for staple/suture removal in 2 weeks  Follow up with endocrinology outpatient as scheduled      Future Appointments   Date Time Provider Department Center   8/27/2024  2:30 PM Kay, LON [facility]         *Established patient; follow-up moderately complex visit/ greater than 30       40 minutes spent w/ patient and staff, including but not limited to/ reviewing present status, needs, abilities with disciplines, reviewing medical records, vital signs, labs, completing medication reconciliation and entering orders for continued care in [facility].    Note to patient: The 21st Century Cures Act makes medical notes like these available to patients in the interest of transparency. However, this is a medical document intended as peer to peer communication. It is written in medical language and may contain abbreviations or verbiage that are unfamiliar. It may appear blunt or direct. Medical documents are intended to carry relevant information, facts as evident, and the clinical opinion of the practitioner who signs the document.    Isha Palomares, APRN  7/31/2024

## 2024-08-01 ENCOUNTER — SNF VISIT (OUTPATIENT)
Dept: INTERNAL MEDICINE CLINIC | Age: 84
End: 2024-08-01

## 2024-08-01 VITALS
RESPIRATION RATE: 18 BRPM | TEMPERATURE: 98 F | OXYGEN SATURATION: 95 % | HEART RATE: 64 BPM | DIASTOLIC BLOOD PRESSURE: 61 MMHG | SYSTOLIC BLOOD PRESSURE: 147 MMHG

## 2024-08-01 DIAGNOSIS — Z79.4 TYPE 2 DIABETES MELLITUS WITH DIABETIC NEUROPATHY, WITH LONG-TERM CURRENT USE OF INSULIN (HCC): ICD-10-CM

## 2024-08-01 DIAGNOSIS — F32.A DEPRESSION, UNSPECIFIED DEPRESSION TYPE: ICD-10-CM

## 2024-08-01 DIAGNOSIS — S06.4XAA EPIDURAL HEMATOMA (HCC): ICD-10-CM

## 2024-08-01 DIAGNOSIS — I10 ESSENTIAL HYPERTENSION: ICD-10-CM

## 2024-08-01 DIAGNOSIS — K59.03 DRUG-INDUCED CONSTIPATION: Primary | ICD-10-CM

## 2024-08-01 DIAGNOSIS — D72.829 LEUKOCYTOSIS, UNSPECIFIED TYPE: ICD-10-CM

## 2024-08-01 DIAGNOSIS — S06.5XAA SDH (SUBDURAL HEMATOMA) (HCC): ICD-10-CM

## 2024-08-01 DIAGNOSIS — E78.2 MIXED HYPERLIPIDEMIA: ICD-10-CM

## 2024-08-01 DIAGNOSIS — Z78.9 DECREASED ACTIVITIES OF DAILY LIVING (ADL): ICD-10-CM

## 2024-08-01 DIAGNOSIS — I10 PRIMARY HYPERTENSION: ICD-10-CM

## 2024-08-01 DIAGNOSIS — S72.001S CLOSED FRACTURE OF RIGHT HIP, SEQUELA: ICD-10-CM

## 2024-08-01 DIAGNOSIS — R33.9 URINARY RETENTION: ICD-10-CM

## 2024-08-01 DIAGNOSIS — E11.40 TYPE 2 DIABETES MELLITUS WITH DIABETIC NEUROPATHY, WITH LONG-TERM CURRENT USE OF INSULIN (HCC): ICD-10-CM

## 2024-08-01 PROCEDURE — 99309 SBSQ NF CARE MODERATE MDM 30: CPT | Performed by: NURSE PRACTITIONER

## 2024-08-01 RX ORDER — SENNA AND DOCUSATE SODIUM 50; 8.6 MG/1; MG/1
2 TABLET, FILM COATED ORAL 2 TIMES DAILY
COMMUNITY

## 2024-08-01 NOTE — PROGRESS NOTES
Evangelina Ward, 6/14/1940, 84 year old, female    Chief Complaint:    Chief Complaint   Patient presents with    Follow - Up     Constipation, depressive symptoms, urinary retention, leukocytosis        Subjective:   TODAY:  Patient seen today for aprn follow up visit per request of nursing staff. Nursing staff asking to re evaluate patient's bowel regimen for constipation as patient is experiencing abdominal pain and nausea today. Patient seen laying in bed watching television. Patient states she is not doing well this morning. Is having abdominal cramping and intermittent nausea. She did receive miralax last night and is passing gas, does feel like she may have BM today. Reports she is urinating OK but is incontinent and wearing depends. She states she is also concerned regarding the way she feels as she does not have the motivation to get out of bed. States she is having some feelings of depression, denies any suicide ideation. Also having difficulty sleeping at night and remains with a very poor appetite. Denies fatigue, fever, body aches/chills, CP, SOB, vomiting, or urinary complaints.       Objective:  /61   Pulse 64   Temp 98.4 °F (36.9 °C)   Resp 18   LMP  (LMP Unknown)   SpO2 95%     PHYSICAL EXAM:  GENERAL HEALTH: well developed, well nourished, in no acute distress  LINES, TUBES, DRAINS:  none  SKIN: no rashes, no suspicious lesions, ecchymosis to R eye, R lateral face and neck, L hand and LUE. Localized erythema and swelling to R dorsal foot - stable   WOUND: R hip surgical incision. R leg DTI. See wound care RN assessment  EYES:  sclera anicteric, conjunctiva normal; there is no nystagmus, no drainage from eyes  HENT:  normocephalic; normal nose, no nasal drainage, mucous membranes pink, moist.  NECK:  supple, non tender, FROM  BREAST:  deferred  RESPIRATORY: clear, no crackles, no wheezing, no dyspnea, no cough, room air  CARDIOVASCULAR: RRR, S1 and S2, no murmurs, no edema  ABDOMEN:   normal active BS+, soft, distended; no organomegaly, no masses; nontender, no guarding, no rebound tenderness.  : suprapubic distention   LYMPHATIC:no lymphedema  MUSCULOSKELETAL: no acute synovitis upper or lower extremity. 4/5 strength bilateral LE, 5/5 strength bilateral UE   EXTREMITIES/VASCULAR: no cyanosis or clubbing, radial pulses 2+ and dorsalis pedal pulses 2+, pitting b/l LE edema +1   NEUROLOGIC: A&OX3, no focal deficits, follows commands  PSYCHIATRIC: calm, cooperative, mood and affect appropriate to situation      Therapy update:  Transfers:max assist x2 or cindy  ADLS:  max assist  Ambulation:  none  DC plan: lives home with spouse    Medications reviewed: Yes      Current Outpatient Medications:     senna-docusate 8.6-50 MG Oral Tab, Take 2 tablets by mouth 2 (two) times daily., Disp: , Rfl:     lisinopril 10 MG Oral Tab, Take 1 tablet (10 mg total) by mouth daily., Disp: 30 tablet, Rfl: 0    traMADol 50 MG Oral Tab, Take 1 tablet (50 mg total) by mouth every 6 (six) hours as needed., Disp: 12 tablet, Rfl: 0    cholecalciferol 50 MCG (2000 UT) Oral Tab, Take 1 tablet (2,000 Units total) by mouth daily., Disp: , Rfl:     polyethylene glycol, PEG 3350, 17 g Oral Powd Pack, Take 17 g by mouth daily as needed., Disp: , Rfl:     atorvastatin 40 MG Oral Tab, Take 1 tablet (40 mg total) by mouth nightly., Disp: , Rfl:     metFORMIN HCl  MG Oral Tablet 24 Hr, Take 2 tablets (1,000 mg total) by mouth 2 (two) times daily., Disp: 360 tablet, Rfl: 3    insulin glargine (LANTUS SOLOSTAR) 100 UNIT/ML Subcutaneous Solution Pen-injector, INJECT up to 48 UNITS INTO THE SKIN ONCE DAILY. (Patient taking differently: Inject 50 Units into the skin every morning.), Disp: 45 mL, Rfl: 3    glipiZIDE 10 MG Oral Tab, Take 1 tablet (10 mg total) by mouth 2 (two) times daily before meals., Disp: 180 tablet, Rfl: 3    torsemide (DEMADEX) 20 MG Oral Tab, Take 1 tablet (20 mg total) by mouth daily., Disp: , Rfl:        Diagnostics reviewed:    Lab Results   Component Value Date    WBC 14.3 (H) 08/01/2024    RBC 2.92 (L) 08/01/2024    HGB 8.4 (L) 08/01/2024    HCT 25.4 (L) 08/01/2024    MCV 87.0 08/01/2024    MCH 28.8 08/01/2024    MCHC 33.1 08/01/2024    RDW 13.9 08/01/2024    .0 08/01/2024     Lab Results   Component Value Date    GLU 88 08/01/2024    BUN 43 (H) 08/01/2024    BUNCREA 42.0 (H) 10/15/2021    CREATSERUM 0.91 08/01/2024    ANIONGAP 5 08/01/2024    GFR 94 03/10/2017    GFRNAA 87 04/17/2019    GFRAA 101 04/17/2019    CA 9.0 08/01/2024    OSMOCALC 290 08/01/2024    ALKPHO 82 08/01/2024    AST 19 08/01/2024    ALT 15 08/01/2024    BILT 0.7 08/01/2024    TP 6.0 08/01/2024    ALB 3.6 08/01/2024    GLOBULIN 2.4 08/01/2024    AGRATIO 1.9 05/21/2013     (L) 08/01/2024    K 4.9 08/01/2024     08/01/2024    CO2 26.0 08/01/2024     Assessment and plan:    Constipation  Bowel management   - secondary to narcotic use for pain and decreased mobility   - continue senna plus bid   - continue miralax 17g daily prn  - will add dulcolax daily prn   - with BM today 8/1 after Miralax this afternoon  - monitor     Urinary retention  - likely secondary to constipation but recently treated for UTI  - PVR > 999. Straight cath x 1   - Check PVR q shift x 3 days. May straight cath if PVR > 350 ml three times, then insert sheikh  - monitor     Leukocytosis  - WBC today 14, previously 6.6   - Previously treated for UTI in hospital s/p IV Rocephin and discharged on PO Cefpodoxime completed 7/29   - VSS, afebrile, with urinary retention today and straight cath x 1  - repeat UA/Ucx   - monitor     Depression   - r/t health condition with feelings of being unmotivated, poor appetite, poor sleep, denies SI  - no prior history of depression  - PHQ9 depression scale score = 17 moderately severe depression   - agreeable to start Mirtazepine 7.5 HS   - psych to follow while in rehab   - monitor     Syncopal episode likely vasovagal  - resolved   - with therapy when attempting to transfer on 7/31  - nursing and therapy staff reporting patient passed out, diaphoretic and nauseous after  - repeat VSS. SBP 140s, HR 60s, blood glucose 200s   - patients symptoms resolved   - monitor     Acute right SDH   Epidural hematoma   - s/p mechanical fall at home   - CT brain + right SDH and epidural hematoma. Repeat 7/22 stable hematoma  - neurosurgery consulted - recc no surgical intervention  - eliquis remains on hold, will need to f/up with neurosx to resume   - PT/OT  - fall risk precautions   - follow up with neurosurgery Dr Leandro Keen in 1 month - apt needed  - monitor      R hip fracture   - s/p IM nailing with Dr Celis on 7/22  - WBAT   - PT/OT   - pain management with Tylenol and Tramadol prn   - follow up with ortho Dr Brian Celis for staple/suture removal in 2 weeks - apt needed   - monitor      R dorsal foot injury   - noted by wound care RN after therapy   - localized ecchymosis and swelling to R dorsal foot demarcated - remains stable   - ice packs prn   - monitor      UTI - resolved   - Ucx reviewed with 10-50k CFU mixed gram negative joe  - s/p IV Rocephin  - discharged on PO Cefpodoxime 200 bid x 5 days. EOT 7/29  - remains asymptomatic   - monitor      HTN  - s/p IV labetalol, hydralazine, and nicardipine drip while in hospital for SBP goal < 150   - vital signs qshift and prn   - SBP ranging 130-150  - continue lisinopril 10 daily   - continue torsemide 20 daily   - monitor      pAF  - eliquis remains on hold, will need to f/up with neurosx to resume   - vital signs q shift and prn  - HR controlled      HL  Atherosclerosis of the aorta  - continue atorvastatin 40 hs      T2DM insulin dependent   - 7/21/24 Hgba1c = 8.3%  - recorded allergy to short acting insulin with headaches   - accuchecks BID  - fasting glucose 97 - 257, preprandial glucose 124-328  - previous order lantus 48 units daily, but takes 50 units daily at home.  Also with poor appetite but sugars stable. Will resume home dose lantus 50 units daily.   - continue metformin 500 bid  - continue glipizide 10 bid   - follow up with endocrinology outpatient   - monitor      CAD  - previously on ASA 81 daily, currently on hold d/t bleed      Weakness, ADL dysfunction, falls  Prevention strategies  PT/OT eval and treat  DC planning with MDT, SW, therapies  Services on DC: C RN/PT/OT/SLP/SW  Equipment on DC: tbd     Vitamins/supplements as r/t deficiencies  [facility] RD to follow while in rehab; supplementation/diet as per [facility] RD  May continue home supplements  Vitamin D3     At risk for malnutrition  Dietician consult  Weekly weights  Supplements as indicated  Encourage po intake     Pain management  Monitor and assess pain  Allergy to tylenol  No NSAIDS d/t recent bleed   Tramadol 50 q6hrs prn        DVT prophylaxis  None - off a/c d/t bleed      GI prophylaxis  None      Labs  CBC, CMP weekly and prn. Next labs Monday 8/5      Follow Ups:  PCP within 7 days of DC  Follow up with neurosurgery Dr Leandro Keen in 1 month   Follow up with ortho Dr Brian Celis for staple/suture removal in 2 weeks  Follow up with endocrinology outpatient as scheduled      Future Appointments   Date Time Provider Department Center   8/27/2024  2:30 PM Trena Villanueav PA [facility]       *Established patient; follow-up moderately complex visit/ greater than 30       35 minutes spent w/ patient and staff, including but not limited to/ reviewing present status, needs, abilities with disciplines, reviewing medical records, vital signs, labs, completing medication reconciliation and entering orders for continued care in [facility].    Note to patient: The 21st Century Cures Act makes medical notes like these available to patients in the interest of transparency. However, this is a medical document intended as peer to peer communication. It is written in medical language and may contain abbreviations or  verbiage that are unfamiliar. It may appear blunt or direct. Medical documents are intended to carry relevant information, facts as evident, and the clinical opinion of the practitioner who signs the document.    Isha Palomares, APRN  8/1/2024

## 2024-08-07 ENCOUNTER — SNF VISIT (OUTPATIENT)
Dept: INTERNAL MEDICINE CLINIC | Age: 84
End: 2024-08-07

## 2024-08-07 VITALS
TEMPERATURE: 98 F | DIASTOLIC BLOOD PRESSURE: 54 MMHG | WEIGHT: 206.19 LBS | SYSTOLIC BLOOD PRESSURE: 115 MMHG | RESPIRATION RATE: 18 BRPM | BODY MASS INDEX: 34 KG/M2 | OXYGEN SATURATION: 99 % | HEART RATE: 71 BPM

## 2024-08-07 DIAGNOSIS — R53.1 WEAKNESS: ICD-10-CM

## 2024-08-07 DIAGNOSIS — E11.40 TYPE 2 DIABETES MELLITUS WITH DIABETIC NEUROPATHY, WITH LONG-TERM CURRENT USE OF INSULIN (HCC): ICD-10-CM

## 2024-08-07 DIAGNOSIS — Z79.4 TYPE 2 DIABETES MELLITUS WITH DIABETIC NEUROPATHY, WITH LONG-TERM CURRENT USE OF INSULIN (HCC): ICD-10-CM

## 2024-08-07 DIAGNOSIS — K59.03 DRUG-INDUCED CONSTIPATION: ICD-10-CM

## 2024-08-07 DIAGNOSIS — Z78.9 DECREASED ACTIVITIES OF DAILY LIVING (ADL): ICD-10-CM

## 2024-08-07 DIAGNOSIS — R33.9 URINARY RETENTION: Primary | ICD-10-CM

## 2024-08-07 DIAGNOSIS — I10 PRIMARY HYPERTENSION: ICD-10-CM

## 2024-08-07 DIAGNOSIS — S72.001S CLOSED FRACTURE OF RIGHT HIP, SEQUELA: ICD-10-CM

## 2024-08-07 DIAGNOSIS — F32.A DEPRESSION, UNSPECIFIED DEPRESSION TYPE: ICD-10-CM

## 2024-08-07 PROCEDURE — 99309 SBSQ NF CARE MODERATE MDM 30: CPT | Performed by: NURSE PRACTITIONER

## 2024-08-07 NOTE — PROGRESS NOTES
Evangelina Ward, 6/14/1940, 84 year old, female    Chief Complaint:    Chief Complaint   Patient presents with    Follow - Up     Hyperglycemia, DENNIS, right hip fracture s/p repair  Right heel blister and right foot erythema  Constipation, urinary retention, depression        Subjective:   TODAY:  Patient seen in bed today.   She is still having on and off bowels.Is having abdominal cramping and intermittent nausea.  Pain in the back, not much in the leg.   Patient states she is not doing well this morning.    Reports she is urinating OK but is incontinent and wearing depends. She is having difficulty sleeping at night and remains with a very poor appetite.   Motivation is the same, still some depressive sx, no SI.  Denies fatigue, fever, body aches/chills, CP, SOB, vomiting, or urinary complaints.       Objective:  /54   Pulse 71   Temp 97.7 °F (36.5 °C)   Resp 18   Wt 206 lb 3.2 oz (93.5 kg)   LMP  (LMP Unknown)   SpO2 99%   BMI 34.31 kg/m²     PHYSICAL EXAM:  GENERAL HEALTH: well developed, well nourished, in no acute distress  LINES, TUBES, DRAINS:  none  SKIN: no rashes, no suspicious lesions, ecchymosis to R eye, R lateral face and neck, L hand and LUE. Localized erythema and swelling to R dorsal foot - stable   WOUND: R hip surgical incision staples removed, well approximated, no drainage, no redness. Right heel blister intact, red.  Buttocks with blister open now and red.  EYES:  sclera anicteric, conjunctiva normal; there is no nystagmus, no drainage from eyes  HENT:  normocephalic; normal nose, no nasal drainage, mucous membranes pink, moist.  NECK:  supple, non tender, FROM  BREAST:  deferred  RESPIRATORY: clear, no crackles, no wheezing, no dyspnea, no cough, room air  CARDIOVASCULAR: RRR, S1 and S2, no murmurs, no edema  ABDOMEN:  normal active BS+, soft, distended; no organomegaly, no masses; nontender, no guarding, no rebound tenderness.  : suprapubic distention   LYMPHATIC:no  lymphedema  MUSCULOSKELETAL: no acute synovitis upper or lower extremity. 4/5 strength bilateral LE, 5/5 strength bilateral UE   EXTREMITIES/VASCULAR: no cyanosis or clubbing, radial pulses 2+ and dorsalis pedal pulses 2+, pitting b/l LE edema +1   NEUROLOGIC: A&OX3, no focal deficits, follows commands  PSYCHIATRIC: calm, cooperative, mood and affect appropriate to situation      Therapy update:  Transfers: mod assist  ADLS:  mod assist  Ambulation:  none  DC plan: lives home with spouse    Medications reviewed: Yes      Current Outpatient Medications:     senna-docusate 8.6-50 MG Oral Tab, Take 2 tablets by mouth 2 (two) times daily., Disp: , Rfl:     lisinopril 10 MG Oral Tab, Take 1 tablet (10 mg total) by mouth daily., Disp: 30 tablet, Rfl: 0    traMADol 50 MG Oral Tab, Take 1 tablet (50 mg total) by mouth every 6 (six) hours as needed., Disp: 12 tablet, Rfl: 0    cholecalciferol 50 MCG (2000 UT) Oral Tab, Take 1 tablet (2,000 Units total) by mouth daily., Disp: , Rfl:     polyethylene glycol, PEG 3350, 17 g Oral Powd Pack, Take 17 g by mouth daily as needed., Disp: , Rfl:     atorvastatin 40 MG Oral Tab, Take 1 tablet (40 mg total) by mouth nightly., Disp: , Rfl:     metFORMIN HCl  MG Oral Tablet 24 Hr, Take 2 tablets (1,000 mg total) by mouth 2 (two) times daily., Disp: 360 tablet, Rfl: 3    insulin glargine (LANTUS SOLOSTAR) 100 UNIT/ML Subcutaneous Solution Pen-injector, INJECT up to 48 UNITS INTO THE SKIN ONCE DAILY. (Patient taking differently: Inject 50 Units into the skin every morning.), Disp: 45 mL, Rfl: 3    glipiZIDE 10 MG Oral Tab, Take 1 tablet (10 mg total) by mouth 2 (two) times daily before meals., Disp: 180 tablet, Rfl: 3    torsemide (DEMADEX) 20 MG Oral Tab, Take 1 tablet (20 mg total) by mouth daily., Disp: , Rfl:       Diagnostics reviewed:    Lab Results   Component Value Date    WBC 10.8 08/05/2024    RBC 2.98 (L) 08/05/2024    HGB 8.5 (L) 08/05/2024    HCT 26.9 (L) 08/05/2024     MCV 90.3 08/05/2024    MCH 28.5 08/05/2024    MCHC 31.6 08/05/2024    RDW 14.0 08/05/2024    .0 08/05/2024     Lab Results   Component Value Date    GLU 98 08/06/2024    BUN 50 (H) 08/06/2024    BUNCREA 42.0 (H) 10/15/2021    CREATSERUM 0.97 08/06/2024    ANIONGAP 3 08/06/2024    GFR 94 03/10/2017    GFRNAA 87 04/17/2019    GFRAA 101 04/17/2019    CA 9.0 08/06/2024    OSMOCALC 291 08/06/2024    ALKPHO 82 08/01/2024    AST 19 08/01/2024    ALT 15 08/01/2024    BILT 0.7 08/01/2024    TP 6.0 08/01/2024    ALB 3.6 08/01/2024    GLOBULIN 2.4 08/01/2024    AGRATIO 1.9 05/21/2013     (L) 08/06/2024    K 5.0 08/06/2024     08/06/2024    CO2 25.0 08/06/2024     Assessment and plan:    Constipation  Bowel management   - secondary to narcotic use for pain and decreased mobility   - continue senna plus bid   - continue miralax 17g every other day and daily prn  - continue dulcolax daily prn   - monitor     Urinary retention  - likely secondary to constipation but recently treated for UTI  - PVR > 999. Straight cath x 1   - Check PVR q shift x 3 days. May straight cath if PVR > 350 ml three times, then insert sheikh  - improving now with management of constipation  - monitor     Leukocytosis - resolved  - WBC today 14, previously 6.6  normal today  - Previously treated for UTI in hospital s/p IV Rocephin and discharged on PO Cefpodoxime completed 7/29   - VSS, afebrile, with urinary retention today and straight cath x 1  - repeat UA/Ucx negative    Depression   - r/t health condition with feelings of being unmotivated, poor appetite, poor sleep, denies SI  - no prior history of depression  - PHQ9 depression scale score = 17 moderately severe depression   - Mirtazepine 7.5 HS   - psych to follow while in rehab   - monitor     Syncopal episode likely vasovagal - resolved   - with therapy when attempting to transfer on 7/31  - nursing and therapy staff reporting patient passed out, diaphoretic and nauseous  after  - repeat VSS. SBP 140s, HR 60s, blood glucose 200s   - patients symptoms resolved , no recurrence  - monitor     Acute right SDH   Epidural hematoma   - s/p mechanical fall at home   - CT brain + right SDH and epidural hematoma. Repeat 7/22 stable hematoma  - neurosurgery consulted - [facility] no surgical intervention  - eliquis remains on hold, will need to f/up with neurosx to resume   - PT/OT  - fall risk precautions   - follow up with neurosurgery Dr Leandro Keen in 1 month - apt needed  - monitor      R hip fracture   - s/p IM nailing with Dr Celis on 7/22  - WBAT   - PT/OT   - pain management with Tylenol and Tramadol prn   - follow up with ortho Dr Brian Celis   - staples removed, healing well  - monitor      R dorsal foot injury   - noted by wound care RN after therapy   - localized ecchymosis and swelling to R dorsal foot demarcated - remains stable   - ice packs prn   - monitor      Right heel blister  Wound care following  Monitoring and offloading     HTN  - s/p IV labetalol, hydralazine, and nicardipine drip while in hospital for SBP goal < 150   - vital signs qshift and prn   - SBP ranging 130-150  - continue lisinopril 10 daily   - continue torsemide 20 daily on hold now with DENNIS  - monitor      pAF  - eliquis remains on hold, will need to f/up with neurosx to resume   - vital signs q shift and prn  - HR controlled      HL  Atherosclerosis of the aorta  - continue atorvastatin 40 hs      T2DM insulin dependent   - 7/21/24 Hgba1c = 8.3%  - recorded allergy to short acting insulin with headaches   - accuchecks BID  - fasting glucose [phone], preprandial glucose 163-328  - lantus 50 units daily.   - continue metformin 500 bid  - continue glipizide 10 bid   - follow up with endocrinology outpatient   - monitor      CAD  - previously on ASA 81 daily, currently on hold d/t bleed      Weakness, ADL dysfunction, falls  Prevention strategies  PT/OT eval and treat  DC planning with MDT, SW, therapies  8/16/24  [facility] on DC[facility] RN/PT/OT/SLP/SW  Equipment on DC: tbd     Vitamins/supplements as r/t deficiencies  [facility] RD to follow while in rehab; supplementation/diet as per [facility] RD  May continue home supplements  Vitamin D3     At risk for malnutrition  Dietician consult  Weekly weights  Supplements as indicated  Encourage po intake     Pain management  Monitor and assess pain  Allergy to tylenol  No NSAIDS d/t recent bleed   Tramadol 50 q6hrs prn        DVT prophylaxis  None - off a/c d/t bleed      GI prophylaxis  None      Labs  CBC, CMP weekly and prn.   BMP 8/9     Follow Ups:  PCP within 7 days of DC  Follow up with neurosurgery Dr Leandro Keen in 1 month   Follow up with ortho Dr Brian Celis 8/27  Follow up with endocrinology outpatient as scheduled      Future Appointments   Date Time Provider Department Center   8/27/2024  2:30 PM Trena Villanueva PA [facility]       *Established patient; follow-up moderately complex visit/ greater than 30       35 minutes spent w/ patient and staff, including but not limited to/ reviewing present status, needs, abilities with disciplines, reviewing medical records, vital signs, labs, completing medication reconciliation and entering orders for continued care in [facility].    Note to patient: The 21st Century Cures Act makes medical notes like these available to patients in the interest of transparency. However, this is a medical document intended as peer to peer communication. It is written in medical language and may contain abbreviations or verbiage that are unfamiliar. It may appear blunt or direct. Medical documents are intended to carry relevant information, facts as evident, and the clinical opinion of the practitioner who signs the document.    Isha Palomares, APRN  8/7/2024

## 2024-08-09 ENCOUNTER — SNF VISIT (OUTPATIENT)
Dept: INTERNAL MEDICINE CLINIC | Age: 84
End: 2024-08-09

## 2024-08-09 VITALS
DIASTOLIC BLOOD PRESSURE: 64 MMHG | SYSTOLIC BLOOD PRESSURE: 143 MMHG | RESPIRATION RATE: 18 BRPM | OXYGEN SATURATION: 98 % | TEMPERATURE: 97 F | HEART RATE: 75 BPM

## 2024-08-09 DIAGNOSIS — Z78.9 DECREASED ACTIVITIES OF DAILY LIVING (ADL): ICD-10-CM

## 2024-08-09 DIAGNOSIS — S06.5XAA SDH (SUBDURAL HEMATOMA) (HCC): ICD-10-CM

## 2024-08-09 DIAGNOSIS — K59.03 DRUG-INDUCED CONSTIPATION: Primary | ICD-10-CM

## 2024-08-09 DIAGNOSIS — Z79.4 TYPE 2 DIABETES MELLITUS WITH DIABETIC NEUROPATHY, WITH LONG-TERM CURRENT USE OF INSULIN (HCC): ICD-10-CM

## 2024-08-09 DIAGNOSIS — F32.A DEPRESSION, UNSPECIFIED DEPRESSION TYPE: ICD-10-CM

## 2024-08-09 DIAGNOSIS — S72.001S CLOSED FRACTURE OF RIGHT HIP, SEQUELA: ICD-10-CM

## 2024-08-09 DIAGNOSIS — I10 PRIMARY HYPERTENSION: ICD-10-CM

## 2024-08-09 DIAGNOSIS — R53.1 WEAKNESS: ICD-10-CM

## 2024-08-09 DIAGNOSIS — E11.40 TYPE 2 DIABETES MELLITUS WITH DIABETIC NEUROPATHY, WITH LONG-TERM CURRENT USE OF INSULIN (HCC): ICD-10-CM

## 2024-08-09 PROCEDURE — 99309 SBSQ NF CARE MODERATE MDM 30: CPT | Performed by: NURSE PRACTITIONER

## 2024-08-09 NOTE — PROGRESS NOTES
Evangelina Ward, 6/14/1940, 84 year old, female    Chief Complaint:    Chief Complaint   Patient presents with    Follow - Up     Pain, right heel wound, right hip fx, irritable bowels, hypokalemia, weakness        Subjective:   TODAY:  Patient seen in bed today.   She had BM today, it was soft, some cramping, no nausea today.   Pain in the back, not much in the leg.   Patient states she is not doing well needs to walk and stand so she can get back home. Tired of being here but cannot go home \"like this\".   Reports she is urinating OK but is incontinent and wearing depends.   She is having difficulty sleeping at night and remains with a very poor appetite.   Motivation is the same, still some depressive sx, no SI.  Denies fatigue, fever, body aches/chills, CP, SOB, vomiting, or urinary complaints.       Objective:  /64   Pulse 75   Temp 97.3 °F (36.3 °C)   Resp 18   LMP  (LMP Unknown)   SpO2 98%     PHYSICAL EXAM:  GENERAL HEALTH: well developed, well nourished, in no acute distress  LINES, TUBES, DRAINS:  none  SKIN: no rashes, no suspicious lesions, ecchymosis to R eye, R lateral face and neck, L hand and LUE all fading. Localized erythema and swelling to R dorsal foot - stable   WOUND: R hip surgical incision staples removed, well approximated, no drainage, no redness. Right heel blister intact, red.  Buttocks with blister open now and red.  EYES:  sclera anicteric, conjunctiva normal; there is no nystagmus, no drainage from eyes  HENT:  normocephalic; normal nose, no nasal drainage, mucous membranes pink, moist.  NECK:  supple, non tender, FROM  BREAST:  deferred  RESPIRATORY: clear, no crackles, no wheezing, no dyspnea, no cough, room air  CARDIOVASCULAR: RRR, S1 and S2, no murmurs, no edema  ABDOMEN:  normal active BS+, soft, distended; no organomegaly, no masses; nontender, no guarding, no rebound tenderness.  : suprapubic distention   LYMPHATIC:no lymphedema  MUSCULOSKELETAL: no acute synovitis  upper or lower extremity. 4/5 strength bilateral LE, 5/5 strength bilateral UE   EXTREMITIES/VASCULAR: no cyanosis or clubbing, radial pulses 2+ and dorsalis pedal pulses 2+, no edema today  NEUROLOGIC: A&OX3, no focal deficits, follows commands  PSYCHIATRIC: calm, cooperative, slightly withdrawn, mood depressed      Therapy update:  Transfers: mod assist  ADLS:  mod assist  Ambulation:  none  DC plan: lives home with spouse, needs encouragement    Medications reviewed: Yes      Current Outpatient Medications:     senna-docusate 8.6-50 MG Oral Tab, Take 2 tablets by mouth 2 (two) times daily., Disp: , Rfl:     lisinopril 10 MG Oral Tab, Take 1 tablet (10 mg total) by mouth daily., Disp: 30 tablet, Rfl: 0    traMADol 50 MG Oral Tab, Take 1 tablet (50 mg total) by mouth every 6 (six) hours as needed., Disp: 12 tablet, Rfl: 0    cholecalciferol 50 MCG (2000 UT) Oral Tab, Take 1 tablet (2,000 Units total) by mouth daily., Disp: , Rfl:     polyethylene glycol, PEG 3350, 17 g Oral Powd Pack, Take 17 g by mouth daily as needed., Disp: , Rfl:     atorvastatin 40 MG Oral Tab, Take 1 tablet (40 mg total) by mouth nightly., Disp: , Rfl:     metFORMIN HCl  MG Oral Tablet 24 Hr, Take 2 tablets (1,000 mg total) by mouth 2 (two) times daily., Disp: 360 tablet, Rfl: 3    insulin glargine (LANTUS SOLOSTAR) 100 UNIT/ML Subcutaneous Solution Pen-injector, INJECT up to 48 UNITS INTO THE SKIN ONCE DAILY. (Patient taking differently: Inject 45 Units into the skin every morning.), Disp: 45 mL, Rfl: 3    glipiZIDE 10 MG Oral Tab, Take 1 tablet (10 mg total) by mouth 2 (two) times daily before meals., Disp: 180 tablet, Rfl: 3    torsemide (DEMADEX) 20 MG Oral Tab, Take 1 tablet (20 mg total) by mouth daily., Disp: , Rfl:       Diagnostics reviewed:    Lab Results   Component Value Date    WBC 10.8 08/05/2024    RBC 2.98 (L) 08/05/2024    HGB 8.5 (L) 08/05/2024    HCT 26.9 (L) 08/05/2024    MCV 90.3 08/05/2024    MCH 28.5 08/05/2024     MCHC 31.6 08/05/2024    RDW 14.0 08/05/2024    .0 08/05/2024     Lab Results   Component Value Date    GLU 63 (L) 08/09/2024    BUN 41 (H) 08/09/2024    BUNCREA 42.0 (H) 10/15/2021    CREATSERUM 0.80 08/09/2024    ANIONGAP 5 08/09/2024    GFR 94 03/10/2017    GFRNAA 87 04/17/2019    GFRAA 101 04/17/2019    CA 9.5 08/09/2024    OSMOCALC 292 08/09/2024    ALKPHO 82 08/01/2024    AST 19 08/01/2024    ALT 15 08/01/2024    BILT 0.7 08/01/2024    TP 6.0 08/01/2024    ALB 3.6 08/01/2024    GLOBULIN 2.4 08/01/2024    AGRATIO 1.9 05/21/2013     08/09/2024    K 5.5 (H) 08/09/2024     08/09/2024    CO2 25.0 08/09/2024     Assessment and plan:    Hyperkalemia  Kayexalate 15 gm po x1 today  Repeat labs Monday    Constipation  Bowel management   - secondary to narcotic use for pain and decreased mobility   - continue senna plus bid   - continue miralax 17g every other day and daily prn  - continue dulcolax daily prn   - monitor - had BM today    Urinary retention - resolving with management of constipation  - likely secondary to constipation but recently treated for UTI  - monitor     Leukocytosis - resolved  - WBC normal now  - Previously treated for UTI in hospital s/p IV Rocephin and discharged on PO Cefpodoxime completed 7/29   - VSS, afebrile  - repeat UA/Ucx negative    Depression   - r/t health condition with feelings of being unmotivated, poor appetite, poor sleep, denies SI  - no prior history of depression  - PHQ9 depression scale score = 17 moderately severe depression   - Mirtazepine 7.5 HS   - psych to follow while in rehab, counseling as well  - monitor     Acute right SDH   Epidural hematoma   - s/p mechanical fall at home   - CT brain + right SDH and epidural hematoma. Repeat 7/22 stable hematoma  - neurosurgery consulted - recc no surgical intervention  - eliquis remains on hold, will need to f/up with neurosx to resume   - PT/OT; - fall risk precautions   - follow up with neurosurgery Dr  Leandro Keen in 1 month - apt needed  - monitor      R hip fracture   - s/p IM nailing with Dr Celis on 7/22  - WBAT   - PT/OT   - pain management with Tylenol and Tramadol prn   - follow up with ortho Dr Brian Celis   - staples removed, healing well  - monitor      R dorsal foot injury improved  - noted by wound care RN after therapy   - localized ecchymosis and swelling to R dorsal foot demarcated -fading in color, same size  - ice packs prn   - monitor      Right heel blister  Wound care following  Monitoring and offloading     HTN  - s/p IV labetalol, hydralazine, and nicardipine drip while in hospital for SBP goal < 150   - vital signs qshift and prn   - SBP ranging 130-150  - continue lisinopril 10 daily   - continue torsemide 20 daily  - monitor      pAF  - eliquis remains on hold, will need to f/up with neurosx to resume   - vital signs q shift and prn  - HR controlled      HL  Atherosclerosis of the aorta  - continue atorvastatin 40 hs      T2DM insulin dependent   - 7/21/24 Hgba1c = 8.3%  - recorded allergy to short acting insulin with headaches   - accuchecks BID  - fasting glucose  preprandial glucose 163-280  - lantus 50 units daily. Reduce to 45 units with AM low glucose, add PM snack and monitor  - continue metformin 500 bid  - continue glipizide 10 bid   - follow up with endocrinology outpatient   - monitor      CAD  - previously on ASA 81 daily, currently on hold d/t bleed      Weakness, ADL dysfunction, falls  Prevention strategies  PT/OT eval and treat  DC planning with MDT, SW, therapies 8/16/24  Services on DC: [facility] RN/PT/OT/SLP/SW  Equipment on DC: tbd     Vitamins/supplements as r/t deficiencies  [facility] to follow while in rehab; supplementation/diet as per [facility]  May continue home supplements  Vitamin D3     At risk for malnutrition  Dietician consult  Weekly weights  Supplements as indicated  Encourage po intake     Pain management  Monitor and assess pain  Allergy to  tylenol  No NSAIDS d/t recent bleed   Tramadol 50 q6hrs prn      DVT prophylaxis  None - off a/c d/t bleed      GI prophylaxis  None      Labs  CBC, CMP weekly and prn.        Follow Ups:  PCP within 7 days of DC  Follow up with neurosurgery Dr Leandro Keen in 1 month [phone] around 8/23 with CT head repeat prior  Follow up with ortho Dr Brian Celis 8/27  Follow up with endocrinology outpatient as scheduled      Future Appointments   Date Time Provider Department Center   8/27/2024  2:30 PM Kay, Trena, PA [facility]       *Established patient; follow-up moderately complex visit/ greater than 30       35 minutes spent w/ patient and staff, including but not limited to/ reviewing present status, needs, abilities with disciplines, reviewing medical records, vital signs, labs, completing medication reconciliation and entering orders for continued care in [facility].    Note to patient: The 21st Century Cures Act makes medical notes like these available to patients in the interest of transparency. However, this is a medical document intended as peer to peer communication. It is written in medical language and may contain abbreviations or verbiage that are unfamiliar. It may appear blunt or direct. Medical documents are intended to carry relevant information, facts as evident, and the clinical opinion of the practitioner who signs the document.    Isha Palomares, APRN  8/9/2024

## 2024-08-13 ENCOUNTER — SNF VISIT (OUTPATIENT)
Dept: INTERNAL MEDICINE CLINIC | Age: 84
End: 2024-08-13

## 2024-08-13 VITALS
HEART RATE: 86 BPM | DIASTOLIC BLOOD PRESSURE: 47 MMHG | RESPIRATION RATE: 16 BRPM | SYSTOLIC BLOOD PRESSURE: 120 MMHG | OXYGEN SATURATION: 98 %

## 2024-08-13 DIAGNOSIS — K59.03 DRUG-INDUCED CONSTIPATION: Primary | ICD-10-CM

## 2024-08-13 DIAGNOSIS — F32.A DEPRESSION, UNSPECIFIED DEPRESSION TYPE: ICD-10-CM

## 2024-08-13 DIAGNOSIS — I48.0 PAROXYSMAL ATRIAL FIBRILLATION (HCC): ICD-10-CM

## 2024-08-13 DIAGNOSIS — S72.001S CLOSED FRACTURE OF RIGHT HIP, SEQUELA: ICD-10-CM

## 2024-08-13 DIAGNOSIS — E78.2 MIXED HYPERLIPIDEMIA: ICD-10-CM

## 2024-08-13 DIAGNOSIS — S06.5XAA SDH (SUBDURAL HEMATOMA) (HCC): ICD-10-CM

## 2024-08-13 DIAGNOSIS — E11.40 TYPE 2 DIABETES MELLITUS WITH DIABETIC NEUROPATHY, WITH LONG-TERM CURRENT USE OF INSULIN (HCC): ICD-10-CM

## 2024-08-13 DIAGNOSIS — Z79.4 TYPE 2 DIABETES MELLITUS WITH DIABETIC NEUROPATHY, WITH LONG-TERM CURRENT USE OF INSULIN (HCC): ICD-10-CM

## 2024-08-13 DIAGNOSIS — R53.1 WEAKNESS: ICD-10-CM

## 2024-08-13 DIAGNOSIS — I10 PRIMARY HYPERTENSION: ICD-10-CM

## 2024-08-13 DIAGNOSIS — Z78.9 DECREASED ACTIVITIES OF DAILY LIVING (ADL): ICD-10-CM

## 2024-08-13 PROCEDURE — 99309 SBSQ NF CARE MODERATE MDM 30: CPT

## 2024-08-13 RX ORDER — MIRTAZAPINE 7.5 MG/1
7.5 TABLET, FILM COATED ORAL NIGHTLY
COMMUNITY

## 2024-08-13 NOTE — PROGRESS NOTES
Evangelina Ward, 6/14/1940, 84 year old, female    Chief Complaint:    Chief Complaint   Patient presents with    Follow - Up     Acute right SDH, epidural hematoma, R hip fx s/p IM nailing, constipation, depression, poor appetite         Subjective:   TODAY:  Patient seen today for aprn follow up visit. She was seen sitting in her wheelchair in the therapy gym. Patient states she is doing ok, is feeling anxious during therapy. She is scared to fall again with her walker. States she will try to attempt standing today. Still with lack of motivation and depressive mood but denies SI. She is having low back pain and R hip pain, has been taking Tramadol which has helped relieve her pain. Fragmented sleeping overnight, appetite still remains very poor, will just pick at her food. Last BM 2 days ago, is passing gas but currently incontinent of urine. Denies CP, SOB, cough, abdominal pain, nausea, vomiting, urinary, or bowel complaints.      Objective:  /47   Pulse 86   Resp 16   LMP  (LMP Unknown)   SpO2 98%     PHYSICAL EXAM:  GENERAL HEALTH: well developed, well nourished, in no acute distress  LINES, TUBES, DRAINS:  none  SKIN: no rashes, no suspicious lesions, ecchymosis to R eye, R lateral face and neck, L hand and LUE improving. Localized erythema and swelling to R dorsal foot - stable   WOUND: R hip surgical incision staples removed, well approximated, no drainage, no redness. Right heel blister intact, red.  Buttocks with blister open now and red.  EYES:  sclera anicteric, conjunctiva normal; there is no nystagmus, no drainage from eyes  HENT:  normocephalic; normal nose, no nasal drainage, mucous membranes pink, moist.  NECK:  supple, non tender, FROM  BREAST:  deferred  RESPIRATORY: clear, no crackles, no wheezing, no dyspnea, no cough, room air  CARDIOVASCULAR: RRR, S1 and S2, no murmurs, no edema  ABDOMEN:  normal active BS+, soft, distended; no organomegaly, no masses; nontender, no guarding, no  rebound tenderness.  : suprapubic distention   LYMPHATIC:no lymphedema  MUSCULOSKELETAL: no acute synovitis upper or lower extremity. 4/5 strength bilateral LE, 5/5 strength bilateral UE   EXTREMITIES/VASCULAR: no cyanosis or clubbing, radial pulses 2+ and dorsalis pedal pulses 2+, trace nonpitting RLE edema   NEUROLOGIC: A&OX3, no focal deficits, follows commands  PSYCHIATRIC: calm, cooperative, slightly withdrawn, mood depressed      Therapy update:  Transfers: mod - max assist with co treating therapists; cindy lift with nursing staff   ADLS:  mod assist  Ambulation:  none  DC plan: 8/21. lives home with spouse, needs encouragement, anxiety hindering therapy progression    Medications reviewed: Yes      Current Outpatient Medications:     mirtazapine 7.5 MG Oral Tab, Take 1 tablet (7.5 mg total) by mouth nightly. Started 8/1 in rehab, Disp: , Rfl:     senna-docusate 8.6-50 MG Oral Tab, Take 2 tablets by mouth 2 (two) times daily., Disp: , Rfl:     lisinopril 10 MG Oral Tab, Take 1 tablet (10 mg total) by mouth daily., Disp: 30 tablet, Rfl: 0    traMADol 50 MG Oral Tab, Take 1 tablet (50 mg total) by mouth every 6 (six) hours as needed., Disp: 12 tablet, Rfl: 0    cholecalciferol 50 MCG (2000 UT) Oral Tab, Take 1 tablet (2,000 Units total) by mouth daily., Disp: , Rfl:     polyethylene glycol, PEG 3350, 17 g Oral Powd Pack, Take 17 g by mouth daily as needed., Disp: , Rfl:     atorvastatin 40 MG Oral Tab, Take 1 tablet (40 mg total) by mouth nightly., Disp: , Rfl:     metFORMIN HCl  MG Oral Tablet 24 Hr, Take 2 tablets (1,000 mg total) by mouth 2 (two) times daily., Disp: 360 tablet, Rfl: 3    insulin glargine (LANTUS SOLOSTAR) 100 UNIT/ML Subcutaneous Solution Pen-injector, INJECT up to 48 UNITS INTO THE SKIN ONCE DAILY. (Patient taking differently: Inject 45 Units into the skin every morning.), Disp: 45 mL, Rfl: 3    glipiZIDE 10 MG Oral Tab, Take 1 tablet (10 mg total) by mouth 2 (two) times daily  before meals., Disp: 180 tablet, Rfl: 3    torsemide (DEMADEX) 20 MG Oral Tab, Take 1 tablet (20 mg total) by mouth daily., Disp: , Rfl:       Diagnostics reviewed:    Lab Results   Component Value Date    WBC 7.1 08/12/2024    RBC 3.08 (L) 08/12/2024    HGB 8.7 (L) 08/12/2024    HCT 27.7 (L) 08/12/2024    MCV 89.9 08/12/2024    MCH 28.2 08/12/2024    MCHC 31.4 08/12/2024    RDW 14.5 08/12/2024    .0 08/12/2024     Lab Results   Component Value Date    GLU 94 08/12/2024    BUN 41 (H) 08/12/2024    BUNCREA 42.0 (H) 10/15/2021    CREATSERUM 1.07 (H) 08/12/2024    ANIONGAP 6 08/12/2024    GFR 94 03/10/2017    GFRNAA 87 04/17/2019    GFRAA 101 04/17/2019    CA 8.9 08/12/2024    OSMOCALC 288 08/12/2024    ALKPHO 82 08/01/2024    AST 19 08/01/2024    ALT 15 08/01/2024    BILT 0.7 08/01/2024    TP 6.0 08/01/2024    ALB 3.6 08/01/2024    GLOBULIN 2.4 08/01/2024    AGRATIO 1.9 05/21/2013     (L) 08/12/2024    K 5.5 (H) 08/12/2024     08/12/2024    CO2 25.0 08/12/2024     Assessment and plan:    Hyperkalemia  - 8/9 potassium 5.5 s/p Kayexalate 15 gm po x1   - Repeat K 5.5 unchanged. Reorder Kayexalate 30 gm po x 1 now   - repeat labs Thursday     Constipation  Bowel management   - secondary to narcotic use for pain and decreased mobility   - continue senna plus bid   - continue miralax 17g every other day and daily prn  - continue dulcolax daily prn   - monitor     Urinary retention - resolving with management of constipation  - likely secondary to constipation but recently treated for UTI  - monitor     Leukocytosis - resolved  - WBC up to 14.2, repeat 10.8, 7 wnl normal now  - Previously treated for UTI in hospital s/p IV Rocephin and discharged on PO Cefpodoxime completed 7/29   - repeat UA/Ucx negative  - VSS, remains afebrile    Depression   - r/t health condition with feelings of being unmotivated, poor appetite, poor sleep, denies SI  - no prior history of depression  - PHQ9 depression scale score =  17 moderately severe depression   - Mirtazepine 7.5 HS   - psych to follow while in rehab, counseling as well  - monitor     Acute right SDH   Epidural hematoma   - s/p mechanical fall at home   - CT brain + right SDH and epidural hematoma. Repeat 7/22 stable hematoma  - neurosurgery consulted - [facility] no surgical intervention  - eliquis remains on hold, will need to f/up with neurosx to resume   - PT/OT; - fall risk precautions   - Follow up CT head scheduled for 8/23/24 prior to neurosx apt   - follow up with neurosurgery Dr Leandro Keen in 1 month. Appointment scheduled with PA on 8/27      R hip fracture   - s/p IM nailing with Dr Celis on 7/22  - WBAT   - PT/OT   - pain management with Tylenol and Tramadol prn   - follow up with ortho Dr Brian Celis   - staples removed, healing well  - monitor      R dorsal foot injury improved  - noted by wound care RN after therapy   - localized ecchymosis and swelling to R dorsal foot demarcated -fading in color, same size  - ice packs prn   - monitor      Right heel blister  - Wound care following  - Monitoring and offloading     HTN  - s/p IV labetalol, hydralazine, and nicardipine drip while in hospital for SBP goal < 150   - vital signs qshift and prn   - SBP ranging 130-150  - continue lisinopril 10 daily   - continue torsemide 20 daily  - monitor      pAF  - eliquis remains on hold, will need to f/up with neurosx to resume   - vital signs q shift and prn  - HR controlled      HL  Atherosclerosis of the aorta  - continue atorvastatin 40 hs      T2DM insulin dependent   - 7/21/24 Hgba1c = 8.3%  - recorded allergy to short acting insulin with headaches   - accuchecks BID  - fasting glucose  preprandial glucose 163-280  - lantus 50 units daily. Reduce to 45 units with AM low glucose ranging 80-90s, add PM snack and monitor  - continue metformin 500 bid  - continue glipizide 10 bid   - follow up with endocrinology outpatient   - monitor      CAD  - previously on  ASA 81 daily, currently on hold d/t bleed      Weakness, ADL dysfunction, falls  Prevention strategies  PT/OT eval and treat  DC planning with MDT, SW, therapies 8/21/24  Services on DC: C RN/PT/OT/SLP/SW  Equipment on DC: wheelchair      Vitamins/supplements as r/t deficiencies  [facility] RD to follow while in rehab; supplementation/diet as per [facility] RD  May continue home supplements  Vitamin D3     At risk for malnutrition  Dietician consult  Weekly weights  Supplements as indicated  Encourage po intake     Pain management  Monitor and assess pain  Allergy to tylenol  No NSAIDS d/t recent bleed   Tramadol 50 q6hrs prn      DVT prophylaxis  None - off a/c d/t bleed      GI prophylaxis  None      Labs  CBC, CMP weekly and prn. Next labs Thurs 8/15        Follow Ups:  PCP within 7 days of DC  Follow up with neurosurgery Dr Leandro Keen or PA in 1 month [phone] on 8/27/24. Scheduled with CT head repeat prior on 8/23.   Follow up with ortho Dr Brian Celis 8/27  Follow up with endocrinology outpatient as scheduled        Future Appointments   Date Time Provider Department Center   8/23/2024  8:00 AM  CT MAIN RM1  [facility]   8/27/2024  2:45 PM Trena Villanueva PA [facility]         *Established patient; follow-up moderately complex visit/ greater than 30       35 minutes spent w/ patient and staff, including but not limited to/ reviewing present status, needs, abilities with disciplines, reviewing medical records, vital signs, labs, completing medication reconciliation and entering orders for continued care in [facility].    Note to patient: The 21st Century Cures Act makes medical notes like these available to patients in the interest of transparency. However, this is a medical document intended as peer to peer communication. It is written in medical language and may contain abbreviations or verbiage that are unfamiliar. It may appear blunt or direct. Medical documents are intended to carry relevant  information, facts as evident, and the clinical opinion of the practitioner who signs the document.    Adelita Pratt, APRN  8/13/2024

## 2024-08-15 ENCOUNTER — SNF VISIT (OUTPATIENT)
Dept: INTERNAL MEDICINE CLINIC | Age: 84
End: 2024-08-15

## 2024-08-15 VITALS
WEIGHT: 180.19 LBS | DIASTOLIC BLOOD PRESSURE: 61 MMHG | SYSTOLIC BLOOD PRESSURE: 135 MMHG | HEART RATE: 84 BPM | OXYGEN SATURATION: 97 % | BODY MASS INDEX: 30 KG/M2 | RESPIRATION RATE: 18 BRPM | TEMPERATURE: 97 F

## 2024-08-15 DIAGNOSIS — I10 ESSENTIAL HYPERTENSION: ICD-10-CM

## 2024-08-15 DIAGNOSIS — R33.9 URINARY RETENTION: ICD-10-CM

## 2024-08-15 DIAGNOSIS — I10 PRIMARY HYPERTENSION: ICD-10-CM

## 2024-08-15 DIAGNOSIS — Z79.4 TYPE 2 DIABETES MELLITUS WITH DIABETIC NEUROPATHY, WITH LONG-TERM CURRENT USE OF INSULIN (HCC): ICD-10-CM

## 2024-08-15 DIAGNOSIS — S06.5XAA SDH (SUBDURAL HEMATOMA) (HCC): ICD-10-CM

## 2024-08-15 DIAGNOSIS — Z78.9 DECREASED ACTIVITIES OF DAILY LIVING (ADL): ICD-10-CM

## 2024-08-15 DIAGNOSIS — F32.A DEPRESSION, UNSPECIFIED DEPRESSION TYPE: ICD-10-CM

## 2024-08-15 DIAGNOSIS — R53.1 WEAKNESS: ICD-10-CM

## 2024-08-15 DIAGNOSIS — S72.001S CLOSED FRACTURE OF RIGHT HIP, SEQUELA: ICD-10-CM

## 2024-08-15 DIAGNOSIS — K59.03 DRUG-INDUCED CONSTIPATION: ICD-10-CM

## 2024-08-15 DIAGNOSIS — E87.5 HYPERKALEMIA: Primary | ICD-10-CM

## 2024-08-15 DIAGNOSIS — E11.40 TYPE 2 DIABETES MELLITUS WITH DIABETIC NEUROPATHY, WITH LONG-TERM CURRENT USE OF INSULIN (HCC): ICD-10-CM

## 2024-08-15 DIAGNOSIS — E78.2 MIXED HYPERLIPIDEMIA: ICD-10-CM

## 2024-08-15 DIAGNOSIS — D72.829 LEUKOCYTOSIS, UNSPECIFIED TYPE: ICD-10-CM

## 2024-08-15 DIAGNOSIS — S06.4XAA EPIDURAL HEMATOMA (HCC): ICD-10-CM

## 2024-08-15 DIAGNOSIS — I48.0 PAROXYSMAL ATRIAL FIBRILLATION (HCC): ICD-10-CM

## 2024-08-15 PROCEDURE — 99309 SBSQ NF CARE MODERATE MDM 30: CPT

## 2024-08-15 NOTE — PROGRESS NOTES
Evangelina Ward, 6/14/1940, 84 year old, female    Chief Complaint:    Chief Complaint   Patient presents with    Follow - Up     Right SDH, epidural hematoma, R hip fx s/p IM nailing, hyperkalemia, poor appetite, depression         Subjective:   TODAY:  Patient seen today for aprn follow up visit. She was seen laying in bed watching television. States she is doing OK today, is feeling tired this morning. States she worked hard in therapy yesterday which was taxing. Was able to stand up with a walker and did take one small step which was still very difficult for her. Still feeling unmotivated jose during her therapy sessions, denies SI. No pain at present. Sleeping better throughout the night. Had BM yesterday and passing gas. Denies fever, body aches/chills, CP, SOB, cough, abdominal pain, nausea, vomiting, urinary or bowel complaints.      Objective:  /61   Pulse 84   Temp 97.3 °F (36.3 °C)   Resp 18   Wt 180 lb 3.2 oz (81.7 kg)   LMP  (LMP Unknown)   SpO2 97%   BMI 29.99 kg/m²     PHYSICAL EXAM:  GENERAL HEALTH: well developed, well nourished, in no acute distress  LINES, TUBES, DRAINS:  none  SKIN: no rashes, no suspicious lesions, fading ecchymosis to R eye, R lateral face and neck, L hand and LUE now improving. Improving localized erythema and swelling to R dorsal foot WOUND: R hip surgical incision staples removed, well approximated, no drainage, no redness. Right heel blister intact, red.  Buttocks with blister open now and red.  EYES:  sclera anicteric, conjunctiva normal; there is no nystagmus, no drainage from eyes  HENT:  normocephalic; normal nose, no nasal drainage, mucous membranes pink, moist.  NECK:  supple, non tender, FROM  BREAST:  deferred  RESPIRATORY: clear, no crackles, no wheezing, no dyspnea, no cough, room air  CARDIOVASCULAR: RRR, S1 and S2, no murmurs  ABDOMEN:  normal active BS+, soft, distended; no organomegaly, no masses; nontender, no guarding, no rebound  tenderness.  : suprapubic distention   LYMPHATIC:no lymphedema  MUSCULOSKELETAL: no acute synovitis upper or lower extremity. 4/5 strength bilateral LE, 5/5 strength bilateral UE   EXTREMITIES/VASCULAR: no cyanosis or clubbing, radial pulses 2+ and dorsalis pedal pulses 2+, trace nonpitting RLE edema   NEUROLOGIC: A&OX3, no focal deficits, follows commands  PSYCHIATRIC: calm, cooperative, slightly withdrawn, mood depressed      Therapy update:  Transfers: mod - max assist with co treating therapists; cindy lift with nursing staff   ADLS:  mod assist  Ambulation:  none  DC plan: 8/21. lives home with spouse, needs encouragement, anxiety hindering therapy progression    Medications reviewed: Yes      Current Outpatient Medications:     mirtazapine 7.5 MG Oral Tab, Take 1 tablet (7.5 mg total) by mouth nightly. Started 8/1 in rehab, Disp: , Rfl:     senna-docusate 8.6-50 MG Oral Tab, Take 2 tablets by mouth 2 (two) times daily., Disp: , Rfl:     lisinopril 10 MG Oral Tab, Take 1 tablet (10 mg total) by mouth daily. (Patient taking differently: Take 1 tablet (10 mg total) by mouth daily. discontinue on 8/12 in rehab d/t hyperkalemia), Disp: 30 tablet, Rfl: 0    traMADol 50 MG Oral Tab, Take 1 tablet (50 mg total) by mouth every 6 (six) hours as needed., Disp: 12 tablet, Rfl: 0    cholecalciferol 50 MCG (2000 UT) Oral Tab, Take 1 tablet (2,000 Units total) by mouth daily., Disp: , Rfl:     polyethylene glycol, PEG 3350, 17 g Oral Powd Pack, Take 17 g by mouth daily as needed., Disp: , Rfl:     atorvastatin 40 MG Oral Tab, Take 1 tablet (40 mg total) by mouth nightly., Disp: , Rfl:     metFORMIN HCl  MG Oral Tablet 24 Hr, Take 2 tablets (1,000 mg total) by mouth 2 (two) times daily., Disp: 360 tablet, Rfl: 3    insulin glargine (LANTUS SOLOSTAR) 100 UNIT/ML Subcutaneous Solution Pen-injector, INJECT up to 48 UNITS INTO THE SKIN ONCE DAILY. (Patient taking differently: Inject 45 Units into the skin every morning.),  Disp: 45 mL, Rfl: 3    glipiZIDE 10 MG Oral Tab, Take 1 tablet (10 mg total) by mouth 2 (two) times daily before meals., Disp: 180 tablet, Rfl: 3    torsemide (DEMADEX) 20 MG Oral Tab, Take 1 tablet (20 mg total) by mouth daily. Increased to 40 daily on 8/15 in rehab d/t hyperkalemia, Disp: , Rfl:       Diagnostics reviewed:    Lab Results   Component Value Date    WBC 7.7 08/15/2024    RBC 3.14 (L) 08/15/2024    HGB 9.0 (L) 08/15/2024    HCT 28.4 (L) 08/15/2024    MCV 90.4 08/15/2024    MCH 28.7 08/15/2024    MCHC 31.7 08/15/2024    RDW 14.6 08/15/2024    .0 08/15/2024     Lab Results   Component Value Date     (H) 08/15/2024    BUN 53 (H) 08/15/2024    BUNCREA 42.0 (H) 10/15/2021    CREATSERUM 1.13 (H) 08/15/2024    ANIONGAP 9 08/15/2024    GFR 94 03/10/2017    GFRNAA 87 04/17/2019    GFRAA 101 04/17/2019    CA 9.1 08/15/2024    OSMOCALC 295 08/15/2024    ALKPHO 82 08/01/2024    AST 19 08/01/2024    ALT 15 08/01/2024    BILT 0.7 08/01/2024    TP 6.0 08/01/2024    ALB 3.6 08/01/2024    GLOBULIN 2.4 08/01/2024    AGRATIO 1.9 05/21/2013     (L) 08/15/2024    K 5.4 (H) 08/15/2024     08/15/2024    CO2 22.0 08/15/2024     Assessment and plan:    Hyperkalemia  - 8/9 K 5.5 s/p Kayexalate 15 gm po x1   - 8/12 repeat K 5.5 unchanged s/p Kayexalate 30 gm po x 1 & lisinopril discontinued   - 8/15 repeat K 5.4. will order kayexalate 30 gm po x 1 again & increase torsemide to 40 daily   - repeat BMP Friday 8/16  - monitor     Depression   - r/t health condition with feelings of being unmotivated, poor appetite, poor sleep, denies SI  - no prior history of depression  - PHQ9 depression scale score = 17 moderately severe depression   - Mirtazepine 7.5 HS   - psych to follow while in rehab, counseling as well  - mood still same still feeling unmotivated jose with therapy, denies SI. Sleeping improving, appetite slowly improving as well  - monitor     Acute right SDH   Epidural hematoma   - s/p mechanical  fall at home   - CT brain + right SDH and epidural hematoma. Repeat 7/22 stable hematoma  - neurosurgery consulted - [facility] no surgical intervention  - eliquis remains on hold, will need to f/up with neurosx to resume   - PT/OT  - fall risk precautions   - Follow up CT head scheduled for 8/23/24 prior to neurosx apt   - follow up with neurosurgery Dr Leandro Keen in 1 month. Appointment scheduled with PA on 8/27      R hip fracture   - s/p IM nailing with Dr Celis on 7/22  - WBAT   - PT/OT   - pain management with Tramadol prn   - follow up with ortho Dr Brian Celis   - staples removed, healing well  - monitor      R dorsal foot injury improved  - noted by wound care RN after therapy   - localized ecchymosis and swelling to R dorsal foot demarcated - fading in color, size now decreasing   - ice packs prn   - monitor      Right heel blister  - Wound care following  - Monitoring and offloading     HTN  - s/p IV labetalol, hydralazine, and nicardipine drip while in hospital for SBP goal < 150   - vital signs qshift and prn   - SBP ranging 130-150  - previously on lisinopril 10 daily, discontinued for hyperkalemia  - previously on torsemide 20 daily, increased to 40 daily   - monitor      pAF  - eliquis remains on hold, will need to f/up with neurosx to resume   - vital signs q shift and prn  - HR controlled      HL  Atherosclerosis of the aorta  - continue atorvastatin 40 hs      T2DM insulin dependent   - 7/21/24 Hgba1c = 8.3%  - recorded allergy to short acting insulin with headaches   - accuchecks BID  - fasting glucose  preprandial glucose 163-280  - previously on lantus 50 units daily. Reduced to 45 units with AM low glucose ranging 80-90s, add PM snack and monitor  - continue metformin 500 bid  - continue glipizide 10 bid   - follow up with endocrinology outpatient   - monitor      CAD  - previously on ASA 81 daily, currently on hold d/t bleed     Constipation - resolved   Bowel management   -  secondary to narcotic use for pain and decreased mobility   - continue senna plus bid   - continue miralax 17g every other day and daily prn  - continue dulcolax daily prn   - monitor BMs     Urinary retention - resolved with management of constipation  - likely secondary to constipation but recently treated for UTI  - monitor     Leukocytosis - resolved  - WBC up to 14.2, repeat 10.8, 7 wnl normal now  - Previously treated for UTI in hospital s/p IV Rocephin and discharged on PO Cefpodoxime completed 7/29   - repeat UA/Ucx negative  - VSS, remains afebrile     Weakness, ADL dysfunction, falls  Prevention strategies  PT/OT eval and treat  DC planning with MDT, SW, therapies: 8/21/24  Services on DC: [facility] RN/PT/OT/SLP/SW  Equipment on DC: wheelchair      Vitamins/supplements as r/t deficiencies  [facility] to follow while in rehab; supplementation/diet as per [facility]  May continue home supplements  Vitamin D3     At risk for malnutrition  Dietician consult  Weekly weights  Supplements as indicated  Encourage po intake     Pain management  Monitor and assess pain  Allergy to tylenol  No NSAIDS d/t recent bleed   Tramadol 50 q6hrs prn      DVT prophylaxis  None - off a/c d/t bleed      GI prophylaxis  None      Labs  CBC, BMP weekly and prn.   Repeat BMP Friday 8/16    Follow Ups:  PCP within 7 days of DC  Follow up with neurosurgery Dr Leandro Keen or PA in 1 month [phone] on 8/27/24. Scheduled with CT head repeat prior on 8/23.   Follow up with ortho Dr Brian Celis 8/27  Follow up with endocrinology outpatient as scheduled        Future Appointments   Date Time Provider Department Center   8/23/2024  8:00 AM  CT MAIN RM1  [facility]   8/27/2024  2:45 PM Trena Villanueva PA ENH[facility]NY[facility]2 [facility]         *Established patient; follow-up moderately complex visit/ greater than 30       35 minutes spent w/ patient and staff, including but not limited to/ reviewing present status, needs, abilities with  disciplines, reviewing medical records, vital signs, labs, completing medication reconciliation and entering orders for continued care in [facility].    Note to patient: The 21st Century Cures Act makes medical notes like these available to patients in the interest of transparency. However, this is a medical document intended as peer to peer communication. It is written in medical language and may contain abbreviations or verbiage that are unfamiliar. It may appear blunt or direct. Medical documents are intended to carry relevant information, facts as evident, and the clinical opinion of the practitioner who signs the document.    Adelita Pratt, APRN  8/15/2024

## 2024-08-20 ENCOUNTER — SNF VISIT (OUTPATIENT)
Dept: INTERNAL MEDICINE CLINIC | Age: 84
End: 2024-08-20

## 2024-08-20 VITALS
TEMPERATURE: 97 F | RESPIRATION RATE: 16 BRPM | SYSTOLIC BLOOD PRESSURE: 153 MMHG | HEART RATE: 64 BPM | OXYGEN SATURATION: 98 % | DIASTOLIC BLOOD PRESSURE: 62 MMHG | WEIGHT: 180.19 LBS | BODY MASS INDEX: 30 KG/M2

## 2024-08-20 DIAGNOSIS — S06.5XAA SDH (SUBDURAL HEMATOMA) (HCC): ICD-10-CM

## 2024-08-20 DIAGNOSIS — Z78.9 DECREASED ACTIVITIES OF DAILY LIVING (ADL): ICD-10-CM

## 2024-08-20 DIAGNOSIS — I48.0 PAROXYSMAL ATRIAL FIBRILLATION (HCC): ICD-10-CM

## 2024-08-20 DIAGNOSIS — I10 PRIMARY HYPERTENSION: ICD-10-CM

## 2024-08-20 DIAGNOSIS — R53.1 WEAKNESS: ICD-10-CM

## 2024-08-20 DIAGNOSIS — D72.829 LEUKOCYTOSIS, UNSPECIFIED TYPE: ICD-10-CM

## 2024-08-20 DIAGNOSIS — K59.03 DRUG-INDUCED CONSTIPATION: ICD-10-CM

## 2024-08-20 DIAGNOSIS — R33.9 URINARY RETENTION: ICD-10-CM

## 2024-08-20 DIAGNOSIS — F32.A DEPRESSION, UNSPECIFIED DEPRESSION TYPE: ICD-10-CM

## 2024-08-20 DIAGNOSIS — E11.40 TYPE 2 DIABETES MELLITUS WITH DIABETIC NEUROPATHY, WITH LONG-TERM CURRENT USE OF INSULIN (HCC): ICD-10-CM

## 2024-08-20 DIAGNOSIS — E78.2 MIXED HYPERLIPIDEMIA: ICD-10-CM

## 2024-08-20 DIAGNOSIS — E87.5 HYPERKALEMIA: Primary | ICD-10-CM

## 2024-08-20 DIAGNOSIS — I10 ESSENTIAL HYPERTENSION: ICD-10-CM

## 2024-08-20 DIAGNOSIS — Z79.4 TYPE 2 DIABETES MELLITUS WITH DIABETIC NEUROPATHY, WITH LONG-TERM CURRENT USE OF INSULIN (HCC): ICD-10-CM

## 2024-08-20 DIAGNOSIS — S72.001S CLOSED FRACTURE OF RIGHT HIP, SEQUELA: ICD-10-CM

## 2024-08-20 DIAGNOSIS — S06.4XAA EPIDURAL HEMATOMA (HCC): ICD-10-CM

## 2024-08-20 PROCEDURE — 99309 SBSQ NF CARE MODERATE MDM 30: CPT

## 2024-08-20 NOTE — PROGRESS NOTES
Evangelina Ward, 6/14/1940, 84 year old, female    Chief Complaint:    Chief Complaint   Patient presents with    Follow - Up     Acute right SDH, epidural hematoma, R hip fx s/p IM nailing, hyperkalemia     HPI:  Evangelina Ward is an 84-year-old female with a past medical history significant for T2DM, HTN, HL, Paroxysmal atrial fibrillation on Eliquis, atherosclerosis of the aorta, CAD, and OA. Presented to [place] ED with c/o right hip and right side head injury s/p mechanical fall at home. CT head revealed right SDH and epidural hematoma. Xray of right femur showed mildly displaced intertrochanteric femoral fracture. Ortho and neurosurgery were consulted. She was admitted to the hospital for acute right SDH and epidural hematoma without surgical intervention and R hip fracture s/p IM nailing by Dr. Celis on 7/22. Hospital course complicated by hyperglycemia, HTN, and UTI s/p IV Rocephin. She was discharged on PO Cefpodoxime. She was stabilized and discharged to [facility] for rehabilitation and strengthening     SUBJECTIVE:  Patient seen today for aprn follow up visit. She is seen sitting up in her WC watching television. No new complaints today. No pain presently. She does report pain to her buttocks wound at night when she is laying down. She is taking Tramadol that has helped manage her pain. Sleep is OK but fragmented throughout the night but her appetite has been improving. Has made some progress with therapy and able to walk a few feet with a walker. Does have some lightheadedness that is self limiting with rest. Last BM yesterday, she is passing pass. Remains with R LE edema which she reports has been unchanged. She states everyone is telling her that she has improved since her rehab admission but she does not feel that same way.     Denies fatigue, fever, body aches/chills, CP, SOB, cough, abdominal pain, nausea, vomiting, urinary or bowel complaints.     Objective:  /62   Pulse 64   Temp 97.3 °F (36.3  °C)   Resp 16   Wt 180 lb 3.2 oz (81.7 kg)   LMP  (LMP Unknown)   SpO2 98%   BMI 29.99 kg/m²     PHYSICAL EXAM:  GENERAL HEALTH: well developed, well nourished, in no acute distress  LINES, TUBES, DRAINS:  none  SKIN: no rashes, no suspicious lesions, fading ecchymosis to R eye, R lateral face and neck, L hand and LUE now improving. Improving localized erythema and swelling to R dorsal foot WOUND: R hip surgical incision staples removed, well approximated, no drainage, no redness. Right heel blister intact. Buttocks with blister open now with erythema.  EYES:  sclera anicteric, conjunctiva normal; there is no nystagmus, no drainage from eyes  HENT:  normocephalic; normal nose, no nasal drainage, mucous membranes pink, moist.  NECK:  supple, non tender, FROM  BREAST:  deferred  RESPIRATORY: clear, no crackles, no wheezing, no dyspnea, no cough, room air  CARDIOVASCULAR: RRR, S1 and S2, no murmurs  ABDOMEN:  normal active BS+, soft, distended; no organomegaly, no masses; nontender, no guarding, no rebound tenderness.  : suprapubic distention   LYMPHATIC:no lymphedema  MUSCULOSKELETAL: no acute synovitis upper or lower extremity.   EXTREMITIES/VASCULAR: no cyanosis or clubbing, radial pulses 2+ and dorsalis pedal pulses 2+, trace nonpitting RLE edema   NEUROLOGIC: A&OX3, no focal deficits, follows commands  PSYCHIATRIC: calm, cooperative, slightly withdrawn, mood depressed      Therapy update:  Transfers: stand pivot   ADLS:  mod assist  Ambulation: 12 ft min A  DC plan: 8/31/24 lives home with spouse, needs encouragement, anxiety hindering therapy progression    Medications reviewed: Yes      Current Outpatient Medications:     mirtazapine 7.5 MG Oral Tab, Take 1 tablet (7.5 mg total) by mouth nightly. Started 8/1 in rehab, Disp: , Rfl:     senna-docusate 8.6-50 MG Oral Tab, Take 2 tablets by mouth 2 (two) times daily., Disp: , Rfl:     lisinopril 10 MG Oral Tab, Take 1 tablet (10 mg total) by mouth daily.  (Patient taking differently: Take 1 tablet (10 mg total) by mouth daily. discontinue on 8/12 in rehab d/t hyperkalemia), Disp: 30 tablet, Rfl: 0    traMADol 50 MG Oral Tab, Take 1 tablet (50 mg total) by mouth every 6 (six) hours as needed., Disp: 12 tablet, Rfl: 0    cholecalciferol 50 MCG (2000 UT) Oral Tab, Take 1 tablet (2,000 Units total) by mouth daily., Disp: , Rfl:     polyethylene glycol, PEG 3350, 17 g Oral Powd Pack, Take 17 g by mouth daily as needed., Disp: , Rfl:     atorvastatin 40 MG Oral Tab, Take 1 tablet (40 mg total) by mouth nightly., Disp: , Rfl:     metFORMIN HCl  MG Oral Tablet 24 Hr, Take 2 tablets (1,000 mg total) by mouth 2 (two) times daily., Disp: 360 tablet, Rfl: 3    insulin glargine (LANTUS SOLOSTAR) 100 UNIT/ML Subcutaneous Solution Pen-injector, INJECT up to 48 UNITS INTO THE SKIN ONCE DAILY. (Patient taking differently: Inject 45 Units into the skin every morning.), Disp: 45 mL, Rfl: 3    glipiZIDE 10 MG Oral Tab, Take 1 tablet (10 mg total) by mouth 2 (two) times daily before meals., Disp: 180 tablet, Rfl: 3    torsemide (DEMADEX) 20 MG Oral Tab, Take 1 tablet (20 mg total) by mouth daily. Increased to 40 daily on 8/15 in rehab d/t hyperkalemia, Disp: , Rfl:       Diagnostics reviewed:    Lab Results   Component Value Date    WBC 5.7 08/20/2024    RBC 2.83 (L) 08/20/2024    HGB 8.0 (L) 08/20/2024    HCT 25.1 (L) 08/20/2024    MCV 88.7 08/20/2024    MCH 28.3 08/20/2024    MCHC 31.9 08/20/2024    RDW 14.6 08/20/2024    .0 08/20/2024     Lab Results   Component Value Date    GLU 88 08/20/2024    BUN 51 (H) 08/20/2024    BUNCREA 42.0 (H) 10/15/2021    CREATSERUM 0.92 08/20/2024    ANIONGAP 4 08/20/2024    GFR 94 03/10/2017    GFRNAA 87 04/17/2019    GFRAA 101 04/17/2019    CA 8.6 (L) 08/20/2024    OSMOCALC 297 (H) 08/20/2024    ALKPHO 82 08/01/2024    AST 19 08/01/2024    ALT 15 08/01/2024    BILT 0.7 08/01/2024    TP 6.0 08/01/2024    ALB 3.6 08/01/2024    GLOBULIN 2.4  08/01/2024    AGRATIO 1.9 05/21/2013     08/20/2024    K 5.0 08/20/2024     08/20/2024    CO2 28.0 08/20/2024     Assessment and plan:    Hyperkalemia  - 8/9 K 5.5 s/p Kayexalate 15 gm po x1   - 8/12 repeat K 5.5 unchanged s/p Kayexalate 30 gm po x 1 & lisinopril discontinued   - 8/15 repeat K 5.4. will order kayexalate 30 gm po x 1 again & increase torsemide to 40 daily   - 8/16 repeat K 4.6, now 5.0 today - wnl, improved    - BMP weekly   - monitor     Depression   - r/t health condition with feelings of being unmotivated, poor appetite, poor sleep, denies SI  - no prior history of depression  - PHQ9 depression scale score = 17 moderately severe depression   - Mirtazepine 7.5 HS   - psych to follow while in rehab, counseling as well  - mood remains the same, still feeling unmotivated jose with therapy, denies SI. Sleeping OK with fragmented sleep, appetite slowly improving   - monitor     Acute right SDH   Epidural hematoma   - s/p mechanical fall at home   - CT brain + right SDH and epidural hematoma. Repeat 7/22 stable hematoma  - neurosurgery consulted - [facility] no surgical intervention  - eliquis remains on hold, will need to f/up with neurosx to resume   - PT/OT  - fall risk precautions   - Follow up CT head scheduled for 8/23/24 prior to neurosx apt   - Follow up with neurosurgery Dr Leandro Keen in 1 month. Appointment scheduled with PA on 8/27/24      R hip fracture   - s/p IM nailing with Dr Celis on 7/22  - WBAT   - PT/OT   - pain management with Tramadol prn   - follow up with ortho Dr Brian Celis   - staples removed, healing well  - monitor      R dorsal foot injury - improved  - noted by wound care RN after therapy   - localized ecchymosis and swelling to R dorsal foot demarcated - fading in color, size decreased  - ice packs prn   - monitor      Right heel blister - improved   - Wound care following  - Monitoring and offloading     HTN  - s/p IV labetalol, hydralazine, and nicardipine  drip while in hospital for SBP goal < 150   - vital signs qshift and prn   - SBP ranging 130-150  - previously on lisinopril 10 daily, discontinued for hyperkalemia on 8/12   - previously on torsemide 20 daily, increased to 40 daily on 8/15   - monitor      pAF  - eliquis remains on hold, will need to f/up with neurosx to resume   - vital signs q shift and prn  - HR controlled      HL  Atherosclerosis of the aorta  - continue atorvastatin 40 hs      T2DM insulin dependent   - 7/21/24 Hgba1c = 8.3%  - recorded allergy to short acting insulin with headaches   - accuchecks BID  - fasting glucose  preprandial glucose 163-280  - previously on lantus 50 units daily. Reduced to 45 units with AM low glucose ranging 80-90s, add PM snack and monitor  - continue metformin 500 bid  - continue glipizide 10 bid   - follow up with endocrinology outpatient   - monitor      CAD  - previously on ASA 81 daily, currently on hold d/t bleed     Constipation - resolved   Bowel management   - secondary to narcotic use for pain and decreased mobility   - continue senna plus bid   - continue miralax 17g daily prn  - continue dulcolax daily prn   - monitor BMs     Urinary retention - resolved with management of constipation  - likely secondary to constipation but recently treated for UTI  - monitor     Leukocytosis - resolved  - WBC up to 14.2, repeat 10.8, wnl normal now  - Previously treated for UTI in hospital s/p IV Rocephin and discharged on PO Cefpodoxime completed 7/29   - repeat UA/Ucx negative  - VSS, remains afebrile     Weakness, ADL dysfunction, falls  Prevention strategies  PT/OT eval and treat  DC planning with MDT, SW, therapies: 8/31/24  Services on DC: [facility] RN/PT/OT/SLP/SW  Equipment on DC: wheelchair      Vitamins/supplements as r/t deficiencies  YEN RD to follow while in rehab; supplementation/diet as per YEN RD  May continue home supplements  Vitamin D3     At risk for malnutrition  Dietician consult  Weekly  weights  Supplements as indicated  Encourage po intake     Pain management  Monitor and assess pain  Allergy to tylenol  No NSAIDS d/t recent bleed   Tramadol 50 q6hrs prn      DVT prophylaxis  None - off a/c d/t bleed      GI prophylaxis  None      Labs  CBC, BMP weekly and prn.     Follow Ups:  PCP within 7 days of DC  Follow up with neurosurgery Dr Leandro Keen or PA in 1 month [phone]. Repeat CT head scheduled 8/23, apt follow up with neurosurgery PA on 8/27  Follow up with ortho Dr Brian Celis as directed   Follow up with endocrinology outpatient as scheduled        Future Appointments   Date Time Provider Department Center   8/23/2024  8:00 AM  CT MAIN RM1  [facility]   8/27/2024  2:45 PM Kay, Trena, PA ENINAPER2 [facility]         *Established patient; follow-up moderately complex visit/ greater than 30       35 minutes spent w/ patient and staff, including but not limited to/ reviewing present status, needs, abilities with disciplines, reviewing medical records, vital signs, labs, completing medication reconciliation and entering orders for continued care in [facility].    Note to patient: The 21st Century Cures Act makes medical notes like these available to patients in the interest of transparency. However, this is a medical document intended as peer to peer communication. It is written in medical language and may contain abbreviations or verbiage that are unfamiliar. It may appear blunt or direct. Medical documents are intended to carry relevant information, facts as evident, and the clinical opinion of the practitioner who signs the document.    Adelita Pratt, APRN  8/20/2024

## 2024-08-23 ENCOUNTER — HOSPITAL ENCOUNTER (OUTPATIENT)
Dept: CT IMAGING | Facility: HOSPITAL | Age: 84
Discharge: HOME | End: 2024-08-23
Payer: MEDICARE

## 2024-08-23 DIAGNOSIS — S06.5XAA SDH (SUBDURAL HEMATOMA) (HCC): ICD-10-CM

## 2024-08-23 PROCEDURE — 70450 CT HEAD/BRAIN W/O DYE: CPT

## 2024-08-26 ENCOUNTER — APPOINTMENT (OUTPATIENT)
Dept: CT IMAGING | Facility: HOSPITAL | Age: 84
End: 2024-08-26
Attending: EMERGENCY MEDICINE
Payer: MEDICARE

## 2024-08-26 ENCOUNTER — APPOINTMENT (OUTPATIENT)
Dept: CV DIAGNOSTICS | Facility: HOSPITAL | Age: 84
End: 2024-08-26
Attending: INTERNAL MEDICINE
Payer: MEDICARE

## 2024-08-26 ENCOUNTER — APPOINTMENT (OUTPATIENT)
Dept: GENERAL RADIOLOGY | Facility: HOSPITAL | Age: 84
End: 2024-08-26
Attending: EMERGENCY MEDICINE
Payer: MEDICARE

## 2024-08-26 ENCOUNTER — APPOINTMENT (OUTPATIENT)
Dept: ULTRASOUND IMAGING | Facility: HOSPITAL | Age: 84
End: 2024-08-26
Attending: INTERNAL MEDICINE
Payer: MEDICARE

## 2024-08-26 ENCOUNTER — HOSPITAL ENCOUNTER (INPATIENT)
Facility: HOSPITAL | Age: 84
LOS: 3 days | Discharge: NURSING, REHAB OR LONG-TERM CARE | End: 2024-08-29
Attending: EMERGENCY MEDICINE | Admitting: INTERNAL MEDICINE
Payer: MEDICARE

## 2024-08-26 DIAGNOSIS — I26.02 ACUTE SADDLE PULMONARY EMBOLISM WITH ACUTE COR PULMONALE (HCC): Primary | ICD-10-CM

## 2024-08-26 PROBLEM — D64.9 ANEMIA: Status: ACTIVE | Noted: 2024-08-26

## 2024-08-26 PROBLEM — R79.89 AZOTEMIA: Status: ACTIVE | Noted: 2024-08-26

## 2024-08-26 LAB
ALBUMIN SERPL-MCNC: 3.9 G/DL (ref 3.2–4.8)
ALBUMIN/GLOB SERPL: 1.4 {RATIO} (ref 1–2)
ALP LIVER SERPL-CCNC: 176 U/L
ALT SERPL-CCNC: 14 U/L
ANION GAP SERPL CALC-SCNC: 7 MMOL/L (ref 0–18)
APTT PPP: 27.3 SECONDS (ref 23–36)
AST SERPL-CCNC: 14 U/L (ref ?–34)
BASOPHILS # BLD AUTO: 0.03 X10(3) UL (ref 0–0.2)
BASOPHILS NFR BLD AUTO: 0.4 %
BILIRUB SERPL-MCNC: 0.4 MG/DL (ref 0.2–1.1)
BUN BLD-MCNC: 42 MG/DL (ref 9–23)
CALCIUM BLD-MCNC: 9.3 MG/DL (ref 8.7–10.4)
CHLORIDE SERPL-SCNC: 102 MMOL/L (ref 98–112)
CHOLEST SERPL-MCNC: 146 MG/DL (ref ?–200)
CK SERPL-CCNC: 28 U/L
CO2 SERPL-SCNC: 26 MMOL/L (ref 21–32)
CREAT BLD-MCNC: 1.27 MG/DL
D DIMER PPP FEU-MCNC: >20 UG/ML FEU (ref ?–0.82)
EGFRCR SERPLBLD CKD-EPI 2021: 42 ML/MIN/1.73M2 (ref 60–?)
EOSINOPHIL # BLD AUTO: 0.17 X10(3) UL (ref 0–0.7)
EOSINOPHIL NFR BLD AUTO: 2.3 %
ERYTHROCYTE [DISTWIDTH] IN BLOOD BY AUTOMATED COUNT: 15.2 %
GLOBULIN PLAS-MCNC: 2.8 G/DL (ref 2–3.5)
GLUCOSE BLD-MCNC: 214 MG/DL (ref 70–99)
HCT VFR BLD AUTO: 29.3 %
HDLC SERPL-MCNC: 37 MG/DL (ref 40–59)
HGB BLD-MCNC: 9.6 G/DL
IMM GRANULOCYTES # BLD AUTO: 0.07 X10(3) UL (ref 0–1)
IMM GRANULOCYTES NFR BLD: 0.9 %
INR BLD: 1.04 (ref 0.8–1.2)
LDLC SERPL CALC-MCNC: 82 MG/DL (ref ?–100)
LYMPHOCYTES # BLD AUTO: 1.69 X10(3) UL (ref 1–4)
LYMPHOCYTES NFR BLD AUTO: 22.7 %
MCH RBC QN AUTO: 28.4 PG (ref 26–34)
MCHC RBC AUTO-ENTMCNC: 32.8 G/DL (ref 31–37)
MCV RBC AUTO: 86.7 FL
MONOCYTES # BLD AUTO: 0.48 X10(3) UL (ref 0.1–1)
MONOCYTES NFR BLD AUTO: 6.4 %
NEUTROPHILS # BLD AUTO: 5.01 X10 (3) UL (ref 1.5–7.7)
NEUTROPHILS # BLD AUTO: 5.01 X10(3) UL (ref 1.5–7.7)
NEUTROPHILS NFR BLD AUTO: 67.3 %
NONHDLC SERPL-MCNC: 109 MG/DL (ref ?–130)
NT-PROBNP SERPL-MCNC: 563 PG/ML (ref ?–450)
OSMOLALITY SERPL CALC.SUM OF ELEC: 297 MOSM/KG (ref 275–295)
PLATELET # BLD AUTO: 245 10(3)UL (ref 150–450)
POTASSIUM SERPL-SCNC: 4.5 MMOL/L (ref 3.5–5.1)
PROT SERPL-MCNC: 6.7 G/DL (ref 5.7–8.2)
PROTHROMBIN TIME: 13.6 SECONDS (ref 11.6–14.8)
RBC # BLD AUTO: 3.38 X10(6)UL
SODIUM SERPL-SCNC: 135 MMOL/L (ref 136–145)
TRIGL SERPL-MCNC: 154 MG/DL (ref 30–149)
TROPONIN I SERPL HS-MCNC: 117 NG/L
TROPONIN I SERPL HS-MCNC: 715 NG/L
VLDLC SERPL CALC-MCNC: 24 MG/DL (ref 0–30)
WBC # BLD AUTO: 7.5 X10(3) UL (ref 4–11)

## 2024-08-26 PROCEDURE — 93005 ELECTROCARDIOGRAM TRACING: CPT

## 2024-08-26 PROCEDURE — 80061 LIPID PANEL: CPT | Performed by: EMERGENCY MEDICINE

## 2024-08-26 PROCEDURE — 93306 TTE W/DOPPLER COMPLETE: CPT | Performed by: INTERNAL MEDICINE

## 2024-08-26 PROCEDURE — 93010 ELECTROCARDIOGRAM REPORT: CPT

## 2024-08-26 PROCEDURE — 71260 CT THORAX DX C+: CPT | Performed by: EMERGENCY MEDICINE

## 2024-08-26 PROCEDURE — 85730 THROMBOPLASTIN TIME PARTIAL: CPT | Performed by: EMERGENCY MEDICINE

## 2024-08-26 PROCEDURE — 70450 CT HEAD/BRAIN W/O DYE: CPT | Performed by: EMERGENCY MEDICINE

## 2024-08-26 PROCEDURE — 82550 ASSAY OF CK (CPK): CPT | Performed by: EMERGENCY MEDICINE

## 2024-08-26 PROCEDURE — 71045 X-RAY EXAM CHEST 1 VIEW: CPT | Performed by: EMERGENCY MEDICINE

## 2024-08-26 PROCEDURE — 85025 COMPLETE CBC W/AUTO DIFF WBC: CPT | Performed by: EMERGENCY MEDICINE

## 2024-08-26 PROCEDURE — 80053 COMPREHEN METABOLIC PANEL: CPT | Performed by: EMERGENCY MEDICINE

## 2024-08-26 PROCEDURE — 96365 THER/PROPH/DIAG IV INF INIT: CPT

## 2024-08-26 PROCEDURE — 93970 EXTREMITY STUDY: CPT | Performed by: INTERNAL MEDICINE

## 2024-08-26 PROCEDURE — 84484 ASSAY OF TROPONIN QUANT: CPT | Performed by: EMERGENCY MEDICINE

## 2024-08-26 PROCEDURE — 83880 ASSAY OF NATRIURETIC PEPTIDE: CPT | Performed by: EMERGENCY MEDICINE

## 2024-08-26 PROCEDURE — 85610 PROTHROMBIN TIME: CPT | Performed by: EMERGENCY MEDICINE

## 2024-08-26 PROCEDURE — 85379 FIBRIN DEGRADATION QUANT: CPT | Performed by: EMERGENCY MEDICINE

## 2024-08-26 PROCEDURE — 99285 EMERGENCY DEPT VISIT HI MDM: CPT

## 2024-08-26 RX ORDER — NICOTINE POLACRILEX 4 MG
30 LOZENGE BUCCAL
Status: DISCONTINUED | OUTPATIENT
Start: 2024-08-26 | End: 2024-08-29

## 2024-08-26 RX ORDER — BISACODYL 10 MG
10 SUPPOSITORY, RECTAL RECTAL DAILY PRN
COMMUNITY

## 2024-08-26 RX ORDER — GABAPENTIN 100 MG/1
100 CAPSULE ORAL 2 TIMES DAILY
Status: ON HOLD | COMMUNITY
End: 2024-08-28 | Stop reason: CLARIF

## 2024-08-26 RX ORDER — HEPARIN SODIUM AND DEXTROSE 10000; 5 [USP'U]/100ML; G/100ML
INJECTION INTRAVENOUS CONTINUOUS
Status: DISPENSED | OUTPATIENT
Start: 2024-08-26 | End: 2024-08-28

## 2024-08-26 RX ORDER — DOXYCYCLINE HYCLATE 50 MG/1
325 CAPSULE, GELATIN COATED ORAL
COMMUNITY

## 2024-08-26 RX ORDER — HEPARIN SODIUM AND DEXTROSE 10000; 5 [USP'U]/100ML; G/100ML
18 INJECTION INTRAVENOUS ONCE
Status: COMPLETED | OUTPATIENT
Start: 2024-08-26 | End: 2024-08-26

## 2024-08-26 RX ORDER — ONDANSETRON 4 MG/1
4 TABLET, FILM COATED ORAL EVERY 8 HOURS PRN
COMMUNITY

## 2024-08-26 RX ORDER — ATORVASTATIN CALCIUM 40 MG/1
40 TABLET, FILM COATED ORAL NIGHTLY
Status: DISCONTINUED | OUTPATIENT
Start: 2024-08-27 | End: 2024-08-29

## 2024-08-26 RX ORDER — MORPHINE SULFATE 2 MG/ML
1 INJECTION, SOLUTION INTRAMUSCULAR; INTRAVENOUS EVERY 4 HOURS PRN
Status: DISCONTINUED | OUTPATIENT
Start: 2024-08-26 | End: 2024-08-29

## 2024-08-26 RX ORDER — NICOTINE POLACRILEX 4 MG
15 LOZENGE BUCCAL
Status: DISCONTINUED | OUTPATIENT
Start: 2024-08-26 | End: 2024-08-29

## 2024-08-26 RX ORDER — DEXTROSE MONOHYDRATE 25 G/50ML
50 INJECTION, SOLUTION INTRAVENOUS
Status: DISCONTINUED | OUTPATIENT
Start: 2024-08-26 | End: 2024-08-29

## 2024-08-26 RX ORDER — MULTIVITAMIN
1 TABLET ORAL DAILY
COMMUNITY

## 2024-08-26 RX ORDER — INSULIN DEGLUDEC 100 U/ML
20 INJECTION, SOLUTION SUBCUTANEOUS DAILY
Status: DISCONTINUED | OUTPATIENT
Start: 2024-08-27 | End: 2024-08-29

## 2024-08-26 NOTE — CONSULTS
[facility]    Evangelina Ward Patient Status:  Emergency    1940 MRN [id number]   Location [facility] EMERGENCY DEPARTMENT Attending Millie Madrid MD   Hosp Day # 0 PCP Dorina Higuera MD     Date of Admission: 2024  Admission Diagnosis: No admission diagnoses are documented for this encounter.     History of Present Illness: 85 yo female with history of HTN, HLD, DM, CAD, A.Fib, who was recently admitted 26 with fall resulting in small right tentorial SDH as well as epidural hematoma.  Pt also found to have mildly displaced right femoral fracture for which pt underwent open reduction internal fixation/intramedullary nail femur fracture repair on .  Pt was discharged to [facility].  Pt now presents to the ER on  following syncopal episode.  Pt states that she was walking with physical therapy when she felt SOB and lightheaded.  She denies chest pain.  In the ER labs were notable for elevated trop, pro-bnp and d-dimer.  CTA was with bilateral PE with findings concerning for right heart strain.  Pt denies personal or family history of VTE.       Past Medical History:    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 elsewhere classified    idiopathic angioedema    Arrhythmia    Back problem    sciatica    Carpal tunnel syndrome    CORONARY ARTERY DISEASE    : Ant WMA on stress, : PTCA to LAD,   Nuclear stress neg, EF 62%    CORONARY ARTERY DISEASE    : ant ischemia on stress,   : PTCA to distal LAD,     nuclear stress neg, EF 62%    Coronary atherosclerosis    Diabetes (HCC)    High blood pressure    High cholesterol    Hx of motion sickness    vertigo    Hyperlipidemia    HYPERTENSION    MENOPAUSE    ERT [id number]    Muscle weakness    LEGS SHAKE WHEN STANDING OR USING STAIRS    OSTEOARTHRITIS     Rt menisectomy    Osteoarthritis    Overweight and obesity    Personal history of colonic polyps     adenoma,  negative    Type 2 diabetes mellitus, uncontrolled     Uncontrolled type 2 diabetes mellitus with diabetic polyneuropathy, with long-term current use of insulin    Unspecified hereditary and idiopathic peripheral neuropathy    Vertigo    Vitamin D deficiency      Past Surgical History:   Procedure Laterality Date    Anesth,knee arthroscopy      left knee repair torn meniscus    Angioplasty (coronary)  2006    w/2 drug eluting stents    Arthroscopy of joint unlisted Right     KNEE    Back surgery      L4-L5 TLIF    Cataract  05/2018    Bilateral    Cath drug eluting stent      X2    Cholecystectomy  1997    Colonoscopy  10/10/2013    Procedure: COLONOSCOPY;  Surgeon: Lauro Lazo MD;  Location:  ENDOSCOPY    Colonoscopy,biopsy  1999    adenomatous polyp    Colonoscopy,diagnostic  2003    wnl    Colonoscopy,diagnostic  10/10/13    normal    Endovenous laser vein addon      Right Greater Saph V  per Dr. Castaneda    Hysterectomy      w/ BSO    Knee replacement surgery  03/17/2018    left    Other      lipoma removed from left arm    Other      trigger finger release left hand middle finger    Other surgical history      vein stripping    Revise median n/carpal tunnel surg      left carpal tunnel release         Allergies   Allergen Reactions    Cipro [Ciprofloxacin] HIVES    Insulin Aspart, Human Analog PAIN     Achiness. HA , head pains    Insulin Lispro, Human PAIN     Achiness, HA, head pain    Tylenol [Acetaminophen] SWELLING     Tongue/mouth    Benzonatate UNKNOWN        Social History:   reports that she has never smoked. She has never used smokeless tobacco. She reports that she does not currently use alcohol. She reports that she does not use drugs.      Family History:  Family History   Problem Relation Age of Onset    Other (colon cancer) Mother         age 77    Heart Disorder Father         age 75    Diabetes Paternal Grandmother     Other (gastric cancer) Maternal Grandmother          Home Medications:  Outpatient Medications Marked as Taking for the  8/26/24 encounter (Hospital Encounter)   Medication Sig Dispense Refill    bisacodyl 10 MG Rectal Suppos Place 1 suppository (10 mg total) rectally daily as needed.      Ferrous Gluconate 324 (38 Fe) MG Oral Tab Take 1 tablet (325 mg total) by mouth daily with breakfast.      gabapentin 100 MG Oral Cap Take 1 capsule (100 mg total) by mouth 2 (two) times daily.      Multiple Vitamin (MULTI-VITAMIN) Oral Tab Take 1 tablet by mouth daily.      ondansetron (ZOFRAN) 4 mg tablet Take 1 tablet (4 mg total) by mouth every 8 (eight) hours as needed for Nausea.      mirtazapine 7.5 MG Oral Tab Take 1 tablet (7.5 mg total) by mouth nightly. Started 8/1 in rehab      senna-docusate 8.6-50 MG Oral Tab Take 2 tablets by mouth 2 (two) times daily.      traMADol 50 MG Oral Tab Take 1 tablet (50 mg total) by mouth every 6 (six) hours as needed. 12 tablet 0    cholecalciferol 50 MCG (2000 UT) Oral Tab Take 1 tablet (2,000 Units total) by mouth daily.      polyethylene glycol, PEG 3350, 17 g Oral Powd Pack Take 17 g by mouth daily as needed.      atorvastatin 40 MG Oral Tab Take 1 tablet (40 mg total) by mouth nightly.      metFORMIN HCl  MG Oral Tablet 24 Hr Take 2 tablets (1,000 mg total) by mouth 2 (two) times daily. 360 tablet 3    insulin glargine (LANTUS SOLOSTAR) 100 UNIT/ML Subcutaneous Solution Pen-injector INJECT up to 48 UNITS INTO THE SKIN ONCE DAILY. (Patient taking differently: Inject 45 Units into the skin every morning.) 45 mL 3    glipiZIDE 10 MG Oral Tab Take 1 tablet (10 mg total) by mouth 2 (two) times daily before meals. 180 tablet 3    torsemide (DEMADEX) 20 MG Oral Tab Take 1 tablet (20 mg total) by mouth daily. Increased to 40 daily on 8/15 in rehab d/t hyperkalemia          Current Medications:    Current Facility-Administered Medications:     heparin (Porcine) 91835 units/250 mL infusion ED (PE/DVT/THROMBUS) INITIAL DOSE, 18 Units/kg/hr, Intravenous, Once    heparin (Porcine) 01910 units/250mL infusion  PE/DVT/THROMBUS CONTINUOUS, 200-3,000 Units/hr, Intravenous, Continuous     Review of Systems:  Constitutional: denies weight loss, fevers, chills, night sweats, or fatigue  HEENT: denies vision or hearing changes, eye pain, tinnitus, hearing loss, sore throat, epistaxis, sinus congestion  Cardiovascular: denies chest pain, PND, palpitations, +edema, +syncope  Respiratory: + SOB, dyspnea on exertion, denies cough, hemoptysis, wheezing, pleurisy  GI: denies nausea, vomiting, diarrhea, constipation, melena, abdominal pain  : denies hematuria, dysuria, hesitancy, or incontinence  Musculoskeletal: denies arthralgias, myalgias, muscle weakness, or joint swelling  Skin: denies rash or pruritis; no jaundice  Neurologic: denies numbness, weakness, ataxia, tremors, or vertigo  Psychiatric: denies insomnia, depression, anxiety, or drug abuse  Endocrine: denies polydypsia, polyuria, cold/heat intolerance  Hematologic/lymphatic: denies easy bruising, new blood clots, or lymphedema  Allergic/immunologic: denies hay fever, hives, or new allergies       OBJECTIVE:  /76   Pulse 80   Temp 98.5 °F (36.9 °C) (Oral)   Resp 16   Ht 65\"   Wt 180 lb 1.9 oz (81.7 kg)   LMP  (LMP Unknown)   SpO2 100%   BMI 29.97 kg/m²      Ventilator Settings: 2L      Wt Readings from Last 3 Encounters:   08/26/24 180 lb 1.9 oz (81.7 kg)   08/20/24 180 lb 3.2 oz (81.7 kg)   08/15/24 180 lb 3.2 oz (81.7 kg)        No intake/output data recorded.  No intake/output data recorded.      Physical Exam:                          General: alert, cooperative, in NAD                          HEENT: oropharynx clear without erythema or exudates, moist mucous membranes                          Lungs: Clear to auscultation bilaterally, no wheezes or crackles                           Chest wall: No tenderness or deformity.                          Heart: Regular rate and rhythm, normal S1S2                          Abdomen: soft, non-tender,  non-distended, positive BS.                          Extremity: No clubbing or cyanosis. + edema                          Skin: bilateral LE are wrapped       Lab Results   Component Value Date    WBC 7.5 08/26/2024    RBC 3.38 08/26/2024    HGB 9.6 08/26/2024    HCT 29.3 08/26/2024    MCV 86.7 08/26/2024    MCH 28.4 08/26/2024    MCHC 32.8 08/26/2024    RDW 15.2 08/26/2024    .0 08/26/2024     Lab Results   Component Value Date     08/26/2024    K 4.5 08/26/2024     08/26/2024    CO2 26.0 08/26/2024    BUN 42 08/26/2024    CREATSERUM 1.27 08/26/2024     08/26/2024    CA 9.3 08/26/2024    ALKPHO 176 08/26/2024    ALT 14 08/26/2024    AST 14 08/26/2024    BILT 0.4 08/26/2024    ALB 3.9 08/26/2024    TP 6.7 08/26/2024     Lab Results   Component Value Date    INR 1.16 07/21/2024    INR 1.0 08/06/2020    INR 0.99 04/17/2019          Imaging: I have independently visualized all relevant chest imaging in PACS.  I agree with the radiology interpretation except where noted.       ASSESSMENT/PLAN:  Acute hypoxemic respiratory failure: 2/2 submassive PE  -wean O2 as able, currently on 2L  -CTA reviewed with bilateral PE  Submassive PE  -troponin and pro-bnp elevated  -CTA with bilateral PE with findings concerning for right heart strain  -check echo and LE dopplers  -pt had recent SDH and epidural hematoma following fall 7/2024, per ER MD neurosurgery contacted and ok to proceed with heparin gtt without the bolus.   -cardiology consulted, they are not recommending lytic therapy given hemodynamic stability and recent brain bleed  HTN/HLD/A.Fib  -resume home meds as BP allows   -eliquis had been held following SDH  -heparin gtt  DM  -insulin per IM  FEN:  -ADAT  Proph:  -heparin gtt (if ok from neurosurgery perspective)  Dispo:  -ICU, pt critically ill and at risk for further decompensation   -discussed code status with pt, should would like to be DNAR/select,  at bedside and in agreement with  pt's choice     Critical Care Time: 35 minutes    Mary Weber MD  8/26/2024  4:46 PM

## 2024-08-26 NOTE — ED INITIAL ASSESSMENT (HPI)
PT from [facility] for syncopal episode. Pt was doing therapy walking from bed to wheelchair and became nauseous with a syncopal episode into her chair. +LOC, denies hitting head. EMS performed accucheck with glucose reading of 256

## 2024-08-26 NOTE — CONSULTS
Cardiology Consultation Note      Evangelina Ward Patient Status:  Emergency    1940 MRN [id number]   Location [facility] EMERGENCY DEPARTMENT Attending Millie Madrid MD   Hosp Day # 0 PCP Dorina Higuera MD     Reason for consultation:  Submassive PE    Impression:  Submassive PE(bilateral PE involving distal main pulmonary arteries extending into segmental branches)  Syncopal episode   Elevated HS trop (715) and elevated pro BNP (563)  Elevated biomarkers likely due to RV strain  Recent subdural hematoma   Recent femoral fracture s/p surgery   CAD s/p PCI in   HLD  HTN  PAF    Plan:  She is hemodynamically stable without the need of pressors and on minimal O2 settings currently. There are signs of RV strain, however, given her recent brain bleed, would not recommend TPA even with cather directed thrombolysis. Continue with heparin and monitor clinically  STAT echo ordered   Recommend ICU admission given complexity and need to close neurological monitoring on anticoagulation        History of Present Illness:  Evangelina Ward is a 84 year old female who presented to [facility] on 2024.    This is a patient of my partner: Dr. Batista.    Evangelina Ward is a 83 year old female who presents for follow-up of coronary artery disease (s/p PCI to LAD in ), hyperlipidemia, hypertension, diabetes and chronic venous insufficiency (s/p multiple endovenous treatments by Dr. Castaneda at [facility] in [place] in the past) .    Comes in from rehab due to increased BARRAZA and syncope. Was working with PT and felt real short of breath and passed out. Feels better now. Found to have large burden or PE.     Cardiology consultation was requested.    Medications:  No current facility-administered medications for this encounter.       Past Medical History:    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 elsewhere classified    idiopathic angioedema    Arrhythmia    Back problem    sciatica    Carpal tunnel syndrome     CORONARY ARTERY DISEASE    11/06: Ant WMA on stress, 12/06: PTCA to LAD,  12/08 Nuclear stress neg, EF 62%    CORONARY ARTERY DISEASE    11/06: ant ischemia on stress,   12/06: PTCA to distal LAD,    12/08 nuclear stress neg, EF 62%    Coronary atherosclerosis    Diabetes (HCC)    High blood pressure    High cholesterol    Hx of motion sickness    vertigo    Hyperlipidemia    HYPERTENSION    MENOPAUSE    ERT [id number]    Muscle weakness    LEGS SHAKE WHEN STANDING OR USING STAIRS    OSTEOARTHRITIS    1/06 Rt menisectomy    Osteoarthritis    Overweight and obesity    Personal history of colonic polyps    2/99 adenoma, 4/03 negative    Type 2 diabetes mellitus, uncontrolled    Uncontrolled type 2 diabetes mellitus with diabetic polyneuropathy, with long-term current use of insulin    Unspecified hereditary and idiopathic peripheral neuropathy    Vertigo    Vitamin D deficiency       Past Surgical History:   Procedure Laterality Date    Anesth,knee arthroscopy      left knee repair torn meniscus    Angioplasty (coronary)  2006    w/2 drug eluting stents    Arthroscopy of joint unlisted Right     KNEE    Back surgery      L4-L5 TLIF    Cataract  05/2018    Bilateral    Cath drug eluting stent      X2    Cholecystectomy  1997    Colonoscopy  10/10/2013    Procedure: COLONOSCOPY;  Surgeon: Lauro Lazo MD;  Location:  ENDOSCOPY    Colonoscopy,biopsy  1999    adenomatous polyp    Colonoscopy,diagnostic  2003    wnl    Colonoscopy,diagnostic  10/10/13    normal    Endovenous laser vein addon      Right Greater Saph V  per Dr. Castaneda    Hysterectomy      w/ BSO    Knee replacement surgery  03/17/2018    left    Other      lipoma removed from left arm    Other      trigger finger release left hand middle finger    Other surgical history      vein stripping    Revise median n/carpal tunnel surg      left carpal tunnel release       Family History  There is no family history of sudden cardiac death.    Social  History   reports that she has never smoked. She has never used smokeless tobacco. She reports that she does not currently use alcohol. She reports that she does not use drugs.     Allergies  Allergies   Allergen Reactions    Cipro [Ciprofloxacin] HIVES    Insulin Aspart, Human Analog PAIN     Achiness. HA , head pains    Insulin Lispro, Human PAIN     Achiness, HA, head pain    Tylenol [Acetaminophen] SWELLING     Tongue/mouth    Benzonatate UNKNOWN         Review of Systems:  Constitutional: negative for fevers  Eyes: negative for visual disturbance  Ears, nose, mouth, throat, and face: negative for epistaxis  Respiratory: negative for dyspnea on exertion  Cardiovascular: negative for chest pain  Gastrointestinal: negative for melena  Genitourinary:negative for hematuria  Hematologic/lymphatic: negative for bleeding  Musculoskeletal:negative for myalgias  Neurological: negative for dizziness and headaches  Endocrine: negative for temperature intolerance      Physical Exam:  Blood pressure 130/72, pulse 80, temperature 98.5 °F (36.9 °C), temperature source Oral, resp. rate 20, height 5' 5\" (1.651 m), weight 180 lb 1.9 oz (81.7 kg), SpO2 95%, not currently breastfeeding.  Temp (24hrs), Av.5 °F (36.9 °C), Min:98.5 °F (36.9 °C), Max:98.5 °F (36.9 °C)    Wt Readings from Last 3 Encounters:   24 180 lb 1.9 oz (81.7 kg)   24 180 lb 3.2 oz (81.7 kg)   08/15/24 180 lb 3.2 oz (81.7 kg)       General: Awake and alert; in no acute distress  HEENT: Extraocular movements are intact; sclerae are anicteric; scalp is atrauamatic; no thyromegaly  Neck: Supple; no JVD; no carotid bruits  Cardiac: Regular rate and regular rhythm; no murmurs/rubs/gallops are appreciated; PMI is non-displaced; there is no evidence of a sternal heave  Lungs: Clear to auscultation bilaterally; no accessory muscle use is noted  Abdomen: Soft, non-tender; bowel sounds are normoactive; no hepatosplenomegaly  Extremities: No clubbing or  cyanosis; moves all 4 extremities normally  Psychiatric: Normal mood and affect; answers questions appropriately  Dermatologic: No rashes; normal skin turgor    Diagnostic testing:    EKG:Likely sinus. Poor baseline due to artifact     Labs:   Lab Results   Component Value Date    INR 1.16 07/21/2024    INR 1.0 08/06/2020     Lab Results   Component Value Date    LDL 82 08/26/2024    HDL 37 08/26/2024    TRIG 154 08/26/2024    VLDL 24 08/26/2024     Lab Results   Component Value Date    WBC 7.5 08/26/2024    HGB 9.6 08/26/2024    HCT 29.3 08/26/2024    .0 08/26/2024    CREATSERUM 1.27 08/26/2024    BUN 42 08/26/2024     08/26/2024    K 4.5 08/26/2024     08/26/2024    CO2 26.0 08/26/2024     08/26/2024    CA 9.3 08/26/2024    ALB 3.9 08/26/2024    ALKPHO 176 08/26/2024    BILT 0.4 08/26/2024    TP 6.7 08/26/2024    AST 14 08/26/2024    ALT 14 08/26/2024    DDIMER >20.00 08/26/2024    CK 28 08/26/2024         Thank you for allowing our practice to participate in the care of your patient. Please do not hesitate to contact me if you have any questions.    Nedra Silverman MD

## 2024-08-26 NOTE — ED PROVIDER NOTES
Patient Seen in: [facility] Emergency Department      History     Chief Complaint   Patient presents with    Syncope     Stated Complaint:     Subjective:   HPI    84-year-old female presents to the emergency department with complaints of pain of a syncopal episode.  Patient was recently in the hospital she had fractured her hip she had an epidural hematoma.  She has a history of atrial fibrillation she is not sure if she was put back on anticoagulation or not she states that she is in the nursing home and her meds are supplied to her.  She admits that she is not up and amatory very often because of the recent hip surgery but she was up and she was walking today and felt short of breath and very lightheaded and has to sit down and then apparently \"passed out\" she states that she not fall to the ground she not sustain any injuries.  She states she had a recent CT scan on Friday but does not know the results.  No fevers or chills no cough cold or congestion.  She states that she had 1 other episode apparently similar to this while she has been at the nursing home but she is not sure that she was evaluated afterwards.  No other acute complaints    Objective:   Past Medical History:    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 elsewhere classified    idiopathic angioedema    Arrhythmia    Back problem    sciatica    Carpal tunnel syndrome    CORONARY ARTERY DISEASE    11/06: Ant WMA on stress, 12/06: PTCA to LAD,  12/08 Nuclear stress neg, EF 62%    CORONARY ARTERY DISEASE    11/06: ant ischemia on stress,   12/06: PTCA to distal LAD,    12/08 nuclear stress neg, EF 62%    Coronary atherosclerosis    Diabetes (HCC)    High blood pressure    High cholesterol    Hx of motion sickness    vertigo    Hyperlipidemia    HYPERTENSION    MENOPAUSE    ERT [id number]    Muscle weakness    LEGS SHAKE WHEN STANDING OR USING STAIRS    OSTEOARTHRITIS    1/06 Rt menisectomy    Osteoarthritis    Overweight and obesity    Personal history of colonic  polyps    2/99 adenoma, 4/03 negative    Type 2 diabetes mellitus, uncontrolled    Uncontrolled type 2 diabetes mellitus with diabetic polyneuropathy, with long-term current use of insulin    Unspecified hereditary and idiopathic peripheral neuropathy    Vertigo    Vitamin D deficiency              Past Surgical History:   Procedure Laterality Date    Anesth,knee arthroscopy      left knee repair torn meniscus    Angioplasty (coronary)  2006    w/2 drug eluting stents    Arthroscopy of joint unlisted Right     KNEE    Back surgery      L4-L5 TLIF    Cataract  05/2018    Bilateral    Cath drug eluting stent      X2    Cholecystectomy  1997    Colonoscopy  10/10/2013    Procedure: COLONOSCOPY;  Surgeon: Lauro Lazo MD;  Location:  ENDOSCOPY    Colonoscopy,biopsy  1999    adenomatous polyp    Colonoscopy,diagnostic  2003    wnl    Colonoscopy,diagnostic  10/10/13    normal    Endovenous laser vein addon      Right Greater Saph V  per Dr. Castaneda    Hysterectomy      w/ BSO    Knee replacement surgery  03/17/2018    left    Other      lipoma removed from left arm    Other      trigger finger release left hand middle finger    Other surgical history      vein stripping    Revise median n/carpal tunnel surg      left carpal tunnel release                Social History     Socioeconomic History    Marital status:    Tobacco Use    Smoking status: Never    Smokeless tobacco: Never   Vaping Use    Vaping status: Never Used   Substance and Sexual Activity    Alcohol use: Not Currently     Comment: Maricruz and New Years    Drug use: Never    Sexual activity: Not Currently     Partners: Male   Other Topics Concern    Caffeine Concern Yes     Comment: 2 cups a day    Exercise No    Seat Belt Yes   Social History Narrative        Health Mnt:    Pap: n/a (SARA/BSO)    Mamm: 6/08, 6/09, 7/10    Breast exam: 6/09, 5/10    DEXA: 6/08 wnl    Cholesterol: flow sheet/statin rx.    Colon: 10/08 (\"5-7 yrs\")    H/O:          calcium: takes    exercise: good, Son in [place] is working over internet as her      Social Determinants of Health     Food Insecurity: No Food Insecurity (8/26/2024)    Food Insecurity     Food Insecurity: Never true   Transportation Needs: No Transportation Needs (8/26/2024)    Transportation Needs     Lack of Transportation: No   Physical Activity: Inactive (1/20/2021)    Received from [facility]    Exercise Vital Sign     Days of Exercise per Week: 0 days     Minutes of Exercise per Session: 0 min   Housing Stability: Low Risk  (8/26/2024)    Housing Stability     Housing Instability: No              Review of Systems   All other systems reviewed and are negative.      Positive for stated Chief Complaint: Syncope    Other systems are as noted in HPI.  Constitutional and vital signs reviewed.      All other systems reviewed and negative except as noted above.    Physical Exam     ED Triage Vitals [08/26/24 1424]   /77   Pulse 87   Resp 18   Temp 98.5 °F (36.9 °C)   Temp src Oral   SpO2 95 %   O2 Device Nasal cannula       Current Vitals:   Vital Signs  BP: 93/60  Pulse: 76  Resp: (!) 7  Temp: 98.3 °F (36.8 °C)  Temp src: Temporal  MAP (mmHg): 71    Oxygen Therapy  SpO2: 94 %  O2 Device: None (Room air)  O2 Flow Rate (L/min): 2 L/min  Pulse Oximetry Type: Continuous  Oximetry Probe Site Changed: Yes  Pulse Ox Probe Location: Right hand            Physical Exam  Vitals and nursing note reviewed. Chaperone present: Nursing in room.   Constitutional:       Appearance: Normal appearance. She is well-developed.   HENT:      Head: Normocephalic and atraumatic.   Cardiovascular:      Rate and Rhythm: Normal rate and regular rhythm.      Pulses: Normal pulses.      Heart sounds: Normal heart sounds.   Pulmonary:      Effort: Pulmonary effort is normal.      Breath sounds: Normal breath sounds. No stridor. No wheezing.   Abdominal:      General: Bowel sounds are  normal.      Palpations: Abdomen is soft.      Tenderness: There is no abdominal tenderness. There is no rebound.   Musculoskeletal:         General: No tenderness. Normal range of motion.      Cervical back: Normal range of motion and neck supple.      Right lower leg: Edema present.      Left lower leg: Edema present.   Lymphadenopathy:      Cervical: No cervical adenopathy.   Skin:     General: Skin is warm and dry.      Capillary Refill: Capillary refill takes less than 2 seconds.      Coloration: Skin is not pale.   Neurological:      General: No focal deficit present.      Mental Status: She is alert and oriented to person, place, and time.      Cranial Nerves: No cranial nerve deficit.      Coordination: Coordination normal.              ED Course     Labs Reviewed   CBC WITH DIFFERENTIAL WITH PLATELET - Abnormal; Notable for the following components:       Result Value    RBC 3.38 (*)     HGB 9.6 (*)     HCT 29.3 (*)     All other components within normal limits   COMP METABOLIC PANEL (14) - Abnormal; Notable for the following components:    Glucose 214 (*)     Sodium 135 (*)     BUN 42 (*)     Creatinine 1.27 (*)     Calculated Osmolality 297 (*)     eGFR-Cr 42 (*)     Alkaline Phosphatase 176 (*)     All other components within normal limits   TROPONIN I HIGH SENSITIVITY - Abnormal; Notable for the following components:    Troponin I (High Sensitivity) 117 (*)     All other components within normal limits   D-DIMER - Abnormal; Notable for the following components:    D-Dimer >20.00 (*)     All other components within normal limits   PRO BETA NATRIURETIC PEPTIDE - Abnormal; Notable for the following components:    Pro-Beta Natriuretic Peptide 563 (*)     All other components within normal limits   LIPID PANEL - Abnormal; Notable for the following components:    HDL Cholesterol 37 (*)     Triglycerides 154 (*)     All other components within normal limits   TROPONIN I HIGH SENSITIVITY - Abnormal; Notable for  the following components:    Troponin I (High Sensitivity) 715 (*)     All other components within normal limits   CK CREATINE KINASE (NOT CREATININE) - Abnormal; Notable for the following components:    CK 28 (*)     All other components within normal limits   PTT, ACTIVATED - Normal   PROTHROMBIN TIME (PT) - Normal   BASIC METABOLIC PANEL (8)   CBC, PLATELET; NO DIFFERENTIAL   PLATELET COUNT   PTT, ACTIVATED   RAINBOW DRAW LAVENDER   RAINBOW DRAW LIGHT GREEN   RAINBOW DRAW BLUE     EKG    Rate, intervals and axes as noted on EKG Report.  Rate: 86  Rhythm: Atrial Fibrillation  Reading: No acute ST segment changes                 CT CHEST PE AORTA (IV ONLY) (CPT=71260)    Result Date: 8/26/2024  PROCEDURE:  CT CHEST PE AORTA (IV ONLY) (CPT=71260)  COMPARISON:  None.  INDICATIONS:  syncope ? big PE  TECHNIQUE:  CT images were obtained with non-ionic intravenous contrast material. Dose reduction techniques were used. Dose information is transmitted to the ACR (American College of Radiology) NRDR (National Radiology Data Registry) which includes the Dose Index Registry.  PATIENT STATED HISTORY:(As transcribed by Technologist)  Patient had syncopal episode   CONTRAST USED:  100cc of Isovue 370  FINDINGS:  LUNGS:  Scattered bilateral subsegmental and discoid atelectasis most pronounced in the lung bases. VASCULATURE:  There is by lateral pulmonary embolism with partial thrombus in the distal main pulmonary arteries bilaterally with partial to complete occlusive thrombus in lobar branches and mid to distal segmental branches of the lower lobes bilaterally  upper lobes and right middle lobe. ROD:  No enlarged adenopathy.  MEDIASTINUM:  No enlarged adenopathy.  CARDIAC:  There is cardiomegaly with evidence of right heart strain.. PLEURA:  No pneumothorax or effusion.  THORACIC AORTA:  No aneurysm.  CHEST WALL:  No enlarged axillary adenopathy.  LIMITED ABDOMEN:  Cholecystectomy.  BONES:  No lytic or destructive process.   Multilevel endplate hypertrophic changes.            CONCLUSION:  Bilateral pulmonary embolism involving distal main pulmonary arteries extending  to segmental branches throughout both lungs.  Critical exam results phoned to Dr. Madrid on 8/26/2024 at 1628 hours with read back.  There is cardiomegaly with findings of mild right-sided heart strain  LOCATION:  [id number]   Dictated by (CST): Makenna Quiñones MD on 8/26/2024 at 4:25 PM     Finalized by (CST): Makenna Quiñones MD on 8/26/2024 at 4:29 PM       CT BRAIN OR HEAD (CPT=70450)    Result Date: 8/26/2024  PROCEDURE:  CT BRAIN OR HEAD (47458)  COMPARISON:  [facility] , CT, CT BRAIN OR HEAD (37788), 7/22/2024, 9:56 PM.  [facility] , CT, CT BRAIN OR HEAD (27423), 8/23/2024, 8:09 AM.  INDICATIONS:  recent epidural  TECHNIQUE:  Noncontrast CT scanning is performed through the brain. Dose reduction techniques were used. Dose information is transmitted to the ACR (American College of Radiology) NRDR (National Radiology Data Registry) which includes the Dose Index Registry.  PATIENT STATED HISTORY: (As transcribed by Technologist)  Patient had syncopal episode    FINDINGS:  VENTRICLES/SULCI:  Diffuse cerebral and cerebellar atrophy. INTRACRANIAL:  No acute intracranial hemorrhage or midline shift.  Previously noted extra-axial hemorrhage along the right tentorium is no longer appreciated.  There is patchy decreased attenuation in the periventricular white matter both cerebral hemispheres extending into the centrum semiovale consistent with chronic microvascular ischemic changes of aging.  Old lacunar infarctions in the basal ganglia.  SINUSES:           No sign of acute sinusitis.  MASTOIDS:          No sign of acute inflammation. SKULL:             No depressed calvarial fracture.            CONCLUSION:  No acute intracranial hemorrhage or depressed calvarial fracture.  There is diffuse cerebral and cerebellar atrophy with chronic microvascular ischemic changes of aging and old  lacunar infarctions in the basal ganglia.    LOCATION:  [id number]   Dictated by (CST): Makenna Quiñones MD on 8/26/2024 at 4:21 PM     Finalized by (CST): Makenna Quiñones MD on 8/26/2024 at 4:23 PM       XR CHEST AP PORTABLE  (CPT=71045)    Result Date: 8/26/2024  PROCEDURE:  XR CHEST AP PORTABLE  (CPT=71045)  TECHNIQUE:  AP chest radiograph was obtained.  COMPARISON:  None.  INDICATIONS:  syncope sob  PATIENT STATED HISTORY: (As transcribed by Technologist)  Patient states she had syncopal episode at rehab today when she was walking.              CONCLUSION:  Normal heart size and pulmonary vascularity.  No focal infiltrate, consolidation, effusion or pneumothorax.  Postsurgical changes left total shoulder arthroplasty.   LOCATION:  [id number]      Dictated by (CST): Makenna Quiñones MD on 8/26/2024 at 3:26 PM     Finalized by (CST): Makenna Quiñones MD on 8/26/2024 at 3:26 PM              MDM      Patient had IV established and blood work obtained.  I reviewed her recent records she was just discharged in the last few weeks.  Her injury occurred at the end of July.  CT scan from head on Friday shows no acute bleed and resolution of her subdural.  Patient does not typically require oxygen and is requiring supplemental oxygen in the emergency department but is otherwise hemodynamically stable.  She did a D-dimer that was greater than 20.  I feel the patient is high risk for pulmonary embolism she did have an elevation in her troponin as well as her proBNP.  I feel that this is all consistent with PE with right heart strain.  She underwent a CT scan of her chest that confirmed a large PE.  I did rescan her head as has not dissipating being to start anticoagulants and she continues to not have any source of bleeding in her head.  Patient was found to have a large PE bilaterally I contacted pulmonary critical care, the duly cardiologist both of who saw the patient while in the emergency department.  Discussed case with neurosurgery  as well as with the hilario hospitalist.  After discussion amongst the specialist it was felt that she would be best served having heparin without a bolus and be monitored closely in the  ICU.  Patient did require supplemental oxygen but overall was hemodynamically stable otherwise.      A total of 48 minutes of critical care time (exclusive of billable procedures) was administered to manage the patient's respiratory instability due to her bilateral pulmonary embolism with cor pulmonale.  This involved direct patient intervention, complex decision making, and/or extensive discussions with the patient, family, and clinical staff.    Admission disposition: 8/26/2024  4:44 PM                                        Medical Decision Making      Disposition and Plan     Clinical Impression:  1. Acute saddle pulmonary embolism with acute cor pulmonale (HCC)         Disposition:  Admit  8/26/2024  4:44 pm    Follow-up:  No follow-up provider specified.        Medications Prescribed:  Current Discharge Medication List                            Hospital Problems       Present on Admission  Date Reviewed: 8/26/2024            ICD-10-CM Noted POA    * (Principal) Acute saddle pulmonary embolism with acute cor pulmonale (HCC) I26.02 8/26/2024 Unknown    Anemia D64.9 8/26/2024 Yes    Azotemia R79.89 8/26/2024 Yes

## 2024-08-26 NOTE — ED QUICK NOTES
Orders for admission, patient is aware of plan and ready to go upstairs. Any questions, please call ED RN Thalia at extension [phone].     Patient Covid vaccination status: Fully vaccinated     COVID Test Ordered in ED: None    COVID Suspicion at Admission: N/A    Running Infusions:  None    Mental Status/LOC at time of transport: A&O x 4    Other pertinent information:   CIWA score: N/A   NIH score:  N/A

## 2024-08-27 LAB
ANION GAP SERPL CALC-SCNC: 3 MMOL/L (ref 0–18)
APTT PPP: 86.9 SECONDS (ref 23–36)
BUN BLD-MCNC: 41 MG/DL (ref 9–23)
CALCIUM BLD-MCNC: 9.2 MG/DL (ref 8.7–10.4)
CHLORIDE SERPL-SCNC: 105 MMOL/L (ref 98–112)
CO2 SERPL-SCNC: 31 MMOL/L (ref 21–32)
CREAT BLD-MCNC: 0.99 MG/DL
EGFRCR SERPLBLD CKD-EPI 2021: 57 ML/MIN/1.73M2 (ref 60–?)
ERYTHROCYTE [DISTWIDTH] IN BLOOD BY AUTOMATED COUNT: 15.3 %
GLUCOSE BLD-MCNC: 198 MG/DL (ref 70–99)
GLUCOSE BLD-MCNC: 287 MG/DL (ref 70–99)
GLUCOSE BLD-MCNC: 314 MG/DL (ref 70–99)
GLUCOSE BLD-MCNC: 71 MG/DL (ref 70–99)
GLUCOSE BLD-MCNC: 71 MG/DL (ref 70–99)
GLUCOSE BLD-MCNC: 75 MG/DL (ref 70–99)
HCT VFR BLD AUTO: 27.5 %
HGB BLD-MCNC: 8.9 G/DL
MCH RBC QN AUTO: 28.3 PG (ref 26–34)
MCHC RBC AUTO-ENTMCNC: 32.4 G/DL (ref 31–37)
MCV RBC AUTO: 87.6 FL
OSMOLALITY SERPL CALC.SUM OF ELEC: 297 MOSM/KG (ref 275–295)
PLATELET # BLD AUTO: 239 10(3)UL (ref 150–450)
PLATELET # BLD AUTO: 239 10(3)UL (ref 150–450)
POTASSIUM SERPL-SCNC: 4.8 MMOL/L (ref 3.5–5.1)
Q-T INTERVAL: 336 MS
QRS DURATION: 94 MS
QTC CALCULATION (BEZET): 402 MS
R AXIS: -26 DEGREES
RBC # BLD AUTO: 3.14 X10(6)UL
SODIUM SERPL-SCNC: 139 MMOL/L (ref 136–145)
T AXIS: 6 DEGREES
VENTRICULAR RATE: 86 BPM
WBC # BLD AUTO: 6.4 X10(3) UL (ref 4–11)

## 2024-08-27 PROCEDURE — 85027 COMPLETE CBC AUTOMATED: CPT | Performed by: INTERNAL MEDICINE

## 2024-08-27 PROCEDURE — 85730 THROMBOPLASTIN TIME PARTIAL: CPT | Performed by: INTERNAL MEDICINE

## 2024-08-27 PROCEDURE — 80048 BASIC METABOLIC PNL TOTAL CA: CPT | Performed by: INTERNAL MEDICINE

## 2024-08-27 PROCEDURE — 82962 GLUCOSE BLOOD TEST: CPT

## 2024-08-27 PROCEDURE — 85049 AUTOMATED PLATELET COUNT: CPT | Performed by: INTERNAL MEDICINE

## 2024-08-27 NOTE — ED QUICK NOTES
Handoff Report given to ICU RN: Mitchell. This RN informed ICU that transport will be completed asap

## 2024-08-27 NOTE — PLAN OF CARE
Rec'd pt from ED @ 2000. Heparin gtt per PE/DVT protocol. US LE venous doppler completed. VSS on RA. NSR on tele. Denies CP or SOB. External catheter in place. Pt updated on POC.    0000- BG 71. MD notified. Orders to hold scheduled degludec per Hipolito Glover MD.     Problem: CARDIOVASCULAR - ADULT  Goal: Maintains optimal cardiac output and hemodynamic stability  Description: INTERVENTIONS:  - Monitor vital signs, rhythm, and trends  - Monitor for bleeding, hypotension and signs of decreased cardiac output  - Evaluate effectiveness of vasoactive medications to optimize hemodynamic stability  - Monitor arterial and/or venous puncture sites for bleeding and/or hematoma  - Assess quality of pulses, skin color and temperature  - Assess for signs of decreased coronary artery perfusion - ex. Angina  - Evaluate fluid balance, assess for edema, trend weights  Outcome: Progressing  Goal: Absence of cardiac arrhythmias or at baseline  Description: INTERVENTIONS:  - Continuous cardiac monitoring, monitor vital signs, obtain 12 lead EKG if indicated  - Evaluate effectiveness of antiarrhythmic and heart rate control medications as ordered  - Initiate emergency measures for life threatening arrhythmias  - Monitor electrolytes and administer replacement therapy as ordered  Outcome: Progressing     Problem: PAIN - ADULT  Goal: Verbalizes/displays adequate comfort level or patient's stated pain goal  Description: INTERVENTIONS:  - Encourage pt to monitor pain and request assistance  - Assess pain using appropriate pain scale  - Administer analgesics based on type and severity of pain and evaluate response  - Implement non-pharmacological measures as appropriate and evaluate response  - Consider cultural and social influences on pain and pain management  - Manage/alleviate anxiety  - Utilize distraction and/or relaxation techniques  - Monitor for opioid side effects  - Notify MD/LIP if interventions unsuccessful or patient reports  new pain  - Anticipate increased pain with activity and pre-medicate as appropriate  Outcome: Progressing

## 2024-08-27 NOTE — PROGRESS NOTES
[facility]    Evangelina Ward Patient Status:  Inpatient    1940 MRN [id number]   Location [facility] 6NE-A Attending Nedra Silverman MD   Hosp Day # 1 PCP Dorina Higuera MD     Critical Care Progress Note     Date of Admission: 2024  2:17 PM  Admission Diagnosis: Acute saddle pulmonary embolism with acute cor pulmonale (HCC) [I26.02]     S:  Pt tolerating heparin gtt without issue.  No SOB at rest.       Current Medications:    Current Facility-Administered Medications:     heparin (Porcine) 00792 units/250mL infusion PE/DVT/THROMBUS CONTINUOUS, 200-3,000 Units/hr, Intravenous, Continuous    morphINE PF 2 MG/ML injection 1 mg, 1 mg, Intravenous, Q4H PRN    atorvastatin (Lipitor) tab 40 mg, 40 mg, Oral, Nightly    insulin degludec (Tresiba) 100 units/mL flextouch 20 Units, 20 Units, Subcutaneous, Daily    glucose (Dex4) 15 GM/59ML oral liquid 15 g, 15 g, Oral, Q15 Min PRN **OR** glucose (Glutose) 40% oral gel 15 g, 15 g, Oral, Q15 Min PRN **OR** glucose-vitamin C (Dex-4) chewable tab 4 tablet, 4 tablet, Oral, Q15 Min PRN **OR** dextrose 50% injection 50 mL, 50 mL, Intravenous, Q15 Min PRN **OR** glucose (Dex4) 15 GM/59ML oral liquid 30 g, 30 g, Oral, Q15 Min PRN **OR** glucose (Glutose) 40% oral gel 30 g, 30 g, Oral, Q15 Min PRN **OR** glucose-vitamin C (Dex-4) chewable tab 8 tablet, 8 tablet, Oral, Q15 Min PRN    insulin aspart (NovoLOG) 100 Units/mL FlexPen 1-5 Units, 1-5 Units, Subcutaneous, TID AC and HS     OBJECTIVE:  /63   Pulse 85   Temp 97.8 °F (36.6 °C) (Temporal)   Resp 20   Ht 65\"   Wt 194 lb 0.1 oz (88 kg)   LMP  (LMP Unknown)   SpO2 97%   BMI 32.28 kg/m²      Ventilator Settings: RA      Wt Readings from Last 3 Encounters:   24 194 lb 0.1 oz (88 kg)   24 180 lb 3.2 oz (81.7 kg)   08/15/24 180 lb 3.2 oz (81.7 kg)        I/O last 3 completed shifts:  In: 190 [I.V.:190]  Out: 800 [Urine:800]  No intake/output data recorded.      Physical Exam:                           General: alert, cooperative, in NAD                          HEENT: oropharynx clear without erythema or exudates, moist mucous membranes                          Lungs: Clear to auscultation bilaterally, no wheezes or crackles                           Chest wall: No tenderness or deformity.                          Heart: Regular rate and rhythm, normal S1S2                          Abdomen: soft, non-tender, non-distended, positive BS.                          Extremity: No clubbing or cyanosis. no edema, tenderness over bilateral calves (per pt this has been chronic for the last several weeks)                          Skin: No rashes or lesions.       Lab Results   Component Value Date    WBC 6.4 08/27/2024    RBC 3.14 08/27/2024    HGB 8.9 08/27/2024    HCT 27.5 08/27/2024    MCV 87.6 08/27/2024    MCH 28.3 08/27/2024    MCHC 32.4 08/27/2024    RDW 15.3 08/27/2024    .0 08/27/2024    .0 08/27/2024     Lab Results   Component Value Date     08/27/2024    K 4.8 08/27/2024     08/27/2024    CO2 31.0 08/27/2024    BUN 41 08/27/2024    CREATSERUM 0.99 08/27/2024    GLU 71 08/27/2024    CA 9.2 08/27/2024    ALKPHO 176 08/26/2024    ALT 14 08/26/2024    AST 14 08/26/2024    BILT 0.4 08/26/2024    ALB 3.9 08/26/2024    TP 6.7 08/26/2024     Lab Results   Component Value Date    INR 1.04 08/26/2024    INR 1.16 07/21/2024    INR 1.0 08/06/2020           Imaging: I have independently visualized all relevant chest imaging in PACS.  I agree with the radiology interpretation except where noted.       ASSESSMENT/PLAN:  Acute hypoxemic respiratory failure: 2/2 submassive PE  -wean O2 as able, currently on RA  -CTA reviewed with bilateral PE  Submassive PE  -troponin and pro-bnp elevated  -CTA with bilateral PE with findings concerning for right heart strain  -Echo with preserved EF, RV size moderately increased with mildly reduced RV systolic function, PASP: 45-50mmHg  -LE dopplers negative  for DVT   -pt had recent SDH and epidural hematoma following fall 7/2024, per ER MD neurosurgery contacted and ok to proceed with heparin gtt without the bolus.   -cardiology consulted, they are not recommending lytic therapy given hemodynamic stability and recent brain bleed  HTN/HLD/A.Fib  -resume home meds as BP allows   -eliquis had been held following SDH  -heparin gtt  DM  -insulin per IM  FEN:  -ADAT  Proph:  -heparin gtt   Dispo:  -ICU monitoring  -DNAR/select    Mary Weber MD  8/27/2024  9:24 AM

## 2024-08-27 NOTE — PROGRESS NOTES
[facility] Hospitalist Progress Note     PCP: Dorina Higuera MD    Chief Complaint: follow-up   Follow up for: The encounter diagnosis was Acute saddle pulmonary embolism with acute cor pulmonale (HCC).    Overnight/Interim Events:      SUBJECTIVE:  Sitting in chair, doing much better. Breathing ok.     OBJECTIVE:  Temp:  [97.7 °F (36.5 °C)-98.5 °F (36.9 °C)] 98.4 °F (36.9 °C)  Pulse:  [73-88] 78  Resp:  [7-27] 20  BP: ()/(53-90) 146/67  SpO2:  [90 %-100 %] 99 %    Intake/Output:    Intake/Output Summary (Last 24 hours) at 8/27/2024 1556  Last data filed at 8/27/2024 0600  Gross per 24 hour   Intake 190 ml   Output 800 ml   Net -610 ml       Last 3 Weights   08/26/24 2119 194 lb 0.1 oz (88 kg)   08/26/24 2000 194 lb 0.1 oz (88 kg)   08/26/24 1424 180 lb 1.9 oz (81.7 kg)   08/20/24 1658 180 lb 3.2 oz (81.7 kg)   08/15/24 1102 180 lb 3.2 oz (81.7 kg)       Exam    General: Alert, no distress, appears stated age.     Head:  Normocephalic, without obvious abnormality, atraumatic.   Eyes:  Sclera anicteric, EOMs intact.    Nose: Nares normal,  Mucosa normal    Throat: Lips normal   Neck: Supple, symmetrical, trachea midline   Lungs:   Clear to auscultation bilaterally. Normal effort   Chest wall:  No tenderness or deformity   Heart:  Regular rate and rhythm, S1, S2 normal, no murmur, rub or gallop appreciated   Abdomen:   Soft, NT/ND, Bowel sounds normal. No masses,  No organomegaly.    Extremities: Extremities normal, atraumatic, no cyanosis or LE edema.   Skin: Skin color, texture, turgor normal. No rashes or lesions.    Neurologic: Moving all extremities spontaneously, no focal deficit appreciated      Data Review:       Labs:     Recent Labs   Lab 08/26/24  1433 08/27/24  0530   WBC 7.5 6.4   HGB 9.6* 8.9*   MCV 86.7 87.6   .0 239.0  239.0   INR 1.04  --        Recent Labs   Lab 08/26/24  1433 08/27/24  0530   * 139   K 4.5 4.8    105   CO2 26.0 31.0   BUN 42* 41*   CREATSERUM 1.27* 0.99   CA 9.3  9.2   * 71       Recent Labs   Lab 08/26/24  1433   ALT 14   AST 14   ALB 3.9       Recent Labs   Lab 08/27/24  0030 08/27/24  0637 08/27/24  1102   PGLU 71 75 198*       No results for input(s): \"TROP\" in the last 168 hours.      Meds:      atorvastatin  40 mg Oral Nightly    insulin degludec  20 Units Subcutaneous Daily    insulin aspart  1-5 Units Subcutaneous TID AC and HS      continuous dose heparin 1,500 Units/hr (08/27/24 1005)       morphINE    glucose **OR** glucose **OR** glucose-vitamin C **OR** dextrose **OR** glucose **OR** glucose **OR** glucose-vitamin C       Assessment/Plan:     84 year old female with PMH including but not limited to type 2 diabetes, atherosclerosis of the aorta, hypertension, hyperlipidemia, paroxysmal A-fib previously on Eliquis, recent SDH, who p/w EH ED c syncope and found to have PE.        Syncope 2/2 Submassive PE   - CTA reviewed c Bilateral pulmonary embolism involving distal main pulmonary arteries extending  to segmental branches throughout both lungs.    - RH strain noteds as well  - echo EF 70-75%, RVSP increased   - dopplers neg DVT  - cards/pulm following, apprec  - IV heparin   - not cardidate for lytics given recent SDH; also, HD stable currently  - ICU monitoring, improving  - pt was working c PT and was near door, sat down, next thing know 911 around her; denies tob/etoh, no personal/fam hx clot      Acute hypoxemic respiratory failure: 2/2 above   -O2 as needed      HTN  - per cards, currently normotensive       # HL  -statin     # Recent right subdural hematoma as well as epidural hematoma  -Pt admitted 7/21/24 c SDH.   -CT head initially reviewed with small right subdural and epidural hematoma, repeat CT head with enlarging right subdural hematoma  -Neurology, neurosurgery previously saw   -CTH no bleed   -per ER MD neurosurgery contacted and ok to proceed with heparin gtt without the bolus.          # Paroxysmal A-fib  -previously on Eliquis   -tele      # Hyperlipidemia  # Type 2 diabetes-sliding scale insulin  - will give half dose lantus 20u for now   - lower BG this AM, held long acting     # CAD   -aspirin when able      Dispo: [facility] ICU  [place] [facility] [place]         Questions/concerns were discussed with patient and son by bedside. D/w RN. Oniel hospitalist to resume care in AM, will discuss plan with them. Total Time spent with patient and coordinating care:  55 minutes    Alex Jc MD  [facility] Hospitalist  [phone]  8/27/2024  3:56 PM

## 2024-08-27 NOTE — CDS QUERY
Dear Dr. Guzmán,     Can you please clarify the relationship, if any, between the diagnosis of ACUTE PULMONARY EMBOLISM (PE) with COR PULMONALE and the recent surgical procedure performed on July 22, 2024?   [  x ] Acute pulmonary embolism with cor pulmonale likely related to surgical procedure  [   ] Acute pulmonary embolism with cor pulmonary not related to surgical procedure   [  ] Clinically unable to determine       CLINICAL INFORMATION FROM THE MEDICAL RECORD    Risk Factors:  R hip fracture, s/p IM nailing with Dr Celis on 07/22/2024    Clinical Indicators:   Patient reports SOB and syncopal episode  CTA chest: Bilateral pulmonary embolism involving distal main pulmonary arteries extending to segmental branches throughout both lungs  D-dimer > 20, Trop 117  2D Echocardiogram: R heart enlargement and R ventricle dysfunction  ED Impression: Acute saddle pulmonary embolism with acute cor pulmonale  H&P Assessment / Plan: Syncope 2/2 submassive PE    Treatment:   Cardiology consult  2D Echocardiogram  Heparin gtt    Use of terms such as suspected, possible, or probable (associated with a specific diagnosis that is being evaluated, monitored, or treated as if it exists) are acceptable and can be coded in the inpatient setting, when documented at the time of discharge.    Please add any additional documentation to your progress note and continue to document this through discharge. For questions regarding this query, please contact Clinical : Jacqueline Arenas RN at #[phone].    THIS FORM IS A PERMANENT PART OF THE MEDICAL RECORD

## 2024-08-27 NOTE — PROGRESS NOTES
Cardiology Progress Note    Evangelina Ward Patient Status:  Inpatient    1940 MRN [id number]   [facility] 6NE-A Attending Nedra Silverman MD   Hosp Day # 1 PCP Dorina Higuera MD     Impression:  Submassive PE(bilateral PE involving distal main pulmonary arteries extending into segmental branches)  Syncopal episode   Elevated HS trop (715) and elevated pro BNP (563)  Elevated biomarkers likely due to RV strain  Recent subdural hematoma   Recent femoral fracture s/p surgery   CAD s/p PCI in   HLD  HTN  PAF     Plan:  She is hemodynamically stable without the need of pressors and on minimal O2 settings currently. There are signs of RV strain, however, given her recent brain bleed, would not recommend TPA even with cather directed thrombolysis. Continue with heparin and monitor clinically.  Continue heparin drip. Keep in CCU today. Ambulate to chair this AM. Light ambulation in hallway later today if patient remains stable (no DVT on ultrasound).  If no issue overnight, transfer to CTU tomorrow.    Subjective:  The patient denies any chest pain or dyspnea at this time.    Objective:  /68   Pulse 88   Temp 97.8 °F (36.6 °C) (Temporal)   Resp (!) 27   Ht 65\"   Wt 194 lb 0.1 oz (88 kg)   LMP  (LMP Unknown)   SpO2 97%   BMI 32.28 kg/m²   Temp (24hrs), Av.3 °F (36.8 °C), Min:97.8 °F (36.6 °C), Max:98.5 °F (36.9 °C)      Intake/Output Summary (Last 24 hours) at 2024 0911  Last data filed at 2024 0600  Gross per 24 hour   Intake 190 ml   Output 800 ml   Net -610 ml     Wt Readings from Last 3 Encounters:   24 194 lb 0.1 oz (88 kg)   24 180 lb 3.2 oz (81.7 kg)   08/15/24 180 lb 3.2 oz (81.7 kg)       General: Awake and alert; in no acute distress  Cardiac: Regular rate and rhythm; no murmurs/rubs/gallops are appreciated  Lungs: Clear to auscultation bilaterally; no accessory muscle use  Abdomen: Soft, non-tender; bowel sounds are normoactive  Extremities: No  clubbing/cyanosis/edema; moves all 4 extremities normally    Current Facility-Administered Medications   Medication Dose Route Frequency    heparin (Porcine) 07734 units/250mL infusion PE/DVT/THROMBUS CONTINUOUS  200-3,000 Units/hr Intravenous Continuous    morphINE PF 2 MG/ML injection 1 mg  1 mg Intravenous Q4H PRN    atorvastatin (Lipitor) tab 40 mg  40 mg Oral Nightly    insulin degludec (Tresiba) 100 units/mL flextouch 20 Units  20 Units Subcutaneous Daily    glucose (Dex4) 15 GM/59ML oral liquid 15 g  15 g Oral Q15 Min PRN    Or    glucose (Glutose) 40% oral gel 15 g  15 g Oral Q15 Min PRN    Or    glucose-vitamin C (Dex-4) chewable tab 4 tablet  4 tablet Oral Q15 Min PRN    Or    dextrose 50% injection 50 mL  50 mL Intravenous Q15 Min PRN    Or    glucose (Dex4) 15 GM/59ML oral liquid 30 g  30 g Oral Q15 Min PRN    Or    glucose (Glutose) 40% oral gel 30 g  30 g Oral Q15 Min PRN    Or    glucose-vitamin C (Dex-4) chewable tab 8 tablet  8 tablet Oral Q15 Min PRN    insulin aspart (NovoLOG) 100 Units/mL FlexPen 1-5 Units  1-5 Units Subcutaneous TID AC and HS       Laboratory Data:  Lab Results   Component Value Date    WBC 6.4 08/27/2024    HGB 8.9 08/27/2024    HCT 27.5 08/27/2024    .0 08/27/2024    .0 08/27/2024     Lab Results   Component Value Date    INR 1.04 08/26/2024    INR 1.16 07/21/2024    INR 1.0 08/06/2020     Lab Results   Component Value Date     08/27/2024    K 4.8 08/27/2024     08/27/2024    CO2 31.0 08/27/2024    BUN 41 08/27/2024    CREATSERUM 0.99 08/27/2024    GLU 71 08/27/2024    CA 9.2 08/27/2024       Telemetry: No malignant tachyarrhythmias or bradyarrhythmias      Thank you for allowing our practice to participate in the care of your patient. Please do not hesitate to contact me if you have any questions.    Lester Johnson MD, FACC

## 2024-08-27 NOTE — H&P
General Medicine H&P     Chief Complaint   Patient presents with    Syncope        PCP: Dorina Higuera MD    History of Present Illness: Patient is a 84 year old female with PMH including but not limited to type 2 diabetes, atherosclerosis of the aorta, hypertension, hyperlipidemia, paroxysmal A-fib previously on Eliquis, recent SDH, who p/w [facility] ED c syncope and found to have PE.     Pt admitted 7/21/24 c SDH. Coming from the [place], Today reports getting lunch and next thing knows was on floor. Unknown but likely LOC.     Currently breathing is ok.       Past Medical History:    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 elsewhere classified    idiopathic angioedema    Arrhythmia    Back problem    sciatica    Carpal tunnel syndrome    CORONARY ARTERY DISEASE    11/06: Ant WMA on stress, 12/06: PTCA to LAD,  12/08 Nuclear stress neg, EF 62%    CORONARY ARTERY DISEASE    11/06: ant ischemia on stress,   12/06: PTCA to distal LAD,    12/08 nuclear stress neg, EF 62%    Coronary atherosclerosis    Diabetes (HCC)    High blood pressure    High cholesterol    Hx of motion sickness    vertigo    Hyperlipidemia    HYPERTENSION    MENOPAUSE    ERT [id number]    Muscle weakness    LEGS SHAKE WHEN STANDING OR USING STAIRS    OSTEOARTHRITIS    1/06 Rt menisectomy    Osteoarthritis    Overweight and obesity    Personal history of colonic polyps    2/99 adenoma, 4/03 negative    Type 2 diabetes mellitus, uncontrolled    Uncontrolled type 2 diabetes mellitus with diabetic polyneuropathy, with long-term current use of insulin    Unspecified hereditary and idiopathic peripheral neuropathy    Vertigo    Vitamin D deficiency      Past Surgical History:   Procedure Laterality Date    Anesth,knee arthroscopy      left knee repair torn meniscus    Angioplasty (coronary)  2006    w/2 drug eluting stents    Arthroscopy of joint unlisted Right     KNEE    Back surgery      L4-L5 TLIF    Cataract  05/2018    Bilateral    Cath drug eluting stent      X2     Cholecystectomy  1997    Colonoscopy  10/10/2013    Procedure: COLONOSCOPY;  Surgeon: Lauro Lazo MD;  Location:  ENDOSCOPY    Colonoscopy,biopsy  1999    adenomatous polyp    Colonoscopy,diagnostic  2003    wnl    Colonoscopy,diagnostic  10/10/13    normal    Endovenous laser vein addon      Right Greater Saph V  per Dr. Castaneda    Hysterectomy      w/ BSO    Knee replacement surgery  03/17/2018    left    Other      lipoma removed from left arm    Other      trigger finger release left hand middle finger    Other surgical history      vein stripping    Revise median n/carpal tunnel surg      left carpal tunnel release        ALL:  Allergies   Allergen Reactions    Cipro [Ciprofloxacin] HIVES    Insulin Aspart, Human Analog PAIN     Achiness. HA , head pains    Insulin Lispro, Human PAIN     Achiness, HA, head pain    Tylenol [Acetaminophen] SWELLING     Tongue/mouth    Benzonatate UNKNOWN        ED initial dose (PE/DVT/THROMBUS) heparin, 18 Units/kg/hr, Once        Social History     Tobacco Use    Smoking status: Never    Smokeless tobacco: Never   Substance Use Topics    Alcohol use: Not Currently     Comment: Maricruz and New Years        Fam Hx  Family History   Problem Relation Age of Onset    Other (colon cancer) Mother         age 77    Heart Disorder Father         age 75    Diabetes Paternal Grandmother     Other (gastric cancer) Maternal Grandmother        Review of Systems  Comprehensive ROS reviewed and negative except for what's stated above. \    OBJECTIVE:  /90   Pulse 80   Temp 98.3 °F (36.8 °C) (Temporal)   Resp 20   Ht 5' 5\" (1.651 m)   Wt 194 lb 0.1 oz (88 kg)   LMP  (LMP Unknown)   SpO2 94%   BMI 32.28 kg/m²     General:  Alert, no distress, appears stated age.    Head:  Normocephalic, without obvious abnormality, atraumatic.   Eyes:  Sclera anicteric, EOMs intact.    Nose: Nares normal,  Mucosa normal    Throat: Lips normal   Neck: Supple, symmetrical, trachea midline    Lungs:   Clear to auscultation bilaterally. Normal effort   Chest wall:  No tenderness or deformity   Heart:  Regular rate and rhythm, S1, S2 normal, no murmur, rub or gallop appreciated   Abdomen:   Soft, NT/ND, Bowel sounds normal. No masses,  No organomegaly.    Extremities/MSK: Extremities normal/normal movement, atraumatic, no cyanosis  or edema.   Skin: Skin color, texture, turgor normal. No rashes or lesions.    Neurologic: Moving all extremities spontaneously, no focal deficit appreciated          LABS:   Lab Results   Component Value Date    WBC 7.5 08/26/2024    HGB 9.6 08/26/2024    HCT 29.3 08/26/2024    .0 08/26/2024    CREATSERUM 1.27 08/26/2024    BUN 42 08/26/2024     08/26/2024    K 4.5 08/26/2024     08/26/2024    CO2 26.0 08/26/2024     08/26/2024    CA 9.3 08/26/2024    ALB 3.9 08/26/2024    ALKPHO 176 08/26/2024    BILT 0.4 08/26/2024    TP 6.7 08/26/2024    AST 14 08/26/2024    ALT 14 08/26/2024    PTT 27.3 08/26/2024    INR 1.04 08/26/2024    PTP 13.6 08/26/2024    DDIMER >20.00 08/26/2024    CK 28 08/26/2024       Radiology: US VENOUS DOPPLER LEG BILAT - DIAG IMG (CPT=93970)    Result Date: 8/26/2024  PROCEDURE:  US VENOUS DOPPLER LEG BILAT - DIAG IMG (CPT=93970)  COMPARISON:  [facility], US VENOUS DOPPLER LEG RIGHT - DIAG IMG (CPT=93971), 4/28/2021, 2:22 PM.  INDICATIONS:  submassive PE  TECHNIQUE:  Real time, grey scale, and duplex ultrasound was used to evaluate the lower extremity venous system. B-mode two-dimensional images of the vascular structures, Doppler spectral analysis, and color flow.  Doppler imaging were performed.  The following veins were imaged bilaterally:  Common, deep, and superficial femoral, popliteal, sapheno-femoral junction, and posterior tibial veins.  PATIENT STATED HISTORY: (As transcribed by Technologist)     FINDINGS:  SAPHENOFEMORAL JUNCTION:  No reflux. THROMBI:  None visible. COMPRESSION:  Normal compressibility, phasicity,  and augmentation. OTHER:  Negative.            CONCLUSION:  No evidence of DVT in bilateral lower extremities.   LOCATION:  [facility]   Dictated by (CST): Ricardo [facility] MD on 2024 at 11:00 PM     Finalized by (CST): Ricardo [facility] MD on 2024 at 11:00 PM       CARD ECHO 2D DOPPLER (CPT=93306)    Result Date: 2024  Transthoracic Echocardiogram Name:Evangelina Ward Date: 2024 :  1940 Ht:  (65in)  BP: 121 / 74 MRN:  [id number]     Age:  84years    Wt:  (11lb)  HR: 70bpm Loc:  [facility]       Gndr: F          BSA: 0.58m^2 Sonographer: [place] MOON Ordering:    Nedra Silverman MD Consulting:  Mary Weber ---------------------------------------------------------------------------- History/Indications:  Pulmonary Embolism. ---------------------------------------------------------------------------- Procedure information:  A transthoracic complete 2D study was performed. Additional evaluation included M-mode, complete spectral Doppler, and color Doppler.  Patient status:  Inpatient.  Location:  Emergency department. Comparison was made to the study of 2023.    This was a STAT study. Transthoracic echocardiography for ventricular function evaluation and assessment of valvular function. Image quality was adequate. ECG rhythm:   Atrial fibrillation ---------------------------------------------------------------------------- Conclusions: 1. Left ventricle: The cavity size was normal. Wall thickness was at the    upper limits of normal. Systolic function was vigorous. The estimated    ejection fraction was 70-75%, by visual assessment. No diagnostic    evidence for regional wall motion abnormalities. Unable to assess LV    diastolic function due to heart rhythm. 2. Right ventricle: The cavity size was moderately increased. Systolic    function was mildly reduced. Systolic pressure was increased. 3. Left atrium: The atrium was mildly dilated. 4. Right atrium: The atrium was mildly dilated.  5. Aortic root: The aortic root was mildly dilated. 6. Mitral valve: There was mild regurgitation. 7. Tricuspid valve: There was mild-moderate regurgitation. 8. Pulmonary arteries: Systolic pressure was increased, in the range of 45mm    Hg to 50mm Hg. Impressions:  This study is compared with previous dated 09/19/2023: The right heart enlargement is new as is the right ventricle dysfunction and pulmonary hypertension. This may be consistent with a new pulmonary process, such as pulmonary embolism. Clinical correlation suggested. * ---------------------------------------------------------------------------- * Findings: Left ventricle:  The cavity size was normal. Wall thickness was at the upper limits of normal. Systolic function was vigorous. The estimated ejection fraction was 70-75%, by visual assessment. No diagnostic evidence for regional wall motion abnormalities. Unable to assess LV diastolic function due to heart rhythm. Left atrium:  The atrium was mildly dilated. Right ventricle:  The cavity size was moderately increased. Systolic function was mildly reduced.  Systolic pressure was increased. Right atrium:  The atrium was mildly dilated. Mitral valve:  The annulus was mildly calcified. Leaflet separation was normal.  Doppler:  Transvalvular velocity was within the normal range. There was no evidence for stenosis. There was mild regurgitation. Aortic valve:  The valve was structurally normal. The valve was trileaflet. Cusp separation was normal.  Doppler:  Transvalvular velocity was within the normal range. There was no evidence for stenosis. There was no significant regurgitation. Tricuspid valve:  The valve is structurally normal. Leaflet separation was normal.  Doppler:  Transvalvular velocity was within the normal range. There was no evidence for stenosis. There was mild-moderate regurgitation. Pulmonic valve:   The valve is structurally normal. Cusp separation was normal.  Doppler:  Transvalvular  velocity was within the normal range. There was no evidence for stenosis. There was no significant regurgitation. Pericardium:   There was no pericardial effusion. Aorta: Aortic root: The aortic root was mildly dilated. Pulmonary arteries: Systolic pressure was increased, in the range of 45mm Hg to 50mm Hg. Systemic veins:  Central venous respirophasic diameter changes are in the normal range (>50%). Inferior vena cava: The IVC was normal-sized. ---------------------------------------------------------------------------- Measurements  Left ventricle          Value       Ref       09/19/2023  IVS thickness, ED,  (H) 1.0   cm    0.6 - 0.9 ----------  PLAX  LV ID, ED, PLAX     (L) 3.6   cm    3.8 - 5.2 4.4  LV ID, ES, PLAX     (L) 2.1   cm    2.2 - 3.5 3.0  LV PW thickness,    (H) 1.0   cm    0.6 - 0.9 ----------  ED, PLAX  IVS/LV PW ratio,        0.97        --------- ----------  ED, PLAX  LV PW/LV ID ratio,      0.29        --------- ----------  ED, PLAX  LV ejection             71    %     54 - 74   61  fraction  Aortic root             Value       Ref       09/19/2023  Aortic root ID, ED  (H) 3.4   cm    1.6 - 3.1 ----------  Left atrium             Value       Ref       09/19/2023  LA ID, A-P, ES      (H) 4.3   cm    2.7 - 3.8 3.8  LA/aortic root          1.26        --------- ----------  ratio  Pulmonary artery        Value       Ref       09/19/2023  PA pressure, S, DP      50    mm Hg --------- ----------  Tricuspid valve         Value       Ref       09/19/2023  Tricuspid regurg    (H) 3.04  m/sec <=2.8     ----------  peak velocity  Tricuspid peak          45    mm Hg --------- ----------  RV-RA gradient  Systemic veins          Value       Ref       09/19/2023  Estimated CVP           5     mm Hg --------- ----------  Right ventricle         Value       Ref       09/19/2023  RV pressure, S, DP      50    mm Hg --------- ---------- Legend: (L)  and  (H)  ted values outside specified reference range.  ---------------------------------------------------------------------------- Prepared and electronically signed by Wilfredo Schmid MD 08/26/2024 20:41     CT CHEST PE AORTA (IV ONLY) (CPT=71260)    Result Date: 8/26/2024  PROCEDURE:  CT CHEST PE AORTA (IV ONLY) (CPT=71260)  COMPARISON:  None.  INDICATIONS:  syncope ? big PE  TECHNIQUE:  CT images were obtained with non-ionic intravenous contrast material. Dose reduction techniques were used. Dose information is transmitted to the ACR (American College of Radiology) NRDR (National Radiology Data Registry) which includes the Dose Index Registry.  PATIENT STATED HISTORY:(As transcribed by Technologist)  Patient had syncopal episode   CONTRAST USED:  100cc of Isovue 370  FINDINGS:  LUNGS:  Scattered bilateral subsegmental and discoid atelectasis most pronounced in the lung bases. VASCULATURE:  There is by lateral pulmonary embolism with partial thrombus in the distal main pulmonary arteries bilaterally with partial to complete occlusive thrombus in lobar branches and mid to distal segmental branches of the lower lobes bilaterally  upper lobes and right middle lobe. ROD:  No enlarged adenopathy.  MEDIASTINUM:  No enlarged adenopathy.  CARDIAC:  There is cardiomegaly with evidence of right heart strain.. PLEURA:  No pneumothorax or effusion.  THORACIC AORTA:  No aneurysm.  CHEST WALL:  No enlarged axillary adenopathy.  LIMITED ABDOMEN:  Cholecystectomy.  BONES:  No lytic or destructive process.  Multilevel endplate hypertrophic changes.            CONCLUSION:  Bilateral pulmonary embolism involving distal main pulmonary arteries extending  to segmental branches throughout both lungs.  Critical exam results phoned to Dr. Madrid on 8/26/2024 at 1628 hours with read back.  There is cardiomegaly with findings of mild right-sided heart strain  LOCATION:  [id number]   Dictated by (CST): Makenna Quiñones MD on 8/26/2024 at 4:25 PM     Finalized by (CST): Makenna Quiñones MD on 8/26/2024  at 4:29 PM       CT BRAIN OR HEAD (CPT=70450)    Result Date: 8/26/2024  PROCEDURE:  CT BRAIN OR HEAD (03967)  COMPARISON:  [facility] , CT, CT BRAIN OR HEAD ([id number]), 7/22/2024, 9:56 PM.  [facility] , CT, CT BRAIN OR HEAD ([id number]), 8/23/2024, 8:09 AM.  INDICATIONS:  recent epidural  TECHNIQUE:  Noncontrast CT scanning is performed through the brain. Dose reduction techniques were used. Dose information is transmitted to the ACR (American College of Radiology) NRDR (National Radiology Data Registry) which includes the Dose Index Registry.  PATIENT STATED HISTORY: (As transcribed by Technologist)  Patient had syncopal episode    FINDINGS:  VENTRICLES/SULCI:  Diffuse cerebral and cerebellar atrophy. INTRACRANIAL:  No acute intracranial hemorrhage or midline shift.  Previously noted extra-axial hemorrhage along the right tentorium is no longer appreciated.  There is patchy decreased attenuation in the periventricular white matter both cerebral hemispheres extending into the centrum semiovale consistent with chronic microvascular ischemic changes of aging.  Old lacunar infarctions in the basal ganglia.  SINUSES:           No sign of acute sinusitis.  MASTOIDS:          No sign of acute inflammation. SKULL:             No depressed calvarial fracture.            CONCLUSION:  No acute intracranial hemorrhage or depressed calvarial fracture.  There is diffuse cerebral and cerebellar atrophy with chronic microvascular ischemic changes of aging and old lacunar infarctions in the basal ganglia.    LOCATION:  [id number]   Dictated by (CST): Makenna Quiñones MD on 8/26/2024 at 4:21 PM     Finalized by (CST): Makenna Quiñones MD on 8/26/2024 at 4:23 PM       XR CHEST AP PORTABLE  (CPT=71045)    Result Date: 8/26/2024  PROCEDURE:  XR CHEST AP PORTABLE  (CPT=71045)  TECHNIQUE:  AP chest radiograph was obtained.  COMPARISON:  None.  INDICATIONS:  syncope sob  PATIENT STATED HISTORY: (As transcribed by Technologist)  Patient states she had syncopal  episode at rehab today when she was walking.              CONCLUSION:  Normal heart size and pulmonary vascularity.  No focal infiltrate, consolidation, effusion or pneumothorax.  Postsurgical changes left total shoulder arthroplasty.   LOCATION:  [id number]      Dictated by (CST): Makenna Quiñones MD on 8/26/2024 at 3:26 PM     Finalized by (CST): Makenna Quiñones MD on 8/26/2024 at 3:26 PM       CT BRAIN OR HEAD (CPT=70450)    Result Date: 8/23/2024  PROCEDURE:  CT BRAIN OR HEAD ([id number])  COMPARISON:  [facility] , CT, CT BRAIN OR HEAD ([id number]), 7/22/2024, 9:56 PM.  INDICATIONS:  S06.5XAA SDH (subdural hematoma) (HCC)  TECHNIQUE:  Noncontrast CT scanning is performed through the brain. Dose reduction techniques were used. Dose information is transmitted to the ACR (American College of Radiology) NRDR (National Radiology Data Registry) which includes the Dose Index Registry.  PATIENT STATED HISTORY: (As transcribed by Technologist)  SDH. The patient doesn't have complaints.    FINDINGS:   VENTRICLES/SULCI: Diffuse sulcal and ventricular prominence concordant with age.  No hydrocephalus. INTRACRANIAL:  Negative for intracranial hemorrhage, mass effect, or acute large vessel transcortical infarct.  Interval resolution of subdural hematoma layering along the right tentorium, as noted on CT of 07/22/2024.  Remote lacunar infarcts of bilateral basal ganglia.  Confluent periventricular white matter hypodensity most in keeping with chronic microvascular ischemia.  Intracranial atherosclerosis. SINUSES:           Paranasal sinuses and mastoid air cells are clear. SKULL:               No evidence for fracture or osseous abnormality. OTHER:               No scalp hematoma.            CONCLUSION:   1. Negative for acute intracranial process.  Resolution of subdural hematoma layering along the right tentorium, as noted on CT of 07/22/2024.    LOCATION:  [facility]   Dictated by (CST): Ellie Guadarrama MD on 8/23/2024 at 9:16 AM     Finalized by  (CST): Ellie Guadarrama MD on 8/23/2024 at 9:18 AM            ASSESSMENT / PLAN:    84 year old female with PMH including but not limited to type 2 diabetes, atherosclerosis of the aorta, hypertension, hyperlipidemia, paroxysmal A-fib previously on Eliquis, recent SDH, who p/w [facility] ED c syncope and found to have PE.     Syncope 2/2 Submassive PE   - CTA reviewed c Bilateral pulmonary embolism involving distal main pulmonary arteries extending  to segmental branches throughout both lungs.    - RH strain noteds as well  - echo/dopplers  - cards/pulm following, apprec  - IV heparin   - not cardidate for lytics given recent SDH; also, HD stable currently  - ICU monitoring     Acute hypoxemic respiratory failure: 2/2 above   -O2 as needed     HTN  - per cards, currently normotensive      # HL  -statin    # Recent right subdural hematoma as well as epidural hematoma  -Pt admitted 7/21/24 c SDH.   -CT head initially reviewed with small right subdural and epidural hematoma, repeat CT head with enlarging right subdural hematoma  -Neurology, neurosurgery previously saw   -CTH no bleed   -per ER MD neurosurgery contacted and ok to proceed with heparin gtt without the bolus.        # Paroxysmal A-fib  -previously on Eliquis   -tele     # Hyperlipidemia  # Type 2 diabetes-sliding scale insulin  - will give half dose lantus 20u for now      # CAD   -aspirin when able      Dispo: [facility] ICU  Coming from the [place]       Outpatient records or previous hospital records reviewed. [facility] hospitalist to continue to follow patient while in house. A total of 75 minutes taken.  Greater than 50% face to face encounter.  D/w Millie Madrid MD  and RN     Alex Jc MD  [facility] Hospitalist  Pager: [phone]  8/26/2024  11:38 PM      **Certification      PHYSICIAN Certification of Need for Inpatient Hospitalization - Initial Certification    Patient will require inpatient services that will reasonably be expected to span two  midnight's based on the clinical documentation in H+P.   Based on patients current state of illness, I anticipate that, after discharge, patient will require TBD.

## 2024-08-28 LAB
ANION GAP SERPL CALC-SCNC: 5 MMOL/L (ref 0–18)
APTT PPP: 87.1 SECONDS (ref 23–36)
BUN BLD-MCNC: 35 MG/DL (ref 9–23)
CALCIUM BLD-MCNC: 9.2 MG/DL (ref 8.7–10.4)
CHLORIDE SERPL-SCNC: 105 MMOL/L (ref 98–112)
CO2 SERPL-SCNC: 26 MMOL/L (ref 21–32)
CREAT BLD-MCNC: 0.84 MG/DL
EGFRCR SERPLBLD CKD-EPI 2021: 69 ML/MIN/1.73M2 (ref 60–?)
ERYTHROCYTE [DISTWIDTH] IN BLOOD BY AUTOMATED COUNT: 15 %
GLUCOSE BLD-MCNC: 178 MG/DL (ref 70–99)
GLUCOSE BLD-MCNC: 214 MG/DL (ref 70–99)
GLUCOSE BLD-MCNC: 257 MG/DL (ref 70–99)
GLUCOSE BLD-MCNC: 309 MG/DL (ref 70–99)
GLUCOSE BLD-MCNC: 317 MG/DL (ref 70–99)
HCT VFR BLD AUTO: 25.6 %
HGB BLD-MCNC: 8.6 G/DL
MAGNESIUM SERPL-MCNC: 1.7 MG/DL (ref 1.6–2.6)
MCH RBC QN AUTO: 28.9 PG (ref 26–34)
MCHC RBC AUTO-ENTMCNC: 33.6 G/DL (ref 31–37)
MCV RBC AUTO: 85.9 FL
MRSA DNA SPEC QL NAA+PROBE: NEGATIVE
OSMOLALITY SERPL CALC.SUM OF ELEC: 294 MOSM/KG (ref 275–295)
PLATELET # BLD AUTO: 242 10(3)UL (ref 150–450)
PLATELET # BLD AUTO: 250 10(3)UL (ref 150–450)
POTASSIUM SERPL-SCNC: 4.2 MMOL/L (ref 3.5–5.1)
RBC # BLD AUTO: 2.98 X10(6)UL
SODIUM SERPL-SCNC: 136 MMOL/L (ref 136–145)
WBC # BLD AUTO: 5.3 X10(3) UL (ref 4–11)

## 2024-08-28 PROCEDURE — 80048 BASIC METABOLIC PNL TOTAL CA: CPT | Performed by: HOSPITALIST

## 2024-08-28 PROCEDURE — 85049 AUTOMATED PLATELET COUNT: CPT | Performed by: INTERNAL MEDICINE

## 2024-08-28 PROCEDURE — 85730 THROMBOPLASTIN TIME PARTIAL: CPT | Performed by: INTERNAL MEDICINE

## 2024-08-28 PROCEDURE — 83735 ASSAY OF MAGNESIUM: CPT | Performed by: HOSPITALIST

## 2024-08-28 PROCEDURE — 87641 MR-STAPH DNA AMP PROBE: CPT | Performed by: INTERNAL MEDICINE

## 2024-08-28 PROCEDURE — 85027 COMPLETE CBC AUTOMATED: CPT | Performed by: HOSPITALIST

## 2024-08-28 PROCEDURE — 82962 GLUCOSE BLOOD TEST: CPT

## 2024-08-28 RX ORDER — GABAPENTIN 100 MG/1
100 CAPSULE ORAL 2 TIMES DAILY
Status: DISCONTINUED | OUTPATIENT
Start: 2024-08-28 | End: 2024-08-28

## 2024-08-28 RX ORDER — MIRTAZAPINE 7.5 MG/1
7.5 TABLET, FILM COATED ORAL NIGHTLY
Status: DISCONTINUED | OUTPATIENT
Start: 2024-08-28 | End: 2024-08-29

## 2024-08-28 RX ORDER — TORSEMIDE 20 MG/1
20 TABLET ORAL DAILY
Status: DISCONTINUED | OUTPATIENT
Start: 2024-08-29 | End: 2024-08-29

## 2024-08-28 RX ORDER — MAGNESIUM OXIDE 400 MG/1
400 TABLET ORAL ONCE
Status: COMPLETED | OUTPATIENT
Start: 2024-08-28 | End: 2024-08-28

## 2024-08-28 NOTE — PROGRESS NOTES
[facility] Pulmonary, Critical Care and Sleep    Evangelina Ward Patient Status:  Inpatient    1940 MRN [id number]   Location [address] Attending Nedra Silverman MD   Hosp Day # 2 PCP Dorina Higuera MD     Date of Admission: 2024  2:17 PM    Admission Diagnosis: Acute saddle pulmonary embolism with acute cor pulmonale (HCC) [I26.02]    S: Feeling OK. No headache or weakness. No bleeding. Feels good.     Scheduled Medications:     atorvastatin  40 mg Oral Nightly    insulin degludec  20 Units Subcutaneous Daily    insulin aspart  1-5 Units Subcutaneous TID AC and HS       Infusing Medications:     continuous dose heparin 1,500 Units/hr (24 0700)       PRN Medications:    morphINE    glucose **OR** glucose **OR** glucose-vitamin C **OR** dextrose **OR** glucose **OR** glucose **OR** glucose-vitamin C    OBJECTIVE:  /63 (BP Location: Right arm)   Pulse 64   Temp 97 °F (36.1 °C) (Temporal)   Resp 16   Ht 165.1 cm (5' 5\")   Wt 194 lb 0.1 oz (88 kg)   LMP  (LMP Unknown)   SpO2 95%   BMI 32.28 kg/m²    Temp (24hrs), Av.8 °F (36.6 °C), Min:97 °F (36.1 °C), Max:98.4 °F (36.9 °C)             Wt Readings from Last 3 Encounters:   24 194 lb 0.1 oz (88 kg)   24 180 lb 3.2 oz (81.7 kg)   08/15/24 180 lb 3.2 oz (81.7 kg)       I/O last 3 completed shifts:  In: 761 [P.O.:240; I.V.:521]  Out: 2400 [Urine:2400]  No intake/output data recorded.     O2: RA  General: NAD.   Neuro: Alert, no focal deficits.    HEENT: PERRL  Neck : No LAD  CV: RRR, nl S1, S2, no S4, S3 or murmur.   Lungs: Clear bilaterally.   Abd: Nontender, non distended.   Ext: No edema.   Skin: No rashes.     Recent Labs   Lab 24  1433 24  0530 24  0508 24  0853   WBC 7.5 6.4  --  5.3   HGB 9.6* 8.9*  --  8.6*   HCT 29.3* 27.5*  --  25.6*   .0 239.0  239.0 250.0 242.0     Recent Labs   Lab 24  1433 24  0530   * 71   BUN 42* 41*   CREATSERUM 1.27* 0.99   CA 9.3  9.2   * 139   K 4.5 4.8    105   CO2 26.0 31.0   AST 14  --    ALT 14  --    ALB 3.9  --      Recent Labs   Lab 08/26/24  1433 08/27/24  0031 08/28/24  0508   INR 1.04  --   --    PTT 27.3 86.9* 87.1*     No results for input(s): \"ABGPHT\", \"IRYGYM2M\", \"YVNKC5D\", \"ABGHCO3\", \"SITE\", \"DEV\", \"THGB\" in the last 168 hours.    COVID-19 Lab Results    COVID-19  Lab Results   Component Value Date    COVID19 Not Detected 07/21/2024    COVID19 Not Detected 01/14/2023    COVID19 Not Detected 08/15/2020       Pro-Calcitonin  No results for input(s): \"PCT\" in the last 168 hours.    Cardiac  Recent Labs   Lab 08/26/24  1433   PBNP 563*       Creatinine Kinase  Recent Labs   Lab 08/26/24  1433   CK 28*       Inflammatory Markers  Recent Labs   Lab 08/26/24  1433   DDIMER >20.00*       Imaging:   CXR 8/26/24:    Chest images personally reviewed.     Assessment/Plan   Acute hypoxemic respiratory failure: 2/2 submassive PE  -wean O2 as able, currently on RA  -CTA reviewed with bilateral PE  Submassive PE provoked after recent trauma with SDH and femur fx.   -troponin and pro-bnp elevated  -CTA with bilateral PE with findings concerning for right heart strain  -Echo with preserved EF, RV size moderately increased with mildly reduced RV systolic function, PASP: 45-50mmHg  -LE dopplers negative for DVT   -pt had recent SDH and epidural hematoma following fall 7/2024, per ER MD neurosurgery contacted and ok to proceed with heparin gtt without the bolus.   -cardiology consulted, they are not recommending lytic therapy given hemodynamic stability and recent brain bleed  Would plan for DOAC and will start eliquis. I think no loading dose would be reasonable given recent SDH.  Start with PM dose and d/c heaprin.   HTN/HLD/A.Fib  -resume home meds as BP allows   -eliquis had been held following SDH  -heparin gtt. Resume eliquis as above.   DM  -insulin per IM  FEN:  -ADAT  Proph:  Anticoagulated.   Dispo:  -ICU monitoring. Ok for  tell later today if can go on oral blood thinner and no other bleeding noted.   -DNAR/select  Critical Care Time greater than: 35 minutes    My best regards,         Sohail Lynch MD  [facility] Pulmonary, Critical Care and Sleep Medicine

## 2024-08-28 NOTE — PLAN OF CARE
Assumed care of pt at 0730.Pt A/Ox4. VSS on room air. Denies chest pain or SOB. Heparin gtt infusing orders to transition to PO DOAC see orders per Dr Lynch. Pt up in the chair all day. Ambulated hallway 40ft, tolerated fairly well. Pt is very anxious and afraid of falling so needs extra time. Pt had large BM x1. Transfer orders to the floor placed. Pt and pt's family updated with plan of care.    Problem: CARDIOVASCULAR - ADULT  Goal: Maintains optimal cardiac output and hemodynamic stability  Description: INTERVENTIONS:  - Monitor vital signs, rhythm, and trends  - Monitor for bleeding, hypotension and signs of decreased cardiac output  - Evaluate effectiveness of vasoactive medications to optimize hemodynamic stability  - Monitor arterial and/or venous puncture sites for bleeding and/or hematoma  - Assess quality of pulses, skin color and temperature  - Assess for signs of decreased coronary artery perfusion - ex. Angina  - Evaluate fluid balance, assess for edema, trend weights  Outcome: Progressing  Goal: Absence of cardiac arrhythmias or at baseline  Description: INTERVENTIONS:  - Continuous cardiac monitoring, monitor vital signs, obtain 12 lead EKG if indicated  - Evaluate effectiveness of antiarrhythmic and heart rate control medications as ordered  - Initiate emergency measures for life threatening arrhythmias  - Monitor electrolytes and administer replacement therapy as ordered  Outcome: Progressing     Problem: PAIN - ADULT  Goal: Verbalizes/displays adequate comfort level or patient's stated pain goal  Description: INTERVENTIONS:  - Encourage pt to monitor pain and request assistance  - Assess pain using appropriate pain scale  - Administer analgesics based on type and severity of pain and evaluate response  - Implement non-pharmacological measures as appropriate and evaluate response  - Consider cultural and social influences on pain and pain management  - Manage/alleviate anxiety  - Utilize distraction  and/or relaxation techniques  - Monitor for opioid side effects  - Notify MD/LIP if interventions unsuccessful or patient reports new pain  - Anticipate increased pain with activity and pre-medicate as appropriate  Outcome: Progressing     Problem: Diabetes/Glucose Control  Goal: Glucose maintained within prescribed range  Description: INTERVENTIONS:  - Monitor Blood Glucose as ordered  - Assess for signs and symptoms of hyperglycemia and hypoglycemia  - Administer ordered medications to maintain glucose within target range  - Assess barriers to adequate nutritional intake and initiate nutrition consult as needed  - Instruct patient on self management of diabetes  Outcome: Progressing

## 2024-08-28 NOTE — PLAN OF CARE
Assumed pt care this evening. No acute events overnight. VSS on RA. NSR on tele. Heparin gtt continued per PE/DVT protocol. Denies any pain. Pt updated on POC.      Problem: CARDIOVASCULAR - ADULT  Goal: Maintains optimal cardiac output and hemodynamic stability  Description: INTERVENTIONS:  - Monitor vital signs, rhythm, and trends  - Monitor for bleeding, hypotension and signs of decreased cardiac output  - Evaluate effectiveness of vasoactive medications to optimize hemodynamic stability  - Monitor arterial and/or venous puncture sites for bleeding and/or hematoma  - Assess quality of pulses, skin color and temperature  - Assess for signs of decreased coronary artery perfusion - ex. Angina  - Evaluate fluid balance, assess for edema, trend weights  Outcome: Progressing  Goal: Absence of cardiac arrhythmias or at baseline  Description: INTERVENTIONS:  - Continuous cardiac monitoring, monitor vital signs, obtain 12 lead EKG if indicated  - Evaluate effectiveness of antiarrhythmic and heart rate control medications as ordered  - Initiate emergency measures for life threatening arrhythmias  - Monitor electrolytes and administer replacement therapy as ordered  Outcome: Progressing     Problem: PAIN - ADULT  Goal: Verbalizes/displays adequate comfort level or patient's stated pain goal  Description: INTERVENTIONS:  - Encourage pt to monitor pain and request assistance  - Assess pain using appropriate pain scale  - Administer analgesics based on type and severity of pain and evaluate response  - Implement non-pharmacological measures as appropriate and evaluate response  - Consider cultural and social influences on pain and pain management  - Manage/alleviate anxiety  - Utilize distraction and/or relaxation techniques  - Monitor for opioid side effects  - Notify MD/LIP if interventions unsuccessful or patient reports new pain  - Anticipate increased pain with activity and pre-medicate as appropriate  Outcome:  Progressing     Problem: Diabetes/Glucose Control  Goal: Glucose maintained within prescribed range  Description: INTERVENTIONS:  - Monitor Blood Glucose as ordered  - Assess for signs and symptoms of hyperglycemia and hypoglycemia  - Administer ordered medications to maintain glucose within target range  - Assess barriers to adequate nutritional intake and initiate nutrition consult as needed  - Instruct patient on self management of diabetes  Outcome: Progressing

## 2024-08-28 NOTE — PROGRESS NOTES
CC: follow-up hospital admissions sob    SUBJECTIVE:  Interval History:     No cp or sob  No nv    OBJECTIVE:  Scheduled Meds:    apixaban  5 mg Oral BID    atorvastatin  40 mg Oral Nightly    insulin degludec  20 Units Subcutaneous Daily    insulin aspart  1-5 Units Subcutaneous TID AC and HS     Continuous Infusions:    continuous dose heparin 1,500 Units/hr (08/28/24 0700)     PRN Meds:   morphINE    glucose **OR** glucose **OR** glucose-vitamin C **OR** dextrose **OR** glucose **OR** glucose **OR** glucose-vitamin C    PHYSICAL EXAM  Vital signs: Temp:  [97 °F (36.1 °C)-98.3 °F (36.8 °C)] 98 °F (36.7 °C)  Pulse:  [64-85] 75  Resp:  [13-25] 16  BP: (119-174)/() 141/71  SpO2:  [95 %-100 %] 100 %      GENERAL - NAD, AAO  EYES- sclera anicteric,    HENT- normocephalic, OP - dry  NECK - supple  CV- RRR  RESP - CTAB, normal resp effort  ABDOMEN- soft, NT/ND   EXT- no LE edema   PSYCH - normal mentation/ normal affect    Data Review:   Labs:   Recent Labs   Lab 08/26/24  1433 08/27/24  0530 08/28/24  0508 08/28/24  0853   WBC 7.5 6.4  --  5.3   HGB 9.6* 8.9*  --  8.6*   MCV 86.7 87.6  --  85.9   .0 239.0  239.0 250.0 242.0   INR 1.04  --   --   --        Recent Labs   Lab 08/26/24  1433 08/27/24  0530 08/28/24  0853   * 139 136   K 4.5 4.8 4.2    105 105   CO2 26.0 31.0 26.0   BUN 42* 41* 35*   CREATSERUM 1.27* 0.99 0.84   CA 9.3 9.2 9.2   MG  --   --  1.7   * 71 178*       Recent Labs   Lab 08/26/24  1433   ALT 14   AST 14   ALB 3.9       Recent Labs   Lab 08/27/24  1102 08/27/24  1625 08/27/24 2024 08/28/24  0525 08/28/24  1155   PGLU 198* 314* 287* 214* 309*           ASSESSMENT/PLAN:    84 year old female with PMH including but not limited to type 2 diabetes, atherosclerosis of the aorta, hypertension, hyperlipidemia, paroxysmal A-fib previously on Eliquis, recent SDH, who p/w EH ED c syncope and found to have PE.         Syncope 2/2 Submassive PE   - CTA reviewed c Bilateral  pulmonary embolism involving distal main pulmonary arteries extending  to segmental branches throughout both lungs.    - RH strain noteds as well  - echo EF 70-75%, RVSP increased   - dopplers neg DVT  - cards/pulm following, apprec  - IV heparin   - not cardidate for lytics given recent SDH; also, HD stable currently  - transfer to tele if ok with others     Acute hypoxemic respiratory failure: 2/2 above   -O2 as needed      HTN  - bp higher today, not on meds at home     # HL  -statin     # Recent right subdural hematoma as well as epidural hematoma  -Pt admitted 7/21/24 c SDH.   -CTH no bleed   -per ER MD neurosurgery contacted and ok to proceed with heparin gtt without the bolus.          # Paroxysmal A-fib  -previously on Eliquis   -tele     # Hyperlipidemia  # Type 2 diabetes-sliding scale insulin  - on tresiba and iss, adjust prn     # CAD   -aspirin when able          Will continue to follow while hospitalized. Please page me or the on-call hospitalist with questions or concerns.    Miguel Engle Hospitalist  [phone]  Answering Service: [phone]

## 2024-08-29 VITALS
DIASTOLIC BLOOD PRESSURE: 67 MMHG | HEIGHT: 65 IN | SYSTOLIC BLOOD PRESSURE: 149 MMHG | OXYGEN SATURATION: 93 % | HEART RATE: 68 BPM | TEMPERATURE: 98 F | BODY MASS INDEX: 32.51 KG/M2 | RESPIRATION RATE: 17 BRPM | WEIGHT: 195.13 LBS

## 2024-08-29 LAB
ANION GAP SERPL CALC-SCNC: 7 MMOL/L (ref 0–18)
APTT PPP: 30.8 SECONDS (ref 23–36)
BUN BLD-MCNC: 33 MG/DL (ref 9–23)
CALCIUM BLD-MCNC: 9.1 MG/DL (ref 8.7–10.4)
CHLORIDE SERPL-SCNC: 106 MMOL/L (ref 98–112)
CO2 SERPL-SCNC: 25 MMOL/L (ref 21–32)
CREAT BLD-MCNC: 0.87 MG/DL
EGFRCR SERPLBLD CKD-EPI 2021: 66 ML/MIN/1.73M2 (ref 60–?)
ERYTHROCYTE [DISTWIDTH] IN BLOOD BY AUTOMATED COUNT: 14.9 %
GLUCOSE BLD-MCNC: 156 MG/DL (ref 70–99)
GLUCOSE BLD-MCNC: 175 MG/DL (ref 70–99)
GLUCOSE BLD-MCNC: 300 MG/DL (ref 70–99)
GLUCOSE BLD-MCNC: 301 MG/DL (ref 70–99)
HCT VFR BLD AUTO: 25.6 %
HGB BLD-MCNC: 8.4 G/DL
MAGNESIUM SERPL-MCNC: 1.9 MG/DL (ref 1.6–2.6)
MCH RBC QN AUTO: 28.1 PG (ref 26–34)
MCHC RBC AUTO-ENTMCNC: 32.8 G/DL (ref 31–37)
MCV RBC AUTO: 85.6 FL
OSMOLALITY SERPL CALC.SUM OF ELEC: 296 MOSM/KG (ref 275–295)
PLATELET # BLD AUTO: 242 10(3)UL (ref 150–450)
POTASSIUM SERPL-SCNC: 4.3 MMOL/L (ref 3.5–5.1)
RBC # BLD AUTO: 2.99 X10(6)UL
SODIUM SERPL-SCNC: 138 MMOL/L (ref 136–145)
WBC # BLD AUTO: 4.6 X10(3) UL (ref 4–11)

## 2024-08-29 PROCEDURE — 97161 PT EVAL LOW COMPLEX 20 MIN: CPT

## 2024-08-29 PROCEDURE — 97165 OT EVAL LOW COMPLEX 30 MIN: CPT

## 2024-08-29 PROCEDURE — 85730 THROMBOPLASTIN TIME PARTIAL: CPT | Performed by: INTERNAL MEDICINE

## 2024-08-29 PROCEDURE — 83735 ASSAY OF MAGNESIUM: CPT | Performed by: HOSPITALIST

## 2024-08-29 PROCEDURE — 80048 BASIC METABOLIC PNL TOTAL CA: CPT | Performed by: HOSPITALIST

## 2024-08-29 PROCEDURE — 97530 THERAPEUTIC ACTIVITIES: CPT

## 2024-08-29 PROCEDURE — 97535 SELF CARE MNGMENT TRAINING: CPT

## 2024-08-29 PROCEDURE — 82962 GLUCOSE BLOOD TEST: CPT

## 2024-08-29 PROCEDURE — 85027 COMPLETE CBC AUTOMATED: CPT | Performed by: HOSPITALIST

## 2024-08-29 RX ORDER — INSULIN DEGLUDEC 100 U/ML
30 INJECTION, SOLUTION SUBCUTANEOUS DAILY
Status: DISCONTINUED | OUTPATIENT
Start: 2024-08-30 | End: 2024-08-29

## 2024-08-29 RX ORDER — FUROSEMIDE 10 MG/ML
20 INJECTION INTRAMUSCULAR; INTRAVENOUS ONCE
Status: COMPLETED | OUTPATIENT
Start: 2024-08-29 | End: 2024-08-29

## 2024-08-29 NOTE — PROGRESS NOTES
Cardiology Progress Note    Evangelina Ward Patient Status:  Inpatient    1940 MRN [id number]   Location [facility] 6NE-A Attending Nedra Silverman MD   Hosp Day # 3 PCP Dorina Higuera MD     Impression:  Submassive PE (bilateral PE involving distal main pulmonary arteries extending into segmental branches)  Syncopal episode   Elevated HS trop (715) and elevated pro BNP (563)  Elevated biomarkers likely due to RV strain  Recent subdural hematoma   Recent femoral fracture s/p surgery   CAD s/p PCI in   HLD  HTN  PAF     Plan:  Doing well on anticoagulation.  O2 NC currently with SaO2 100%.  Continue. Eliquis (no bolus) started.  Agree.  Xfer to telemetry today/tomorrow if bed available.  PT/OT.    From CV standpoint, she can DC home on oral AC.  Would have her FU in 2-3 weeks with Dr. LEROY or myself in clinic and she'll need a FU echo in a month or two to evaluate for right heart recovery.  Plan discussed with RN.    Subjective:  The patient denies any chest pain or dyspnea at this time.    Objective:  /67 (BP Location: Left arm)   Pulse 63   Temp 97.9 °F (36.6 °C) (Temporal)   Resp 16   Ht 65\"   Wt 195 lb 1.7 oz (88.5 kg)   LMP  (LMP Unknown)   SpO2 100%   BMI 32.47 kg/m²   Temp (24hrs), Av.2 °F (36.8 °C), Min:97.6 °F (36.4 °C), Max:99.9 °F (37.7 °C)      Intake/Output Summary (Last 24 hours) at 2024 1259  Last data filed at 2024 1200  Gross per 24 hour   Intake 305 ml   Output 2000 ml   Net -1695 ml     Wt Readings from Last 3 Encounters:   24 195 lb 1.7 oz (88.5 kg)   24 180 lb 3.2 oz (81.7 kg)   08/15/24 180 lb 3.2 oz (81.7 kg)       General: Awake and alert; in no acute distress  Cardiac: Regular rate and rhythm; no murmurs/rubs/gallops are appreciated  Lungs: Clear to auscultation bilaterally; no accessory muscle use  Abdomen: Soft, non-tender; bowel sounds are normoactive  Extremities: No clubbing/cyanosis/edema; moves all 4 extremities  normally    Current Facility-Administered Medications   Medication Dose Route Frequency    [START ON 8/30/2024] insulin degludec (Tresiba) 100 units/mL flextouch 30 Units  30 Units Subcutaneous Daily    insulin aspart (NovoLOG) 100 Units/mL FlexPen 2-10 Units  2-10 Units Subcutaneous TID AC and HS    apixaban (Eliquis) tab 5 mg  5 mg Oral BID    mirtazapine (Remeron) tab 7.5 mg  7.5 mg Oral Nightly    torsemide (Demadex) tab 20 mg  20 mg Oral Daily    morphINE PF 2 MG/ML injection 1 mg  1 mg Intravenous Q4H PRN    atorvastatin (Lipitor) tab 40 mg  40 mg Oral Nightly    glucose (Dex4) 15 GM/59ML oral liquid 15 g  15 g Oral Q15 Min PRN    Or    glucose (Glutose) 40% oral gel 15 g  15 g Oral Q15 Min PRN    Or    glucose-vitamin C (Dex-4) chewable tab 4 tablet  4 tablet Oral Q15 Min PRN    Or    dextrose 50% injection 50 mL  50 mL Intravenous Q15 Min PRN    Or    glucose (Dex4) 15 GM/59ML oral liquid 30 g  30 g Oral Q15 Min PRN    Or    glucose (Glutose) 40% oral gel 30 g  30 g Oral Q15 Min PRN    Or    glucose-vitamin C (Dex-4) chewable tab 8 tablet  8 tablet Oral Q15 Min PRN       Laboratory Data:  Lab Results   Component Value Date    WBC 4.6 08/29/2024    HGB 8.4 08/29/2024    HCT 25.6 08/29/2024    .0 08/29/2024     Lab Results   Component Value Date    INR 1.04 08/26/2024    INR 1.16 07/21/2024    INR 1.0 08/06/2020     Lab Results   Component Value Date     08/29/2024    K 4.3 08/29/2024     08/29/2024    CO2 25.0 08/29/2024    BUN 33 08/29/2024    CREATSERUM 0.87 08/29/2024     08/29/2024    CA 9.1 08/29/2024    MG 1.9 08/29/2024       Telemetry: No malignant tachyarrhythmias or bradyarrhythmias

## 2024-08-29 NOTE — CM/SW NOTE
Patient cleared to return to [facility]--confirmed acceptance with Yuliana in Admissions at the facility.  Discussed transportation options with patient and spouse--specifically [facility] (service provided/ billed later)--pt and spouse in agreement.  Nurse to call report to --patient returning to room 102.  David morales arranged for 1630  (PCS form completed)

## 2024-08-29 NOTE — DIETARY NOTE
[facility]   part of [facility]   CLINICAL NUTRITION    Evangelina Ward     Admitting diagnosis:  Acute saddle pulmonary embolism with acute cor pulmonale (HCC) [I26.02]    Ht: 165.1 cm (5' 5\")  Wt: 88.5 kg (195 lb 1.7 oz).   Body mass index is 32.47 kg/m².  IBW: 56.8 kg    Wt Readings from Last 6 Encounters:   08/29/24 88.5 kg (195 lb 1.7 oz)   08/20/24 81.7 kg (180 lb 3.2 oz)   08/15/24 81.7 kg (180 lb 3.2 oz)   08/07/24 93.5 kg (206 lb 3.2 oz)   07/31/24 93.5 kg (206 lb 3.2 oz)   07/29/24 93.5 kg (206 lb 3.2 oz)        Labs/Meds reviewed    Diet:       Procedures    Cardiac diet Cardiac; Is Patient on Accuchecks? No     Percent Meals Eaten (last 3 days)       Date/Time Percent Meals Eaten (%)    08/27/24 0800 100 %    08/27/24 1200 100 %    08/27/24 1800 100 %    08/28/24 0800 100 %    08/28/24 1200 100 %          Pt chart reviewed d/t Wounds.  Patient reports good appetite at this time.  Nursing notes reports Percent Meals Eaten (%): 100 % intake for last meal.  Tolerating po diet without diarrhea, emesis, or constipation.   No significant weight changes noted.     PMH includes CAD, HTN, DM. Pt p/w acute saddle pulmonary embolism.  Pt seen for Wounds - has unstagable PI to heel.  Pt reports she moved in the [facility] about 5 mo ago, and feels she eats well there.  She thinks she lost about 20# from an improved diet while at [facility].  She denies n/v/d. She has had the wound on her heel for several months now, but feels it is improving.  She knows importance of protein, encouraged pt to continue to include protein sources at all meals.  Pt verbalized understanding, and has no other nutrition questions/concerns.    Patient is at low nutrition risk at this time.    Please consult if patient status changes or nutrition issues arise.    Valeria Cobb RD, LDN, [facility]  Clinical Dietitian  Phone [phone]

## 2024-08-29 NOTE — OCCUPATIONAL THERAPY NOTE
IMPRESSION:  RIGHT CAROTID SYSTEM  1. Internal carotid artery atherosclerotic disease, less than 50% stenosis;  note that [facility] criteria for 50% stenosis were updated in 2023 to  align with latest IAC guideline recommendations, and this patient's  stenosis category has been accordingly downgraded from the previous study  to match latest published guidelines  2. In comparison with the prior study, there is no change in degree  category of stenosis        LEFT CAROTID SYSTEM  1. Internal carotid artery atherosclerotic disease, less than 50% stenosis  2. In comparison with the prior study, there is no change in degree  category of stenosis        VERTEBRAL ARTERY SYSTEM  Normally antegrade    Phone call to patient to convey stable results.  Per Snap[employer]ot, ok to leave detailed message with results.  Message left.  Patient encouraged to call the clinic with any additional questions or concerns.    OCCUPATIONAL THERAPY EVALUATION - INPATIENT     Room Number: 6613/6613-A  Evaluation Date: 8/29/2024  Type of Evaluation: Initial  Presenting Problem: Bilateral PE    Physician Order: IP Consult to Occupational Therapy  Reason for Therapy: ADL/IADL Dysfunction and Discharge Planning    OCCUPATIONAL THERAPY ASSESSMENT   Patient is currently functioning near baseline with toileting, bathing, and lower body dressing. Prior to admission, patient's baseline is independent.  Patient is requiring minimum assistance as a result of the following impairments: decreased endurance. Occupational Therapy will continue to follow for duration of hospitalization.    Patient will benefit from continued skilled OT Services to promote return to prior level of function and safety with continuous assistance and gradual rehabilitative therapy      History Related to Current Admission: Patient is a 84 year old female admitted from [facility] on 8/26/2024 with Presenting Problem: Bilateral PE. Co-Morbidities : AC, HTN, HLD, CAD, and DM    Recent admissions:  07/2024 falling,  subdural hematoma, epidural hematoma, R hip fracture, 7/22/24 s/p R femur IM nailing -> [facility]    WEIGHT BEARING RESTRICTION  Weight Bearing Restriction: None                Recommendations for nursing staff:   Transfers: cga  Toileting location: toilet    EVALUATION SESSION:  Patient Start of Session: seated  FUNCTIONAL TRANSFER ASSESSMENT  Sit to Stand: Chair  Chair: Contact Guard Assist    BED MOBILITY  Supine to Sit : Not tested  Sit to Supine (OT): Not Tested       O2 SATURATIONS  Oxygen Therapy  SPO2% on Room Air at Rest: 99  SPO2% Ambulation on Oxygen: 97    COGNITION  Overall Cognitive Status:  WFL - within functional limits    Upper Extremity   ROM: within functional limits   Strength: within functional limits     EDUCATION PROVIDED  Patient: Role of Occupational Therapy; Plan of Care; Functional Transfer Techniques; Fall Prevention  Patient's Response to Education:  Verbalized Understanding    Equipment used: rw     Therapist comments: seated in the chair. Room air 99%, RR 19, 141/94, HR 68.   CGA to stand and to ambulate in hallway with RW, chair follow. Pt mentioned that she is \"a panics\" so she only walks with chair follow. She reports fear of falling.   RR 29, 97% room air. Discussed plan of care.     Patient End of Session: Up in chair;Needs met;Call light within reach;RN aware of session/findings;All patient questions and concerns addressed    OCCUPATIONAL PROFILE    HOME SITUATION  Type of Home: House  Home Layout: Two level  Lives With: Spouse    Toilet and Equipment: Toilet riser  Shower/Tub and Equipment: Tub-shower combo;Shower chair;Grab bar  Other Equipment: Reacher (rw)          Drives: Yes (not since the fall in July, has been at [facility])  Patient Regularly Uses: Reading glasses    Prior Level of Function: prior to the fall in July, independent with ADL and IADL. Uses RW. Enjoys reading. After the hospital stay here in July, pt has been at [facility].  Pt is walking about 45 ft with RW and wheelchair follow.  Pt is using diaper for all toileting needs.  Independent with UB dressing, but staff assists with LB dressing and sponge bathing. Pt mentioned that she is afraid of falling and would not walk unless there is a chair follow.      SUBJECTIVE   See above.    PAIN ASSESSMENT  Ratin          OBJECTIVE  Precautions: None  Fall Risk: Standard fall risk      ASSESSMENTS    AM-PAC ‘6-Clicks’ Inpatient Daily Activity Short Form  -   Putting on and taking off regular lower body clothing?: A Lot  -   Bathing (including washing, rinsing, drying)?: A Lot  -   Toileting, which includes using toilet, bedpan or urinal? : A Little  -   Putting on and taking off regular upper body clothing?: None  -   Taking care of personal grooming such as brushing teeth?: None  -   Eating meals?: None    AM-PAC Score:  Score: 19  Approx Degree of Impairment: 42.8%  Standardized Score  (AM-PAC Scale): 40.22    ADDITIONAL TESTS     NEUROLOGICAL FINDINGS      COGNITION ASSESSMENTS       PLAN  OT Treatment Plan: Balance activities;Energy conservation/work simplification techniques;ADL training;Functional transfer training;UE strengthening/ROM;Patient/Family education;Equipment eval/education;Compensatory technique education  Rehab Potential : Fair  Frequency: 3x/week  Number of Visits to Meet Established Goals: 5    ADL Goals   Patient will perform lower body dressing:  with supervision  Patient will perform toileting: with supervision    Functional Transfer Goals  Patient will transfer to toilet:  with supervision    UE Exercise Program Goal  Patient will be independent with bilateral AROM HEP (home exercise program).    Patient Evaluation Complexity Level:   Occupational Profile/Medical History LOW - Brief history including review of medical or therapy records    Specific performance deficits impacting engagement in ADL/IADL MODERATE  3 - 5 performance deficits   Client Assessment/Performance Deficits MODERATE - Comorbidities and min to mod modifications of tasks    Clinical Decision Making LOW - Analysis of occupational profile, problem-focused assessments, limited treatment options    Overall Complexity LOW     OT Session Time: 23 minutes  Self-Care Home Management: 8 minutes  Therapeutic Activity: 6 minutes

## 2024-08-29 NOTE — CM/SW NOTE
Met with patient to discuss discharge planning.  Patient known to this writer from previous admission when patient discharged to the [facility]. Prior to initial admit to the [facility], patient resided with her spouse Rafat in their two story home in [place].  The couple has been  for 59 years.  Patient was independent with ADL's--has a walker and a cane but no respiratory DME.  Patient admitted from [facility],  [facility]  PCP is Dr Dorina Higuera; uses [facility] to fill scripts.      Patient worked with therapies with ongoing GRADUAL recommendation.  Confirmed with patient that she wants to continue with [facility] @ the [facility].   Spoke with Aishwarya in Admissions @ the facility--they are holding her room and can accept back.  AIDIN updates sent to the [facility]--[facility] referral sent.  As for Eliquis--will check with patient's [facility] if current with this medication and if savings voucher    Called patient's [facility] (phone )--confirmed patient is not new to this medication--script filled in Feb 2024 for $680.oo and again in May 2024 for $135.oo--according to technician--unable to determine if 30-day free voucher ever applied--will give patient this voucher to apply post rehab   08/29/24 1100   CM/SW Referral Data   Referral Source Physician;   Reason for Referral Discharge planning   Informant Patient   Medical Hx   Does patient have an established PCP? Yes   Patient Info   Patient's Current Mental Status at Time of Assessment Alert;Oriented   Patient's Home Environment House   Number of Levels in Home 2   Patient lives with Spouse/Significant other   Patient Status Prior to Admission   Independent with ADLs and Mobility No   Pt. requires assistance with Ambulating;Bathing;Toileting   Services in place prior to admission Other (comment)  (pt @ rehab @ [facility])   Discharge Needs   Anticipated D/C needs Subacute rehab   Services Requested   Submitted to [facility] Yes   [facility]NA  Level 1 Submitted   (return to facility)      ever used

## 2024-08-29 NOTE — PROGRESS NOTES
[facility]    Evangelina Ward Patient Status:  Inpatient    1940 MRN [id number]   Location [facility] 6NE-A Attending Nedra Silverman MD   Hosp Day # 3 PCP Dorina Higuera MD     Critical Care Progress Note     Date of Admission: 2024  2:17 PM  Admission Diagnosis: Acute saddle pulmonary embolism with acute cor pulmonale (HCC) [I26.02]     S:  Pt transitioned to eliquis.  No acute events overnight.        Current Medications:    Current Facility-Administered Medications:     apixaban (Eliquis) tab 5 mg, 5 mg, Oral, BID    mirtazapine (Remeron) tab 7.5 mg, 7.5 mg, Oral, Nightly    torsemide (Demadex) tab 20 mg, 20 mg, Oral, Daily    morphINE PF 2 MG/ML injection 1 mg, 1 mg, Intravenous, Q4H PRN    atorvastatin (Lipitor) tab 40 mg, 40 mg, Oral, Nightly    insulin degludec (Tresiba) 100 units/mL flextouch 20 Units, 20 Units, Subcutaneous, Daily    glucose (Dex4) 15 GM/59ML oral liquid 15 g, 15 g, Oral, Q15 Min PRN **OR** glucose (Glutose) 40% oral gel 15 g, 15 g, Oral, Q15 Min PRN **OR** glucose-vitamin C (Dex-4) chewable tab 4 tablet, 4 tablet, Oral, Q15 Min PRN **OR** dextrose 50% injection 50 mL, 50 mL, Intravenous, Q15 Min PRN **OR** glucose (Dex4) 15 GM/59ML oral liquid 30 g, 30 g, Oral, Q15 Min PRN **OR** glucose (Glutose) 40% oral gel 30 g, 30 g, Oral, Q15 Min PRN **OR** glucose-vitamin C (Dex-4) chewable tab 8 tablet, 8 tablet, Oral, Q15 Min PRN    insulin aspart (NovoLOG) 100 Units/mL FlexPen 1-5 Units, 1-5 Units, Subcutaneous, TID AC and HS     OBJECTIVE:  BP 96/63 (BP Location: Left arm)   Pulse 56   Temp 97.9 °F (36.6 °C) (Temporal)   Resp 19   Ht 65\"   Wt 195 lb 1.7 oz (88.5 kg)   LMP  (LMP Unknown)   SpO2 91%   BMI 32.47 kg/m²      Ventilator Settings: RA      Wt Readings from Last 3 Encounters:   24 195 lb 1.7 oz (88.5 kg)   24 180 lb 3.2 oz (81.7 kg)   08/15/24 180 lb 3.2 oz (81.7 kg)        I/O last 3 completed shifts:  In: 1422 [P.O.:1020; I.V.:402]  Out: 2400  [Urine:2400]  No intake/output data recorded.      Physical Exam:                          General: alert, cooperative, in NAD                          HEENT: oropharynx clear without erythema or exudates, moist mucous membranes                          Lungs: Clear to auscultation bilaterally, no wheezes or crackles                           Chest wall: No tenderness or deformity.                          Heart: Regular rate and rhythm, normal S1S2,                          Abdomen: soft, non-tender, non-distended, positive BS.                          Extremity: No clubbing or cyanosis. + edema                          Skin: No rashes or lesions.       Lab Results   Component Value Date    WBC 4.6 08/29/2024    RBC 2.99 08/29/2024    HGB 8.4 08/29/2024    HCT 25.6 08/29/2024    MCV 85.6 08/29/2024    MCH 28.1 08/29/2024    MCHC 32.8 08/29/2024    RDW 14.9 08/29/2024    .0 08/29/2024     Lab Results   Component Value Date     08/29/2024    K 4.3 08/29/2024     08/29/2024    CO2 25.0 08/29/2024    BUN 33 08/29/2024    CREATSERUM 0.87 08/29/2024     08/29/2024    CA 9.1 08/29/2024     Lab Results   Component Value Date    INR 1.04 08/26/2024    INR 1.16 07/21/2024    INR 1.0 08/06/2020           Imaging: I have independently visualized all relevant chest imaging in PACS.  I agree with the radiology interpretation except where noted.     ASSESSMENT/PLAN:  Acute hypoxemic respiratory failure: 2/2 submassive PE  -wean O2 as able, currently on RA  -CTA reviewed with bilateral PE  Submassive PE provoked after recent trauma with SDH and femur fx.   -troponin and pro-bnp elevated  -CTA with bilateral PE with findings concerning for right heart strain  -Echo with preserved EF, RV size moderately increased with mildly reduced RV systolic function, PASP: 45-50mmHg  -LE dopplers negative for DVT   -pt had recent SDH and epidural hematoma following fall 7/2024, per ER MD neurosurgery contacted and ok to  proceed with heparin gtt without the bolus.   -cardiology consulted, they are not recommending lytic therapy given hemodynamic stability and recent brain bleed  -now transitioned to eliquis.   HTN/HLD/A.Fib  -resume home meds as BP allows   -eliquis   -give 1 dose of lasix today for increased LE edema   DM  -insulin per IM  FEN:  -ADAT  Proph:  -eliquis   Dispo:  -stable to transfer to the floor, pulm will continue to follow   -DNAR/select    Mary Weber MD  8/29/2024  8:01 AM      None

## 2024-08-29 NOTE — PHYSICAL THERAPY NOTE
PHYSICAL THERAPY EVALUATION - INPATIENT     Room Number: 6613/6613-A  Evaluation Date: 8/29/2024  Type of Evaluation: Initial  Physician Order: PT Eval and Treat    Presenting Problem: Acute saddle pulmonary embolism with acute cor pulmonale  Co-Morbidities : AC, HTN, HLD, CAD, and DM  Reason for Therapy: Mobility Dysfunction and Discharge Planning    9/18-9/20  Atrial Fibrillation, discharge SNF    PHYSICAL THERAPY ASSESSMENT   Patient is currently functioning below baseline with bed mobility, transfers, and gait.  Prior to admission, patient's baseline is IND.  Patient is requiring minimal assist and moderate assist as a result of the following impairments: decreased endurance/aerobic capacity, pain, and decreased muscular endurance.  Physical Therapy will continue to follow for duration of hospitalization.    Patient will benefit from continued skilled PT Services to promote return to prior level of function and safety with continuous assistance and gradual rehabilitative therapy .    PLAN  PT Treatment Plan: Bed mobility;Body mechanics;Family education;Gait training;Strengthening;Stair training;Transfer training;Balance training  Rehab Potential : Good  Frequency (Obs): 3-5x/week  Number of Visits to Meet Established Goals: 3      CURRENT GOALS    Goal #1 Patient is able to demonstrate supine - sit EOB @ level: supervision     Goal #2 Patient is able to demonstrate transfers EOB to/from BSC at assistance level: supervision     Goal #3 Patient is able to ambulate 150 feet with assist device: walker - rolling at assistance level: supervision     Goal #4    Goal #5    Goal #6    Goal Comments: Goals established on 8/29/2024      PHYSICAL THERAPY MEDICAL/SOCIAL HISTORY  History related to current admission: Patient is a 84 year old female admitted on 8/26/2024 from Home for Acute saddle pulmonary embolism with acute cor pulmonale.        HOME SITUATION  Type of Home: House   Home Layout: Two level                 Lives With: Spouse  Drives: Yes (not since the fall in July, has been at [facility])  Patient Owned Equipment: Rolling walker  Patient Regularly Uses: Reading glasses    Prior Level of [place]: Pt states that she lives in house with her spouse. Pt states that she is able to ambulate with th use of the RW. After previous hospitalization pt was discharged to [facility] for rehab.    SUBJECTIVE  \"I can try\"      OBJECTIVE  Precautions: None  Fall Risk: Standard fall risk    WEIGHT BEARING RESTRICTION  Weight Bearing Restriction: None                PAIN ASSESSMENT  Ratin  Location: Pt reports no pain       COGNITION  Overall Cognitive Status:  WFL - within functional limits    RANGE OF MOTION AND STRENGTH ASSESSMENT  Upper extremity ROM and strength are within functional limits     Lower extremity ROM is within functional limits     Lower extremity strength is within functional limits       BALANCE  Static Sitting: Fair +  Dynamic Sitting: Fair +  Static Standing: Fair  Dynamic Standing: Fair -    ADDITIONAL TESTS                                    ACTIVITY TOLERANCE                         O2 WALK       NEUROLOGICAL FINDINGS                        AM-PAC '6-Clicks' INPATIENT SHORT FORM - BASIC MOBILITY  How much difficulty does the patient currently have...  Patient Difficulty: Turning over in bed (including adjusting bedclothes, sheets and blankets)?: A Little   Patient Difficulty: Sitting down on and standing up from a chair with arms (e.g., wheelchair, bedside commode, etc.): A Little   Patient Difficulty: Moving from lying on back to sitting on the side of the bed?: A Little   How much help from another person does the patient currently need...   Help from Another: Moving to and from a bed to a chair (including a wheelchair)?: A Little   Help from Another: Need to walk in hospital room?: A Little   Help from Another: Climbing 3-5 steps with a railing?: A Little       AM-PAC Score:  Raw Score: 18   Approx Degree of  Impairment: 46.58%   Standardized Score (AM-PAC Scale): 43.63   CMS Modifier (G-Code): CK    FUNCTIONAL ABILITY STATUS  Gait Assessment   Functional Mobility/Gait Assessment  Gait Assistance: Contact guard assist  Distance (ft): 45,45  Assistive Device: Rolling walker  Pattern: Within Functional Limits    Skilled Therapy Provided     Bed Mobility:  Rolling: NT  Supine to sit: NT   Sit to supine: NT     Transfer Mobility:  Sit to stand: Min A   Stand to sit: Min A  Gait = Min A using RW with W/c follow.     Therapist's Comments: While standing pt demonstrated a decrease in DF preventing proper foot clearance. Pt continues to require min A to have proper gait pattern to allow pt to safely ambulate.    Exercise/Education Provided:  Bed mobility  Body mechanics  Gait training  Transfer training    Patient End of Session: Up in chair;Needs met;Call light within reach;RN aware of session/findings;All patient questions and concerns addressed;Alarm set      Patient Evaluation Complexity Level:  History Moderate - 1 or 2 personal factors and/or co-morbidities   Examination of body systems Low - addressing 1-2 elements   Clinical Presentation Low - Stable   Clinical Decision Making Low - Stable       PT Session Time: 23 minutes  Therapeutic Activity: 15 minutes

## 2024-08-29 NOTE — PLAN OF CARE
Heparin gtt stopped at 2100, started eliquis this evening. Denies pain, pure wick in place for urine collection. Waiting for tele bed for transfer.

## 2024-08-29 NOTE — PLAN OF CARE
Received patient resting in bed. Up to chair and ambulating halls early into shift and spent most of shift up in chair. VSS on RA. NSR on tele. One time dose of lasix given per pulm. Voiding per kyle, see flowsheets for I&Os. Cleared by all docs on case to discharge back to the [facility]. Ivs removed, discharge paperwork reviewed with pt and  at bedside. Pt. Transported via medivan back to the [facility] with belongings in hand.     15:04: report called to RN at the [facility].

## 2024-08-29 NOTE — PROGRESS NOTES
CC: follow-up hospital admissions sob    SUBJECTIVE:  Interval History:     Ambulated yesterday, no cp or sob  Feels better but afraid to go back to [facility] today    OBJECTIVE:  Scheduled Meds:    apixaban  5 mg Oral BID    mirtazapine  7.5 mg Oral Nightly    torsemide  20 mg Oral Daily    atorvastatin  40 mg Oral Nightly    insulin degludec  20 Units Subcutaneous Daily    insulin aspart  1-5 Units Subcutaneous TID AC and HS     Continuous Infusions:       PRN Meds:   morphINE    glucose **OR** glucose **OR** glucose-vitamin C **OR** dextrose **OR** glucose **OR** glucose **OR** glucose-vitamin C    PHYSICAL EXAM  Vital signs: Temp:  [97.6 °F (36.4 °C)-99.9 °F (37.7 °C)] 99.9 °F (37.7 °C)  Pulse:  [56-75] 71  Resp:  [16-20] 20  BP: ()/(51-76) 112/51  SpO2:  [91 %-100 %] 100 %      GENERAL - NAD, AAO  EYES- sclera anicteric,    HENT- normocephalic, OP - dry  NECK - supple  CV- RRR  RESP - CTAB, normal resp effort  ABDOMEN- soft, NT/ND   EXT- no LE edema   PSYCH - normal mentation/ normal affect    Data Review:   Labs:   Recent Labs   Lab 08/26/24  1433 08/27/24  0530 08/28/24  0508 08/28/24  0853 08/29/24  0425   WBC 7.5 6.4  --  5.3 4.6   HGB 9.6* 8.9*  --  8.6* 8.4*   MCV 86.7 87.6  --  85.9 85.6   .0 239.0  239.0 250.0 242.0 242.0   INR 1.04  --   --   --   --        Recent Labs   Lab 08/26/24  1433 08/27/24  0530 08/28/24  0853 08/29/24  0425   * 139 136 138   K 4.5 4.8 4.2 4.3    105 105 106   CO2 26.0 31.0 26.0 25.0   BUN 42* 41* 35* 33*   CREATSERUM 1.27* 0.99 0.84 0.87   CA 9.3 9.2 9.2 9.1   MG  --   --  1.7 1.9   * 71 178* 156*       Recent Labs   Lab 08/26/24  1433   ALT 14   AST 14   ALB 3.9       Recent Labs   Lab 08/28/24  1155 08/28/24  1643 08/28/24  2133 08/29/24  0611 08/29/24  1050   PGLU 309* 317* 257* 175* 300*           ASSESSMENT/PLAN:    84 year old female with PMH including but not limited to type 2 diabetes, atherosclerosis of the aorta, hypertension,  hyperlipidemia, paroxysmal A-fib previously on Eliquis, recent SDH, who p/w [facility] ED c syncope and found to have PE.         Syncope 2/2 Submassive PE   - CTA reviewed c Bilateral pulmonary embolism involving distal main pulmonary arteries extending  to segmental branches throughout both lungs.    - RH strain noteds as well  - echo EF 70-75%, RVSP increased   - dopplers neg DVT  - cards/pulm following, apprec  - IV heparin -> eliquis   - not cardidate for lytics given recent SDH; also, HD stable currently  - transfer to tele if ok with others     Acute hypoxemic respiratory failure: 2/2 above   -O2 as needed      HTN  - bp controlled today     # HL  -statin     # Recent right subdural hematoma as well as epidural hematoma  -Pt admitted 7/21/24 c SDH.   -CTH no bleed     LE edema  -IV lasix today  -on torsemide too        # Paroxysmal A-fib  -on eiquis     # Hyperlipidemia  # Type 2 diabetes-sliding scale insulin  - on tresiba will increase to 30 units  - increase slding scale to med dose     # CAD   -aspirin when able          Will continue to follow while hospitalized. Please page me or the on-call hospitalist with questions or concerns.    Miguel Engle Hospitalist  [phone]  Answering Service: [phone]

## 2024-08-29 NOTE — DISCHARGE SUMMARY
[facility] Discharge Summary    Patient ID  Evangelina Ward  [id number]  84 year old  6/14/1940    Admit date: 8/26/2024    Discharge date: 08/29/24    Attending: Nedra Silverman MD     Primary Care Physician: Dorina Higuera MD      Reason for admission:  PE    Discharge condition: stable    Disposition: [facility]    Important follow up:  -PCP within 7 d  -specialists:      -labs:    -radiology:      Additional patient instructions       Discharge med list     Medication List        START taking these medications      apixaban 5 MG Tabs  Commonly known as: Eliquis  Take 1 tablet (5 mg total) by mouth 2 (two) times daily.            CHANGE how you take these medications      Lantus SoloStar 100 UNIT/ML Sopn  Generic drug: insulin glargine  INJECT up to 48 UNITS INTO THE SKIN ONCE DAILY.  What changed:   how much to take  how to take this  when to take this  additional instructions            CONTINUE taking these medications      atorvastatin 40 MG Tabs  Commonly known as: Lipitor     bisacodyl 10 MG Supp  Commonly known as: Dulcolax     cholecalciferol 50 MCG (2000 UT) Tabs  Commonly known as: Vitamin D3     Ferrous Gluconate 324 (38 Fe) MG Tabs     glipiZIDE 10 MG Tabs  Commonly known as: Glucotrol  Take 1 tablet (10 mg total) by mouth 2 (two) times daily before meals.     metFORMIN  MG Tb24  Commonly known as: Glucophage XR  Take 2 tablets (1,000 mg total) by mouth 2 (two) times daily.     mirtazapine 7.5 MG Tabs  Commonly known as: Remeron     Multi-Vitamin Tabs     ondansetron 4 mg tablet  Commonly known as: Zofran     polyethylene glycol (PEG 3350) 17 g Pack  Commonly known as: Miralax     senna-docusate 8.6-50 MG Tabs  Commonly known as: Senokot-S     torsemide 20 MG Tabs  Commonly known as: Demadex     traMADol 50 MG Tabs  Commonly known as: Ultram  Take 1 tablet (50 mg total) by mouth every 6 (six) hours as needed.               Where to Get Your Medications        You can get these medications from  any pharmacy    Bring a paper prescription for each of these medications  apixaban 5 MG Tabs         Discharge Diagnoses:    Syncope due to PE  Acute hypxic RF - resolved  HTN  HLD  Recent SDH  LE edema  PAF  HLD  CAD    Consults:  IP CONSULT TO PULMONOLOGY  IP CONSULT TO CARDIOLOGY  IP CONSULT TO NEUROSURGERY  IP CONSULT TO HOSPITALIST  IP CONSULT TO PULMONOLOGY  IP CONSULT TO SOCIAL WORK    Radiology:  US VENOUS DOPPLER LEG BILAT - DIAG IMG (CPT=93970)    Result Date: 2024  PROCEDURE:  US VENOUS DOPPLER LEG BILAT - DIAG IMG (CPT=93970)  COMPARISON:  [facility], , US VENOUS DOPPLER LEG RIGHT - DIAG IMG (CPT=93971), 2021, 2:22 PM.  INDICATIONS:  submassive PE  TECHNIQUE:  Real time, grey scale, and duplex ultrasound was used to evaluate the lower extremity venous system. B-mode two-dimensional images of the vascular structures, Doppler spectral analysis, and color flow.  Doppler imaging were performed.  The following veins were imaged bilaterally:  Common, deep, and superficial femoral, popliteal, sapheno-femoral junction, and posterior tibial veins.  PATIENT STATED HISTORY: (As transcribed by Technologist)     FINDINGS:  SAPHENOFEMORAL JUNCTION:  No reflux. THROMBI:  None visible. COMPRESSION:  Normal compressibility, phasicity, and augmentation. OTHER:  Negative.            CONCLUSION:  No evidence of DVT in bilateral lower extremities.   LOCATION:  [facility]   Dictated by (CST): Ricardo Edwards MD on 2024 at 11:00 PM     Finalized by (CST): Ricardo Edwards MD on 2024 at 11:00 PM       CARD ECHO 2D DOPPLER (CPT=93306)    Result Date: 2024  Transthoracic Echocardiogram Name:Evangelina Ward Date: 2024 :  1940 Ht:  (65in)  BP: 121 / 74 MRN:  [id number]     Age:  84years    Wt:  (11lb)  HR: 70bpm Loc:  [facility]       Gndr: F          BSA: 0.58m^2 Sonographer: [place] MOON Ordering:    Nedra Silverman MD Consulting:  Mary Weber  ---------------------------------------------------------------------------- History/Indications:  Pulmonary Embolism. ---------------------------------------------------------------------------- Procedure information:  A transthoracic complete 2D study was performed. Additional evaluation included M-mode, complete spectral Doppler, and color Doppler.  Patient status:  Inpatient.  Location:  Emergency department. Comparison was made to the study of 09/19/2023.    This was a STAT study. Transthoracic echocardiography for ventricular function evaluation and assessment of valvular function. Image quality was adequate. ECG rhythm:   Atrial fibrillation ---------------------------------------------------------------------------- Conclusions: 1. Left ventricle: The cavity size was normal. Wall thickness was at the    upper limits of normal. Systolic function was vigorous. The estimated    ejection fraction was 70-75%, by visual assessment. No diagnostic    evidence for regional wall motion abnormalities. Unable to assess LV    diastolic function due to heart rhythm. 2. Right ventricle: The cavity size was moderately increased. Systolic    function was mildly reduced. Systolic pressure was increased. 3. Left atrium: The atrium was mildly dilated. 4. Right atrium: The atrium was mildly dilated. 5. Aortic root: The aortic root was mildly dilated. 6. Mitral valve: There was mild regurgitation. 7. Tricuspid valve: There was mild-moderate regurgitation. 8. Pulmonary arteries: Systolic pressure was increased, in the range of 45mm    Hg to 50mm Hg. Impressions:  This study is compared with previous dated 09/19/2023: The right heart enlargement is new as is the right ventricle dysfunction and pulmonary hypertension. This may be consistent with a new pulmonary process, such as pulmonary embolism. Clinical correlation suggested. * ---------------------------------------------------------------------------- * Findings: Left  ventricle:  The cavity size was normal. Wall thickness was at the upper limits of normal. Systolic function was vigorous. The estimated ejection fraction was 70-75%, by visual assessment. No diagnostic evidence for regional wall motion abnormalities. Unable to assess LV diastolic function due to heart rhythm. Left atrium:  The atrium was mildly dilated. Right ventricle:  The cavity size was moderately increased. Systolic function was mildly reduced.  Systolic pressure was increased. Right atrium:  The atrium was mildly dilated. Mitral valve:  The annulus was mildly calcified. Leaflet separation was normal.  Doppler:  Transvalvular velocity was within the normal range. There was no evidence for stenosis. There was mild regurgitation. Aortic valve:  The valve was structurally normal. The valve was trileaflet. Cusp separation was normal.  Doppler:  Transvalvular velocity was within the normal range. There was no evidence for stenosis. There was no significant regurgitation. Tricuspid valve:  The valve is structurally normal. Leaflet separation was normal.  Doppler:  Transvalvular velocity was within the normal range. There was no evidence for stenosis. There was mild-moderate regurgitation. Pulmonic valve:   The valve is structurally normal. Cusp separation was normal.  Doppler:  Transvalvular velocity was within the normal range. There was no evidence for stenosis. There was no significant regurgitation. Pericardium:   There was no pericardial effusion. Aorta: Aortic root: The aortic root was mildly dilated. Pulmonary arteries: Systolic pressure was increased, in the range of 45mm Hg to 50mm Hg. Systemic veins:  Central venous respirophasic diameter changes are in the normal range (>50%). Inferior vena cava: The IVC was normal-sized. ---------------------------------------------------------------------------- Measurements  Left ventricle          Value       Ref       09/19/2023  IVS thickness, ED,  (H) 1.0   cm     0.6 - 0.9 ----------  PLAX  LV ID, ED, PLAX     (L) 3.6   cm    3.8 - 5.2 4.4  LV ID, ES, PLAX     (L) 2.1   cm    2.2 - 3.5 3.0  LV PW thickness,    (H) 1.0   cm    0.6 - 0.9 ----------  ED, PLAX  IVS/LV PW ratio,        0.97        --------- ----------  ED, PLAX  LV PW/LV ID ratio,      0.29        --------- ----------  ED, PLAX  LV ejection             71    %     54 - 74   61  fraction  Aortic root             Value       Ref       09/19/2023  Aortic root ID, ED  (H) 3.4   cm    1.6 - 3.1 ----------  Left atrium             Value       Ref       09/19/2023  LA ID, A-P, ES      (H) 4.3   cm    2.7 - 3.8 3.8  LA/aortic root          1.26        --------- ----------  ratio  Pulmonary artery        Value       Ref       09/19/2023  PA pressure, S, DP      50    mm Hg --------- ----------  Tricuspid valve         Value       Ref       09/19/2023  Tricuspid regurg    (H) 3.04  m/sec <=2.8     ----------  peak velocity  Tricuspid peak          45    mm Hg --------- ----------  RV-RA gradient  Systemic veins          Value       Ref       09/19/2023  Estimated CVP           5     mm Hg --------- ----------  Right ventricle         Value       Ref       09/19/2023  RV pressure, S, DP      50    mm Hg --------- ---------- Legend: (L)  and  (H)  ted values outside specified reference range. ---------------------------------------------------------------------------- Prepared and electronically signed by Wilfredo Schmid MD 08/26/2024 20:41     CT CHEST PE AORTA (IV ONLY) (CPT=71260)    Result Date: 8/26/2024  PROCEDURE:  CT CHEST PE AORTA (IV ONLY) (CPT=71260)  COMPARISON:  None.  INDICATIONS:  syncope ? big PE  TECHNIQUE:  CT images were obtained with non-ionic intravenous contrast material. Dose reduction techniques were used. Dose information is transmitted to the ACR (American College of Radiology) NRDR (National Radiology Data Registry) which includes the Dose Index Registry.  PATIENT STATED HISTORY:(As transcribed by  Technologist)  Patient had syncopal episode   CONTRAST USED:  100cc of Isovue 370  FINDINGS:  LUNGS:  Scattered bilateral subsegmental and discoid atelectasis most pronounced in the lung bases. VASCULATURE:  There is by lateral pulmonary embolism with partial thrombus in the distal main pulmonary arteries bilaterally with partial to complete occlusive thrombus in lobar branches and mid to distal segmental branches of the lower lobes bilaterally  upper lobes and right middle lobe. ROD:  No enlarged adenopathy.  MEDIASTINUM:  No enlarged adenopathy.  CARDIAC:  There is cardiomegaly with evidence of right heart strain.. PLEURA:  No pneumothorax or effusion.  THORACIC AORTA:  No aneurysm.  CHEST WALL:  No enlarged axillary adenopathy.  LIMITED ABDOMEN:  Cholecystectomy.  BONES:  No lytic or destructive process.  Multilevel endplate hypertrophic changes.            CONCLUSION:  Bilateral pulmonary embolism involving distal main pulmonary arteries extending  to segmental branches throughout both lungs.  Critical exam results phoned to Dr. Madrid on 8/26/2024 at 1628 hours with read back.  There is cardiomegaly with findings of mild right-sided heart strain  LOCATION:  [id number]   Dictated by (CST): Makenna Quiñones MD on 8/26/2024 at 4:25 PM     Finalized by (CST): Makenna Quiñones MD on 8/26/2024 at 4:29 PM       CT BRAIN OR HEAD (CPT=70450)    Result Date: 8/26/2024  PROCEDURE:  CT BRAIN OR HEAD (36447)  COMPARISON:  [facility] , CT, CT BRAIN OR HEAD (63636), 7/22/2024, 9:56 PM.  [facility] , CT, CT BRAIN OR HEAD (39054), 8/23/2024, 8:09 AM.  INDICATIONS:  recent epidural  TECHNIQUE:  Noncontrast CT scanning is performed through the brain. Dose reduction techniques were used. Dose information is transmitted to the ACR (American College of Radiology) NRDR (National Radiology Data Registry) which includes the Dose Index Registry.  PATIENT STATED HISTORY: (As transcribed by Technologist)  Patient had syncopal episode    FINDINGS:   VENTRICLES/SULCI:  Diffuse cerebral and cerebellar atrophy. INTRACRANIAL:  No acute intracranial hemorrhage or midline shift.  Previously noted extra-axial hemorrhage along the right tentorium is no longer appreciated.  There is patchy decreased attenuation in the periventricular white matter both cerebral hemispheres extending into the centrum semiovale consistent with chronic microvascular ischemic changes of aging.  Old lacunar infarctions in the basal ganglia.  SINUSES:           No sign of acute sinusitis.  MASTOIDS:          No sign of acute inflammation. SKULL:             No depressed calvarial fracture.            CONCLUSION:  No acute intracranial hemorrhage or depressed calvarial fracture.  There is diffuse cerebral and cerebellar atrophy with chronic microvascular ischemic changes of aging and old lacunar infarctions in the basal ganglia.    LOCATION:  [id number]   Dictated by (CST): Makenna Quiñones MD on 8/26/2024 at 4:21 PM     Finalized by (CST): Makenna Quiñones MD on 8/26/2024 at 4:23 PM       XR CHEST AP PORTABLE  (CPT=71045)    Result Date: 8/26/2024  PROCEDURE:  XR CHEST AP PORTABLE  (CPT=71045)  TECHNIQUE:  AP chest radiograph was obtained.  COMPARISON:  None.  INDICATIONS:  syncope sob  PATIENT STATED HISTORY: (As transcribed by Technologist)  Patient states she had syncopal episode at rehab today when she was walking.              CONCLUSION:  Normal heart size and pulmonary vascularity.  No focal infiltrate, consolidation, effusion or pneumothorax.  Postsurgical changes left total shoulder arthroplasty.   LOCATION:  [id number]      Dictated by (CST): Makenna Quiñones MD on 8/26/2024 at 3:26 PM     Finalized by (CST): Makenna Quiñones MD on 8/26/2024 at 3:26 PM       CT BRAIN OR HEAD (CPT=70450)    Result Date: 8/23/2024  PROCEDURE:  CT BRAIN OR HEAD (01088)  COMPARISON:  [facility] , CT, CT BRAIN OR HEAD ([id number]), 7/22/2024, 9:56 PM.  INDICATIONS:  S06.5XAA SDH (subdural hematoma) ([facility])  TECHNIQUE:  Noncontrast  CT scanning is performed through the brain. Dose reduction techniques were used. Dose information is transmitted to the ACR (American College of Radiology) NRDR (National Radiology Data Registry) which includes the Dose Index Registry.  PATIENT STATED HISTORY: (As transcribed by Technologist)  SDH. The patient doesn't have complaints.    FINDINGS:   VENTRICLES/SULCI: Diffuse sulcal and ventricular prominence concordant with age.  No hydrocephalus. INTRACRANIAL:  Negative for intracranial hemorrhage, mass effect, or acute large vessel transcortical infarct.  Interval resolution of subdural hematoma layering along the right tentorium, as noted on CT of 07/22/2024.  Remote lacunar infarcts of bilateral basal ganglia.  Confluent periventricular white matter hypodensity most in keeping with chronic microvascular ischemia.  Intracranial atherosclerosis. SINUSES:           Paranasal sinuses and mastoid air cells are clear. SKULL:               No evidence for fracture or osseous abnormality. OTHER:               No scalp hematoma.            CONCLUSION:   1. Negative for acute intracranial process.  Resolution of subdural hematoma layering along the right tentorium, as noted on CT of 07/22/2024.    LOCATION:  [facility]   Dictated by (CST): Ellie Guadarrama MD on 8/23/2024 at 9:16 AM     Finalized by (CST): Ellie Guadarrama MD on 8/23/2024 at 9:18 AM         Operative reports:      Hospital course:    84 year old female with PMH including but not limited to type 2 diabetes, atherosclerosis of the aorta, hypertension, hyperlipidemia, paroxysmal A-fib previously on Eliquis, recent SDH, who p/w [facility] ED c syncope and found to have PE.         Syncope 2/2 Submassive PE   - CTA reviewed c Bilateral pulmonary embolism involving distal main pulmonary arteries extending  to segmental branches throughout both lungs.    - RH strain noteds as well  - echo EF 70-75%, RVSP increased   - dopplers neg DVT  - cards/pulm following, apprec  - IV heparin ->  eliquis   - not cardidate for lytics given recent SDH; also, HD stable currently  -dw pulm, ok to dc to [facility]    Acute hypoxemic respiratory failure: 2/2 above   -O2 as needed      HTN  - bp controlled today     # HL  -statin     # Recent right subdural hematoma as well as epidural hematoma  -Pt admitted 7/21/24 c SDH.   -CTH no bleed      LE edema  -IV lasix today  -on torsemide too        # Paroxysmal A-fib  -on eiquis     # Hyperlipidemia  # Type 2 diabetes-sliding scale insulin  - on tresiba will increase to 30 units  - increase slding scale to med dose     # CAD   -aspirin when able          Day of discharge exam:  Vitals:    08/29/24 1200   BP: 149/67   Pulse: 63   Resp: 16   Temp: 97.9 °F (36.6 °C)          Total time coordinating care 32 min      Patient and/or family had opportunity to ask questions and expressed understanding and agreement with therapeutic plan as outlined         Miguel Engle Hospitalist  [phone]  Answering Service: [phone]

## 2024-08-29 NOTE — PROGRESS NOTES
Cardiology Progress Note    Evangelina Ward Patient Status:  Inpatient    1940 MRN [id number]   Location [facility] 6NE-A Attending Nedra Silevrman MD   Hosp Day # 2 PCP Dorina Higuera MD     Impression:  Submassive PE(bilateral PE involving distal main pulmonary arteries extending into segmental branches)  Syncopal episode   Elevated HS trop (715) and elevated pro BNP (563)  Elevated biomarkers likely due to RV strain  Recent subdural hematoma   Recent femoral fracture s/p surgery   CAD s/p PCI in   HLD  HTN  PAF     Plan:  Doing well on anticoagulation.  O2 NC currently with SaO2 100%.  Continue. Eliquis (no bolus) started.  Agree.  Xfer to telemetry today/tomorrow if bed available.  PT/OT.  Anticipate DC rehab/home in near future now that she's stable, on RA, and oral AC.    Subjective:  The patient denies any chest pain or dyspnea at this time.    Objective:  /67 (BP Location: Left arm)   Pulse 70   Temp 97.6 °F (36.4 °C) (Temporal)   Resp 19   Ht 65\"   Wt 194 lb 0.1 oz (88 kg)   LMP  (LMP Unknown)   SpO2 100%   BMI 32.28 kg/m²   Temp (24hrs), Av.8 °F (36.6 °C), Min:97 °F (36.1 °C), Max:98.3 °F (36.8 °C)      Intake/Output Summary (Last 24 hours) at 2024 2315  Last data filed at 2024 2100  Gross per 24 hour   Intake 1091 ml   Output 1800 ml   Net -709 ml     Wt Readings from Last 3 Encounters:   24 194 lb 0.1 oz (88 kg)   24 180 lb 3.2 oz (81.7 kg)   08/15/24 180 lb 3.2 oz (81.7 kg)       General: Awake and alert; in no acute distress  Cardiac: Regular rate and rhythm; no murmurs/rubs/gallops are appreciated  Lungs: Clear to auscultation bilaterally; no accessory muscle use  Abdomen: Soft, non-tender; bowel sounds are normoactive  Extremities: No clubbing/cyanosis/edema; moves all 4 extremities normally    Current Facility-Administered Medications   Medication Dose Route Frequency    apixaban (Eliquis) tab 5 mg  5 mg Oral BID    mirtazapine (Remeron) tab  7.5 mg  7.5 mg Oral Nightly    [START ON 8/29/2024] torsemide (Demadex) tab 20 mg  20 mg Oral Daily    morphINE PF 2 MG/ML injection 1 mg  1 mg Intravenous Q4H PRN    atorvastatin (Lipitor) tab 40 mg  40 mg Oral Nightly    insulin degludec (Tresiba) 100 units/mL flextouch 20 Units  20 Units Subcutaneous Daily    glucose (Dex4) 15 GM/59ML oral liquid 15 g  15 g Oral Q15 Min PRN    Or    glucose (Glutose) 40% oral gel 15 g  15 g Oral Q15 Min PRN    Or    glucose-vitamin C (Dex-4) chewable tab 4 tablet  4 tablet Oral Q15 Min PRN    Or    dextrose 50% injection 50 mL  50 mL Intravenous Q15 Min PRN    Or    glucose (Dex4) 15 GM/59ML oral liquid 30 g  30 g Oral Q15 Min PRN    Or    glucose (Glutose) 40% oral gel 30 g  30 g Oral Q15 Min PRN    Or    glucose-vitamin C (Dex-4) chewable tab 8 tablet  8 tablet Oral Q15 Min PRN    insulin aspart (NovoLOG) 100 Units/mL FlexPen 1-5 Units  1-5 Units Subcutaneous TID AC and HS       Laboratory Data:  Lab Results   Component Value Date    WBC 5.3 08/28/2024    HGB 8.6 08/28/2024    HCT 25.6 08/28/2024    .0 08/28/2024     Lab Results   Component Value Date    INR 1.04 08/26/2024    INR 1.16 07/21/2024    INR 1.0 08/06/2020     Lab Results   Component Value Date     08/28/2024    K 4.2 08/28/2024     08/28/2024    CO2 26.0 08/28/2024    BUN 35 08/28/2024    CREATSERUM 0.84 08/28/2024     08/28/2024    CA 9.2 08/28/2024    MG 1.7 08/28/2024       Telemetry: No malignant tachyarrhythmias or bradyarrhythmias    >35 min critical care time spent on this complex ICU patient with submassive PE and right heart dysfunction.  Time spent of diagnosis, review, planning, and family discussions.

## 2024-09-03 ENCOUNTER — INITIAL APN SNF VISIT (OUTPATIENT)
Dept: INTERNAL MEDICINE CLINIC | Age: 84
End: 2024-09-03

## 2024-09-03 VITALS
TEMPERATURE: 98 F | OXYGEN SATURATION: 100 % | HEART RATE: 84 BPM | WEIGHT: 185.5 LBS | SYSTOLIC BLOOD PRESSURE: 128 MMHG | RESPIRATION RATE: 16 BRPM | BODY MASS INDEX: 31 KG/M2 | DIASTOLIC BLOOD PRESSURE: 58 MMHG

## 2024-09-03 DIAGNOSIS — Z79.4 TYPE 2 DIABETES MELLITUS WITH DIABETIC NEUROPATHY, WITH LONG-TERM CURRENT USE OF INSULIN (HCC): ICD-10-CM

## 2024-09-03 DIAGNOSIS — S06.4XAA EPIDURAL HEMATOMA (HCC): ICD-10-CM

## 2024-09-03 DIAGNOSIS — S72.001S CLOSED FRACTURE OF RIGHT HIP, SEQUELA: ICD-10-CM

## 2024-09-03 DIAGNOSIS — Z78.9 DECREASED ACTIVITIES OF DAILY LIVING (ADL): ICD-10-CM

## 2024-09-03 DIAGNOSIS — I10 PRIMARY HYPERTENSION: ICD-10-CM

## 2024-09-03 DIAGNOSIS — I48.0 PAROXYSMAL ATRIAL FIBRILLATION (HCC): ICD-10-CM

## 2024-09-03 DIAGNOSIS — E11.40 TYPE 2 DIABETES MELLITUS WITH DIABETIC NEUROPATHY, WITH LONG-TERM CURRENT USE OF INSULIN (HCC): ICD-10-CM

## 2024-09-03 DIAGNOSIS — E78.2 MIXED HYPERLIPIDEMIA: ICD-10-CM

## 2024-09-03 DIAGNOSIS — F32.A DEPRESSION, UNSPECIFIED DEPRESSION TYPE: ICD-10-CM

## 2024-09-03 DIAGNOSIS — S06.5XAA SDH (SUBDURAL HEMATOMA) (HCC): ICD-10-CM

## 2024-09-03 DIAGNOSIS — R53.1 WEAKNESS: ICD-10-CM

## 2024-09-03 DIAGNOSIS — R60.0 LEG EDEMA: ICD-10-CM

## 2024-09-03 DIAGNOSIS — R55 SYNCOPE, UNSPECIFIED SYNCOPE TYPE: Primary | ICD-10-CM

## 2024-09-03 DIAGNOSIS — I26.99 PULMONARY EMBOLISM WITHOUT ACUTE COR PULMONALE, UNSPECIFIED CHRONICITY, UNSPECIFIED PULMONARY EMBOLISM TYPE (HCC): ICD-10-CM

## 2024-09-03 DIAGNOSIS — J96.01 ACUTE HYPOXEMIC RESPIRATORY FAILURE (HCC): ICD-10-CM

## 2024-09-03 PROCEDURE — 99310 SBSQ NF CARE HIGH MDM 45: CPT

## 2024-09-03 PROCEDURE — 1123F ACP DISCUSS/DSCN MKR DOCD: CPT

## 2024-09-03 NOTE — PROGRESS NOTES
Evangelina Ward  : 1940  Age 84 year old  female patient is admitted to [facility] for rehabilitation and strengthening.     [facility] Admission: 24 - 24  [facility] Readmission: 24 - 24    Chief Complaint   Patient presents with    Follow - Up     Saddle PE with RH strain, HTN, SDH, epidural hematoma, R hip fracture s/p IM nailing, depression      Discharge Diagnoses:  Syncope d/t submassive PE  Acute hypoxic respiratory failure   Recent SDH  HTN  HL   LE edema  PAF   CAD     HPI  Evangelina Ward is a 84 year old female with a past medical history significant for T2DM, HTN, HL, pAF on Eliquis, atherosclerosis of the aorta, CAD, and OA. Initially admitted to [facility] after a mechanical fall in which she developed a SDH, epidural hematoma, and R hip fracture s/p IM nailing. On , she was sent out to [facility] from rehab after a syncopal episode while participating in therapy. Lab work remarkable for elevated troponin, pBNP, and Ddimer. CT chest was significant for bilateral PE with concerns for right heart strain. She was readmitted to the hospital and diagnosed with a saddle PE with RH strain and acute on hypoxemic respiratory failure. Cardiology and pulmonary were consulted. US dopplers were negative for DVT and 2D echo with moderately increased RV size and mildly reduced RV systolic function. She was not a candidate for thrombolytics but was started on a heparin gtt after CT brain imaging revealed a resolution of her SDH and clearance from neurosurgery. Her Eliquis was then resumed prior to discharge. Hospital course complicated by LE edema in which she received IV lasix while on Torsemide. Repeat CT brain imaging was negative for bleed after restarting anticoagulation. Now discharged to [facility] for rehabilitation and strengthening.     Patient seen today for initial aprn visit. She was seen in the therapy gym. Patient reports feeling good today.  States she has been feeling better since she was treated for her PE in the hospital. Has been progressing with therapy and ambulating the halls with a rolling walker. Sleeping remains fragmented but appetite is much improved. Feels like her mood has also improved, is motivated to continue with therapy so she can go home. Still with LE and pedal edema today she did receive IV lasix in the hospital but states she did not see much improvement. Last BM yesterday. Denies lightheadedness, fever, body aches/chills, CP, SOB, cough, abdominal pain, nausea, vomiting, urinary or bowel complaints. DW nursing, no acute concerns.     BP elevated SBP ranging 140-160s. Lisinopril DC in rehab d/t hyperkalemia. Will start Hydralazine 25 daily and monitor.   Will increase Torsemide 20 daily to BID x 5 days d/t persistent LE and pedal edema and reevaluate     Past Medical History:    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 elsewhere classified    idiopathic angioedema    Arrhythmia    Back problem    sciatica    Carpal tunnel syndrome    CORONARY ARTERY DISEASE    11/06: Ant WMA on stress, 12/06: PTCA to LAD,  12/08 Nuclear stress neg, EF 62%    CORONARY ARTERY DISEASE    11/06: ant ischemia on stress,   12/06: PTCA to distal LAD,    12/08 nuclear stress neg, EF 62%    Coronary atherosclerosis    Diabetes (HCC)    High blood pressure    High cholesterol    Hx of motion sickness    vertigo    Hyperlipidemia    HYPERTENSION    MENOPAUSE    ERT [id number]    Muscle weakness    LEGS SHAKE WHEN STANDING OR USING STAIRS    OSTEOARTHRITIS    1/06 Rt menisectomy    Osteoarthritis    Overweight and obesity    Personal history of colonic polyps    2/99 adenoma, 4/03 negative    Type 2 diabetes mellitus, uncontrolled    Uncontrolled type 2 diabetes mellitus with diabetic polyneuropathy, with long-term current use of insulin    Unspecified hereditary and idiopathic peripheral neuropathy    Vertigo    Vitamin D deficiency     Past Surgical History:   Procedure  Laterality Date    Anesth,knee arthroscopy      left knee repair torn meniscus    Angioplasty (coronary)  2006    w/2 drug eluting stents    Arthroscopy of joint unlisted Right     KNEE    Back surgery      L4-L5 TLIF    Cataract  05/2018    Bilateral    Cath drug eluting stent      X2    Cholecystectomy  1997    Colonoscopy  10/10/2013    Procedure: COLONOSCOPY;  Surgeon: Lauro Lazo MD;  Location:  ENDOSCOPY    Colonoscopy,biopsy  1999    adenomatous polyp    Colonoscopy,diagnostic  2003    wnl    Colonoscopy,diagnostic  10/10/13    normal    Endovenous laser vein addon      Right Greater Saph V  per Dr. Castaneda    Hysterectomy      w/ BSO    Knee replacement surgery  03/17/2018    left    Other      lipoma removed from left arm    Other      trigger finger release left hand middle finger    Other surgical history      vein stripping    Revise median n/carpal tunnel surg      left carpal tunnel release     Family History   Problem Relation Age of Onset    Other (colon cancer) Mother         age 77    Heart Disorder Father         age 75    Diabetes Paternal Grandmother     Other (gastric cancer) Maternal Grandmother      Social History     Socioeconomic History    Marital status:    Tobacco Use    Smoking status: Never    Smokeless tobacco: Never   Vaping Use    Vaping status: Never Used   Substance and Sexual Activity    Alcohol use: Not Currently     Comment: [place] and New Years    Drug use: Never    Sexual activity: Not Currently     Partners: Male   Other Topics Concern    Caffeine Concern Yes     Comment: 2 cups a day    Exercise No    Seat Belt Yes   Social History Narrative        Health Mnt:    Pap: n/a (SARA/BSO)    Mamm: 6/08, 6/09, 7/10    Breast exam: 6/09, 5/10    DEXA: 6/08 wnl    Cholesterol: flow sheet/statin rx.    Colon: 10/08 (\"5-7 yrs\")    H/O:         calcium: takes    exercise: good, Son in [place] is working over internet as her      Social Determinants of  Health     Food Insecurity: No Food Insecurity (8/26/2024)    Food Insecurity     Food Insecurity: Never true   Transportation Needs: No Transportation Needs (8/26/2024)    Transportation Needs     Lack of Transportation: No   Physical Activity: Inactive (1/20/2021)    [facility], [facility]    Exercise Vital Sign     Days of Exercise per Week: 0 days     Minutes of Exercise per Session: 0 min   Housing Stability: Low Risk  (8/26/2024)    Housing Stability     Housing Instability: No       ALLERGIES:  Allergies   Allergen Reactions    Cipro [Ciprofloxacin] HIVES    Insulin Aspart, Human Analog PAIN     Achiness. HA , head pains    Insulin Lispro, Human PAIN     Achiness, HA, head pain    Tylenol [Acetaminophen] SWELLING     Tongue/mouth    Benzonatate UNKNOWN       PODARYL Ward (spouse)    CODE STATUS:  Full Code    ADVANCED CARE PLANNING TEAM: None. Will need family care plan.       CURRENT MEDICATIONS   Current Outpatient Medications   Medication Sig Dispense Refill    apixaban 5 MG Oral Tab Take 1 tablet (5 mg total) by mouth 2 (two) times daily. 60 tablet 0    bisacodyl 10 MG Rectal Suppos Place 1 suppository (10 mg total) rectally daily as needed.      Ferrous Gluconate 324 (38 Fe) MG Oral Tab Take 1 tablet (325 mg total) by mouth daily with breakfast.      Multiple Vitamin (MULTI-VITAMIN) Oral Tab Take 1 tablet by mouth daily.      ondansetron (ZOFRAN) 4 mg tablet Take 1 tablet (4 mg total) by mouth every 8 (eight) hours as needed for Nausea.      mirtazapine 7.5 MG Oral Tab Take 1 tablet (7.5 mg total) by mouth nightly. Started 8/1 in rehab      senna-docusate 8.6-50 MG Oral Tab Take 1 tablet by mouth daily.      traMADol 50 MG Oral Tab Take 1 tablet (50 mg total) by mouth every 6 (six) hours as needed. 12 tablet 0    cholecalciferol 50 MCG (2000 UT) Oral Tab Take 1 tablet (2,000 Units total) by mouth daily.      polyethylene glycol, PEG 3350, 17 g Oral Powd Pack  Take 17 g by mouth daily as needed.      atorvastatin 40 MG Oral Tab Take 1 tablet (40 mg total) by mouth nightly.      metFORMIN HCl  MG Oral Tablet 24 Hr Take 2 tablets (1,000 mg total) by mouth 2 (two) times daily. 360 tablet 3    insulin glargine (LANTUS SOLOSTAR) 100 UNIT/ML Subcutaneous Solution Pen-injector INJECT up to 48 UNITS INTO THE SKIN ONCE DAILY. (Patient taking differently: Inject 45 Units into the skin every morning.) 45 mL 3    glipiZIDE 10 MG Oral Tab Take 1 tablet (10 mg total) by mouth 2 (two) times daily before meals. 180 tablet 3    torsemide (DEMADEX) 20 MG Oral Tab Take 1 tablet (20 mg total) by mouth daily.         VITALS:  BP (!) 162/60   Pulse 62   Temp 97.5 °F (36.4 °C)   Resp 16   Wt 185 lb 8 oz (84.1 kg)   LMP  (LMP Unknown)   SpO2 98%   BMI 30.87 kg/m²      REVIEW OF SYSTEMS:  GENERAL HEALTH:feels well otherwise  SKIN: denies any unusual skin lesions or rashes  WOUNDS: R heel DTI. R hip and R lateral thigh surgical incision. Skin breakdown buttocks. See wound care assessment   EYES:no visual complaints or deficits  HENT: denies nasal congestion, sinus pain or sore throat;  RESPIRATORY: denies shortness of breath, wheezing or cough   CARDIOVASCULAR:denies chest pain, no palpitations   GI: denies nausea, vomiting, constipation, diarrhea; no rectal bleeding; no heartburn  Gu: No urinary frequency, urgency or dysuria  MUSCULOSKELETAL:no joint complaints upper or lower extremities  NEURO:no sensory or motor complaint  PSYCHE: + symptoms of depression improving   HEMATOLOGY:denies bruising  ENDOCRINE: denies excessive thirst or urination; denies unexpected wt gain or wt loss  ALLERGY/IMM.: denies food or seasonal allergies      PHYSICAL EXAM:  GENERAL HEALTH: well developed, well nourished, in no apparent distress  LINES, TUBES, DRAINS:  none  SKIN: no rashes, no suspicious lesions, warm, dry  WOUND: R heel DTI. R hip and R lateral thigh surgical incision. Skin breakdown  buttocks. See wound care assessment   EYES: sclera anicteric, conjunctiva normal; there is no nystagmus, no drainage from eyes  HENT: normocephalic; normal nose, no nasal drainage, mucous membranes pink, moist.  NECK: supple, non tender, FROM  BREAST: deferred  RESPIRATORY:clear, no crackles, no wheezing, no dyspnea, no cough, room air  CARDIOVASCULAR: S1, S2 normal, RRR; no S3, no S4  ABDOMEN:  normal active BS+, soft, nondistended; no organomegaly, no masses; nontender, no guarding, no rebound tenderness.  :no suprapubic distension  LYMPHATIC:no lymphedema  MUSCULOSKELETAL: no acute synovitis upper or lower extremity  EXTREMITIES/VASCULAR:radial pulses 2+, no cyanosis, no clubbing, bilateral nonpitting LE and pedal edema +3   NEUROLOGIC: A&OX3, no focal deficits, follows commands  PSYCHIATRIC: calm, cooperative, mood and affect appropriate to situation    DIAGNOSTICS REVIEWED AT THIS VISIT:  Lab Results   Component Value Date    WBC 5.4 08/30/2024    RBC 3.11 (L) 08/30/2024    HGB 8.7 (L) 08/30/2024    HCT 26.9 (L) 08/30/2024    MCV 86.5 08/30/2024    MCH 28.0 08/30/2024    MCHC 32.3 08/30/2024    RDW 15.1 08/30/2024    .0 08/30/2024     Lab Results   Component Value Date    GLU 97 08/30/2024    BUN 32 (H) 08/30/2024    BUNCREA 42.0 (H) 10/15/2021    CREATSERUM 0.85 08/30/2024    ANIONGAP 8 08/30/2024    GFR 94 03/10/2017    GFRNAA 87 04/17/2019    GFRAA 101 04/17/2019    CA 9.3 08/30/2024    OSMOCALC 293 08/30/2024    ALKPHO 151 (H) 08/30/2024    AST 25 08/30/2024    ALT 19 08/30/2024    BILT 0.5 08/30/2024    TP 6.2 08/30/2024    ALB 3.8 08/30/2024    GLOBULIN 2.4 08/30/2024    AGRATIO 1.9 05/21/2013     08/30/2024    K 4.7 08/30/2024     08/30/2024    CO2 25.0 08/30/2024     PROCEDURE:  CT CHEST PE AORTA (IV ONLY) (CPT=71260)     COMPARISON:  None.     INDICATIONS:  syncope ? big PE     TECHNIQUE:  CT images were obtained with non-ionic intravenous contrast material. Dose reduction  techniques were used. Dose information is transmitted to the ACR (American College of Radiology) NRDR (National Radiology Data Registry) which includes the  Dose Index Registry.     PATIENT STATED HISTORY:(As transcribed by Technologist)  Patient had syncopal episode      CONTRAST USED:  100cc of Isovue 370     FINDINGS:    LUNGS:  Scattered bilateral subsegmental and discoid atelectasis most pronounced in the lung bases.  VASCULATURE:  There is by lateral pulmonary embolism with partial thrombus in the distal main pulmonary arteries bilaterally with partial to complete occlusive thrombus in lobar branches and mid to distal segmental branches of the lower lobes bilaterally   upper lobes and right middle lobe.  ROD:  No enlarged adenopathy.    MEDIASTINUM:  No enlarged adenopathy.    CARDIAC:  There is cardiomegaly with evidence of right heart strain..  PLEURA:  No pneumothorax or effusion.    THORACIC AORTA:  No aneurysm.    CHEST WALL:  No enlarged axillary adenopathy.    LIMITED ABDOMEN:  Cholecystectomy.    BONES:  No lytic or destructive process.  Multilevel endplate hypertrophic changes.                   Impression   CONCLUSION:  Bilateral pulmonary embolism involving distal main pulmonary arteries extending   to segmental branches throughout both lungs.  Critical exam results phoned to Dr. Madrid on 8/26/2024 at 1628 hours with read back.     There is cardiomegaly with findings of mild right-sided heart strain     LOCATION:  [id number]        Dictated by (CST): Makenna Quiñones MD on 8/26/2024 at 4:25 PM      Finalized by (CST): Makenna Quiñones MD on 8/26/2024 at 4:29 PM       PROCEDURE:  CT BRAIN OR HEAD ([id number])     COMPARISON:  [facility] , CT, CT BRAIN OR HEAD ([id number]), 7/22/2024, 9:56 PM.  [facility] , CT, CT BRAIN OR HEAD ([id number]), 8/23/2024, 8:09 AM.     INDICATIONS:  recent epidural     TECHNIQUE:  Noncontrast CT scanning is performed through the brain. Dose reduction techniques were used. Dose information is  transmitted to the ACR (American College of Radiology) NRDR (National Radiology Data Registry) which includes the Dose Index  Registry.     PATIENT STATED HISTORY: (As transcribed by Technologist)  Patient had syncopal episode         FINDINGS:    VENTRICLES/SULCI:  Diffuse cerebral and cerebellar atrophy.  INTRACRANIAL:  No acute intracranial hemorrhage or midline shift.  Previously noted extra-axial hemorrhage along the right tentorium is no longer appreciated.  There is patchy decreased attenuation in the periventricular white matter both cerebral  hemispheres extending into the centrum semiovale consistent with chronic microvascular ischemic changes of aging.  Old lacunar infarctions in the basal ganglia.    SINUSES:           No sign of acute sinusitis.    MASTOIDS:          No sign of acute inflammation.  SKULL:             No depressed calvarial fracture.                   Impression   CONCLUSION:  No acute intracranial hemorrhage or depressed calvarial fracture.     There is diffuse cerebral and cerebellar atrophy with chronic microvascular ischemic changes of aging and old lacunar infarctions in the basal ganglia.           LOCATION:  [id number]        Dictated by (CST): Makenna Quiñones MD on 8/26/2024 at 4:21 PM      Finalized by (CST): Makenna Quiñones MD on 8/26/2024 at 4:23 PM      PROCEDURE:  US VENOUS DOPPLER LEG BILAT - DIAG IMG (CPT=93970)     COMPARISON:  [facility], US VENOUS DOPPLER LEG RIGHT - DIAG IMG (CPT=93971), 4/28/2021, 2:22 PM.     INDICATIONS:  submassive PE     TECHNIQUE:  Real time, grey scale, and duplex ultrasound was used to evaluate the lower extremity venous system. B-mode two-dimensional images of the vascular structures, Doppler spectral analysis, and color flow.  Doppler imaging were performed.  The  following veins were imaged bilaterally:  Common, deep, and superficial femoral, popliteal, sapheno-femoral junction, and posterior tibial veins.     PATIENT STATED HISTORY: (As  transcribed by Technologist)           FINDINGS:    SAPHENOFEMORAL JUNCTION:  No reflux.  THROMBI:  None visible.  COMPRESSION:  Normal compressibility, phasicity, and augmentation.  OTHER:  Negative.                   Impression   CONCLUSION:  No evidence of DVT in bilateral lower extremities.        LOCATION:  [place]        Dictated by (CST): Ricardo [facility] MD on 8/26/2024 at 11:00 PM      Finalized by (CST): Ricardo [facility] MD on 8/26/2024 at 11:00 PM       [facility] reviewed.  Medication reconciliation completed.        SEE PLAN BELOW    Syncope 2/2 submassive PE  with RH strain   - CTA with bilateral PE involving distal main pulmonary arteries extending to segmental branches throughout both lungs   - 2D echo EF 70-75% with RV size moderately increased and mildly reduced RV systolic function   - US dopplers negative DVT   - not candidate for thrombrolytics d/t recent SDH   -  s/p IV heparin gtt, now on Eliquis 5 bid that was restarted   - follow up with cardiology Dr Wilfredo Schmid in 2-3 weeks with repeat echo in 1 month   - follow up with pulmonary Dr Weber outpatient     Acute hypoxemic respiratory failure 2/2 PE - resolved   - previously on O2 in hospital   - now on RA with stable oxygen saturations  - monitor     Recent SDH and epidural hematoma   - s/p mechanical fall at home   - neurosurgery consulted - [facility] no surgical intervention  - CT brain on 8/23 with resolution of subdural hematoma. Repeat CT on 8/26 with no bleed after starting blood thinners   - follow up with neurosurgery Dr Leandro Keen in 1 month      R hip fracture   s/p IM nailing with Dr Celis on 7/22   - staples removed and now healing well   - WBAT  - PT/OT  - fall risk precautions   - pain management with Tramadol prn  - follow up with ortho Dr Brian Celis outpatient     LE edema   - s/p IV lasix in hospital without much improvement   - continue compression stockings   - on torsemide 20 daily but will  increase BID x 5 days for LE/pedal edema and re evaluate   - monitor     Depression   - r/t health condition with feelings of being unmotivated, poor appetite, poor sleep, denies SI  - no prior history of depression  - Initial PHQ9 depression scale score = 17 moderately severe depression   - continue Mirtazepine 7.5 HS   - psych to follow while in rehab, counseling as well  - mood has improved, feeling more motivated to do well in therapy to go home, denies SI. Sleeping remains fragmented, appetite improved.   - monitor     HTN  - vital signs q shift and prn   - -160s elevated   - continue torsemide 20 daily increased to BID x 5 days r/t LE and pedal edema  - previously on lisinopril, DC'd for hyperkalemia in rehab  - will start Hydralazine 25 daily   - monitor     HL  Atherosclerosis of aorta  - continue atorvastatin 40 hs    pAF  - vital signs q shift and prn  - continue Eliquis 5 bid resumed in hospital   - HR controlled     T2DM insulin dependent  - 7/21/24 Hgba1c = 8.3%  - recorded allergy to short acting insulin with headaches   - accuchecks BID  - fasting glucose  preprandial glucose   - continue lantus 48 units daily   - continue metformin 500 bid  - continue glipizide 10 bid   - follow up with endocrinology outpatient   - monitor      CAD  - previously on ASA 81 daily, currently on hold d/t bleed. Will need to follow up with neurosx on restarting ASA with eliquis     Physical Deconditioning/Impaired mobility and ADLs/At risk for falling  - Fall precautions  - PT/OT eval and treat    Vitamins/supplements as r/t deficiencies  - YEN RD to follow while in rehab; supplementation/diet as per YEN RD  - May continue home supplements  - Vitamin D3  - Ferrous gluconate   - MV     At risk for malnutrition  - Dietician consult  - Weekly weights  - Supplements as indicated  - Encourage po intake    Pain management  - Monitor and assess pain  - Offer to pre-medicate 30-60 min prior to therapy  -  Tramadol 50 q6hrs prn     Hx constipation  Bowel management  - Monitor for Bms  - Dulcolax daily prn  - Miralax daily prn  - Senna plus daily     DVT prophylaxis  - Encourage exercise and participate with therapy as much as able   - Eliquis bid     GI prophylaxis  - none    Labs  - CBC, CMP weekly and prn    Discharge planning  - ELOS 14 days  - DC planning with MDT, SW, therapies  - [facility] team to establish care plan meeting with patient and POA/family as appropriate  - [facility] team/ & discharge planner to assist with establishing safe discharge plan for next level of care  - Anticipate DC on or before 9/16/24.  - DC Plan: from home with      Follow Ups:  - PCP within 7 days of DC  - Cardiology Dr Wilfredo Schmid in 2-3 weeks with repeat echo in 1 month  - Neurosurgery Dr Leandro Keen in 1 month   - Pulmonary Dr Weber outpatient   - Ortho Dr Brian Celis outpatient   No future appointments.    Please note, voice recognition software (DRAGON) was used to create this document. An attempt at proofreading has been made to minimize errors. Errors may still exist.       75 minutes spent w/ patient and reviewing medical records, labs, completing medication reconciliation and entering orders to establish plan of care in [facility]. Patient is a Full Code.       Note to patient: The 21st Century Cures Act makes medical notes like these available to patients in the interest of transparency. However, this is a medical document intended as peer to peer communication. It is written in medical language and may contain abbreviations or verbiage that are unfamiliar. It may appear blunt or direct. Medical documents are intended to carry relevant information, facts as evident, and the clinical opinion of the practitioner who signs the document.    Adelita MESA, APRN  09/03/24

## 2024-09-05 ENCOUNTER — SNF VISIT (OUTPATIENT)
Dept: INTERNAL MEDICINE CLINIC | Age: 84
End: 2024-09-05

## 2024-09-05 VITALS
WEIGHT: 186.69 LBS | SYSTOLIC BLOOD PRESSURE: 146 MMHG | HEART RATE: 64 BPM | OXYGEN SATURATION: 98 % | DIASTOLIC BLOOD PRESSURE: 83 MMHG | TEMPERATURE: 98 F | RESPIRATION RATE: 16 BRPM | BODY MASS INDEX: 31 KG/M2

## 2024-09-05 DIAGNOSIS — R60.0 LEG EDEMA: ICD-10-CM

## 2024-09-05 DIAGNOSIS — E78.2 MIXED HYPERLIPIDEMIA: ICD-10-CM

## 2024-09-05 DIAGNOSIS — R55 SYNCOPE, UNSPECIFIED SYNCOPE TYPE: Primary | ICD-10-CM

## 2024-09-05 DIAGNOSIS — S06.4XAA EPIDURAL HEMATOMA (HCC): ICD-10-CM

## 2024-09-05 DIAGNOSIS — Z79.4 TYPE 2 DIABETES MELLITUS WITH DIABETIC NEUROPATHY, WITH LONG-TERM CURRENT USE OF INSULIN (HCC): ICD-10-CM

## 2024-09-05 DIAGNOSIS — I48.0 PAROXYSMAL ATRIAL FIBRILLATION (HCC): ICD-10-CM

## 2024-09-05 DIAGNOSIS — I26.99 PULMONARY EMBOLISM WITHOUT ACUTE COR PULMONALE, UNSPECIFIED CHRONICITY, UNSPECIFIED PULMONARY EMBOLISM TYPE (HCC): ICD-10-CM

## 2024-09-05 DIAGNOSIS — R53.1 WEAKNESS: ICD-10-CM

## 2024-09-05 DIAGNOSIS — F32.A DEPRESSION, UNSPECIFIED DEPRESSION TYPE: ICD-10-CM

## 2024-09-05 DIAGNOSIS — E11.40 TYPE 2 DIABETES MELLITUS WITH DIABETIC NEUROPATHY, WITH LONG-TERM CURRENT USE OF INSULIN (HCC): ICD-10-CM

## 2024-09-05 DIAGNOSIS — S06.5XAA SDH (SUBDURAL HEMATOMA) (HCC): ICD-10-CM

## 2024-09-05 DIAGNOSIS — Z78.9 DECREASED ACTIVITIES OF DAILY LIVING (ADL): ICD-10-CM

## 2024-09-05 DIAGNOSIS — S72.001S CLOSED FRACTURE OF RIGHT HIP, SEQUELA: ICD-10-CM

## 2024-09-05 DIAGNOSIS — I10 PRIMARY HYPERTENSION: ICD-10-CM

## 2024-09-05 DIAGNOSIS — J96.01 ACUTE HYPOXEMIC RESPIRATORY FAILURE (HCC): ICD-10-CM

## 2024-09-05 PROCEDURE — 99309 SBSQ NF CARE MODERATE MDM 30: CPT

## 2024-09-05 NOTE — PROGRESS NOTES
Evangelina Ward, 6/14/1940, 84 year old, female is admitted to [facility] for rehabilitation and strengthening.     [facility] Admission: 7/21/24 - 7/24/24  [facility] Readmission: 8/26/24 - 8/29/24     Chief Complaint:    Chief Complaint   Patient presents with    Follow - Up     Saddle PE, RH strain, HTN, LE edema, depression  SDH, epidural hemtaoma, R hip fx s/p IM nailing        HPI:  Evangelina Ward is a 84 year old female with a past medical history significant for T2DM, HTN, HL, pAF on Eliquis, atherosclerosis of the aorta, CAD, and OA. Initially admitted to [facility] after a mechanical fall in which she developed a SDH, epidural hematoma, and R hip fracture s/p IM nailing. On 8/26, she was sent out to [facility] from rehab after a syncopal episode while participating in therapy. Lab work remarkable for elevated troponin, pBNP, and Ddimer. CT chest was significant for bilateral PE with concerns for right heart strain. She was readmitted to the hospital and diagnosed with a saddle PE with RH strain and acute on hypoxemic respiratory failure. Cardiology and pulmonary were consulted. US dopplers were negative for DVT and 2D echo with moderately increased RV size and mildly reduced RV systolic function. She was not a candidate for thrombolytics but was started on a heparin gtt after CT brain imaging revealed a resolution of her SDH and clearance from neurosurgery. Her Eliquis was then resumed prior to discharge. Hospital course complicated by LE edema in which she received IV lasix while on Torsemide. Repeat CT brain imaging was negative for bleed after restarting anticoagulation. Now discharged to [facility] for rehabilitation and strengthening.     SUBJECTIVE:  Patient seen today for aprn follow up visit. Laying in bed watching television. No pain currently. Sleep remains poor and appetite is OK. Still with LE edema with compression stockings. No pain currently. Last BM yesterday with  1 episode of diarrhea. States NP psych visited her yesterday and recommending increase in Mirtazapine but is declining at this time. Would really like to go home soon. Denies fever, body aches/chills, CP, SOB, cough, abdominal pain, nausea, vomiting, urinary complaints.     DW nursing, no acute concerns. If patient with 3 or more loose stools in a day, will r/o Cdiff. Started on hydralazine on Tuesday, BP with improvement. Diuretics increased to BID on Tuesday as well with minimal change but will continue for a few more days and re eval.    Labs reviewed. Anemia stable.     OBJECTIVE: /83   Pulse 64   Temp 97.7 °F (36.5 °C)   Resp 16   Wt 186 lb 11.2 oz (84.7 kg)   LMP  (LMP Unknown)   SpO2 98%   BMI 31.07 kg/m²     PHYSICAL EXAM:  GENERAL HEALTH: well developed, well nourished, in no apparent distress  LINES, TUBES, DRAINS:  none  SKIN: no rashes, no suspicious lesions, warm, dry  WOUND: R heel DTI resolved. R hip and R lateral thigh surgical incision. See wound care assessment.   EYES:  sclera anicteric, conjunctiva normal; there is no nystagmus, no drainage from eyes  HENT:  normocephalic; normal nose, no nasal drainage, mucous membranes pink, moist.  NECK:  supple, non tender, FROM  BREAST:  deferred  RESPIRATORY: clear, no crackles, no wheezing, no dyspnea, no cough, room air  CARDIOVASCULAR: S1, S2 normal, RRR; no S3, no S4 and no murmur  ABDOMEN:  normal active BS+, soft, nondistended; no organomegaly, no masses; nontender, no guarding, no rebound tenderness.  :no suprapubic distension  LYMPHATIC:no lymphedema  MUSCULOSKELETAL: no acute synovitis upper or lower extremity  EXTREMITIES/VASCULAR:radial pulses 2+, no cyanosis, no clubbing, LLE and pedal edema +2, RLE and pedal edema +3 with compression stockings on   NEUROLOGIC: A&OX3, no focal deficits, follows commands  PSYCHIATRIC: calm, cooperative, mood and affect appropriate to situation    Therapy update:  Transfers: sup/SBA  ADLS: upper  body min A; lower body dependent   Ambulation:  20 ft cga rw  [place] plan: from home with spouse. Will need CG assistance at [place].     Medications reviewed: Yes      Current Outpatient Medications:     apixaban 5 MG Oral Tab, Take 1 tablet (5 mg total) by mouth 2 (two) times daily., Disp: 60 tablet, Rfl: 0    bisacodyl 10 MG Rectal Suppos, Place 1 suppository (10 mg total) rectally daily as needed., Disp: , Rfl:     Ferrous Gluconate 324 (38 Fe) MG Oral Tab, Take 1 tablet (325 mg total) by mouth daily with breakfast., Disp: , Rfl:     Multiple Vitamin (MULTI-VITAMIN) Oral Tab, Take 1 tablet by mouth daily., Disp: , Rfl:     ondansetron (ZOFRAN) 4 mg tablet, Take 1 tablet (4 mg total) by mouth every 8 (eight) hours as needed for Nausea., Disp: , Rfl:     mirtazapine 7.5 MG Oral Tab, Take 1 tablet (7.5 mg total) by mouth nightly. Started 8/1 in rehab, Disp: , Rfl:     senna-docusate 8.6-50 MG Oral Tab, Take 1 tablet by mouth daily., Disp: , Rfl:     traMADol 50 MG Oral Tab, Take 1 tablet (50 mg total) by mouth every 6 (six) hours as needed., Disp: 12 tablet, Rfl: 0    cholecalciferol 50 MCG (2000 UT) Oral Tab, Take 1 tablet (2,000 Units total) by mouth daily., Disp: , Rfl:     polyethylene glycol, PEG 3350, 17 g Oral Powd Pack, Take 17 g by mouth daily as needed., Disp: , Rfl:     atorvastatin 40 MG Oral Tab, Take 1 tablet (40 mg total) by mouth nightly., Disp: , Rfl:     metFORMIN HCl  MG Oral Tablet 24 Hr, Take 2 tablets (1,000 mg total) by mouth 2 (two) times daily., Disp: 360 tablet, Rfl: 3    insulin glargine (LANTUS SOLOSTAR) 100 UNIT/ML Subcutaneous Solution Pen-injector, INJECT up to 48 UNITS INTO THE SKIN ONCE DAILY. (Patient taking differently: Inject 45 Units into the skin every morning.), Disp: 45 mL, Rfl: 3    glipiZIDE 10 MG Oral Tab, Take 1 tablet (10 mg total) by mouth 2 (two) times daily before meals., Disp: 180 tablet, Rfl: 3    torsemide (DEMADEX) 20 MG Oral Tab, Take 1 tablet (20 mg total) by mouth  daily., Disp: , Rfl:       Diagnostics reviewed:    Lab Results   Component Value Date    WBC 5.4 09/05/2024    RBC 3.09 (L) 09/05/2024    HGB 8.7 (L) 09/05/2024    HCT 27.3 (L) 09/05/2024    MCV 88.3 09/05/2024    MCH 28.2 09/05/2024    MCHC 31.9 09/05/2024    RDW 14.9 09/05/2024    .0 09/05/2024     Lab Results   Component Value Date     (H) 09/05/2024    BUN 37 (H) 09/05/2024    BUNCREA 42.0 (H) 10/15/2021    CREATSERUM 0.82 09/05/2024    ANIONGAP 8 09/05/2024    GFR 94 03/10/2017    GFRNAA 87 04/17/2019    GFRAA 101 04/17/2019    CA 9.4 09/05/2024    OSMOCALC 295 09/05/2024    ALKPHO 128 09/05/2024    AST 17 09/05/2024    ALT 17 09/05/2024    BILT 0.3 09/05/2024    TP 6.0 09/05/2024    ALB 3.6 09/05/2024    GLOBULIN 2.4 09/05/2024    AGRATIO 1.9 05/21/2013     09/05/2024    K 4.4 09/05/2024     09/05/2024    CO2 23.0 09/05/2024       ASSESSMENT AND PLAN :    Syncope 2/2 submassive PE  with RH strain   - CTA with bilateral PE involving distal main pulmonary arteries extending to segmental branches throughout both lungs   - 2D echo EF 70-75% with RV size moderately increased and mildly reduced RV systolic function   - US dopplers negative DVT   - not candidate for thrombrolytics d/t recent SDH   - s/p IV heparin gtt, now on Eliquis 5 bid that was restarted   - follow up with cardiology Dr Wilfredo Schmid in 2-3 weeks with repeat echo in 1 month   - follow up with pulmonary Dr Weber outpatient     Acute hypoxemic respiratory failure 2/2 PE - resolved   - previously on O2 in hospital   - now on RA with stable oxygen saturations  - monitor      Recent SDH and epidural hematoma   - s/p mechanical fall at home   - neurosurgery consulted - recc no surgical intervention  - CT brain on 8/23 with resolution of subdural hematoma. Repeat CT on 8/26 with no bleed after starting blood thinners   - follow up with neurosurgery Dr Leandro Keen in 1 month       R hip fracture   s/p IM nailing with   Vimal on 7/22   - staples removed and now healing well   - WBAT  - PT/OT  - fall risk precautions   - pain management with Tramadol prn  - follow up with ortho Dr Brian Celis outpatient      LE edema   - s/p IV lasix in hospital without much improvement   - continue compression stockings   - on torsemide 20 daily but will increase BID x 5 days for LE/pedal edema and re evaluate   - monitor      Depression   - r/t health condition with feelings of being unmotivated, poor appetite, poor sleep, denies SI  - no prior history of depression  - Initial PHQ9 depression scale score = 17 moderately severe depression   - continue Mirtazepine 7.5 HS. Declining increase in dose.   - psych to follow while in rehab, counseling as well  - mood has improved, feeling more motivated to do well in therapy to go home, denies SI. Sleeping remains fragmented, appetite improved.   - monitor     Acute blood loss anemia   - Hgb 8.1 - 9.6 stable   - Eliquis restarted   - no s/s of active bleed will monitor   - CBC weekly and prn     HTN  - vital signs q shift and prn   - -160s elevated with improvement   - continue torsemide 20 daily increased to BID x 5 days r/t LE and pedal edema  - previously on lisinopril, DC'd for hyperkalemia in rehab  - started on Hydralazine 25 daily   - monitor      HL  Atherosclerosis of aorta  - continue atorvastatin 40 hs     pAF  - vital signs q shift and prn  - continue Eliquis 5 bid resumed in hospital   - HR controlled      T2DM insulin dependent  - 7/21/24 Hgba1c = 8.3%  - recorded allergy to short acting insulin with headaches   - accuchecks BID  - fasting glucose  preprandial glucose   - continue lantus 48 units daily   - continue metformin 500 bid  - continue glipizide 10 bid   - follow up with endocrinology outpatient   - monitor      CAD  - previously on ASA 81 daily, currently on hold d/t bleed. Followed up with neurosurgery PA on restarting ASA with eliquis awaiting response       Physical Deconditioning/Impaired mobility and ADLs/At risk for falling  - Fall precautions  - PT/OT eval and treat     Vitamins/supplements as r/t deficiencies  - [facility] RD to follow while in rehab; supplementation/diet as per [facility] RD  - May continue home supplements  - Vitamin D3  - Ferrous gluconate   - MV      At risk for malnutrition  - Dietician consult  - Weekly weights  - Supplements as indicated  - Encourage po intake     Pain management  - Monitor and assess pain  - Offer to pre-medicate 30-60 min prior to therapy  - Tramadol 50 q6hrs prn      Hx constipation  Bowel management  - Monitor for Bms  - Dulcolax daily prn  - Miralax daily prn  - Senna plus daily      DVT prophylaxis  - Encourage exercise and participate with therapy as much as able   - Eliquis bid      GI prophylaxis  - none     Labs  - CBC, CMP weekly and prn     Discharge planning  - ELOS 14 days  - DC planning with MDT, SW, therapies  - [facility] team to establish care plan meeting with patient and POA/family as appropriate  - [facility] team/ & discharge planner to assist with establishing safe discharge plan for next level of care  - Anticipate DC on or before 9/16/24.  - DC Plan: from home with       Follow Ups:  - PCP within 7 days of DC  - Cardiology Dr Wilfredo Schmid in 2-3 weeks with repeat echo in 1 month  - Neurosurgery Dr Leandro Keen in 1 month   - Pulmonary Dr Weber outpatient   - Ortho Dr Brian Celis outpatient     No future appointments.      *Established patient; follow-up moderately complex visit/ greater than 30       35 minutes spent w/ patient and staff, including but not limited to/ reviewing present status, needs, abilities with disciplines, reviewing medical records, vital signs, labs, completing medication reconciliation and entering orders for continued care in [facility].    Note to patient: The 21st Century Cures Act makes medical notes like these available to patients in the interest of transparency. However, this  is a medical document intended as peer to peer communication. It is written in medical language and may contain abbreviations or verbiage that are unfamiliar. It may appear blunt or direct. Medical documents are intended to carry relevant information, facts as evident, and the clinical opinion of the practitioner who signs the document.    Adelita MESA, APRN  9/5/2024

## 2024-09-10 ENCOUNTER — SNF VISIT (OUTPATIENT)
Dept: INTERNAL MEDICINE CLINIC | Age: 84
End: 2024-09-10

## 2024-09-10 VITALS
HEART RATE: 63 BPM | DIASTOLIC BLOOD PRESSURE: 54 MMHG | BODY MASS INDEX: 31 KG/M2 | TEMPERATURE: 98 F | RESPIRATION RATE: 16 BRPM | OXYGEN SATURATION: 97 % | WEIGHT: 187.63 LBS | SYSTOLIC BLOOD PRESSURE: 123 MMHG

## 2024-09-10 DIAGNOSIS — S72.001S CLOSED FRACTURE OF RIGHT HIP, SEQUELA: ICD-10-CM

## 2024-09-10 DIAGNOSIS — J96.01 ACUTE HYPOXEMIC RESPIRATORY FAILURE (HCC): ICD-10-CM

## 2024-09-10 DIAGNOSIS — R55 SYNCOPE, UNSPECIFIED SYNCOPE TYPE: Primary | ICD-10-CM

## 2024-09-10 DIAGNOSIS — Z78.9 DECREASED ACTIVITIES OF DAILY LIVING (ADL): ICD-10-CM

## 2024-09-10 DIAGNOSIS — Z79.4 TYPE 2 DIABETES MELLITUS WITH DIABETIC NEUROPATHY, WITH LONG-TERM CURRENT USE OF INSULIN (HCC): ICD-10-CM

## 2024-09-10 DIAGNOSIS — S06.4XAA EPIDURAL HEMATOMA (HCC): ICD-10-CM

## 2024-09-10 DIAGNOSIS — I10 PRIMARY HYPERTENSION: ICD-10-CM

## 2024-09-10 DIAGNOSIS — R53.1 WEAKNESS: ICD-10-CM

## 2024-09-10 DIAGNOSIS — E11.40 TYPE 2 DIABETES MELLITUS WITH DIABETIC NEUROPATHY, WITH LONG-TERM CURRENT USE OF INSULIN (HCC): ICD-10-CM

## 2024-09-10 DIAGNOSIS — F32.A DEPRESSION, UNSPECIFIED DEPRESSION TYPE: ICD-10-CM

## 2024-09-10 DIAGNOSIS — I26.99 PULMONARY EMBOLISM WITHOUT ACUTE COR PULMONALE, UNSPECIFIED CHRONICITY, UNSPECIFIED PULMONARY EMBOLISM TYPE (HCC): ICD-10-CM

## 2024-09-10 DIAGNOSIS — I48.0 PAROXYSMAL ATRIAL FIBRILLATION (HCC): ICD-10-CM

## 2024-09-10 DIAGNOSIS — S06.5XAA SDH (SUBDURAL HEMATOMA) (HCC): ICD-10-CM

## 2024-09-10 DIAGNOSIS — R60.0 LEG EDEMA: ICD-10-CM

## 2024-09-10 DIAGNOSIS — E78.2 MIXED HYPERLIPIDEMIA: ICD-10-CM

## 2024-09-10 PROCEDURE — 99309 SBSQ NF CARE MODERATE MDM 30: CPT

## 2024-09-10 RX ORDER — HYDRALAZINE HYDROCHLORIDE 25 MG/1
25 TABLET, FILM COATED ORAL DAILY
COMMUNITY

## 2024-09-11 NOTE — PROGRESS NOTES
Evangelina Ward, 6/14/1940, 84 year old, female is admitted to [facility] for rehabilitation and strengthening.     [facility] Admission: 7/21/24 - 7/24/24  [facility] Readmission: 8/26/24 - 8/29/24     Chief Complaint:    Chief Complaint   Patient presents with    Follow - Up     HTN, Saddle PE, SDH, epidural hematoma, R hip fx s/p IM nailing        HPI:  Evangelina Ward is a 84 year old female with a past medical history significant for T2DM, HTN, HL, pAF on Eliquis, atherosclerosis of the aorta, CAD, and OA. Initially admitted to [facility] after a mechanical fall in which she developed a SDH, epidural hematoma, and R hip fracture s/p IM nailing. On 8/26, she was sent out to [facility] from rehab after a syncopal episode while participating in therapy. Lab work remarkable for elevated troponin, pBNP, and Ddimer. CT chest was significant for bilateral PE with concerns for right heart strain. She was readmitted to the hospital and diagnosed with a saddle PE with RH strain and acute on hypoxemic respiratory failure. Cardiology and pulmonary were consulted. US dopplers were negative for DVT and 2D echo with moderately increased RV size and mildly reduced RV systolic function. She was not a candidate for thrombolytics but was started on a heparin gtt after CT brain imaging revealed a resolution of her SDH and clearance from neurosurgery. Her Eliquis was then resumed prior to discharge. Hospital course complicated by LE edema in which she received IV lasix while on Torsemide. Repeat CT brain imaging was negative for bleed after restarting anticoagulation. Now discharged to [facility] for rehabilitation and strengthening.     SUBJECTIVE:  Patient seen today for aprn follow up visit. She was seen sitting up in her WC in her room. Doing well this morning. Was having some R heel pain which was evaluated by the wound care team earlier this morning. She states there is now a small opening to the heel  which may have occurred during therapy. It was recommended she needs to be wearing her surgical shoe when out of bed. Appetite continues to improve, sleep still fragmented. LE edema with some improvement after increased in diuretics and with compression stockings. Last BM yesterday with 1 episode of diarrhea. Plan for DC home on Friday. Patient reports she feels like she would be OK at home with HH but her  still remains nervous.     Denies fever, body aches/chills, CP, SOB, cough, abdominal pain, nausea, vomiting, urinary complaints.   DW nursing, no acute concerns.     OBJECTIVE: /54   Pulse 63   Temp 97.5 °F (36.4 °C)   Resp 16   Wt 187 lb 9.6 oz (85.1 kg)   LMP  (LMP Unknown)   SpO2 97%   BMI 31.22 kg/m²     PHYSICAL EXAM:  GENERAL HEALTH: well developed, well nourished, in no apparent distress  LINES, TUBES, DRAINS:  none  SKIN: no rashes, no suspicious lesions, warm, dry  WOUND: Non pressure partial thickness wound to R heel. R hip and R lateral thigh surgical incision. See wound care assessment.   EYES:  sclera anicteric, conjunctiva normal; there is no nystagmus, no drainage from eyes  HENT:  normocephalic; normal nose, no nasal drainage, mucous membranes pink, moist.  NECK:  supple, non tender, FROM  BREAST:  deferred  RESPIRATORY: clear, no crackles, no wheezing, no dyspnea, no cough, room air  CARDIOVASCULAR: S1, S2 normal, RRR; no S3, no S4 and no murmur  ABDOMEN:  normal active BS+, soft, nondistended; no organomegaly, no masses; nontender, no guarding, no rebound tenderness.  :no suprapubic distension  LYMPHATIC:no lymphedema  MUSCULOSKELETAL: no acute synovitis upper or lower extremity  EXTREMITIES/VASCULAR: radial pulses 2+, no cyanosis, no clubbing, LLE and pedal edema +1, RLE and pedal edema +2 with compression stockings on   NEUROLOGIC: A&OX3, no focal deficits, follows commands  PSYCHIATRIC: calm, cooperative, mood and affect appropriate to situation    Therapy  update:  Transfers: SB/CGA  ADLS: set up/SBA  Ambulation:  44 ft  SB/cga rw  DC plan: Home with spouse with HH PT, OT, RN, HHA    Medications reviewed: Yes      Current Outpatient Medications:     hydrALAZINE 25 MG Oral Tab, Take 1 tablet (25 mg total) by mouth daily., Disp: , Rfl:     apixaban 5 MG Oral Tab, Take 1 tablet (5 mg total) by mouth 2 (two) times daily., Disp: 60 tablet, Rfl: 0    bisacodyl 10 MG Rectal Suppos, Place 1 suppository (10 mg total) rectally daily as needed., Disp: , Rfl:     Ferrous Gluconate 324 (38 Fe) MG Oral Tab, Take 1 tablet (325 mg total) by mouth daily with breakfast., Disp: , Rfl:     Multiple Vitamin (MULTI-VITAMIN) Oral Tab, Take 1 tablet by mouth daily., Disp: , Rfl:     ondansetron (ZOFRAN) 4 mg tablet, Take 1 tablet (4 mg total) by mouth every 8 (eight) hours as needed for Nausea., Disp: , Rfl:     mirtazapine 7.5 MG Oral Tab, Take 1 tablet (7.5 mg total) by mouth nightly. Started 8/1 in rehab, Disp: , Rfl:     senna-docusate 8.6-50 MG Oral Tab, Take 1 tablet by mouth daily., Disp: , Rfl:     traMADol 50 MG Oral Tab, Take 1 tablet (50 mg total) by mouth every 6 (six) hours as needed., Disp: 12 tablet, Rfl: 0    cholecalciferol 50 MCG (2000 UT) Oral Tab, Take 1 tablet (2,000 Units total) by mouth daily., Disp: , Rfl:     polyethylene glycol, PEG 3350, 17 g Oral Powd Pack, Take 17 g by mouth daily as needed., Disp: , Rfl:     atorvastatin 40 MG Oral Tab, Take 1 tablet (40 mg total) by mouth nightly., Disp: , Rfl:     metFORMIN HCl  MG Oral Tablet 24 Hr, Take 2 tablets (1,000 mg total) by mouth 2 (two) times daily., Disp: 360 tablet, Rfl: 3    insulin glargine (LANTUS SOLOSTAR) 100 UNIT/ML Subcutaneous Solution Pen-injector, INJECT up to 48 UNITS INTO THE SKIN ONCE DAILY. (Patient taking differently: Inject 45 Units into the skin every morning.), Disp: 45 mL, Rfl: 3    glipiZIDE 10 MG Oral Tab, Take 1 tablet (10 mg total) by mouth 2 (two) times daily before meals., Disp: 180  tablet, Rfl: 3    torsemide (DEMADEX) 20 MG Oral Tab, Take 1 tablet (20 mg total) by mouth daily., Disp: , Rfl:       Diagnostics reviewed:    Lab Results   Component Value Date    WBC 5.4 09/05/2024    RBC 3.09 (L) 09/05/2024    HGB 8.7 (L) 09/05/2024    HCT 27.3 (L) 09/05/2024    MCV 88.3 09/05/2024    MCH 28.2 09/05/2024    MCHC 31.9 09/05/2024    RDW 14.9 09/05/2024    .0 09/05/2024     Lab Results   Component Value Date     (H) 09/05/2024    BUN 37 (H) 09/05/2024    BUNCREA 42.0 (H) 10/15/2021    CREATSERUM 0.82 09/05/2024    ANIONGAP 8 09/05/2024    GFR 94 03/10/2017    GFRNAA 87 04/17/2019    GFRAA 101 04/17/2019    CA 9.4 09/05/2024    OSMOCALC 295 09/05/2024    ALKPHO 128 09/05/2024    AST 17 09/05/2024    ALT 17 09/05/2024    BILT 0.3 09/05/2024    TP 6.0 09/05/2024    ALB 3.6 09/05/2024    GLOBULIN 2.4 09/05/2024    AGRATIO 1.9 05/21/2013     09/05/2024    K 4.4 09/05/2024     09/05/2024    CO2 23.0 09/05/2024       ASSESSMENT AND PLAN :    Syncope 2/2 submassive PE  with RH strain   - CTA with bilateral PE involving distal main pulmonary arteries extending to segmental branches throughout both lungs   - 2D echo EF 70-75% with RV size moderately increased and mildly reduced RV systolic function   - US dopplers negative DVT   - not candidate for thrombrolytics d/t recent SDH   - s/p IV heparin gtt, now on Eliquis 5 bid that was restarted   - follow up with cardiology Dr Wilfredo Schmid in 2-3 weeks with repeat echo in 1 month   - follow up with pulmonary Dr Weber outpatient     Acute hypoxemic respiratory failure 2/2 PE - resolved   - previously on O2 in hospital   - now on RA with stable oxygen saturations  - monitor      Recent SDH and epidural hematoma   - s/p mechanical fall at home   - neurosurgery consulted - [facility] no surgical intervention  - CT brain on 8/23 with resolution of subdural hematoma. Repeat CT on 8/26 with no bleed after starting blood thinners   - follow up with  neurosurgery Dr Leandro Keen in 1 month       R hip fracture   s/p IM nailing with Dr Celis on 7/22   - staples removed and now healing well   - WBAT  - PT/OT  - fall risk precautions   - pain management with Tramadol prn  - follow up with ortho Dr Brian Celis outpatient      LE edema   - s/p IV lasix in hospital without much improvement   - continue compression stockings   - on torsemide 20 daily but will increase to BID now for LE/pedal edema and she has been tolerating well   - monitor      Depression   - r/t health condition with feelings of being unmotivated, poor appetite, poor sleep, denies SI  - no prior history of depression  - Initial PHQ9 depression scale score = 17 moderately severe depression   - continue Mirtazepine 7.5 HS. Declining increase in dose.   - psych to follow while in rehab, counseling as well  - mood has improved, feeling more motivated to do well in therapy to go home, denies SI. Sleeping remains fragmented, appetite improved.   - monitor     Acute blood loss anemia   - Hgb 8.1 - 9.6 stable   - Eliquis restarted   - no s/s of active bleed will monitor   - CBC weekly and prn     HTN  - vital signs q shift and prn   - on torsemide 20 daily but will increase to BID now for LE/pedal edema and she has been tolerating well   - previously on lisinopril, DC'd for hyperkalemia in rehab  - started on Hydralazine 25 daily   - BP with improvement   - monitor      HL  Atherosclerosis of aorta  - continue atorvastatin 40 hs     pAF  - vital signs q shift and prn  - continue Eliquis 5 bid resumed in hospital   - HR controlled      T2DM insulin dependent  - 7/21/24 Hgba1c = 8.3%  - recorded allergy to short acting insulin with headaches   - accuchecks BID  - fasting glucose  preprandial glucose   - continue lantus 48 units daily   - continue metformin 500 bid  - continue glipizide 10 bid   - follow up with endocrinology outpatient   - monitor      CAD  - previously on ASA 81 daily,  was on hold d/t bleed  - followed up with neurosurgery LON Villanueva and reports OK to restart ASA 81 daily with Eliquis      Physical Deconditioning/Impaired mobility and ADLs/At risk for falling  - Fall precautions  - PT/OT eval and treat     Vitamins/supplements as r/t deficiencies  - [facility] RD to follow while in rehab; supplementation/diet as per [facility] RD  - May continue home supplements  - Vitamin D3  - Ferrous gluconate   - MV      At risk for malnutrition  - Dietician consult  - Weekly weights  - Supplements as indicated  - Encourage po intake     Pain management  - Monitor and assess pain  - Offer to pre-medicate 30-60 min prior to therapy  - Tramadol 50 q6hrs prn      Hx constipation  Bowel management  - Monitor for Bms  - Dulcolax daily prn  - Miralax daily prn  - Senna plus daily      DVT prophylaxis  - Encourage exercise and participate with therapy as much as able   - Eliquis bid      GI prophylaxis  - none     Labs  - CBC, CMP weekly and prn     Discharge planning  - ELOS 14 days  - DC planning with MDT, SW, therapies  - [facility] team to establish care plan meeting with patient and POA/family as appropriate  - [facility] team/ & discharge planner to assist with establishing safe discharge plan for next level of care  - Anticipate DC on or before 9/13/24.  - DC Plan: Home with spouse with  PT, OT, RN, HHA    Follow Ups:  - PCP within 7 days of DC  - Cardiology Dr Wilfredo Schmid in 2-3 weeks with repeat echo in 1 month  - Neurosurgery Dr Leandro Keen in 1 month   - Pulmonary Dr Weber outpatient   - Ortho Dr Brian Celis outpatient     No future appointments.    *Established patient; follow-up moderately complex visit/ greater than 30       35 minutes spent w/ patient and staff, including but not limited to/ reviewing present status, needs, abilities with disciplines, reviewing medical records, vital signs, labs, completing medication reconciliation and entering orders for continued care in [facility].    Note  to patient: The 21st Century Cures Act makes medical notes like these available to patients in the interest of transparency. However, this is a medical document intended as peer to peer communication. It is written in medical language and may contain abbreviations or verbiage that are unfamiliar. It may appear blunt or direct. Medical documents are intended to carry relevant information, facts as evident, and the clinical opinion of the practitioner who signs the document.    Adelita MESA, APRN  9/10/2024

## 2024-09-12 ENCOUNTER — SNF DISCHARGE (OUTPATIENT)
Dept: INTERNAL MEDICINE CLINIC | Age: 84
End: 2024-09-12

## 2024-09-12 VITALS
TEMPERATURE: 97 F | BODY MASS INDEX: 32 KG/M2 | HEART RATE: 87 BPM | WEIGHT: 191 LBS | DIASTOLIC BLOOD PRESSURE: 70 MMHG | OXYGEN SATURATION: 96 % | SYSTOLIC BLOOD PRESSURE: 142 MMHG | RESPIRATION RATE: 18 BRPM

## 2024-09-12 DIAGNOSIS — S72.001S CLOSED FRACTURE OF RIGHT HIP, SEQUELA: ICD-10-CM

## 2024-09-12 DIAGNOSIS — J96.01 ACUTE HYPOXEMIC RESPIRATORY FAILURE (HCC): ICD-10-CM

## 2024-09-12 DIAGNOSIS — I48.0 PAROXYSMAL ATRIAL FIBRILLATION (HCC): ICD-10-CM

## 2024-09-12 DIAGNOSIS — S06.5XAA SDH (SUBDURAL HEMATOMA) (HCC): ICD-10-CM

## 2024-09-12 DIAGNOSIS — E11.40 TYPE 2 DIABETES MELLITUS WITH DIABETIC NEUROPATHY, WITH LONG-TERM CURRENT USE OF INSULIN (HCC): ICD-10-CM

## 2024-09-12 DIAGNOSIS — E78.2 MIXED HYPERLIPIDEMIA: ICD-10-CM

## 2024-09-12 DIAGNOSIS — R53.1 WEAKNESS: ICD-10-CM

## 2024-09-12 DIAGNOSIS — I26.99 PULMONARY EMBOLISM WITHOUT ACUTE COR PULMONALE, UNSPECIFIED CHRONICITY, UNSPECIFIED PULMONARY EMBOLISM TYPE (HCC): ICD-10-CM

## 2024-09-12 DIAGNOSIS — F32.A DEPRESSION, UNSPECIFIED DEPRESSION TYPE: ICD-10-CM

## 2024-09-12 DIAGNOSIS — R55 SYNCOPE, UNSPECIFIED SYNCOPE TYPE: Primary | ICD-10-CM

## 2024-09-12 DIAGNOSIS — Z79.4 TYPE 2 DIABETES MELLITUS WITH DIABETIC NEUROPATHY, WITH LONG-TERM CURRENT USE OF INSULIN (HCC): ICD-10-CM

## 2024-09-12 DIAGNOSIS — R60.0 LEG EDEMA: ICD-10-CM

## 2024-09-12 DIAGNOSIS — Z78.9 DECREASED ACTIVITIES OF DAILY LIVING (ADL): ICD-10-CM

## 2024-09-12 DIAGNOSIS — I10 PRIMARY HYPERTENSION: ICD-10-CM

## 2024-09-12 DIAGNOSIS — S06.4XAA EPIDURAL HEMATOMA (HCC): ICD-10-CM

## 2024-09-12 PROCEDURE — 99316 NF DSCHRG MGMT 30 MIN+: CPT

## 2024-09-12 NOTE — PROGRESS NOTES
Evangelina Ward, 6/14/1940, 84 year old, female is being discharged from [facility] [place] to home    DISCHARGE SUMMARY    Date of Admission to [facility]: 7/24/24    Date of Discharge from [facility]: 9/13/24                                 Admitting Diagnoses:   Syncope d/t submassive PE  Acute hypoxic respiratory failure   Fall  Acute right subdural hematoma   Epidural hematoma   Right intertrochanteric femur fracture s/p IM nailing   UTI  HTN  HL   LE edema  PAF   CAD   T2DM    Reason for Admission to [facility]: Rehabilitation and strengthening     Vital Signs: /70   Pulse 87   Temp 97.3 °F (36.3 °C)   Resp 18   Wt 191 lb (86.6 kg)   LMP  (LMP Unknown)   SpO2 96%   BMI 31.78 kg/m²     ROS and Physical Exam     REVIEW OF SYSTEMS:  GENERAL HEALTH:feels well otherwise  SKIN: denies any unusual skin lesions or rashes  WOUNDS: R heel injury; R hip and R lateral thigh surgical incision.   EYES:no visual complaints or deficits  HENT: denies nasal congestion, sinus pain or sore throat;  RESPIRATORY: denies shortness of breath, wheezing or cough   CARDIOVASCULAR:denies chest pain, no palpitations   GI: denies nausea, vomiting, constipation, diarrhea; no rectal bleeding; no heartburn  Gu: No urinary frequency, urgency or dysuria  MUSCULOSKELETAL:no joint complaints upper or lower extremities  NEURO:no sensory or motor complaint  PSYCHE: + depressive symptoms improving  HEMATOLOGY: + hx anemia; denies bruising, denies excessive bleeding  ENDOCRINE: denies excessive thirst or urination; denies unexpected wt gain or wt loss  ALLERGY/IMM.: denies food or seasonal allergies     PHYSICAL EXAM:  GENERAL HEALTH: well developed, well nourished, in no apparent distress  LINES, TUBES, DRAINS:  none  SKIN: no rashes, no suspicious lesions, warm, dry  WOUND: Non pressure partial thickness wound to R heel. R hip and R lateral thigh surgical incision. See wound care assessment  EYES: sclera anicteric, conjunctiva normal; there is  no nystagmus, no drainage from eyes  HENT: normocephalic; normal nose, no nasal drainage, mucous membranes pink, moist.  NECK: supple, non tender, FROM  BREAST: deferred  RESPIRATORY: clear to percussion and auscultation  CARDIOVASCULAR: S1, S2 normal, RRR; no S3, no S4 and no murmur  ABDOMEN:  normal active BS+, soft, nondistended; no organomegaly, no masses; nontender, no guarding, no rebound tenderness.  :no suprapubic distension  LYMPHATIC:no lymphedema  MUSCULOSKELETAL: no acute synovitis upper or lower extremity  EXTREMITIES/VASCULAR:radial pulses 2+ and dorsalis pedal pulses 2+, no cyanosis, no clubbing, LLE and pedal edema +1, RLE and pedal edema +2 with compression stockings on  NEUROLOGIC: A&OX3, no focal deficits, follows commands  PSYCHIATRIC: calm, cooperative, mood and affect appropriate to situation    Most recent lab results:  Lab Results   Component Value Date    WBC 7.0 09/12/2024    RBC 3.34 (L) 09/12/2024    HGB 9.3 (L) 09/12/2024    HCT 28.9 (L) 09/12/2024    MCV 86.5 09/12/2024    MCH 27.8 09/12/2024    MCHC 32.2 09/12/2024    RDW 14.6 09/12/2024    .0 09/12/2024     Lab Results   Component Value Date    GLU 83 09/12/2024    BUN 36 (H) 09/12/2024    BUNCREA 42.0 (H) 10/15/2021    CREATSERUM 0.75 09/12/2024    ANIONGAP 8 09/12/2024    GFR 94 03/10/2017    GFRNAA 87 04/17/2019    GFRAA 101 04/17/2019    CA 9.4 09/12/2024    OSMOCALC 299 (H) 09/12/2024    ALKPHO 117 09/12/2024    AST 14 09/12/2024    ALT 15 09/12/2024    BILT 0.3 09/12/2024    TP 5.9 09/12/2024    ALB 3.6 09/12/2024    GLOBULIN 2.3 09/12/2024    AGRATIO 1.9 05/21/2013     09/12/2024    K 4.0 09/12/2024     09/12/2024    CO2 25.0 09/12/2024       Discharge Diagnoses w/ current management:    Syncope 2/2 submassive PE  with RH strain   - CTA with bilateral PE involving distal main pulmonary arteries extending to segmental branches throughout both lungs   - 2D echo EF 70-75% with RV size moderately increased and  mildly reduced RV systolic function   - US dopplers negative DVT   - not candidate for thrombrolytics d/t recent SDH   - s/p IV heparin gtt, now on Eliquis 5 bid that was restarted   - follow up with cardiology Dr Wilfredo Schmid in 2-3 weeks with repeat echo in 1 month   - follow up with pulmonary Dr Weber outpatient     Acute hypoxemic respiratory failure 2/2 PE - resolved   - previously on O2 in hospital   - now on RA with stable oxygen saturations  - monitor      Recent SDH and epidural hematoma   - s/p mechanical fall at home   - neurosurgery consulted - [facility] no surgical intervention  - CT brain on 8/23 with resolution of subdural hematoma. Repeat CT on 8/26 with no bleed after starting blood thinners   - continue Eliquis and ASA    - follow up with neurosurgery Dr Leandro Keen in 1 month       R hip fracture   s/p IM nailing with Dr Celis on 7/22   - staples removed and now healing well   - WBAT  - PT/OT  - fall risk precautions   - pain management with Tramadol prn  - follow up with ortho Dr Brian Celis outpatient      LE edema   - s/p IV lasix in hospital without much improvement   - continue compression stockings   - on torsemide 20 daily but will increase to BID  for LE/pedal edema  - monitor      Depression   - r/t health condition with feelings of being unmotivated, poor appetite, poor sleep, denies SI  - no prior history of depression  - Initial PHQ9 depression scale score = 17 moderately severe depression   - continue Mirtazepine 7.5 HS. Declining increase in dose.   - psych to follow while in rehab, counseling as well  - mood has improved, feeling more motivated to do well in therapy to go home, denies SI. Sleeping remains fragmented, appetite improved.   - monitor      Acute blood loss anemia   - Hgb 8.1 - 9.6 stable   - Eliquis and ASA restarted   - no s/s of active bleed will monitor   - CBC weekly and prn      HTN  - vital signs q shift and prn   - previously on torsemide 20 daily but will  increase to BID now for LE/pedal edema and she has been tolerating well   - previously on lisinopril, DC'd for hyperkalemia in rehab  - started on Hydralazine 25 daily   - BP with improvement   - monitor      HL  Atherosclerosis of aorta  - continue atorvastatin 40 hs     pAF  - vital signs q shift and prn  - continue Eliquis 5 bid resumed in hospital   - HR controlled      T2DM insulin dependent  - 7/21/24 Hgba1c = 8.3%  - recorded allergy to short acting insulin with headaches   - accuchecks BID  - fasting glucose  preprandial glucose   - continue lantus 48 units daily   - continue metformin 500 bid  - continue glipizide 10 bid   - follow up with endocrinology outpatient   - monitor      CAD  - previously on ASA 81 daily, was on hold d/t bleed  - followed up with neurosurgery LON Villanueva and reports OK to restart ASA 81 daily with Eliquis      Physical Deconditioning/Impaired mobility and ADLs/At risk for falling  - Fall precautions  - PT/OT eval and treat     Vitamins/supplements as r/t deficiencies  - [facility] to follow while in rehab; supplementation/diet as per [facility]  - May continue home supplements  - Vitamin D3  - Ferrous gluconate   - MV      At risk for malnutrition  - Dietician consult  - Weekly weights  - Supplements as indicated  - Encourage po intake     Pain management  - Monitor and assess pain  - Offer to pre-medicate 30-60 min prior to therapy  - Tramadol 50 q6hrs prn      Hx constipation  Bowel management  - Monitor for Bms  - Dulcolax daily prn  - Miralax daily prn  - Senna plus daily      DVT prophylaxis  - Encourage exercise and participate with therapy as much as able   - Eliquis bid      GI prophylaxis  - none     Labs  - CBC, CMP weekly and prn     Discharge planning  - ELOS 14 days  - DC planning with MDT, SW, therapies  - [facility] team to establish care plan meeting with patient and POA/family as appropriate  - [facility] team/ & discharge planner to assist with  establishing safe discharge plan for next level of care  - Anticipate DC on or before 9/13/24.  - DC Plan: Home with spouse with HH PT, OT, RN, HHA    Medication Reconciliation Completed:  Yes - All medications and side effects reviewed with patient.      FOLLOW UP APPOINTMENTS  - PCP within 7 days of DC  - Cardiology Dr Wilfredo Schmid in 2-3 weeks with repeat echo in 1 month  - Neurosurgery Dr Leandro Keen in 1 month   - Pulmonary Dr Weber outpatient   - Ortho Dr Brian Celis outpatient     No future appointments.      35 minutes spent in coordination of care in preparation for discharge.    Note to patient: The 21st Century Cures Act makes medical notes like these available to patients in the interest of transparency. However, this is a medical document intended as peer to peer communication. It is written in medical language and may contain abbreviations or verbiage that are unfamiliar. It may appear blunt or direct. Medical documents are intended to carry relevant information, facts as evident, and the clinical opinion of the practitioner who signs the document.     Adelita MESA, APRN  9/12/2024   CONST: no fevers, no chills  EYES: no redness, no vision changes   HENT: no throat pain, no epistaxis  CV: no chest pain, no palpitations     RESP: no shortness of breath, no cough  ABD: (+) abdominal pain, no vomiting     : no dysuria, no hematuria   MSK: no muscle pain, no joint swelling     NEURO: no headache, no focal weakness or loss of sensation     SKIN:  no rash, no lacerations.

## 2025-02-28 NOTE — PLAN OF CARE
Assumed care of pt at 0730. Pt A/Ox4. VSS on RA. NSR on tele. Denies SOB or chest pain. Heparin gtt infusing per protocol. External catheter in place. Pt up to the chair for meals. Pt tolerated well. Pt and pt's family updated with plan of care. Pt refused insulin this am notified Dr Jc. This evening she agreed to taking her short-acting insulin with meals after another long discussion about the importance of this med.    Problem: CARDIOVASCULAR - ADULT  Goal: Maintains optimal cardiac output and hemodynamic stability  Description: INTERVENTIONS:  - Monitor vital signs, rhythm, and trends  - Monitor for bleeding, hypotension and signs of decreased cardiac output  - Evaluate effectiveness of vasoactive medications to optimize hemodynamic stability  - Monitor arterial and/or venous puncture sites for bleeding and/or hematoma  - Assess quality of pulses, skin color and temperature  - Assess for signs of decreased coronary artery perfusion - ex. Angina  - Evaluate fluid balance, assess for edema, trend weights  Outcome: Progressing  Goal: Absence of cardiac arrhythmias or at baseline  Description: INTERVENTIONS:  - Continuous cardiac monitoring, monitor vital signs, obtain 12 lead EKG if indicated  - Evaluate effectiveness of antiarrhythmic and heart rate control medications as ordered  - Initiate emergency measures for life threatening arrhythmias  - Monitor electrolytes and administer replacement therapy as ordered  Outcome: Progressing     Problem: PAIN - ADULT  Goal: Verbalizes/displays adequate comfort level or patient's stated pain goal  Description: INTERVENTIONS:  - Encourage pt to monitor pain and request assistance  - Assess pain using appropriate pain scale  - Administer analgesics based on type and severity of pain and evaluate response  - Implement non-pharmacological measures as appropriate and evaluate response  - Consider cultural and social influences on pain and pain management  -  Requested Prescriptions     Pending Prescriptions Disp Refills    amoxicillin (AMOXIL) 500 MG capsule 6 capsule 0     Sig: Take 4 tablets by mouth 1 hour prior to procedure and 2 tablets by mouth 6 hours after procedure       Manage/alleviate anxiety  - Utilize distraction and/or relaxation techniques  - Monitor for opioid side effects  - Notify MD/LIP if interventions unsuccessful or patient reports new pain  - Anticipate increased pain with activity and pre-medicate as appropriate  Outcome: Progressing     Problem: Diabetes/Glucose Control  Goal: Glucose maintained within prescribed range  Description: INTERVENTIONS:  - Monitor Blood Glucose as ordered  - Assess for signs and symptoms of hyperglycemia and hypoglycemia  - Administer ordered medications to maintain glucose within target range  - Assess barriers to adequate nutritional intake and initiate nutrition consult as needed  - Instruct patient on self management of diabetes  Outcome: Progressing

## 2025-05-07 ENCOUNTER — APPOINTMENT (OUTPATIENT)
Dept: CARDIOLOGY | Age: 85
End: 2025-05-07

## 2025-05-14 ENCOUNTER — OFFICE VISIT (OUTPATIENT)
Dept: WOUND CARE | Facility: HOSPITAL | Age: 85
End: 2025-05-14
Attending: INTERNAL MEDICINE
Payer: MEDICARE

## 2025-05-14 VITALS
HEART RATE: 88 BPM | RESPIRATION RATE: 14 BRPM | DIASTOLIC BLOOD PRESSURE: 70 MMHG | SYSTOLIC BLOOD PRESSURE: 130 MMHG | TEMPERATURE: 98 F

## 2025-05-14 DIAGNOSIS — T14.8XXD DELAYED WOUND HEALING: ICD-10-CM

## 2025-05-14 DIAGNOSIS — L98.499 DIABETES MELLITUS WITH SKIN ULCER (HCC): ICD-10-CM

## 2025-05-14 DIAGNOSIS — I87.331 IDIOPATHIC CHRONIC VENOUS HYPERTENSION OF RIGHT LOWER EXTREMITY WITH ULCER AND INFLAMMATION (HCC): Primary | ICD-10-CM

## 2025-05-14 DIAGNOSIS — I48.0 PAROXYSMAL ATRIAL FIBRILLATION (HCC): ICD-10-CM

## 2025-05-14 DIAGNOSIS — E66.9 OBESITY (BMI 30-39.9): ICD-10-CM

## 2025-05-14 DIAGNOSIS — M79.89 RIGHT LEG SWELLING: ICD-10-CM

## 2025-05-14 DIAGNOSIS — E11.622 DIABETES MELLITUS WITH SKIN ULCER (HCC): ICD-10-CM

## 2025-05-14 DIAGNOSIS — I25.10 CORONARY ARTERY DISEASE INVOLVING NATIVE CORONARY ARTERY OF NATIVE HEART WITHOUT ANGINA PECTORIS: ICD-10-CM

## 2025-05-14 DIAGNOSIS — L97.212 NON-PRESSURE CHRONIC ULCER OF RIGHT CALF WITH FAT LAYER EXPOSED (HCC): ICD-10-CM

## 2025-05-14 DIAGNOSIS — Z79.01 LONG TERM (CURRENT) USE OF ANTICOAGULANTS: ICD-10-CM

## 2025-05-14 DIAGNOSIS — I87.303 CHRONIC VENOUS HYPERTENSION INVOLVING BOTH SIDES: ICD-10-CM

## 2025-05-14 LAB — GLUCOSE BLD-MCNC: 152 MG/DL (ref 70–99)

## 2025-05-14 PROCEDURE — 29581 APPL MULTLAYER CMPRN SYS LEG: CPT

## 2025-05-14 PROCEDURE — 82962 GLUCOSE BLOOD TEST: CPT | Performed by: INTERNAL MEDICINE

## 2025-05-14 PROCEDURE — 99214 OFFICE O/P EST MOD 30 MIN: CPT

## 2025-05-14 NOTE — PROGRESS NOTES
Weekly Wound Education Note    Teaching Provided To: Patient  Training Topics: Cleasing and general instructions, Compression, Discharge instructions, Dressing, Edema control              Notes: Initial visit for wounds to right leg. States she uses her lymphedema pumps. Hydrofera transfer, kerramax, conforming gauze, and comprilan 1x8, 1x10, 1x12. Provider with compression information sheet, educated on importance on compression.

## 2025-05-14 NOTE — PATIENT INSTRUCTIONS
4/6/2020        Gerber Moseley  [address] [facility] [address]           Return to work    This is to certify that Gerber Moseley has been under my care from 4/6/2020 and  is unable to return to work until further notice     Restrictions:  Excuse off work until recheck 4/13/2020    Remarks:  Concussion and laceration      Thank you,      _______________________________________________________   MD Pablo Lea M.D.  [facility], [facility] [address]  [address] [facility] [address]  Dept Phone: [phone]     Nurse visit on Friday or Monday  See me Wednesday  Intense Blood sugar control  Recommend start GLP-1 if tolerated.     Wound Cleaning and Dressings:    Shower with protection.   Dressing changes only in the clinic    DRESSINGS: HF transfer / kerramax  Change dressing twice weekly.     Compression Therapy : comprilan   Compression Therapy Instructions:  1.  Okay to wear overnight        2.  Avoid prolonged standing in one place.  It is better to have your calf muscles moving       to pump fluid out of the legs.    3.  Elevate leg(s) above the level of the heart when sitting or as much as possible.    4.  Take your diuretics as directed by your provider.  Do not skip doses or change doses      unless instructed to do so by your provider.    5. Do not get leg(s) with compression wrap wet. If wraps are too tight as indicated        By pain, numbness/tingling or discoloration of toes remove wrap completely       and call the   wound center.       Miscellaneous Instructions:  Supplement with a daily multivitamin   Low salt diet  Intense blood sugar control - Goal Blood sugar below 180 at all times recommended.  Increase protein intake / consider protein supplements - see below  Elevate extremities at all times when sitting / laying down.    DIETARY MODIFICATIONS TO HELP WITH WOUND HEALING:    Protein: Meats, beans, eggs, milk and yogurt particularly Greek yogurt), tofu, soy nuts, soy protein products    Vitamin C: Citrus fruits and juices, strawberries, tomatoes, tomato juice, peppers, baked potatoes, spinach, broccoli, cauliflower, [place] sprouts, cabbage    Vitamin A: Dark green, leafy vegetables, orange or yellow vegetables, cantaloupe, fortified dairy products, liver, fortified cereals    Zinc: Fortified cereals, red meats, seafood    Consider Johnnie by [employer] (These are essential branch chain amino acids that help with tissue building and wound healing) and take 2 packets/day. you can order online at [employer]  or amaz[employer]    ADDITIONAL REMINDERS:    The treatment plan has been discussed at length with you and your provider. Follow all instructions carefully, it is very important. If you do not follow all instructions, you are at  risk of your wound not healing, infection, possible loss of limb and even end of life.  Please call the clinic during regular business hours ( 7:30 AM - 5:30 PM) if you notice increased bleeding, redness, warmth, pain or pus like drainage or start running a fever greater than 100.3.    For after hour emergencies, please call your primary physician or go to the nearest emergency room.

## 2025-05-14 NOTE — PROGRESS NOTES
[facility] CONSULTATION NOTE  BERHANE TAPIA MD  5/14/2025    Subjective   Evangelina Ward is a 84 year old female.    Chief Complaint   Patient presents with    Wound Care     Initial visit for right leg wound. Pt stated that it started as blisters and opened up for there. Has been open for a few weeks. Pt arrives with no dressing in place and no compression.      HPI    84-year-old  female here for evaluation and management of open wound on the right leg.  She has few scattered open areas on the right leg which is clearly related to edema.  She said that these opened up as blisters which broke down into a full-thickness wound.  Duration more than a month    Patient tells me that she has had chronic pedal edema for a long time.  This is the first time that she has developed blisters and open wounds.  She has been working with her doctors for edema management.  She has been on diuretics and the diuretic dose was recently increased.    Of note she is not compliant with leg elevation at all.  She is sitting most of the day with the feet hanging down.    She is not compliant with low-salt diet at all.  She is mostly eating canned soups, boxed meals and processed foods most of which are very high in sodium.  She does understand that she needs to follow a low-salt diet but her  does not like that and she has a hard time preparing fresh foods.    There is no purulent drainage/malodor/redness periwound/fever.  No signs of active infection.    Diabetes status: yes  Last A1c value was 8.3% done 7/21/2024.      Smoker status: no    Past Medical history, Surgical history, Social history, Family history reviewed with patient.   Medications reviewed.   Epic chart notes including provider notes, labs, imaging etc. Reviewed.   [facility] care everywhere queried and results reviewed.     Past Medical Hx:  Past Medical History[1]  Past Surgical Hx:  Past Surgical History[2]  Problem List:  Problem  List[3]  Social History:  Short Social Hx on File[4]  Family History:  Family History[5]  Allergies:  Allergies[6]  Current Meds:  Current Medications[7]  Tobacco Counseling:  Counseling given: Not Answered       REVIEW OF SYSTEMS:   CONSTITUTIONAL:  Denies unusual weight gain/loss, fever, chills, or fatigue.  EENT:  Eyes:  Denies eye pain, visual loss, blurred vision, double vision or yellow sclerae.   CARDIOVASCULAR:  Denies chest pain, chest pressure, chest discomfort, palpitations, dyspnea on exertion or at rest.  RESPIRATORY:  Denies shortness of breath, wheezing, cough or sputum.  GASTROINTESTINAL:  Denies abdominal pain, nausea, vomiting, constipation, diarrhea, or blood in stool.  MUSCULOSKELETAL:  Denies weakness  NEUROLOGICAL:  Denies headache, seizures, dizziness, syncope      Objective   Objective  Physical Exam    Wound Assessment  Wound 05/14/25 #1 Right Anterior Leg Leg Right (Active)   Date First Assessed/Time First Assessed: 05/14/25 1520    Wound Number (Wound Clinic Only): #1 Right Anterior Leg  Primary Wound Type: Venous Ulcer  Location: Leg  Wound Location Orientation: Right      Assessments 5/14/2025  3:24 PM   Wound Image     Drainage Amount Unable to assess   Wound Length (cm) 5.7 cm   Wound Width (cm) 13.5 cm   Wound Surface Area (cm^2) 60.44 cm^2   Wound Depth (cm) 0.1 cm   Wound Volume (cm^3) 4.029 cm^3   Margins Well-defined edges   Non-staged Wound Description Full thickness   Neeru-wound Assessment Pink;Edema (Blister to periwound.)   Wound Granulation Tissue Red;Pink;Firm   Wound Bed Granulation (%) 40 %   Wound Bed Epithelium (%) 60 %   Wound Odor None   Shape Clustered       No associated orders.               PHYSICAL EXAM:   /70   Pulse 88   Temp 97.8 °F (36.6 °C)   Resp 14   LMP  (LMP Unknown)  Estimated body mass index is 31.78 kg/m² as calculated from the following:    Height as of 8/26/24: 65\".    Weight as of 9/12/24: 191 lb (86.6 kg).   Vital signs reviewed.Appears  stated age, well groomed.  Physical Exam:  GEN:  Patient is alert, awake and oriented, well developed, well nourished, no apparent distress.  HEENT:  Head:  Normocephalic, atraumatic   HEART:  Regular rate and rhythm, no murmurs, rubs or gallops.  LUNGS: Clear to auscultation bilterally, no rales/rhonchi/wheezing.  EXTREMITIES: Bilateral pedal edema 4+ pitting  Doppler evaluation-right dorsalis pedis strong triphasic waveform heard      Assessment   Assessment    Encounter Diagnosis  1. Idiopathic chronic venous hypertension of right lower extremity with ulcer and inflammation (HCC)    2. 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 Chronic venous hypertension involving both sides    4. Diabetes mellitus with skin ulcer (HCC)    5. Delayed wound healing    6. Right leg swelling    7. Long term (current) use of anticoagulants    8. Obesity (BMI 30-39.9)    9. Coronary artery disease involving native coronary artery of native heart without angina pectoris    10. Paroxysmal atrial fibrillation (HCC)        Problem List  Problem List[8]    Plan    Start absorptive dressings and multilayer compression wraps  Advised patient to focus on edema management-low-salt diet/leg elevation/avoid prolonged standing  Discussed low-salt food options in detail  Focus on losing weight  Consider GLP medications if tolerated-defer to PCP/endocrinologist  Return to clinic in a few days for dressing change  Return in 1 week for an appointment to see me.      PROCEDURES:     Compression wrap application      Patient Instructions     Nurse visit on Friday or Monday  See me Wednesday  Intense Blood sugar control  Recommend start GLP-1 if tolerated.     Wound Cleaning and Dressings:    Shower with protection.   Dressing changes only in the clinic    DRESSINGS: HF transfer / kerramax  Change dressing twice weekly.     Compression Therapy : comprilan   Compression Therapy Instructions:  1.  Okay to wear overnight         2.  Avoid prolonged standing in one place.  It is better to have your calf muscles moving       to pump fluid out of the legs.    3.  Elevate leg(s) above the level of the heart when sitting or as much as possible.    4.  Take your diuretics as directed by your provider.  Do not skip doses or change doses      unless instructed to do so by your provider.    5. Do not get leg(s) with compression wrap wet. If wraps are too tight as indicated        By pain, numbness/tingling or discoloration of toes remove wrap completely       and call the   wound center.       Miscellaneous Instructions:  Supplement with a daily multivitamin   Low salt diet  Intense blood sugar control - Goal Blood sugar below 180 at all times recommended.  Increase protein intake / consider protein supplements - see below  Elevate extremities at all times when sitting / laying down.    DIETARY MODIFICATIONS TO HELP WITH WOUND HEALING:    Protein: Meats, beans, eggs, milk and yogurt particularly Greek yogurt), tofu, soy nuts, soy protein products    Vitamin C: Citrus fruits and juices, strawberries, tomatoes, tomato juice, peppers, baked potatoes, spinach, broccoli, cauliflower, Richey sprouts, cabbage    Vitamin A: Dark green, leafy vegetables, orange or yellow vegetables, cantaloupe, fortified dairy products, liver, fortified cereals    Zinc: Fortified cereals, red meats, seafood    Consider Johnnie [employer] (These are essential branch chain amino acids that help with tissue building and wound healing) and take 2 packets/day. you can order online at [employer] or "[employer]    ADDITIONAL REMINDERS:    The treatment plan has been discussed at length with you and your provider. Follow all instructions carefully, it is very important. If you do not follow all instructions, you are at  risk of your wound not healing, infection, possible loss of limb and even end of life.  Please call the clinic during regular business hours ( 7:30 AM - 5:30 PM) if  you notice increased bleeding, redness, warmth, pain or pus like drainage or start running a fever greater than 100.3.    For after hour emergencies, please call your primary physician or go to the nearest emergency room.            Patient/Caregiver Education: There are no barriers to learning. Medical education for above diagnosis given.   Answered all questions.    Outcome: Patient verbalizes understanding. Patient is notified to call with any questions, complications, allergies, or worsening or changing symptoms.  Patient is to call with any side effects or complications as a result of the treatments today.      DOCUMENTATION OF TIME SPENT: Code selection for this visit was based on time spent : 60 min on date of service in preparing to see the patient, obtaining and/or reviewing separately obtained history, performing a medically appropriate examination, counseling and educating the patient/family/caregiver, ordering medications or testing, referring and communicating with other healthcare providers, documenting clinical information in the E HR, independently interpreting results and communicating results to the patient/family/caregiver and care coordination with the patient's other providers.    Followup: Return in about 1 week (around 5/21/2025) for Wound followup.      Note to Patient:  The 21st Century Cures Act makes medical notes like these available to patients in the interest of transparency. However, be advised this is a medical document and is intended as [id number] communication; it is written in medical language and may appear blunt, direct, or contain abbreviations or verbiage that are unfamiliar. Medical documents are intended to carry relevant information, facts as evident, and the clinical opinion of the practitioner.    Also, please note that this report has been produced using speech recognition software and may contain errors related to that system including, but not limited to, errors in  grammar, punctuation, and spelling, as well as words and phrases that possibly may have been recognized inappropriately.  If there are any questions or concerns, contact the dictating provider for clarification.    Bernard Vidales MD, 05/14/25, 4:54 PM                [1]   Past Medical History:   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 elsewhere classified    idiopathic angioedema    Arrhythmia    Back problem    sciatica    Carpal tunnel syndrome    CORONARY ARTERY DISEASE    11/06: Ant WMA on stress, 12/06: PTCA to LAD,  12/08 Nuclear stress neg, EF 62%    CORONARY ARTERY DISEASE    11/06: ant ischemia on stress,   12/06: PTCA to distal LAD,    12/08 nuclear stress neg, EF 62%    Coronary atherosclerosis    Diabetes (HCC)    High blood pressure    High cholesterol    Hx of motion sickness    vertigo    Hyperlipidemia    HYPERTENSION    MENOPAUSE    ERT [id number]    Muscle weakness    LEGS SHAKE WHEN STANDING OR USING STAIRS    OSTEOARTHRITIS    1/06 Rt menisectomy    Osteoarthritis    Overweight and obesity    Personal history of colonic polyps    2/99 adenoma, 4/03 negative    Type 2 diabetes mellitus, uncontrolled    Uncontrolled type 2 diabetes mellitus with diabetic polyneuropathy, with long-term current use of insulin    Unspecified hereditary and idiopathic peripheral neuropathy    Vertigo    Vitamin D deficiency   [2]   Past Surgical History:  Procedure Laterality Date    Anesth,knee arthroscopy      left knee repair torn meniscus    Angioplasty (coronary)  2006 w/2 drug eluting stents    Arthroscopy of joint unlisted Right     KNEE    Back surgery      L4-L5 TLIF    Cataract  05/2018    Bilateral    Cath drug eluting stent      X2    Cholecystectomy  1997    Colonoscopy  10/10/2013    Procedure: COLONOSCOPY;  Surgeon: Lauro Lazo MD;  Location:  ENDOSCOPY    Colonoscopy,biopsy  1999    adenomatous polyp    Colonoscopy,diagnostic  2003    wnl    Colonoscopy,diagnostic  10/10/13    normal    Endovenous  laser vein addon      Right Greater Saph V  per Dr. Castaneda    Hysterectomy      w/ BSO    Knee replacement surgery  03/17/2018    left    Other      lipoma removed from left arm    Other      trigger finger release left hand middle finger    Other surgical history      vein stripping    Revise median n/carpal tunnel surg      left carpal tunnel release   [3]   Patient Active Problem List  Diagnosis    Vertigo, benign paroxysmal    Abnormality of gait    Type 2 diabetes mellitus with diabetic neuropathy, with long-term current use of insulin (HCC)    CAD (coronary artery disease)    Obesity (BMI 30-39.9)    Lumbar radiculopathy    Lumbar facet arthropathy    Sacroiliitis, not elsewhere classified    Long-term insulin use (HCC)    Benign positional vertigo    Vitamin D deficiency    Uncontrolled type 2 diabetes mellitus with diabetic polyneuropathy, with long-term current use of insulin    Urinary incontinence    Primary hypertension    Microalbuminuria due to type 2 diabetes mellitus (HCC)    Hyperlipidemia, unspecified hyperlipidemia type    Spinal stenosis of lumbar region without neurogenic claudication    Depression, unspecified depression type    Lymphedema    Encounter for preprocedure screening laboratory testing for COVID-19    Primary osteoarthritis of left shoulder    Paroxysmal atrial fibrillation ([facility])    Weakness generalized    Fall, initial encounter    Injury of head, initial encounter    SDH (subdural hematoma) (HCC)    Type 2 diabetes mellitus with hyperglycemia (HCC)    Closed fracture of right hip (HCC)    Anemia    Azotemia    Acute saddle pulmonary embolism with acute cor pulmonale (HCC)   [4]   Social History  Socioeconomic History    Marital status:    Tobacco Use    Smoking status: Never    Smokeless tobacco: Never   Vaping Use    Vaping status: Never Used   Substance and Sexual Activity    Alcohol use: Not Currently     Comment: Maricruz and New Years    Drug use: Never    Sexual  activity: Not Currently     Partners: Male   Other Topics Concern    Caffeine Concern Yes     Comment: 2 cups a day    Exercise No    Seat Belt Yes   Social History Narrative        Health Mnt:    Pap: n/a (SARA/BSO)    Mamm: 6/08, 6/09, 7/10    Breast exam: 6/09, 5/10    DEXA: 6/08 wnl    Cholesterol: flow sheet/statin rx.    Colon: 10/08 (\"5-7 yrs\")    H/O:         calcium: takes    exercise: good, Son in [place] is working over [employer] as her      Social Drivers of Health     Food Insecurity: No Food Insecurity (8/26/2024)    Food Insecurity     Food Insecurity: Never true   Transportation Needs: No Transportation Needs (8/26/2024)    Transportation Needs     Lack of Transportation: No   Housing Stability: Low Risk  (8/26/2024)    Housing Stability     Housing Instability: No   [5]   Family History  Problem Relation Age of Onset    Other (colon cancer) Mother         age 77    Heart Disorder Father         age 75    Diabetes Paternal Grandmother     Other (gastric cancer) Maternal Grandmother    [6]   Allergies  Allergen Reactions    Cipro [Ciprofloxacin] HIVES    Insulin Aspart, Human Analog PAIN     Achiness. HA , head pains    Insulin Lispro, Human PAIN     Achiness, HA, head pain    Tylenol [Acetaminophen] SWELLING     Tongue/mouth    Benzonatate UNKNOWN   [7]   Current Outpatient Medications   Medication Sig Dispense Refill    apixaban 5 MG Oral Tab Take 1 tablet (5 mg total) by mouth 2 (two) times daily.      apixaban 5 MG Oral Tab Take 1 tablet (5 mg total) by mouth 2 (two) times daily. 60 tablet 0    bisacodyl 10 MG Rectal Suppos Place 1 suppository (10 mg total) rectally daily as needed.      traMADol 50 MG Oral Tab Take 1 tablet (50 mg total) by mouth every 6 (six) hours as needed. 12 tablet 0    atorvastatin 40 MG Oral Tab Take 0.5 tablets (20 mg total) by mouth nightly.      metFORMIN HCl  MG Oral Tablet 24 Hr Take 2 tablets (1,000 mg total) by mouth 2 (two) times daily. 360 tablet 3     insulin glargine (LANTUS SOLOSTAR) 100 UNIT/ML Subcutaneous Solution Pen-injector INJECT up to 48 UNITS INTO THE SKIN ONCE DAILY. (Patient taking differently: Inject 45 Units into the skin every morning.) 45 mL 3    glipiZIDE 10 MG Oral Tab Take 1 tablet (10 mg total) by mouth 2 (two) times daily before meals. 180 tablet 3    torsemide (DEMADEX) 20 MG Oral Tab Take 1 tablet (20 mg total) by mouth in the morning and 1 tablet (20 mg total) before bedtime.      hydrALAZINE 25 MG Oral Tab Take 1 tablet (25 mg total) by mouth daily. (Patient not taking: Reported on 5/14/2025)      Ferrous Gluconate 324 (38 Fe) MG Oral Tab Take 1 tablet (325 mg total) by mouth daily with breakfast. (Patient not taking: Reported on 5/14/2025)      Multiple Vitamin (MULTI-VITAMIN) Oral Tab Take 1 tablet by mouth daily. (Patient not taking: Reported on 5/14/2025)      ondansetron (ZOFRAN) 4 mg tablet Take 1 tablet (4 mg total) by mouth every 8 (eight) hours as needed for Nausea. (Patient not taking: Reported on 5/14/2025)      mirtazapine 7.5 MG Oral Tab Take 1 tablet (7.5 mg total) by mouth nightly. Started 8/1 in rehab (Patient not taking: Reported on 5/14/2025)      senna-docusate 8.6-50 MG Oral Tab Take 1 tablet by mouth daily. (Patient not taking: Reported on 5/14/2025)      cholecalciferol 50 MCG (2000 UT) Oral Tab Take 1 tablet (2,000 Units total) by mouth daily. (Patient not taking: Reported on 5/14/2025)      polyethylene glycol, PEG 3350, 17 g Oral Powd Pack Take 17 g by mouth daily as needed. (Patient not taking: Reported on 5/14/2025)     [8]   Patient Active Problem List  Diagnosis    Vertigo, benign paroxysmal    Abnormality of gait    Type 2 diabetes mellitus with diabetic neuropathy, with long-term current use of insulin (HCC)    CAD (coronary artery disease)    Obesity (BMI 30-39.9)    Lumbar radiculopathy    Lumbar facet arthropathy    Sacroiliitis, not elsewhere classified    Long-term insulin use (HCC)    Benign  positional vertigo    Vitamin D deficiency    Uncontrolled type 2 diabetes mellitus with diabetic polyneuropathy, with long-term current use of insulin    Urinary incontinence    Primary hypertension    Microalbuminuria due to type 2 diabetes mellitus (HCC)    Hyperlipidemia, unspecified hyperlipidemia type    Spinal stenosis of lumbar region without neurogenic claudication    Depression, unspecified depression type    Lymphedema    Encounter for preprocedure screening laboratory testing for COVID-19    Primary osteoarthritis of left shoulder    Paroxysmal atrial fibrillation ([facility])    Weakness generalized    Fall, initial encounter    Injury of head, initial encounter    SDH (subdural hematoma) ([facility])    Type 2 diabetes mellitus with hyperglycemia ([facility])    Closed fracture of right hip ([facility])    Anemia    Azotemia    Acute saddle pulmonary embolism with acute cor pulmonale (HCC)

## 2025-05-21 ENCOUNTER — OFFICE VISIT (OUTPATIENT)
Dept: WOUND CARE | Facility: HOSPITAL | Age: 85
End: 2025-05-21
Attending: INTERNAL MEDICINE
Payer: MEDICARE

## 2025-05-21 VITALS
RESPIRATION RATE: 14 BRPM | DIASTOLIC BLOOD PRESSURE: 73 MMHG | HEART RATE: 66 BPM | TEMPERATURE: 98 F | SYSTOLIC BLOOD PRESSURE: 152 MMHG

## 2025-05-21 DIAGNOSIS — M79.89 RIGHT LEG SWELLING: ICD-10-CM

## 2025-05-21 DIAGNOSIS — E66.9 OBESITY (BMI 30-39.9): ICD-10-CM

## 2025-05-21 DIAGNOSIS — I25.10 CORONARY ARTERY DISEASE INVOLVING NATIVE CORONARY ARTERY OF NATIVE HEART WITHOUT ANGINA PECTORIS: ICD-10-CM

## 2025-05-21 DIAGNOSIS — Z79.01 LONG TERM (CURRENT) USE OF ANTICOAGULANTS: ICD-10-CM

## 2025-05-21 DIAGNOSIS — T14.8XXD DELAYED WOUND HEALING: ICD-10-CM

## 2025-05-21 DIAGNOSIS — L98.499 DIABETES MELLITUS WITH SKIN ULCER (HCC): ICD-10-CM

## 2025-05-21 DIAGNOSIS — L97.212 NON-PRESSURE CHRONIC ULCER OF RIGHT CALF WITH FAT LAYER EXPOSED (HCC): ICD-10-CM

## 2025-05-21 DIAGNOSIS — I87.303 CHRONIC VENOUS HYPERTENSION INVOLVING BOTH SIDES: ICD-10-CM

## 2025-05-21 DIAGNOSIS — I48.0 PAROXYSMAL ATRIAL FIBRILLATION (HCC): ICD-10-CM

## 2025-05-21 DIAGNOSIS — E11.622 DIABETES MELLITUS WITH SKIN ULCER (HCC): ICD-10-CM

## 2025-05-21 DIAGNOSIS — I87.331 IDIOPATHIC CHRONIC VENOUS HYPERTENSION OF RIGHT LOWER EXTREMITY WITH ULCER AND INFLAMMATION (HCC): Primary | ICD-10-CM

## 2025-05-21 LAB — GLUCOSE BLD-MCNC: 239 MG/DL (ref 70–99)

## 2025-05-21 PROCEDURE — 29581 APPL MULTLAYER CMPRN SYS LEG: CPT

## 2025-05-21 PROCEDURE — 82962 GLUCOSE BLOOD TEST: CPT | Performed by: INTERNAL MEDICINE

## 2025-05-21 NOTE — PROGRESS NOTES
Weekly Wound Education Note    Teaching Provided To: Patient  Training Topics: Cleasing and general instructions, Compression, Discharge instructions, Dressing, Edema control  Training Method: Explain/Verbal  Training Response: Patient responds and understands, Reinforcement needed        Notes: Improving. Endoform, sorbact, hydrofera transfer, ABD pad, and comprilan 1x8, 2x10, 1x12. Return in 1 week. Order compression garment.

## 2025-05-21 NOTE — PROGRESS NOTES
[facility] PROGRESS NOTE  BERHANE TAPIA MD  5/21/2025    Chief Complaint:   Chief Complaint   Patient presents with    Wound Recheck     Patient arrives to wound care appointment concerning her RLE. RLE wrapped in comprilan compression. Denies pain to wound. No new questions or concerns today.       HPI:   Subjective   Evangelina Ward is a 84 year old female coming in for a follow-up visit.    HPI    Wound-improved overall.  Dimensions slightly smaller  Good granulation on wound base  Edema decreased but still continues to be present.  No signs of active infection at this point of time.  Family present    Review of Systems  Negative except HPI   Denies chest pain / SOB / palpitations  Denies fever.     Allergies  Allergies[1]    Current Meds:  Current Medications[2]      EXAM:     Objective   Objective    Physical Exam    Vital Signs  Vitals:    05/21/25 1440   BP: 152/73   Pulse: 66   Resp: 14   Temp: 98 °F (36.7 °C)       Wound Assessment  Wound 05/14/25 #1 Right Anterior Leg Leg Right (Active)   Date First Assessed/Time First Assessed: 05/14/25 1520    Wound Number (Wound Clinic Only): #1 Right Anterior Leg  Primary Wound Type: Venous Ulcer  Location: Leg  Wound Location Orientation: Right      Assessments 5/14/2025  3:24 PM 5/21/2025  2:41 PM   Wound Image       Drainage Amount Unable to assess Moderate   Drainage Description -- Serosanguineous   Treatments Compression (1x8, 1x10, 1x12, cellona x2, stockinette) --   Wound Length (cm) 5.7 cm 5.3 cm   Wound Width (cm) 13.5 cm 10.6 cm   Wound Surface Area (cm^2) 60.44 cm^2 44.12 cm^2   Wound Depth (cm) 0.1 cm 0.1 cm   Wound Volume (cm^3) 4.029 cm^3 2.942 cm^3   Wound Healing % -- 27   Margins Well-defined edges Well-defined edges   Non-staged Wound Description Full thickness Full thickness   Neeru-wound Assessment Pink;Edema (Blister to periwound.) Edema   Wound Granulation Tissue Red;Pink;Firm Red;Pink;Firm   Wound Bed Granulation (%) 40 % 30 %   Wound  Bed Epithelium (%) 60 % 70 %   Wound Odor None None   Shape Clustered clustered   Tunneling? No No   Undermining? No No   Sinus Tracts? No No       No associated orders.       Compression Wrap 05/14/25 Leg Anterior;Right (Active)   Placement Date: 05/14/25   Location: Leg  Wound Location Orientation: Anterior;Right      Assessments 5/14/2025  5:13 PM   Response to Treatment Well tolerated   Compression Layers Multilayer   Compression Product Type Comprilan 8cm;Comprilan 10cm;Comprilan 12cm;Coban;Stockinette 4in (1x8, 1x10, 1x12, cellona x2)   Dressing Applied Yes   Compression Wrap Location Toes to Knee   Compression Wrap Status Clean;Dry;Intact       No associated orders.                ASSESSMENT AND PLAN:     Assessment     Encounter Diagnosis  1. Idiopathic chronic venous hypertension of right lower extremity with ulcer and inflammation (HCC)    2. 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 Chronic venous hypertension involving both sides    4. Diabetes mellitus with skin ulcer (HCC)    5. Delayed wound healing    6. Right leg swelling    7. Long term (current) use of anticoagulants    8. Obesity (BMI 30-39.9)    9. Coronary artery disease involving native coronary artery of native heart without angina pectoris    10. Paroxysmal atrial fibrillation (HCC)            PROCEDURES:    Compression wrap application after dressing change.      PLAN OF CARE:    Continue absorptive dressings and Comprilan compression wraps.  Increase the level of compression.   focus on edema reduction  Low-salt diet/leg elevation  Avoid prolonged standing or sitting.  Watch out for signs of early infection - counseled.   Plan of care discussed with patient in detail - All questions answered   Return in one week.       Patient Instructions     Nurse visit on Friday or tuesday  See me Wednesday  Intense Blood sugar control  Recommend start GLP-1 if tolerated.     Wound Cleaning and Dressings:    Shower with  protection.   Dressing changes only in the clinic    DRESSINGS: HF transfer / kerramax  Change dressing twice weekly.     Compression Therapy : comprilan   Compression Therapy Instructions:  1.  Okay to wear overnight        2.  Avoid prolonged standing in one place.  It is better to have your calf muscles moving       to pump fluid out of the legs.    3.  Elevate leg(s) above the level of the heart when sitting or as much as possible.    4.  Take your diuretics as directed by your provider.  Do not skip doses or change doses      unless instructed to do so by your provider.    5. Do not get leg(s) with compression wrap wet. If wraps are too tight as indicated        By pain, numbness/tingling or discoloration of toes remove wrap completely       and call the   wound center.       Miscellaneous Instructions:  Supplement with a daily multivitamin   Low salt diet  Intense blood sugar control - Goal Blood sugar below 180 at all times recommended.  Increase protein intake / consider protein supplements - see below  Elevate extremities at all times when sitting / laying down.    DIETARY MODIFICATIONS TO HELP WITH WOUND HEALING:    Protein: Meats, beans, eggs, milk and yogurt particularly Greek yogurt), tofu, soy nuts, soy protein products    Vitamin C: Citrus fruits and juices, strawberries, tomatoes, tomato juice, peppers, baked potatoes, spinach, broccoli, cauliflower, [place] sprouts, cabbage    Vitamin A: Dark green, leafy vegetables, orange or yellow vegetables, cantaloupe, fortified dairy products, liver, fortified cereals    Zinc: Fortified cereals, red meats, seafood    Consider Johnnie by [employer] (These are essential branch chain amino acids that help with tissue building and wound healing) and take 2 packets/day. you can order online at [employer] or [email or web address]    ADDITIONAL REMINDERS:    The treatment plan has been discussed at length with you and your provider. Follow all instructions carefully, it is very  important. If you do not follow all instructions, you are at  risk of your wound not healing, infection, possible loss of limb and even end of life.  Please call the clinic during regular business hours ( 7:30 AM - 5:30 PM) if you notice increased bleeding, redness, warmth, pain or pus like drainage or start running a fever greater than 100.3.    For after hour emergencies, please call your primary physician or go to the nearest emergency room.        Patient/Caregiver Education: There are no barriers to learning. Medical education for above diagnosis given.   Answered all questions.    Outcome: Patient verbalizes understanding. Patient is notified to call with any questions, complications, allergies, or worsening or changing symptoms.  Patient is to call with any side effects or complications as a result of the treatments today.      DOCUMENTATION OF TIME SPENT: Code selection for this visit was based on time spent : 30 min on date of service in preparing to see the patient, obtaining and/or reviewing separately obtained history, performing a medically appropriate examination, counseling and educating the patient/family/caregiver, ordering medications or testing, referring and communicating with other healthcare providers, documenting clinical information in the E HR, independently interpreting results and communicating results to the patient/family/caregiver and care coordination with the patient's other providers.    Followup: Return in about 1 week (around 5/28/2025) for Wound followup.      Note to Patient:  The 21st Century Cures Act makes medical notes like these available to patients in the interest of transparency. However, be advised this is a medical document and is intended as [id number] communication; it is written in medical language and may appear blunt, direct, or contain abbreviations or verbiage that are unfamiliar. Medical documents are intended to carry relevant information, facts as evident,  and the clinical opinion of the practitioner.    Also, please note that this report has been produced using speech recognition software and may contain errors related to that system including, but not limited to, errors in grammar, punctuation, and spelling, as well as words and phrases that possibly may have been recognized inappropriately.  If there are any questions or concerns, contact the dictating provider for clarification.      Bernard Vidales MD  5/21/2025  4:33 PM                      [1]   Allergies  Allergen Reactions    Cipro [Ciprofloxacin] HIVES    Insulin Aspart, Human Analog PAIN     Achiness. HA , head pains    Insulin Lispro, Human PAIN     Achiness, HA, head pain    Tylenol [Acetaminophen] SWELLING     Tongue/mouth    Benzonatate UNKNOWN   [2]   Current Outpatient Medications   Medication Sig Dispense Refill    apixaban 5 MG Oral Tab Take 1 tablet (5 mg total) by mouth 2 (two) times daily.      hydrALAZINE 25 MG Oral Tab Take 1 tablet (25 mg total) by mouth daily. (Patient not taking: Reported on 5/14/2025)      apixaban 5 MG Oral Tab Take 1 tablet (5 mg total) by mouth 2 (two) times daily. 60 tablet 0    bisacodyl 10 MG Rectal Suppos Place 1 suppository (10 mg total) rectally daily as needed.      Ferrous Gluconate 324 (38 Fe) MG Oral Tab Take 1 tablet (325 mg total) by mouth daily with breakfast. (Patient not taking: Reported on 5/14/2025)      Multiple Vitamin (MULTI-VITAMIN) Oral Tab Take 1 tablet by mouth daily. (Patient not taking: Reported on 5/14/2025)      ondansetron (ZOFRAN) 4 mg tablet Take 1 tablet (4 mg total) by mouth every 8 (eight) hours as needed for Nausea. (Patient not taking: Reported on 5/14/2025)      mirtazapine 7.5 MG Oral Tab Take 1 tablet (7.5 mg total) by mouth nightly. Started 8/1 in rehab (Patient not taking: Reported on 5/14/2025)      senna-docusate 8.6-50 MG Oral Tab Take 1 tablet by mouth daily. (Patient not taking: Reported on 5/14/2025)      traMADol 50 MG  Oral Tab Take 1 tablet (50 mg total) by mouth every 6 (six) hours as needed. 12 tablet 0    cholecalciferol 50 MCG (2000 UT) Oral Tab Take 1 tablet (2,000 Units total) by mouth daily. (Patient not taking: Reported on 5/14/2025)      polyethylene glycol, PEG 3350, 17 g Oral Powd Pack Take 17 g by mouth daily as needed. (Patient not taking: Reported on 5/14/2025)      atorvastatin 40 MG Oral Tab Take 0.5 tablets (20 mg total) by mouth nightly.      metFORMIN HCl  MG Oral Tablet 24 Hr Take 2 tablets (1,000 mg total) by mouth 2 (two) times daily. 360 tablet 3    insulin glargine (LANTUS SOLOSTAR) 100 UNIT/ML Subcutaneous Solution Pen-injector INJECT up to 48 UNITS INTO THE SKIN ONCE DAILY. (Patient taking differently: Inject 45 Units into the skin every morning.) 45 mL 3    glipiZIDE 10 MG Oral Tab Take 1 tablet (10 mg total) by mouth 2 (two) times daily before meals. 180 tablet 3    torsemide (DEMADEX) 20 MG Oral Tab Take 1 tablet (20 mg total) by mouth in the morning and 1 tablet (20 mg total) before bedtime.

## 2025-05-21 NOTE — PATIENT INSTRUCTIONS
See me Wednesday  Intense Blood sugar control  Recommend start GLP-1 if tolerated.     Wound Cleaning and Dressings:    Shower with protection.   Dressing changes only in the clinic    DRESSINGS: HF transfer / kerramax  Change dressing weekly.     Compression Therapy : comprilan   Compression Therapy Instructions:  1.  Okay to wear overnight        2.  Avoid prolonged standing in one place.  It is better to have your calf muscles moving       to pump fluid out of the legs.    3.  Elevate leg(s) above the level of the heart when sitting or as much as possible.    4.  Take your diuretics as directed by your provider.  Do not skip doses or change doses      unless instructed to do so by your provider.    5. Do not get leg(s) with compression wrap wet. If wraps are too tight as indicated        By pain, numbness/tingling or discoloration of toes remove wrap completely       and call the   wound center.       Miscellaneous Instructions:  Supplement with a daily multivitamin   Low salt diet  Intense blood sugar control - Goal Blood sugar below 180 at all times recommended.  Increase protein intake / consider protein supplements - see below  Elevate extremities at all times when sitting / laying down.    DIETARY MODIFICATIONS TO HELP WITH WOUND HEALING:    Protein: Meats, beans, eggs, milk and yogurt particularly Greek yogurt), tofu, soy nuts, soy protein products    Vitamin C: Citrus fruits and juices, strawberries, tomatoes, tomato juice, peppers, baked potatoes, spinach, broccoli, cauliflower, [place] sprouts, cabbage    Vitamin A: Dark green, leafy vegetables, orange or yellow vegetables, cantaloupe, fortified dairy products, liver, fortified cereals    Zinc: Fortified cereals, red meats, seafood    Consider Johnnie by [employer] (These are essential branch chain amino acids that help with tissue building and wound healing) and take 2 packets/day. you can order online at [employer] or [employer]  REMINDERS:    The treatment plan has been discussed at length with you and your provider. Follow all instructions carefully, it is very important. If you do not follow all instructions, you are at  risk of your wound not healing, infection, possible loss of limb and even end of life.  Please call the clinic during regular business hours ( 7:30 AM - 5:30 PM) if you notice increased bleeding, redness, warmth, pain or pus like drainage or start running a fever greater than 100.3.    For after hour emergencies, please call your primary physician or go to the nearest emergency room.

## 2025-05-28 ENCOUNTER — OFFICE VISIT (OUTPATIENT)
Dept: WOUND CARE | Facility: HOSPITAL | Age: 85
End: 2025-05-28
Attending: INTERNAL MEDICINE
Payer: MEDICARE

## 2025-05-28 VITALS
RESPIRATION RATE: 14 BRPM | TEMPERATURE: 98 F | SYSTOLIC BLOOD PRESSURE: 140 MMHG | HEART RATE: 86 BPM | DIASTOLIC BLOOD PRESSURE: 72 MMHG

## 2025-05-28 DIAGNOSIS — Z79.01 LONG TERM (CURRENT) USE OF ANTICOAGULANTS: ICD-10-CM

## 2025-05-28 DIAGNOSIS — T14.8XXD DELAYED WOUND HEALING: ICD-10-CM

## 2025-05-28 DIAGNOSIS — L97.212 NON-PRESSURE CHRONIC ULCER OF RIGHT CALF WITH FAT LAYER EXPOSED (HCC): ICD-10-CM

## 2025-05-28 DIAGNOSIS — I48.0 PAROXYSMAL ATRIAL FIBRILLATION (HCC): ICD-10-CM

## 2025-05-28 DIAGNOSIS — L98.499 DIABETES MELLITUS WITH SKIN ULCER (HCC): ICD-10-CM

## 2025-05-28 DIAGNOSIS — I87.331 IDIOPATHIC CHRONIC VENOUS HYPERTENSION OF RIGHT LOWER EXTREMITY WITH ULCER AND INFLAMMATION (HCC): Primary | ICD-10-CM

## 2025-05-28 DIAGNOSIS — M79.89 RIGHT LEG SWELLING: ICD-10-CM

## 2025-05-28 DIAGNOSIS — E66.9 OBESITY (BMI 30-39.9): ICD-10-CM

## 2025-05-28 DIAGNOSIS — E11.622 DIABETES MELLITUS WITH SKIN ULCER (HCC): ICD-10-CM

## 2025-05-28 DIAGNOSIS — I25.10 CORONARY ARTERY DISEASE INVOLVING NATIVE CORONARY ARTERY OF NATIVE HEART WITHOUT ANGINA PECTORIS: ICD-10-CM

## 2025-05-28 DIAGNOSIS — I87.303 CHRONIC VENOUS HYPERTENSION INVOLVING BOTH SIDES: ICD-10-CM

## 2025-05-28 PROCEDURE — 29581 APPL MULTLAYER CMPRN SYS LEG: CPT

## 2025-05-28 NOTE — PATIENT INSTRUCTIONS
Return one week for nurse visit.   OK to discharge  if wound stays closed.   Intense Blood sugar control  Recommend start GLP-1 if tolerated.     Wound Cleaning and Dressings:    Shower with protection.   Dressing changes only in the clinic  Change dressing weekly.     Compression Therapy : coflex-2  Compression Therapy Instructions:  1.  Okay to wear overnight        2.  Avoid prolonged standing in one place.  It is better to have your calf muscles moving       to pump fluid out of the legs.    3.  Elevate leg(s) above the level of the heart when sitting or as much as possible.    4.  Take your diuretics as directed by your provider.  Do not skip doses or change doses      unless instructed to do so by your provider.    5. Do not get leg(s) with compression wrap wet. If wraps are too tight as indicated        By pain, numbness/tingling or discoloration of toes remove wrap completely       and call the   wound center.       Miscellaneous Instructions:  Supplement with a daily multivitamin   Low salt diet  Intense blood sugar control - Goal Blood sugar below 180 at all times recommended.  Increase protein intake / consider protein supplements - see below  Elevate extremities at all times when sitting / laying down.    DIETARY MODIFICATIONS TO HELP WITH WOUND HEALING:    Protein: Meats, beans, eggs, milk and yogurt particularly Greek yogurt), tofu, soy nuts, soy protein products    Vitamin C: Citrus fruits and juices, strawberries, tomatoes, tomato juice, peppers, baked potatoes, spinach, broccoli, cauliflower, [place] sprouts, cabbage    Vitamin A: Dark green, leafy vegetables, orange or yellow vegetables, cantaloupe, fortified dairy products, liver, fortified cereals    Zinc: Fortified cereals, red meats, seafood    Consider Johnnie by [employer] (These are essential branch chain amino acids that help with tissue building and wound healing) and take 2 packets/day. you can order online at [employer] or  [employer]    ADDITIONAL REMINDERS:    The treatment plan has been discussed at length with you and your provider. Follow all instructions carefully, it is very important. If you do not follow all instructions, you are at  risk of your wound not healing, infection, possible loss of limb and even end of life.  Please call the clinic during regular business hours ( 7:30 AM - 5:30 PM) if you notice increased bleeding, redness, warmth, pain or pus like drainage or start running a fever greater than 100.3.    For after hour emergencies, please call your primary physician or go to the nearest emergency room.

## 2025-05-28 NOTE — PROGRESS NOTES
Weekly Wound Education Note    Teaching Provided To: Patient, Family  Training Topics: Cleasing and general instructions, Compression, Discharge instructions, Edema control  Training Method: Explain/Verbal  Training Response: Reinforcement needed        Notes: Much improved, fragilly healed. Pt states she did get her compression in the mail but forgot it at home. Decreased compression to a unna boot 30-40mmhg today. If patient remains healed per provider ok to discharge next week at RN visit - transition into velcro compression wrap.

## 2025-05-28 NOTE — PROGRESS NOTES
[facility] PROGRESS NOTE  BERHANE TAPIA MD  5/28/2025    Chief Complaint:   Chief Complaint   Patient presents with    Wound Care     Patients is here for a follow up. Patients stated no issue at this moment        HPI:   Subjective   Evangelina Ward is a 84 year old female coming in for a follow-up visit.    HPI    Wound closed  Skin still maturing.   No s/o infection  Has compression garment from DME at home.   Edema improved     Review of Systems  Negative except HPI   Denies chest pain / SOB / palpitations  Denies fever.     Allergies  Allergies[1]    Current Meds:  Current Medications[2]      EXAM:     Objective   Objective    Physical Exam    Vital Signs  Vitals:    05/28/25 0700   BP: 140/72   Pulse: 86   Resp: 14   Temp: 97.5 °F (36.4 °C)       Wound Assessment  Wound 05/14/25 #1 Right Anterior Leg Leg Right (Active)   Date First Assessed/Time First Assessed: 05/14/25 1520    Wound Number (Wound Clinic Only): #1 Right Anterior Leg  Primary Wound Type: Venous Ulcer  Location: Leg  Wound Location Orientation: Right      Assessments 5/14/2025  3:24 PM 5/28/2025  9:02 AM   Wound Image       Drainage Amount Unable to assess None   Treatments Compression (1x8, 1x10, 1x12, cellona x2, stockinette) Compression (unna boot 30-40mmhg)   Wound Length (cm) 5.7 cm 0.1 cm   Wound Width (cm) 13.5 cm 0.1 cm   Wound Surface Area (cm^2) 60.44 cm^2 0.01 cm^2   Wound Depth (cm) 0.1 cm 0 cm   Wound Volume (cm^3) 4.029 cm^3 0 cm^3   Wound Healing % -- 100   Margins Well-defined edges Well-defined edges   Non-staged Wound Description Full thickness Full thickness   Neeru-wound Assessment Pink;Edema (Blister to periwound.) Edema   Wound Granulation Tissue Red;Pink;Firm Firm;Pink   Wound Bed Granulation (%) 40 % 100 %   Wound Bed Epithelium (%) 60 % --   Wound Odor None None   Shape Clustered --   Tunneling? No No   Undermining? No No   Sinus Tracts? No No       No associated orders.       Compression Wrap 05/14/25 Leg  Anterior;Right (Active)   Placement Date: 05/14/25   Location: Leg  Wound Location Orientation: Anterior;Right      Assessments 5/14/2025  5:13 PM 5/28/2025  9:33 AM   Response to Treatment Well tolerated Well tolerated   Compression Layers Multilayer Multilayer   Compression Product Type Comprilan 8cm;Comprilan 10cm;Comprilan 12cm;Coban;Stockinette 4in (1x8, 1x10, 1x12, cellona x2) Unna Boot (30-40mmhg)   Dressing Applied Yes Yes   Compression Wrap Location Toes to Knee Toes to Knee   Compression Wrap Status Clean;Dry;Intact Clean;Dry;Intact       No associated orders.                ASSESSMENT AND PLAN:     Assessment     Encounter Diagnosis  1. Idiopathic chronic venous hypertension of right lower extremity with ulcer and inflammation (HCC)    2. 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 Chronic venous hypertension involving both sides    4. Diabetes mellitus with skin ulcer (HCC)    5. Delayed wound healing    6. Right leg swelling    7. Long term (current) use of anticoagulants    8. Obesity (BMI 30-39.9)    9. Coronary artery disease involving native coronary artery of native heart without angina pectoris    10. Paroxysmal atrial fibrillation (HCC)        PROCEDURES:    Coflex-2 compression wrap application.       PLAN OF CARE:    Decrease compression   Watch out for signs of early infection - counseled.   Plan of care discussed with patient in detail - All questions answered   Return in one week.     Patient Instructions     Return one week for nurse visit.   OK to discharge  if wound stays closed.   Intense Blood sugar control  Recommend start GLP-1 if tolerated.     Wound Cleaning and Dressings:    Shower with protection.   Dressing changes only in the clinic  Change dressing weekly.     Compression Therapy : coflex-2  Compression Therapy Instructions:  1.  Okay to wear overnight        2.  Avoid prolonged standing in one place.  It is better to have your calf muscles  moving       to pump fluid out of the legs.    3.  Elevate leg(s) above the level of the heart when sitting or as much as possible.    4.  Take your diuretics as directed by your provider.  Do not skip doses or change doses      unless instructed to do so by your provider.    5. Do not get leg(s) with compression wrap wet. If wraps are too tight as indicated        By pain, numbness/tingling or discoloration of toes remove wrap completely       and call the   wound center.       Miscellaneous Instructions:  Supplement with a daily multivitamin   Low salt diet  Intense blood sugar control - Goal Blood sugar below 180 at all times recommended.  Increase protein intake / consider protein supplements - see below  Elevate extremities at all times when sitting / laying down.    DIETARY MODIFICATIONS TO HELP WITH WOUND HEALING:    Protein: Meats, beans, eggs, milk and yogurt particularly Greek yogurt), tofu, soy nuts, soy protein products    Vitamin C: Citrus fruits and juices, strawberries, tomatoes, tomato juice, peppers, baked potatoes, spinach, broccoli, cauliflower, Trail sprouts, cabbage    Vitamin A: Dark green, leafy vegetables, orange or yellow vegetables, cantaloupe, fortified dairy products, liver, fortified cereals    Zinc: Fortified cereals, red meats, seafood    Consider Johnnie by [employer] (These are essential branch chain amino acids that help with tissue building and wound healing) and take 2 packets/day. you can order online at [employer] or [employer]    ADDITIONAL REMINDERS:    The treatment plan has been discussed at length with you and your provider. Follow all instructions carefully, it is very important. If you do not follow all instructions, you are at  risk of your wound not healing, infection, possible loss of limb and even end of life.  Please call the clinic during regular business hours ( 7:30 AM - 5:30 PM) if you notice increased bleeding, redness, warmth, pain or pus like drainage or start  running a fever greater than 100.3.    For after hour emergencies, please call your primary physician or go to the nearest emergency room.    Patient/Caregiver Education: There are no barriers to learning. Medical education for above diagnosis given.   Answered all questions.    Outcome: Patient verbalizes understanding. Patient is notified to call with any questions, complications, allergies, or worsening or changing symptoms.  Patient is to call with any side effects or complications as a result of the treatments today.      DOCUMENTATION OF TIME SPENT: Code selection for this visit was based on time spent : 30 min on date of service in preparing to see the patient, obtaining and/or reviewing separately obtained history, performing a medically appropriate examination, counseling and educating the patient/family/caregiver, ordering medications or testing, referring and communicating with other healthcare providers, documenting clinical information in the E HR, independently interpreting results and communicating results to the patient/family/caregiver and care coordination with the patient's other providers.    Followup: Return in about 1 week (around 6/4/2025) for Wound followup.      Note to Patient:  The 21st Century Cures Act makes medical notes like these available to patients in the interest of transparency. However, be advised this is a medical document and is intended as nwtt-ew-ynns communication; it is written in medical language and may appear blunt, direct, or contain abbreviations or verbiage that are unfamiliar. Medical documents are intended to carry relevant information, facts as evident, and the clinical opinion of the practitioner.    Also, please note that this report has been produced using speech recognition software and may contain errors related to that system including, but not limited to, errors in grammar, punctuation, and spelling, as well as words and phrases that possibly may have been  recognized inappropriately.  If there are any questions or concerns, contact the dictating provider for clarification.      Bernard Vidales MD  5/28/2025  9:41 AM                      [1]   Allergies  Allergen Reactions    Cipro [Ciprofloxacin] HIVES    Insulin Aspart, Human Analog PAIN     Achiness. HA , head pains    Insulin Lispro, Human PAIN     Achiness, HA, head pain    Tylenol [Acetaminophen] SWELLING     Tongue/mouth    Benzonatate UNKNOWN   [2]   Current Outpatient Medications   Medication Sig Dispense Refill    apixaban 5 MG Oral Tab Take 1 tablet (5 mg total) by mouth 2 (two) times daily.      hydrALAZINE 25 MG Oral Tab Take 1 tablet (25 mg total) by mouth daily. (Patient not taking: Reported on 5/14/2025)      apixaban 5 MG Oral Tab Take 1 tablet (5 mg total) by mouth 2 (two) times daily. 60 tablet 0    bisacodyl 10 MG Rectal Suppos Place 1 suppository (10 mg total) rectally daily as needed.      Ferrous Gluconate 324 (38 Fe) MG Oral Tab Take 1 tablet (325 mg total) by mouth daily with breakfast. (Patient not taking: Reported on 5/14/2025)      Multiple Vitamin (MULTI-VITAMIN) Oral Tab Take 1 tablet by mouth daily. (Patient not taking: Reported on 5/14/2025)      ondansetron (ZOFRAN) 4 mg tablet Take 1 tablet (4 mg total) by mouth every 8 (eight) hours as needed for Nausea. (Patient not taking: Reported on 5/14/2025)      mirtazapine 7.5 MG Oral Tab Take 1 tablet (7.5 mg total) by mouth nightly. Started 8/1 in rehab (Patient not taking: Reported on 5/14/2025)      senna-docusate 8.6-50 MG Oral Tab Take 1 tablet by mouth daily. (Patient not taking: Reported on 5/14/2025)      traMADol 50 MG Oral Tab Take 1 tablet (50 mg total) by mouth every 6 (six) hours as needed. 12 tablet 0    cholecalciferol 50 MCG (2000 UT) Oral Tab Take 1 tablet (2,000 Units total) by mouth daily. (Patient not taking: Reported on 5/14/2025)      polyethylene glycol, PEG 3350, 17 g Oral Powd Pack Take 17 g by mouth daily as  needed. (Patient not taking: Reported on 5/14/2025)      atorvastatin 40 MG Oral Tab Take 0.5 tablets (20 mg total) by mouth nightly.      metFORMIN HCl  MG Oral Tablet 24 Hr Take 2 tablets (1,000 mg total) by mouth 2 (two) times daily. 360 tablet 3    insulin glargine (LANTUS SOLOSTAR) 100 UNIT/ML Subcutaneous Solution Pen-injector INJECT up to 48 UNITS INTO THE SKIN ONCE DAILY. (Patient taking differently: Inject 45 Units into the skin every morning.) 45 mL 3    glipiZIDE 10 MG Oral Tab Take 1 tablet (10 mg total) by mouth 2 (two) times daily before meals. 180 tablet 3    torsemide (DEMADEX) 20 MG Oral Tab Take 1 tablet (20 mg total) by mouth in the morning and 1 tablet (20 mg total) before bedtime.

## 2025-06-04 ENCOUNTER — OFFICE VISIT (OUTPATIENT)
Dept: WOUND CARE | Facility: HOSPITAL | Age: 85
End: 2025-06-04
Attending: INTERNAL MEDICINE
Payer: MEDICARE

## 2025-06-04 VITALS
HEART RATE: 78 BPM | RESPIRATION RATE: 14 BRPM | SYSTOLIC BLOOD PRESSURE: 136 MMHG | TEMPERATURE: 98 F | DIASTOLIC BLOOD PRESSURE: 60 MMHG

## 2025-06-04 DIAGNOSIS — L97.212 NON-PRESSURE CHRONIC ULCER OF RIGHT CALF WITH FAT LAYER EXPOSED (HCC): ICD-10-CM

## 2025-06-04 DIAGNOSIS — M79.89 RIGHT LEG SWELLING: ICD-10-CM

## 2025-06-04 DIAGNOSIS — I87.331 IDIOPATHIC CHRONIC VENOUS HYPERTENSION OF RIGHT LOWER EXTREMITY WITH ULCER AND INFLAMMATION (HCC): Primary | ICD-10-CM

## 2025-06-04 DIAGNOSIS — I87.303 CHRONIC VENOUS HYPERTENSION INVOLVING BOTH SIDES: ICD-10-CM

## 2025-06-04 PROCEDURE — 99213 OFFICE O/P EST LOW 20 MIN: CPT

## 2025-06-04 NOTE — PROGRESS NOTES
Chief Complaint   Patient presents with    Wound Care     Rn visit for wound to right leg. Denies pain or concerns at this time.          Medications - Current[1]    Allergies[2]       HISTORY:     Past medical, surgical, family and social history updated where appropriate.    PHYSICAL EXAM:   /60   Pulse 78   Temp 97.5 °F (36.4 °C)   Resp 14   LMP  (LMP Unknown)        Vital signs reviewed.      Calf     Point of Measurement - Right Calf: 32        Right Calf from:: Heel  Right Calf cm:: 32.4    Ankle     Point of Measurement - Right Ankle: 10           Right Ankle from:: Heel  Right Ankle cm:: 23       Wound 05/14/25 #1 Right Anterior Leg Leg Right (Active)   Date First Assessed/Time First Assessed: 05/14/25 1520    Wound Number (Wound Clinic Only): #1 Right Anterior Leg  Primary Wound Type: Venous Ulcer  Location: Leg  Wound Location Orientation: Right      Assessments 5/14/2025  3:24 PM 6/4/2025  9:22 AM   Wound Image       Drainage Amount Unable to assess None   Treatments Compression Compression   Wound Length (cm) 5.7 cm 0 cm   Wound Width (cm) 13.5 cm 0 cm   Wound Surface Area (cm^2) 60.44 cm^2 0 cm^2   Wound Depth (cm) 0.1 cm 0 cm   Wound Volume (cm^3) 4.029 cm^3 0 cm^3   Wound Healing % -- 100   Margins Well-defined edges Flat and Intact   Non-staged Wound Description Full thickness Full thickness   Neeru-wound Assessment Pink;Edema Edema   Wound Granulation Tissue Red;Pink;Firm --   Wound Bed Granulation (%) 40 % --   Wound Bed Epithelium (%) 60 % 100 %   Wound Odor None None   Shape Clustered --   Tunneling? No No   Undermining? No No   Sinus Tracts? No No       No associated orders.          ASSESSMENT AND PLAN:        Risks, benefits, and alternatives of current treatment plan discussed in detail.  Questions and concerns addressed. Red flags to RTC or ED reviewed.  Patient (or parent) agrees to plan.      No follow-ups on file.  Weekly Wound Education Note    Teaching Provided To: Patient,  Family  Training Topics: Cleasing and general instructions, Compression, Discharge instructions, Edema control  Training Method: Explain/Verbal  Training Response: Patient responds and understands        Notes: Here for RN visit.    Patient her for nurse visit for right leg wound - fragilely healed last visit. Arrives with 2 layer compression wrap, tolerated well. Edema measurements stable. Wound remains healed, per provider orders ok to transitioned into velcro compression garment. Spouse/patient educated on velcro garment - liner and velcro compression wrap applied. OK to remove at bedtime but must be put back on prior to getting out of bed. OK to shower and make sure to moisturize. Spandagrip F x2 to left leg applied. Reminded to continue to use lymphedema pumps daily if not twice a day and to purchase a compression garment for the left leg.     Discharged per orders.     Carina DOAN RN   6/4/2025  9:56 AM           [1]   Current Outpatient Medications:     apixaban 5 MG Oral Tab, Take 1 tablet (5 mg total) by mouth 2 (two) times daily., Disp: , Rfl:     hydrALAZINE 25 MG Oral Tab, Take 1 tablet (25 mg total) by mouth daily. (Patient not taking: Reported on 5/14/2025), Disp: , Rfl:     apixaban 5 MG Oral Tab, Take 1 tablet (5 mg total) by mouth 2 (two) times daily., Disp: 60 tablet, Rfl: 0    bisacodyl 10 MG Rectal Suppos, Place 1 suppository (10 mg total) rectally daily as needed., Disp: , Rfl:     Ferrous Gluconate 324 (38 Fe) MG Oral Tab, Take 1 tablet (325 mg total) by mouth daily with breakfast. (Patient not taking: Reported on 5/14/2025), Disp: , Rfl:     Multiple Vitamin (MULTI-VITAMIN) Oral Tab, Take 1 tablet by mouth daily. (Patient not taking: Reported on 5/14/2025), Disp: , Rfl:     ondansetron (ZOFRAN) 4 mg tablet, Take 1 tablet (4 mg total) by mouth every 8 (eight) hours as needed for Nausea. (Patient not taking: Reported on 5/14/2025), Disp: , Rfl:     mirtazapine 7.5 MG Oral Tab, Take 1 tablet (7.5  mg total) by mouth nightly. Started 8/1 in rehab (Patient not taking: Reported on 5/14/2025), Disp: , Rfl:     senna-docusate 8.6-50 MG Oral Tab, Take 1 tablet by mouth daily. (Patient not taking: Reported on 5/14/2025), Disp: , Rfl:     traMADol 50 MG Oral Tab, Take 1 tablet (50 mg total) by mouth every 6 (six) hours as needed., Disp: 12 tablet, Rfl: 0    cholecalciferol 50 MCG (2000 UT) Oral Tab, Take 1 tablet (2,000 Units total) by mouth daily. (Patient not taking: Reported on 5/14/2025), Disp: , Rfl:     polyethylene glycol, PEG 3350, 17 g Oral Powd Pack, Take 17 g by mouth daily as needed. (Patient not taking: Reported on 5/14/2025), Disp: , Rfl:     atorvastatin 40 MG Oral Tab, Take 0.5 tablets (20 mg total) by mouth nightly., Disp: , Rfl:     metFORMIN HCl  MG Oral Tablet 24 Hr, Take 2 tablets (1,000 mg total) by mouth 2 (two) times daily., Disp: 360 tablet, Rfl: 3    insulin glargine (LANTUS SOLOSTAR) 100 UNIT/ML Subcutaneous Solution Pen-injector, INJECT up to 48 UNITS INTO THE SKIN ONCE DAILY. (Patient taking differently: Inject 45 Units into the skin every morning.), Disp: 45 mL, Rfl: 3    glipiZIDE 10 MG Oral Tab, Take 1 tablet (10 mg total) by mouth 2 (two) times daily before meals., Disp: 180 tablet, Rfl: 3    torsemide (DEMADEX) 20 MG Oral Tab, Take 1 tablet (20 mg total) by mouth in the morning and 1 tablet (20 mg total) before bedtime., Disp: , Rfl:   [2]   Allergies  Allergen Reactions    Cipro [Ciprofloxacin] HIVES    Insulin Aspart, Human Analog PAIN     Achiness. HA , head pains    Insulin Lispro, Human PAIN     Achiness, HA, head pain    Tylenol [Acetaminophen] SWELLING     Tongue/mouth    Benzonatate UNKNOWN

## 2025-06-11 ENCOUNTER — APPOINTMENT (OUTPATIENT)
Dept: WOUND CARE | Facility: HOSPITAL | Age: 85
End: 2025-06-11
Attending: INTERNAL MEDICINE
Payer: MEDICARE

## 2025-06-18 ENCOUNTER — APPOINTMENT (OUTPATIENT)
Dept: WOUND CARE | Facility: HOSPITAL | Age: 85
End: 2025-06-18
Attending: INTERNAL MEDICINE
Payer: MEDICARE

## 2025-06-25 ENCOUNTER — APPOINTMENT (OUTPATIENT)
Dept: WOUND CARE | Facility: HOSPITAL | Age: 85
End: 2025-06-25
Attending: INTERNAL MEDICINE
Payer: MEDICARE

## (undated) DEVICE — LAMINECTOMY CDS: Brand: MEDLINE INDUSTRIES, INC.

## (undated) DEVICE — C-ARM: Brand: UNBRANDED

## (undated) DEVICE — SUTURE VICRYL 2-0 FSL

## (undated) DEVICE — BOWL CEMENT MIX QUICK-VAC

## (undated) DEVICE — BLDE PRCPT FSCL SPLTR DISP

## (undated) DEVICE — SYRINGE MED 30ML STD CLR PLAS LL TIP N CTRL

## (undated) DEVICE — SUTURE VICRYL 0 CP-1

## (undated) DEVICE — ZIMMER® STERILE DISPOSABLE TOURNIQUET CUFF WITH PLC, DUAL PORT, SINGLE BLADDER, 34 IN. (86 CM)

## (undated) DEVICE — STERILE POLYISOPRENE POWDER-FREE SURGICAL GLOVES WITH EMOLLIENT COATING: Brand: PROTEXIS

## (undated) DEVICE — 3.2 MM THREADED PIN: Brand: ZIMMER® NATURAL NAIL™ SYSTEM

## (undated) DEVICE — SOLUTION IRRIG 1000ML 0.9% NACL USP BTL

## (undated) DEVICE — UNDYED BRAIDED (POLYGLACTIN 910), SYNTHETIC ABSORBABLE SUTURE: Brand: COATED VICRYL

## (undated) DEVICE — SUTURE VICRYL 1 OS-6

## (undated) DEVICE — STERILE POLYISOPRENE POWDER-FREE SURGICAL GLOVES: Brand: PROTEXIS

## (undated) DEVICE — 3M™ MICROFOAM™ TAPE 1528-4: Brand: 3M™ MICROFOAM™

## (undated) DEVICE — DECANTER BAG 9": Brand: MEDLINE INDUSTRIES, INC.

## (undated) DEVICE — SUT FIBERWIRE 2 C-13 AR-7202

## (undated) DEVICE — LIGHT HANDLE

## (undated) DEVICE — CHLORAPREP 26ML APPLICATOR

## (undated) DEVICE — KENDALL SCD EXPRESS SLEEVES, KNEE LENGTH, MEDIUM: Brand: KENDALL SCD

## (undated) DEVICE — PIN FIX 3X305MM TEMP THRD

## (undated) DEVICE — KENDALL SCD EXPRESS SLEEVES, THIGH LENGTH, MEDIUM: Brand: KENDALL SCD

## (undated) DEVICE — DRESSING SUR 3.5X6IN WTRPRF BACT BARR

## (undated) DEVICE — SOL  .9 1000ML BTL

## (undated) DEVICE — ANTIBACTERIAL UNDYED BRAIDED (POLYGLACTIN 910), SYNTHETIC ABSORBABLE SUTURE: Brand: COATED VICRYL

## (undated) DEVICE — WRAP COOLING KNEE W/ICE PILLOW

## (undated) DEVICE — APPLICATOR CHLORAPREP 26ML

## (undated) DEVICE — IMPLANTABLE DEVICE: Type: IMPLANTABLE DEVICE | Site: SHOULDER

## (undated) DEVICE — SUTURE ETHIBOND 1 OS-6

## (undated) DEVICE — MARKER SKIN PREP RESIST STRL

## (undated) DEVICE — FLOSEAL HEMOSTATIC MATRIX, 5ML: Brand: FLOSEAL HEMOSTATIC MATRIX

## (undated) DEVICE — SOL NACL IRRIG 0.9% 1000ML BTL

## (undated) DEVICE — TRANSPOSAL ULTRAFLEX DUO/QUAD ULTRA CART MANIFOLD

## (undated) DEVICE — WRAP COOLING SHLDR W/ICE PILLO

## (undated) DEVICE — DRAPE C-ARM UNIVERSAL

## (undated) DEVICE — SLEEVE KENDALL SCD EXPRESS MED

## (undated) DEVICE — NUVAMAP O.R. TECHNOLOGY

## (undated) DEVICE — SUT COAT VCRL 0 27IN CP-1 ABSRB UD 36MM 1/2

## (undated) DEVICE — PROXIMATE SKIN STAPLERS (35 WIDE) CONTAINS 35 STAINLESS STEEL STAPLES (FIXED HEAD): Brand: PROXIMATE

## (undated) DEVICE — 3.0MM PRECISION NEURO (MATCH HEAD)

## (undated) DEVICE — Device: Brand: STABLECUT®

## (undated) DEVICE — BIT DRL 4.3MM LNG CALIB FOR NAT NAIL 14

## (undated) DEVICE — WRAP COOLING BACK W/NO PILLOW

## (undated) DEVICE — Device

## (undated) DEVICE — GOWN,SIRUS,FABRIC-REINFORCED,X-LARGE: Brand: MEDLINE

## (undated) DEVICE — C-ARMOR C-ARM EQUIPMENT COVERS CLEAR STERILE UNIVERSAL FIT 12 PER CASE: Brand: C-ARMOR

## (undated) DEVICE — COVER LT HNDL RIG FOR SUR CAM DISP

## (undated) DEVICE — GOWN,SIRUS,FABRIC-REINFORCED,LARGE: Brand: MEDLINE

## (undated) DEVICE — SUT VICRYL 2-0 FSL J589H

## (undated) DEVICE — SHEET,DRAPE,40X58,STERILE: Brand: MEDLINE

## (undated) DEVICE — DRESSING AQUACEL AG 3.5X12

## (undated) DEVICE — SLEEVE COMPR MD KNEE LEN SGL USE KENDALL SCD

## (undated) DEVICE — SUT CHROMIC GUT 0 CT-1 812H

## (undated) DEVICE — SOL  .9 3000ML

## (undated) DEVICE — WIRE FX PRCPT KRSH BVL BLNT

## (undated) DEVICE — UNIVERS REVERSE DRILL SURG 3MM

## (undated) DEVICE — GLOVE SURG SENSICARE SZ 8-1/2

## (undated) DEVICE — GLOVE SUR 7 SENSICARE PI PIP CRM PWD F

## (undated) DEVICE — PAD,ABDOMINAL,8"X7.5",ST,LF,20/BX: Brand: MEDLINE INDUSTRIES, INC.

## (undated) DEVICE — GLOVE SUR 6.5 SENSICARE PI PIP GRN PWD F

## (undated) DEVICE — CONTAINER,SPECIMEN,PNEU TUBE,4OZ,OR STRL: Brand: MEDLINE

## (undated) DEVICE — SYRINGE BULB 50/CS 48/PLT: Brand: MEDEGEN MEDICAL PRODUCTS, LLC

## (undated) DEVICE — 11.1-M5 MULTIMODALITY KIT 5

## (undated) DEVICE — NV I-PAS III DIAMOND TIP

## (undated) DEVICE — KIT TRC TRIMANO BEACH CHR ARM

## (undated) DEVICE — NV CLIP DSC&IN-LINE ACTIVATOR

## (undated) DEVICE — DRAPE,U/SHT,SPLIT,FILM,60X84,STERILE: Brand: MEDLINE

## (undated) DEVICE — KIT POSITIONING PROAXIS PRONEV

## (undated) DEVICE — 450 ML BOTTLE OF 0.05% CHLORHEXIDINE GLUCONATE IN 99.95% STERILE WATER FOR IRRIGATION, USP AND APPLICATOR.: Brand: IRRISEPT ANTIMICROBIAL WOUND LAVAGE

## (undated) DEVICE — SPECIMEN CONTAINER,POSITIVE SEAL INDICATOR, OR PACKAGED: Brand: PRECISION

## (undated) DEVICE — DRAPE TABLE COVER 44X90 TC-10

## (undated) DEVICE — 3.0MM PRECISION ROUND

## (undated) DEVICE — TOTAL KNEE CDS: Brand: MEDLINE INDUSTRIES, INC.

## (undated) DEVICE — GRAFT DELIVERY SYS MODULE

## (undated) DEVICE — MAXCESS MAS TLIF 2 KIT ACCESS

## (undated) DEVICE — MEGADYNE E-Z CLEAN BLADE 4IN

## (undated) DEVICE — NEEDLE SPNL 18GA L3.5IN PNK QNCKE STYL DISP

## (undated) DEVICE — ORTHO CDS-LF: Brand: MEDLINE INDUSTRIES, INC.

## (undated) DEVICE — ARTHX PSTNR IMPL 2.8MM PIN NIT

## (undated) DEVICE — RELINE POWER

## (undated) DEVICE — PACK PBDS HIP PINNING

## (undated) DEVICE — GLOVE SUR 7.5 SENSICARE PI PIP GRN PWD F

## (undated) DEVICE — STOCKINETTE HYDROMED 8X6

## (undated) NOTE — MR AVS SNAPSHOT
[address]  [phone]               Thank you for choosing us for your health care visit with Yumiko Long PT. We are glad to serve you and happy to provide you with this summary of your visit.   Please he available in the parking lot in front of the [employer]. When you enter the [employer], please check in with the  reception staff. They will take your name and direct you to our P.T. clinic within the building.  For security purposes, ple Allergies as of Apr 26, 2017     Cipro [Ciprofloxacin] Hives    Insulin Aspart, Human Analog     Achiness.  HA , head pains    Insulin Lispro, Human     Achiness, HA, head pain    Tylenol [Acetaminophen] Swelling                   Current Medications Visits < Visit Summaries. MyChart questions? Call [phone] for help. [employer] is NOT to be used for urgent needs. For medical emergencies, dial 911.            Visit [facility] Effect online at  [email or web address]

## (undated) NOTE — IP AVS SNAPSHOT
[facility] [place] [address] [place], [address] ~ [phone]                Discharge Summary   3/10/2017    [facility]           Admission Information        Provider Department    3/10/2017 Kayla Harper MD  3sw-A Take 1-2 tablets ( mg total) by mouth every 4 to 6 hours as needed.    Stop taking on:  4/9/2017    Livan Preston taking these medications        Instructions Authorizing Provider    Morning Afternoon Evening As because it takes that long for the incision on the skin to heal and be a barrier to prevent infection). ? When allowed to shower:    o AQUACEL dressing is waterproof and does not require being covered to keep it dry.   o Pat dressing and surrounding skin d ? There could be a small amount of redness around the staples or incision; this is normal.  ? Watch for increased redness, warmth, any odor, increased drainage or opening of the incision.  A little clear yellow or blood tinged drainage is normal up to 2 wee ? Some pain medications have Tylenol (acetaminophen) in them such as Norco and Percocet. Do NOT take Tylenol (acetaminophen) within 4 hours of a dose of these medications. ?  Apply ice or cold therapy to surgical site for 20 minutes at least four times a d ? Continue using incentive spirometry because narcotics make you sleepy so you may not take good deep breaths. We do not want you to get pneumonia. Post op Office visits  ? Schedule 10 days to 3 weeks after surgery WITH SURGEON’s office. ?  Additional ? If traveling by plane, BEFORE you get into a security line, let them know that you had your hip replaced, as you will most likely set off the metal detector. The doctors no longer provide an identification card for this as they are easily copied.  ALSO re [address] [id number] [phone]            Apr 11, 2017 11:00 AM   FOLLOW UP with Kenia Yanez MD   [address] ([address] at [address] [facility] )    [address] [place] [address] [facility]              Disc 4.4 (01/11/17)  1.6   (01/11/17)  8.45 (H) (01/11/17)  2.86 (01/11/17)  0.53 (01/11/17)  0.19 (01/11/17)  8.94      Metabolic Lab Results  (Last result in the past 90 days)    HgbA1C Glucose BUN Creatinine Calcium Alkaline Phosph AST    -- (03/01/17)  114 office, you can view your past visit information in [employer] by going to Visits < Visit Summaries. [employer] questions? Call [phone] for help. [employer] is NOT to be used for urgent needs.   For medical emergencies, dial 911.             _____________ Use: Prevent the development or progression of blood clots   Most common side effects: Abnormal bleeding   What to report to your healthcare team: Bruising, blood in urine or stool, or nosebleeds             Blood Producing Medications     Ferrous Sulfate

## (undated) NOTE — MR AVS SNAPSHOT
[address] [phone]               Thank you for choosing us for your health care visit with Sunny Sanford PT. We are glad to serve you and happy to provide you with this summary of your visit.   Please he the building. For security purposes, please check in with the reception staff at every visit.                                           Apr 19, 2017  1:45 PM   PT VISIT BY THERAPIST with Kat Stanley, PT   [address] [facility] in [facility] ([facility] Sev inside the [employer], at [facility] (enter via [facility]). Convenient parking is available in the parking lot in front of the [employer].   When you enter the [employer], please check in with the Commonly known as:  ULTRAM           Vitamin D 2000 units Tabs   Take 2,000 Units by mouth daily.                    MyChart     Visit MyChart  You can access your MyChart to more actively manage your health care and view more details from this visit by [facility]

## (undated) NOTE — MR AVS SNAPSHOT
[address] [phone]               Thank you for choosing us for your health care visit with Ricardo Pack PT. We are glad to serve you and happy to provide you with this summary of your visit.   Please he the building. For security purposes, please check in with the reception staff at every visit.                                           Apr 20, 2017  1:30 PM   PT VISIT BY THERAPIST with Angle Mcknight PT   THE [facility] Physical Therapy in [facility] ([facility] Sev inside the [employer], at [facility] (enter via [facility]). Convenient parking is available in the parking lot in front of the [employer].   When you enter the [employer], please check in with the Commonly known as:  ULTRAM           Vitamin D 2000 units Tabs   Take 2,000 Units by mouth daily.                    MyChart     Visit MyChart  You can access your MyChart to more actively manage your health care and view more details from this visit by [facility]

## (undated) NOTE — MR AVS SNAPSHOT
[address] [phone]               Thank you for choosing us for your health care visit with Diamond Rodriguez PT. We are glad to serve you and happy to provide you with this summary of your visit.   Please he the building. For security purposes, please check in with the reception staff at every visit.                                           Apr 27, 2017  1:00 PM   POSTOP with MD Mary Lou Dowd ([address] [place] [address]) TAKE 1 OR 2 TABLETS BY MOUTH EVERY 6 HOURS AS NEEDED   Commonly known as:  ULTRAM           Vitamin D 2000 units Tabs   Take 2,000 Units by mouth daily.                    MyChart     Visit MyChart  You can access your MyChart to more actively manage your h

## (undated) NOTE — LETTER
[facility] Testing Department  Phone: [phone]  OUTSIDE TESTING RESULT REQUEST      TO:   DR Mariel Dumont Date: 4/12/19    FAX #: [phone]     IMPORTANT: FOR YOUR IMMEDIATE ATTENTION  Please FAX all test results listed below to:

## (undated) NOTE — ED AVS SNAPSHOT
[facility] Emergency Department    [address] [place] [address]    Phone:  [phone]    Fax:  [address] [facility]   MRN: [id number]    Department:  [facility] Emergency Department   Date of Visit:  1/1 [address] [place] [phone]  Pediatric [phone] Emergency Department   [phone]       To Check ER Wait Times:  TEXT 'ERwait' to [id number]      Click [email or web address]      Or call [phone]    If you have any will be contacted. Please make sure we have your correct phone number before you leave. After you leave, you should follow the attached instructions. I have read and understand the instructions given to me by my caregivers.         [facility] You can access your MyChart to more actively manage your health care and view more details from this visit by going to [email or web address].   If you've recently had a stay at the Hospital you can access your discharge instructions in [address] by erick

## (undated) NOTE — MR AVS SNAPSHOT
CY[address]THIA [address]  [phone]               Thank you for choosing us for your health care visit with Jeffrye Palmer, PT. We are glad to serve you and happy to provide you with this summary of your visit.   Please he the building. For security purposes, please check in with the reception staff at every visit.                                           May 15, 2017 11:15 AM   PT VISIT BY THERAPIST with Carlo Castorena PT   THE [facility] Physical Therapy in [facility] (EDW Sev inside the [employer], at [facility] (enter via [facility]). Convenient parking is available in the parking lot in front of the [place].   When you enter the [place], please check in with the Commonly known as:  ULTRAM           Vitamin D 2000 units Tabs   Take 2,000 Units by mouth daily.                    MyChart     Visit MyChart  You can access your MyChart to more actively manage your health care and view more details from this visit by [facility]

## (undated) NOTE — MR AVS SNAPSHOT
CY[address]IA [address]  [phone]               Thank you for choosing us for your health care visit with Sly Weston PT. We are glad to serve you and happy to provide you with this summary of your visit.   Please he the building. For security purposes, please check in with the reception staff at every visit.                                           May 18, 2017 11:00 AM   PT VISIT BY THERAPIST with Kat Stanley PT   THE [facility] Physical Therapy in [facility] (EDW Sev inside the [employer], at [facility] (enter via [facility]). Convenient parking is available in the parking lot in front of the [facility].   When you enter the [facility], please check in with the Commonly known as:  ULTRAM           Vitamin D 2000 units Tabs   Take 2,000 Units by mouth daily.                    MyChart     Visit MyChart  You can access your MyChart to more actively manage your health care and view more details from this visit by [facility]

## (undated) NOTE — MR AVS SNAPSHOT
CY[address]THIA [address] [phone]               Thank you for choosing us for your health care visit with Ricardo Pack PT. We are glad to serve you and happy to provide you with this summary of your visit.   Please he Tylenol [Acetaminophen] Swelling                   Current Medications          This list is accurate as of: 5/24/17 10:08 AM.  Always use your most recent med list.                atorvastatin 20 MG Tabs   Take 20 mg by mouth nightly.    Commonly known as Visit [facility] online at  [email or web address]

## (undated) NOTE — LETTER
Date: 5/21/2025  Patient name: Evangelina Ward  YOB: 1940  Medical Record Number: [id number]  Primary Coverage: Payor: MEDICARE / Plan: MEDICARE PART A&B / Product Type: *No Product type* /   [place]  Insurance ID: [id number]  Patient Address: [address] [place] [id number]  Telephone Information:   Home Phone [phone]   Mobile [phone]         Encounter Date: 5/21/2025  Provider: Berhane Tapia MD  Diagnosis:     ICD-10-CM   1. Idiopathic chronic venous hypertension of right lower extremity with ulcer and inflammation (HCC)  I87.331   2. Non-pressure chronic ulcer of right calf with fat layer exposed (HCC)  L97.212   3. Chronic venous hypertension involving both sides  I87.303   4. Diabetes mellitus with skin ulcer (HCC)  E11.622    L98.499   5. Delayed wound healing  T14.8XXD   6. Right leg swelling  M79.89   7. Long term (current) use of anticoagulants  Z79.01   8. Obesity (BMI 30-39.9)  E66.9   9. Coronary artery disease involving native coronary artery of native heart without angina pectoris  I25.10   10. Paroxysmal atrial fibrillation ([facility])  I48.0       Progress Note:  [place] [facility] PROGRESS NOTE  BERHANE TAPIA MD  5/21/2025    Chief Complaint:   Chief Complaint   Patient presents with    Wound Recheck     Patient arrives to wound care appointment concerning her RLE. RLE wrapped in comprilan compression. Denies pain to wound. No new questions or concerns today.       HPI:   Subjective   Evangelina Ward is a 84 year old female coming in for a follow-up visit.    HPI    Wound-improved overall.  Dimensions slightly smaller  Good granulation on wound base  Edema decreased but still continues to be present.  No signs of active infection at this point of time.  Family present    Review of Systems  Negative except HPI   Denies chest pain / SOB / palpitations  Denies fever.     Allergies  Allergies[1]    Current  Meds:  Current Medications[2]      EXAM:     Objective   Objective    Physical Exam    Vital Signs  Vitals:    05/21/25 1440   BP: 152/73   Pulse: 66   Resp: 14   Temp: 98 °F (36.7 °C)       Wound Assessment  Wound 05/14/25 #1 Right Anterior Leg Leg Right (Active)   Date First Assessed/Time First Assessed: 05/14/25 1520    Wound Number (Wound Clinic Only): #1 Right Anterior Leg  Primary Wound Type: Venous Ulcer  Location: Leg  Wound Location Orientation: Right      Assessments 5/14/2025  3:24 PM 5/21/2025  2:41 PM   Wound Image       Drainage Amount Unable to assess Moderate   Drainage Description -- Serosanguineous   Treatments Compression (1x8, 1x10, 1x12, cellona x2, stockinette) --   Wound Length (cm) 5.7 cm 5.3 cm   Wound Width (cm) 13.5 cm 10.6 cm   Wound Surface Area (cm^2) 60.44 cm^2 44.12 cm^2   Wound Depth (cm) 0.1 cm 0.1 cm   Wound Volume (cm^3) 4.029 cm^3 2.942 cm^3   Wound Healing % -- 27   Margins Well-defined edges Well-defined edges   Non-staged Wound Description Full thickness Full thickness   Neeru-wound Assessment Pink;Edema (Blister to periwound.) Edema   Wound Granulation Tissue Red;Pink;Firm Red;Pink;Firm   Wound Bed Granulation (%) 40 % 30 %   Wound Bed Epithelium (%) 60 % 70 %   Wound Odor None None   Shape Clustered clustered   Tunneling? No No   Undermining? No No   Sinus Tracts? No No       No associated orders.       Compression Wrap 05/14/25 Leg Anterior;Right (Active)   Placement Date: 05/14/25   Location: Leg  Wound Location Orientation: Anterior;Right      Assessments 5/14/2025  5:13 PM   Response to Treatment Well tolerated   Compression Layers Multilayer   Compression Product Type Comprilan 8cm;Comprilan 10cm;Comprilan 12cm;Coban;Stockinette 4in (1x8, 1x10, 1x12, cellona x2)   Dressing Applied Yes   Compression Wrap Location Toes to Knee   Compression Wrap Status Clean;Dry;Intact       No associated orders.                ASSESSMENT AND PLAN:     Assessment    Encounter Diagnosis  1.  Idiopathic chronic venous hypertension of right lower extremity with ulcer and inflammation (HCC)    2. 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 Chronic venous hypertension involving both sides    4. Diabetes mellitus with skin ulcer (HCC)    5. Delayed wound healing    6. Right leg swelling    7. Long term (current) use of anticoagulants    8. Obesity (BMI 30-39.9)    9. Coronary artery disease involving native coronary artery of native heart without angina pectoris    10. Paroxysmal atrial fibrillation (HCC)            PROCEDURES:    Compression wrap application after dressing change.      PLAN OF CARE:    Continue absorptive dressings and Comprilan compression wraps.  Increase the level of compression.   focus on edema reduction  Low-salt diet/leg elevation  Avoid prolonged standing or sitting.  Watch out for signs of early infection - counseled.   Plan of care discussed with patient in detail - All questions answered   Return in one week.       Patient Instructions     Nurse visit on Friday or tuesday  See me Wednesday  Intense Blood sugar control  Recommend start GLP-1 if tolerated.     Wound Cleaning and Dressings:    Shower with protection.   Dressing changes only in the clinic    DRESSINGS: HF transfer / kerramax  Change dressing twice weekly.     Compression Therapy : comprilan   Compression Therapy Instructions:  1.  Okay to wear overnight        2.  Avoid prolonged standing in one place.  It is better to have your calf muscles moving       to pump fluid out of the legs.    3.  Elevate leg(s) above the level of the heart when sitting or as much as possible.    4.  Take your diuretics as directed by your provider.  Do not skip doses or change doses      unless instructed to do so by your provider.    5. Do not get leg(s) with compression wrap wet. If wraps are too tight as indicated        By pain, numbness/tingling or discoloration of toes remove wrap completely       and  call the   wound center.       Miscellaneous Instructions:  Supplement with a daily multivitamin   Low salt diet  Intense blood sugar control - Goal Blood sugar below 180 at all times recommended.  Increase protein intake / consider protein supplements - see below  Elevate extremities at all times when sitting / laying down.    DIETARY MODIFICATIONS TO HELP WITH WOUND HEALING:    Protein: Meats, beans, eggs, milk and yogurt particularly Greek yogurt), tofu, soy nuts, soy protein products    Vitamin C: Citrus fruits and juices, strawberries, tomatoes, tomato juice, peppers, baked potatoes, spinach, broccoli, cauliflower, [place] sprouts, cabbage    Vitamin A: Dark green, leafy vegetables, orange or yellow vegetables, cantaloupe, fortified dairy products, liver, fortified cereals    Zinc: Fortified cereals, red meats, seafood    Consider Johnnie by [employer] (These are essential branch chain amino acids that help with tissue building and wound healing) and take 2 packets/day. you can order online at [employer]ott or [employer]!    ADDITIONAL REMINDERS:    The treatment plan has been discussed at length with you and your provider. Follow all instructions carefully, it is very important. If you do not follow all instructions, you are at  risk of your wound not healing, infection, possible loss of limb and even end of life.  Please call the clinic during regular business hours ( 7:30 AM - 5:30 PM) if you notice increased bleeding, redness, warmth, pain or pus like drainage or start running a fever greater than 100.3.    For after hour emergencies, please call your primary physician or go to the nearest emergency room.        Patient/Caregiver Education: There are no barriers to learning. Medical education for above diagnosis given.   Answered all questions.    Outcome: Patient verbalizes understanding. Patient is notified to call with any questions, complications, allergies, or worsening or changing symptoms.  Patient is to call  with any side effects or complications as a result of the treatments today.      DOCUMENTATION OF TIME SPENT: Code selection for this visit was based on time spent : 30 min on date of service in preparing to see the patient, obtaining and/or reviewing separately obtained history, performing a medically appropriate examination, counseling and educating the patient/family/caregiver, ordering medications or testing, referring and communicating with other healthcare providers, documenting clinical information in the E HR, independently interpreting results and communicating results to the patient/family/caregiver and care coordination with the patient's other providers.    Followup: Return in about 1 week (around 5/28/2025) for Wound followup.      Note to Patient:  The 21st Century Cures Act makes medical notes like these available to patients in the interest of transparency. However, be advised this is a medical document and is intended as [id number] communication; it is written in medical language and may appear blunt, direct, or contain abbreviations or verbiage that are unfamiliar. Medical documents are intended to carry relevant information, facts as evident, and the clinical opinion of the practitioner.    Also, please note that this report has been produced using speech recognition software and may contain errors related to that system including, but not limited to, errors in grammar, punctuation, and spelling, as well as words and phrases that possibly may have been recognized inappropriately.  If there are any questions or concerns, contact the dictating provider for clarification.      Bernard Vidales MD  5/21/2025  4:33 PM                      [1]   Allergies  Allergen Reactions    Cipro [Ciprofloxacin] HIVES    Insulin Aspart, Human Analog PAIN     Achiness. HA , head pains    Insulin Lispro, Human PAIN     Achiness, HA, head pain    Tylenol [Acetaminophen] SWELLING     Tongue/mouth    Benzonatate  UNKNOWN   [2]   Current Outpatient Medications   Medication Sig Dispense Refill    apixaban 5 MG Oral Tab Take 1 tablet (5 mg total) by mouth 2 (two) times daily.      hydrALAZINE 25 MG Oral Tab Take 1 tablet (25 mg total) by mouth daily. (Patient not taking: Reported on 5/14/2025)      apixaban 5 MG Oral Tab Take 1 tablet (5 mg total) by mouth 2 (two) times daily. 60 tablet 0    bisacodyl 10 MG Rectal Suppos Place 1 suppository (10 mg total) rectally daily as needed.      Ferrous Gluconate 324 (38 Fe) MG Oral Tab Take 1 tablet (325 mg total) by mouth daily with breakfast. (Patient not taking: Reported on 5/14/2025)      Multiple Vitamin (MULTI-VITAMIN) Oral Tab Take 1 tablet by mouth daily. (Patient not taking: Reported on 5/14/2025)      ondansetron (ZOFRAN) 4 mg tablet Take 1 tablet (4 mg total) by mouth every 8 (eight) hours as needed for Nausea. (Patient not taking: Reported on 5/14/2025)      mirtazapine 7.5 MG Oral Tab Take 1 tablet (7.5 mg total) by mouth nightly. Started 8/1 in rehab (Patient not taking: Reported on 5/14/2025)      senna-docusate 8.6-50 MG Oral Tab Take 1 tablet by mouth daily. (Patient not taking: Reported on 5/14/2025)      traMADol 50 MG Oral Tab Take 1 tablet (50 mg total) by mouth every 6 (six) hours as needed. 12 tablet 0    cholecalciferol 50 MCG (2000 UT) Oral Tab Take 1 tablet (2,000 Units total) by mouth daily. (Patient not taking: Reported on 5/14/2025)      polyethylene glycol, PEG 3350, 17 g Oral Powd Pack Take 17 g by mouth daily as needed. (Patient not taking: Reported on 5/14/2025)      atorvastatin 40 MG Oral Tab Take 0.5 tablets (20 mg total) by mouth nightly.      metFORMIN HCl  MG Oral Tablet 24 Hr Take 2 tablets (1,000 mg total) by mouth 2 (two) times daily. 360 tablet 3    insulin glargine (LANTUS SOLOSTAR) 100 UNIT/ML Subcutaneous Solution Pen-injector INJECT up to 48 UNITS INTO THE SKIN ONCE DAILY. (Patient taking differently: Inject 45 Units into the skin  every morning.) 45 mL 3    glipiZIDE 10 MG Oral Tab Take 1 tablet (10 mg total) by mouth 2 (two) times daily before meals. 180 tablet 3    torsemide (DEMADEX) 20 MG Oral Tab Take 1 tablet (20 mg total) by mouth in the morning and 1 tablet (20 mg total) before bedtime.         Weekly Wound Education Note    Teaching Provided To: Patient  Training Topics: Cleasing and general instructions, Compression, Discharge instructions, Dressing, Edema control  Training Method: Explain/Verbal  Training Response: Patient responds and understands, Reinforcement needed        Notes: Improving. Endoform, sorbact, hydrofera transfer, ABD pad, and comprilan 1x8, 2x10, 1x12. Return in 1 week. Order compression garment.        WOUND CARE SUPPLIES TO ORDER BELOW LINE  ---------------------------------------------------------------------------------    Wound Information/Order:  Wound Number: 1   Product: NO DRESSINGS NEEDED, ONLY COMPRESSION GARMENT    Was a Debridement performed: Yes, Debridement type: mechanical    Compression Stockings ordered: Yes   Product type: Juxtalite HD  Frequency: daily  Duration: 90 days      Calf     Point of Measurement - Right Calf: 32        Right Calf from:: Heel  Right Calf cm:: 36.4    Ankle     Point of Measurement - Right Ankle: 10           Right Ankle from:: Heel  Right Ankle cm:: 25.5       Heel to Knee   41cm       Notes: Dispense as ordered. Please call patient if there is any copay before sending garment.     Additional wound: No

## (undated) NOTE — MR AVS SNAPSHOT
CYNTHIA Leslie Quinones [address] [phone]               Thank you for choosing us for your health care visit with Farhad Hernandez PT. We are glad to serve you and happy to provide you with this summary of your visit.   Please he Tylenol [Acetaminophen] Swelling                   Current Medications          This list is accurate as of: 4/24/17 12:01 PM.  Always use your most recent med list.                Atorvastatin Calcium 20 MG Tabs   Take 20 mg by mouth nightly.    Commonly [facility] at  [email or web address]

## (undated) NOTE — IP AVS SNAPSHOT
Patient Demographics     Address Phone E-mail Address    Alex Sierra  [address] [phone] (Home) *Preferred*  [phone] (Mobile) [email or web address]      Emergency Contact(s)     Name Relation Home Work [address] Sex  ? Usually allowed after four to six weeks – check with surgeon at your office visit. Return to work  ? Usually allowed after four to six weeks. Discuss specific work activities with your surgeon. Restrictions  ?  For knee replacement surger blood in your urine. Use electric razors and soft toothbrushes only. ? Do not take aspirin while taking blood thinners unless ordered by your physician. ? Review anticoagulant education information sheet provided. Discomfort  ?  Surgical discomfort is ? Use stool softeners such as Colace or Senakot while on narcotics, and laxatives such as Miralax or Milk of Magnesia if needed. ? An enema or suppository may be needed if above measures do not work. Prevention of infection and promotion of healing  ? ? Increased redness, swelling, or warmth of skin around incision. ? Increased or foul smelling drainage from incision  ? Red streaks on skin near incision. ? Temperature >100.4F.  ? Increased pain at incision not relieved by pain medication.             Nioklas Grey SPECIAL  INSTRUCTIONS:  View Dr. Mell Pollack discharge instructions at [email or web address]                  [facility]  D:[phone]  W:[phone]               Follow-up Information     Follow up with Jeffrey Shoemaker MD In 2 week Commonly known as:  GLUCOPHAGE-XR        Take 2 tablets (1,000 mg total) by mouth 2 (two) times daily with meals.     Fabien Aguilar                              rivaroxaban 10 MG Tabs   Last time this was given:  10 mg on 3/11/2017  6:02 PM   Commonly known [id number] ferrous sulfate EC tab 325 mg 03/12/17 0840 Given      [id number] lisinopril (PRINIVIL,ZESTRIL) tab 10 mg 03/12/17 0840 Given      [id number] oxyCODONE HCl (OXY-IR) cap/tab 5 mg (Or Linked Group #1) 03/11/17 1802 Given      [id number] oxyCODONE HCl Author:  Sanju Gonzalez Provider Service:  (none) Author Type:  Physician    Filed:  3/2/2017  5:10 PM Note Time:  3/2/2017  5:10 PM Status:  Signed    :  Luis Gonzalez In       Scan on 3/1/2017 12:00 AM          Electronically signed by Julio Dinh Cipro [Ciprofloxaci*    Hives  Insulin Aspart, Hum*        Comment:Achiness.  HA , head pains  Insulin Lispro, Hum*        Comment:Achiness, HA, head pain  Tylenol [Acetaminop*    Swelling   Past Medical History   Diagnosis Date   • CORONARY ARTERY DISEASE Comment trigger finger release left hand middle finger    COLONOSCOPY,DIAGNOSTIC  10/10/13    Comment normal    COLONOSCOPY  10/10/2013    Comment Procedure: COLONOSCOPY;  Surgeon: Champ Busby MD;  Location: [address] [place] [address] Recent Labs   Lab  03/10/17   1556   CREATSERUM  0.53*       No results for input(s): ALT, AST, ALB, AMYLASE, LIPASE, LDH in the last 72 hours.     Invalid input(s): ALPHOS, TBIL, DBIL, TPROT    Recent Labs   Lab  03/10/17   1021  03/10/17   1053  03/10/17 Osteoarthritis of left knee      Past Medical History   Past Medical History   Diagnosis Date   • CORONARY ARTERY DISEASE      11/06: Ant WMA on stress, 12/06: PTCA to LAD,  12/08 Nuclear stress neg, EF 62%   • HYPERTENSION    • Unspecified hereditary an Comment Procedure: COLONOSCOPY;  Surgeon: Vera Lennox, MD;  Location: [facility] ENDOSCOPY    CATH BARE METAL STENT (BMS)         SUBJECTIVE  \"My thigh really hurts today and I'm having a hard time lifting my leg. I was doing better yesterday. \"    Patie Wheeled to gym in recliner. Performed ex with (S) and assist from spouse with SAQ and SLR. Sit > stand from recliner with CGA (pt moves slowly). Amb with RW and CGA x 100 feet with cues to lift L leg and not drag it.   Negotiates 4 stairs with B rails an Frequency (Obs): BID    CURR   Goal #1       Patient is able to demonstrate supine - sit EOB @ level: supervision     Goal #2         Patient is able to demonstrate transfers Sit to/from Stand at assistance level: supervison     Goal #3         Patient is • Coronary atherosclerosis    • High blood pressure    • Vitamin D deficiency 10/6/2016   • Uncontrolled type 2 diabetes mellitus with diabetic polyneuropathy, with long-term current use of insulin ([email or web address]ca [facility].) 10/6/2016   • Muscle weakness    • High cholesterol How much difficulty does the patient currently have. ..  -   Turning over in bed (including adjusting bedclothes, sheets and blankets)?: A Little   -   Sitting down on and standing up from a chair with arms (e.g., wheelchair, bedside commode, etc.): A Lanetl Glut Sets 20 reps 20 reps   Hip Abd/Add 20 reps 20 reps   Heel slides 20 reps 20 reps   Saq 20 reps 20 reps   SLR 20 reps 20 reps   Sitting Knee Flexion 20 reps 20 reps   Standing heel/toe raises 20 reps 20 reps   Standing knee flexion 20 reps 20 reps   Ex Version 1 of 1    Author:  Lillie Nicholson PT Service:  (none) Author Type:  Physical Therapist    Filed:  3/11/2017 11:36 AM Note Time:  3/11/2017 11:17 AM Status:  Signed    :  Lillie Nicholson PT (Physical Therapist)           KIMO ANGIOPLASTY (CORONARY)  2006    Comment w/2 drug eluting stents    CHOLECYSTECTOMY  1997    HYSTERECTOMY      Comment w/ BSO    OTHER SURGICAL HISTORY      Comment vein stripping    OTHER SURGICAL HISTORY      Comment R Knee arthroscopy    REVISE MEDIAN N following:   L LE hip: 4-/5 and knee:3-/5 , DF 4/5    BALANCE  Static Sitting: Good  Dynamic Sitting: Good  Static Standing: Fair -  Dynamic Standing: Fair -                                                                       ACTIVITY TOLERANCE  No short aware of session/findings; All patient questions and concerns addressed; Family present    ASSESSMENT     Patient is a 78year old female admitted 3/10/2017 for L TKA.    In this PT evaluation, the patient presents with the following impairments: LOM on L kne with left Le numbness & lack of motor control. Unable to progress with evaluation this time. RN - Kelsey Joy was aware of this attempt.  Will continue with evaluation in morning of 03/10/17                  Occupational Therapy Notes (last 72 hours) (Notes fro • Uncontrolled type 2 diabetes mellitus with diabetic polyneuropathy, with long-term current use of insulin ([facility] 10/6/2016   • Muscle weakness    • High cholesterol    • Visual impairment      glasses   • Osteoarthritis    • Back problem      sciatica -   Bathing (including washing, rinsing, drying)?: A Little  -   Toileting, which includes using toilet, bedpan or urinal? 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 : A Little  -   Putting on and taking off regular upper body clothing?: A Little  -   Taking care of personal grooming such as brushing teeth?: A Little  -   Eating meals?: None    AM-PAC Score: assist as needed )  OT Device Recommendations: TBD    PLAN  OT Treatment Plan: Balance activities; Energy conservation/work simplification techniques;ADL training;Functional transfer training; Endurance training;Patient/Family education;Patient/Family traini

## (undated) NOTE — MR AVS SNAPSHOT
[address]  [phone]               Thank you for choosing us for your health care visit with Emerita Barahona PT. We are glad to serve you and happy to provide you with this summary of your visit.   Please he the building. For security purposes, please check in with the reception staff at every visit.                                           Apr 26, 2017 11:15 AM   PT VISIT BY THERAPIST with Jillian Biggs, PT   [facility] [place] (EDW Sev Ferrous Sulfate 325 (65 Fe) MG Tabs   Take 1 tablet (325 mg total) by mouth daily with breakfast.           glipiZIDE 10 MG Tabs   Take 10 mg by mouth 2 (two) times daily before meals.    Commonly known as:  GLUCOTROL           LANTUS SOLOSTAR 100 UNIT/ML

## (undated) NOTE — ED AVS SNAPSHOT
[facility] Emergency Department    [place]  [address] [facility]    Tameka Garcia [address]    Phone:  [phone]    Fax:  [address] [facility]   MRN: [id number]    Department:  [facility] Emergency Department   Date of Visit:  1/1 IF THERE IS ANY CHANGE OR WORSENING OF YOUR CONDITION, CALL YOUR PRIMARY CARE PHYSICIAN AT ONCE OR RETURN IMMEDIATELY TO THE EMERGENCY DEPARTMENT.     If you have been prescribed any medication(s), please fill your prescription right away and begin taking t

## (undated) NOTE — MR AVS SNAPSHOT
[address]  [phone]               Thank you for choosing us for your health care visit with Keyur Biggs PT. We are glad to serve you and happy to provide you with this summary of your visit.   Please he the building. For security purposes, please check in with the reception staff at every visit.                                           May 22, 2017 11:15 AM   PT VISIT BY THERAPIST with Jillian Biggs PT   [facility] in [place] (EDW Sev Cipro [Ciprofloxacin] Hives    Insulin Aspart, Human Analog     Achiness.  HA , head pains    Insulin Lispro, Human     Achiness, HA, head pain    Tylenol [Acetaminophen] Swelling                   Current Medications          This list is accurate as of: RunAlong questions? Call [phone] for help. RunAlong is NOT to be used for urgent needs. For medical emergencies, dial 911.            Visit [facility] online at  [email or web address]

## (undated) NOTE — MR AVS SNAPSHOT
[address]  [phone]               Thank you for choosing us for your health care visit with Michel Valencia PT. We are glad to serve you and happy to provide you with this summary of your visit.   Please he enter the [facility], please check in with the  reception staff. They will take your name and direct you to our P.T. clinic within the building. For security purposes, please check in with the reception staff at every visit. Achiness.  HA , head pains    Insulin Lispro, Human     Achiness, HA, head pain    Tylenol [Acetaminophen] Swelling                   Current Medications          This list is accurate as of: 4/6/17  2:30 PM.  Always use your most recent med list. office, you can view your past visit information in [facility] by going to Visits < Visit Summaries. CoverHound questions? Call [phone] for help. CoverHound is NOT to be used for urgent needs. For medical emergencies, dial 911.            Visit [facility]

## (undated) NOTE — MR AVS SNAPSHOT
[address]  [phone]               Thank you for choosing us for your health care visit with Bishop Eli PT. We are glad to serve you and happy to provide you with this summary of your visit.   Please he the building. For security purposes, please check in with the reception staff at every visit.                                           Apr 10, 2017  1:45 PM   PT VISIT BY THERAPIST with ADENIKE [facility]der[facility] in [place] (EDW Sev Cipro [Ciprofloxacin] Hives    Insulin Aspart, Human Analog     Achiness.  HA , head pains    Insulin Lispro, Human     Achiness, HA, head pain    Tylenol [Acetaminophen] Swelling                   Current Medications          This list is accurate as of: Summaries. If you've been to the Emergency Department or your doctor's office, you can view your past visit information in [facility] by going to Visits < Visit Summaries. W-locate questions? Call [phone] for help.   [employer]ate is NOT to be used for urge

## (undated) NOTE — MR AVS SNAPSHOT
[address] [phone]               Thank you for choosing us for your health care visit with Bishop Eli PT. We are glad to serve you and happy to provide you with this summary of your visit.   Please he enter the [facility], please check in with the  reception staff. They will take your name and direct you to our P.T. clinic within the building. For security purposes, please check in with the reception staff at every visit. Achiness.  HA , head pains    Insulin Lispro, Human     Achiness, HA, head pain    Tylenol [Acetaminophen] Swelling                   Current Medications          This list is accurate as of: 4/10/17  3:13 PM.  Always use your most recent med list. Call [phone] for help. [employer] is NOT to be used for urgent needs. For medical emergencies, dial 911.            Visit [facility] online at  [email or web address]

## (undated) NOTE — LETTER
[address]  [address]  Authorization for Surgical Operation and Procedure     Date:___________                                                                                                         Time:__________  I hereby authorize Surgeon(s):  Brian Celis MD, my physician and his/her assistants (if applicable), which may include medical students, residents, and/or fellows, to perform the following surgical operation/ procedure and administer such anesthesia as may be determined necessary by my physician:  Operation/Procedure name (s) Procedure(s):  OPEN REDUCTION INTERNAL FIXATION /INTRAMEDULLARY NAIL FEMUR FRACTURE- RIGHT on Evangelina OLIMPIA Ward   2.   I recognize that during the surgical operation/procedure, unforeseen conditions may necessitate additional or different procedures than those listed above.  I, therefore, further authorize and request that the above-named surgeon, assistants, or designees perform such procedures as are, in their judgment, necessary and desirable.    3.   My surgeon/physician has discussed prior to my surgery the potential benefits, risks and side effects of this procedure; the likelihood of achieving goals; and potential problems that might occur during recuperation.  They also discussed reasonable alternatives to the procedure, including risks, benefits, and side effects related to the alternatives and risks related to not receiving this procedure.  I have had all my questions answered and I acknowledge that no guarantee has been made as to the result that may be obtained.    4.   Should the need arise during my operation/procedure, which includes change of level of care prior to discharge, I also consent to the administration of blood and/or blood products.  Further, I understand that despite careful testing and screening of blood or blood products by collecting agencies, I may still be subject to ill effects as a result of receiving a  blood transfusion and/or blood products.  The following are some, but not all, of the potential risks that can occur: fever and allergic reactions, hemolytic reactions, transmission of diseases such as Hepatitis, AIDS and Cytomegalovirus (CMV) and fluid overload.  In the event that I wish to have an autologous transfusion of my own blood, or a directed donor transfusion, I will discuss this with my physician.  Check only if Refusing Blood or Blood Products  I understand refusal of blood or blood products as deemed necessary by my physician may have serious consequences to my condition to include possible death. I hereby assume responsibility for my refusal and release the hospital, its personnel, and my physicians from any responsibility for the consequences of my refusal.          o  Refuse      5.   I authorize the use of any specimen, organs, tissues, body parts or foreign objects that may be removed from my body during the operation/procedure for diagnosis, research or teaching purposes and their subsequent disposal by hospital authorities.  I also authorize the release of specimen test results and/or written reports to my treating physician on the hospital medical staff or other referring or consulting physicians involved in my care, at the discretion of the Pathologist or my treating physician.    6.   I consent to the photographing or videotaping of the operations or procedures to be performed, including appropriate portions of my body for medical, scientific, or educational purposes, provided my identity is not revealed by the pictures or by descriptive texts accompanying them.  If the procedure has been photographed/videotaped, the surgeon will obtain the original picture, image, videotape or CD.  The hospital will not be responsible for storage, release or maintenance of the picture, image, tape or CD.    7.   I consent to the presence of a  or observers in the operating room as deemed  necessary by my physician or their designees.    8.   I recognize that in the event my procedure results in extended X-Ray/fluoroscopy time, I may develop a skin reaction.    9. If I have a Do Not Attempt Resuscitation (DNAR) order in place, that status will be suspended while in the operating room, procedural suite, and during the recovery period unless otherwise explicitly stated by me (or a person authorized to consent on my behalf). The surgeon or my attending physician will determine when the applicable recovery period ends for purposes of reinstating the DNAR order.  10. Patients having a sterilization procedure: I understand that if the procedure is successful the results will be permanent and it will therefore be impossible for me to inseminate, conceive, or bear children.  I also understand that the procedure is intended to result in sterility, although the result has not been guaranteed.   11. I acknowledge that my physician has explained sedation/analgesia administration to me including the risk and benefits I consent to the administration of sedation/analgesia as may be necessary or desirable in the judgment of my physician.    I CERTIFY THAT I HAVE READ AND FULLY UNDERSTAND THE ABOVE CONSENT TO OPERATION and/or OTHER PROCEDURE.    _________________________________________  __________________________________  Signature of Patient     Signature of Responsible Person         ___________________________________         Printed Name of Responsible Person           _________________________________                 Relationship to Patient  _________________________________________  ______________________________  Signature of Witness          Date  Time      Patient Name: Evangelina Ward     : 1940                 Printed: 2024     Medical Record #: [id number]                     Page 1 of 2                                    [address]   [address]    Consent for Anesthesia    IEvangelina agree to be cared for by an anesthesiologist, who is specially trained to monitor me and give me medicine to put me to sleep or keep me comfortable during my procedure    I understand that my anesthesiologist is not an employee or agent of [facility] or [employer]. He or she works for [employer].    As the patient asking for anesthesia services, I agree to:  Allow the anesthesiologist (anesthesia doctor) to give me medicine and do additional procedures as necessary. Some examples are: Starting or using an “IV” to give me medicine, fluids or blood during my procedure, and having a breathing tube placed to help me breathe when I’m asleep (intubation). In the event that my heart stops working properly, I understand that my anesthesiologist will make every effort to sustain my life, unless otherwise directed by [facility] Do Not Resuscitate documents.  Tell my anesthesia doctor before my procedure:  If I am pregnant.  The last time that I ate or drank.  All of the medicines I take (including prescriptions, herbal supplements, and pills I can buy without a prescription (including street drugs/illegal medications). Failure to inform my anesthesiologist about these medicines may increase my risk of anesthetic complications.  If I am allergic to anything or have had a reaction to anesthesia before.  I understand how the anesthesia medicine will help me (benefits).  I understand that with any type of anesthesia medicine there are risks:  The most common risks are: nausea, vomiting, sore throat, muscle soreness, damage to my eyes, mouth, or teeth (from breathing tube placement).  Rare risks include: remembering what happened during my procedure, allergic reactions to medications, injury to my airway, heart, lungs, vision, nerves, or muscles and in extremely rare instances death.  My doctor has explained to me other choices  available to me for my care (alternatives).  Pregnant Patients (“epidural”):  I understand that the risks of having an epidural (medicine given into my back to help control pain during labor), include itching, low blood pressure, difficulty urinating, headache or slowing of the baby’s heart. Very rare risks include infection, bleeding, seizure, irregular heart rhythms and nerve injury.  Regional Anesthesia (“spinal”, “epidural”, & “nerve blocks”):  I understand that rare but potential complications include headache, bleeding, infection, seizure, irregular heart rhythms, and nerve injury.    I can change my mind about having anesthesia services at any time before I get the medicine.    _____________________________________________________________________________  Patient (or Representative) Signature/Relationship to Patient  Date   Time    _____________________________________________________________________________   Name (if used)    Language/Organization   Time    _____________________________________________________________________________  Anesthesiologist Signature     Date   Time  I have discussed the procedure and information above with the patient (or patient’s representative) and answered their questions. The patient or their representative has agreed to have anesthesia services.    _____________________________________________________________________________  Witness        Date   Time  I have verified that the signature is that of the patient or patient’s representative, and that it was signed before the procedure  Patient Name: Evangelina Ward     : 1940                 Printed: 2024     Medical Record #: [id number]                     Page 2 of 2

## (undated) NOTE — IP AVS SNAPSHOT
Patient Demographics     Address  [place] [address] Phone  [phone] (Home) *Preferred* E-mail Address  [email or web address]      Emergency Contact(s)     Name Relation Home Work Thang Spouse [phone] ? Do Not drive while taking narcotics or muscle relaxants. ? Adhere to sitting restrictions. Stairs  ? Climb stairs as needed    Incision site care and dressing  Changes as directed by your surgeon  ?  Always wash hands before and after dressing changes ? An enema or suppository may be necessary if above measures do not work. No smoking  ? Smoking will inhibit healing. ? Even one cigarette a day will cause problems. ? Chewing tobacco, nicotine gum or patches will also inhibit healing.     Pain Managem ? Difficulty urinating or having bowel movements  ? Headaches that worsen when standing and resolve when laying flat. Go directly to the ER or CALL 911 if you:  ? become short of breath  ? have chest pain  ?  cough up blood  ? have unexplained anxiety wi metFORMIN HCl  MG Tb24  Commonly known as:  GLUCOPHAGE-XR      Take 1,000 mg by mouth 2 (two) times daily. tiZANidine HCl 4 MG Tabs  Commonly known as:  ZANAFLEX      Take 1 tablet (4 mg total) by mouth every 8 (eight) hours as needed.    P R Vitals  105/55 Filed at 05/01/2019 1138   Pulse  65 Filed at 05/01/2019 1138   Resp  18 Filed at 05/01/2019 1138   Temp  98.2 °F (36.8 °C) Filed at 05/01/2019 1138   SpO2  95 % Filed at 05/01/2019 1138      Patient's Most Recent Weight       Most Recent Va       2016 : nuclear stress test unremarkable.          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       idiopathic angioedema   • Back problem       sciatica   • Carpal tunnel syndrome     • CORONARY ARTERY DIS • Other         lipoma removed from left arm   • Other         trigger finger release left hand middle finger   • Other surgical history         vein stripping   • Other surgical history       • Revise median n/carpal tunnel surg         left carpal tunnel Financial resource strain: Not on file      Food insecurity:        Worry: Not on file        Inability: Not on file      Transportation needs:        Medical: Not on file        Non-medical: Not on file    Tobacco Use      Smoking status: Never Smoker Exercise:  min        REVIEW OF SYSTEMS:   GENERAL: feels well otherwise  SKIN: denies any unusual skin lesions  EYES:denies blurred vision or double vision  HEENT: denies nasal congestion, sinus pain or ST  LUNGS: denies shortness of breath with exertion Working on better control over pst few wks. Am gluc dropping to 60s, decreasing lantus dose.   D/w pt that uncontrolled dm will increase surgical and post surgical complications.      Lumbar radiculopathy  Preoperative examination       The above referenced Insulin Aspart, Hum*        Comment:Achiness.  HA , head pains  Insulin Lispro, Hum*        Comment:Achiness, HA, head pain   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     idiopathic angioedema   • Back problem • KNEE TOTAL REPLACEMENT Left 3/10/2017    Performed by Jeffrey Shoemaker MD at [facility]   • LUMBAR FACET INJECTION Right 4/18/2016    Performed by Mayra Rivera MD at [facility]   • LUMBAR FACET INJECTION Left 4/4/2016    Performed by Mayra Rivera General:  Alert, no distress, appears stated age. Head:  Normocephalic, without obvious abnormality, atraumatic. Eyes:  Sclera anicteric, No conjunctival pallor, EOMs intact. Nose: Nares normal. Septum midline. Mucosa normal. No drainage.    Throat: - Start SSI with qid accuchecks  - cont lantus    HL  - cont statin    HTN  - cont lisinopril          Prevention: , SCDs, bowel regimen      Outpatient records and previous hospital records reviewed.      Thank you for allowing me to participate in the car ERT [id number]   • Muscle weakness     LEGS SHAKE WHEN STANDING OR USING STAIRS   • OSTEOARTHRITIS     1/06 Rt menisectomy   • Osteoarthritis    • Overweight and obesity    • Personal history of colonic polyps     2/99 adenoma, 4/03 negative   • Type 2 neida • RADIOFREQUENCY ABLATION OF SACROILIAC JOINT Right 7/20/2016    Performed by Onesimo Pereira MD at [facility] MAIN OR   • [address] Left 7/18/2016    Performed by Onesimo Pereira MD at [facility] MAIN OR   • REVISE MEDIAN N/CARPAL TU -   Moving to and from a bed to a chair (including a wheelchair)?: None   -   Need to walk in hospital room?: None   -   Climbing 3-5 steps with a railing?: A Little       AM-PAC Score:  Raw Score: 21   Approx Degree of Impairment: 28.97%   Standardized Sc L3-L4,L4-L5,L5-S1 Decompression with discectomy on 04/29/19. Pertinent comorbidities and personal factors impacting therapy include  h/o Peripheral neuropathy,Type II DM,HTN, CAD - Angioplasty,Left TKA, h/o falls.  Patient was able to perform functional mo Author:  Karin Tong PT Service:  Rehab Author Type:  Physical Therapist    Filed:  4/30/2019  1:49 PM Date of Service:  4/30/2019 12:30 PM Status:  Signed    :  Karin Tong PT (Physical Therapist)             PHYSICAL THERAPY EVALUATION • Vitamin D deficiency 10/6/2016       Past Surgical History  Past Surgical History:   Procedure Laterality Date   • ANESTH,KNEE ARTHROSCOPY      left knee repair torn meniscus   • ANGIOPLASTY (CORONARY)  2006 w/2 drug eluting stents   • ARTHROSCOPY OF BILATERAL Bilateral 5/26/2015    Performed by Orquidea Cummings MD at [address] [place] [address]  Type of Home: House   Home Layout: Two level  Stairs to Enter : 2  Railing: No  Stairs to Bedroom: 14  Railing: Yes    Lives With: Spouse  Drives: Yes  P Static Sitting: Good  Dynamic Sitting: Fair +  Static Standing: Fair -  Dynamic Standing: Fair -    ADDITIONAL TESTS  Additional Tests: None     NEUROLOGICAL FINDINGS  Neurological Findings: None c/o numbness on bilateral feet   Bilateral feet & ankle swel Bilateral rail assist - in step to pattern with constant verbal cues. Patient needed constant verbal cues + CGA during car/ tub transfers practice. Patient was left up in bedside chair @ end of session.     Exercise/Education Provided:  Bed mobility  Body me Research supports that patients with this level of impairment may benefit from Home PT + family assist @ d/c. PLAN  PT Treatment Plan: Bed mobility; Body mechanics; Endurance; Energy conservation;Patient education; Family education;Gait training;Neuromuscul Signed    :  Guera Soares OT (Occupational Therapist)       OCCUPATIONAL THERAPY EVALUATION - INPATIENT     Room Number: 381/381-A  Evaluation Date: 5/1/2019  Type of Evaluation: Initial[MM. 1]  Presenting Problem: (L3-S1 decomp/discectomy)[MM.2 • ANGIOPLASTY (CORONARY)  2006    w/2 drug eluting stents   • ARTHROSCOPY OF JOINT UNLISTED Right     KNEE   • CATARACT  05/2018    Bilateral   • CATH DRUG ELUTING STENT      X2   • CHOLECYSTECTOMY  1997   • COLONOSCOPY N/A 10/10/2013    Performed by [employer] Toilet and Equipment: Standard height toilet; Toilet riser with arms  Shower/Tub and Equipment: Tub-shower combo;Grab bar; Shower chair       Occupation/Status: (retired)  Hand Dominance: Right  Drives: Yes  Patient Regularly Uses: None[MM. 2]    Prior Level -   Taking care of personal grooming such as brushing teeth?: A Little  -   Eating meals?: None[MM. 2]    AM-PAC Score:[MM.1]  Score: 19  Approx Degree of Impairment: 42.8%  Standardized Score (AM-PAC Scale): 40.22  CMS Modifier (G-Code): CK[MM. 2]    FUNCTI living, rest and sleep, work, leisure and social participation.      The patient is functioning below her previous functional level and would benefit from skilled inpatient OT to address the above deficits, maximizing patient’s ability to return safely to  Patient will recall all spinal precautions and incorporate into ADLs  Patient will don/doff brace with supervision[MM. 1]       Attribution Key    MM. 1 - Robb Yoder OT on 5/1/2019  7:15 AM  MM. 2 - Robb Yoder OT on 5/1/2019  7:16 AM  MM. 3 - OLIMPIA

## (undated) NOTE — IP AVS SNAPSHOT
Patient Demographics     Address  [facility] [address]  [address] [place] [address] Phone  [phone] (Home) *Preferred*  [phone] [facility]) E-mail Address  [email or web address]      Emergency Contact(s)     Name Relation Home Work [facility] 12 ? Stop if causing pain. ? Check with surgeon for more information. Driving  ? Usually allowed after  2-3 weeks;  check with physician at first office visit. ? Do Not drive while taking narcotics or muscle relaxants. ? Adhere to sitting restrictions. ? May use pillow under knees, between legs or behind back if lying on your side. ? Log roll to turn. [facility]  ? Wear until walking. ? May take off at night and for bathing. Hand wash with mild soap; line dry. Diet and constipation prevention  ?   o Call pharmacy or physician office at least five days before out of pain medication. If calling physician office after 3 pm, it will be handled on the next business day. Post op office visit  ?  Schedule 2 weeks after surgery with surgeon as directed at TEST THREE TIMES DAILY   Richelle Borrego MD         glipiZIDE 10 MG Tabs  Commonly known as:  Zully Macdonald BEFORE MEALS   Richelle Borrego MD         HYDROcodone-acetaminophen 5-325 MG Tabs  Commonly known as:  Emani Erwin [id number] HYDROcodone-acetaminophen ([address])  MG per tab 1 tablet 08/19/20 0414 Given      [id number] HYDROcodone-acetaminophen ([address])  MG per tab 1 tablet 08/19/20 1123 Given      [id number] Senna (SENOKOT) tab 17.2 mg 08/18/20 2103 Given Specimen Information    Type Source Collected On   Blood — 08/19/20 0536          Components    Component Value Reference Range [facility]   WBC 9.0 4.0 - 11.0 x10(3) uL — [facility] ([facility])   RBC 3.34 3.80 - 5.30 x10(6)uL  David Mcnally ([facility])   HGB 9.6 12.0 - 1 • HYDROcodone-acetaminophen (NORCO) 5-325 MG Oral Tab Take 1 tablet by mouth every 6 (six) hours as needed for Pain. 30 tablet 0   • metoprolol Tartrate 25 MG Oral Tab Take 25 mg by mouth 2 (two) times a day.  1/2 tab       • HYDROcodone-acetaminophen (NORC   LEGS SHAKE WHEN STANDING OR USING STAIRS   • OSTEOARTHRITIS       1/06 Rt menisectomy   • Osteoarthritis     • Overweight and obesity     • Personal history of colonic polyps       2/99 adenoma, 4/03 negative   • Type 2 diabetes mellitus, uncontrolled (H • RADIOFREQUENCY ABLATION OF SACROILIAC JOINT Right 7/20/2016     Performed by Sunni Feng MD at [facility] MAIN OR   • [address] Left 7/18/2016     Performed by Sunni Feng MD at [facility] MAIN OR   • REVISE MEDIAN N/CARPAL /80   Pulse 62   Temp 96.7 °F (35.9 °C) (Oral)   Resp 16   Ht 5' 5\" (1.651 m)   Wt 194 lb 9.6 oz (88.3 kg)   LMP  (LMP Unknown)   SpO2 97%   Breastfeeding No   BMI 32.38 kg/m²   GENERAL: well developed, well nourished,in no apparent distress  SKIN: pseaudarthosis, hardware failure/migration were verbalized. [PP.1]            Electronically signed by Keturah Murdock MD on 8/17/2020 10:30 AM   Attribution Bloom    PP. 1 - Keturah Murdock MD on 8/17/2020 10:30 AM                        Consults - MD Consult Tylenol [Acetaminop*    SWELLING    Comment:Tongue/mouth   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     idiopathic angioedema   • Back problem     sciatica   • Carpal tunnel syndrome    • CORONARY ARTERY Paralee James • LUMBAR FACET INJECTION Right 4/18/2016    Performed by Tahir Stanton MD at [facility] MAIN OR   • LUMBAR FACET INJECTION Left 4/4/2016    Performed by Tahir Stanton MD at [facility] MAIN OR   • LUMBAR INTERLAMINAR EPIDURAL INJECTION N/A 4/27/2015    Performed by RESPIRATORY:  (-) cough (-) shortness of breath (-) hemoptysis  GASTROINTESTINAL:  (-) nausea (-) vomiting (-) diarrhea  GENITOURINARY:  (-) dysuria (-) frequency (-)urgency  MUSCULOSKELETAL:  (-) arthritis (-) muscle cramps  SKIN:  (-) rashes (-) hair los Lab 08/17/20  1042 08/17/20  1428 08/17/20  1625   PGLU 201* 201* 183*       No results for input(s): TROP in the last 168 hours. [DO.1]            Electronically signed by Juan R Osullivan DO on 8/17/2020  5:44 PM   Attribution Key    DO. 1 - Carole Marcus • Personal history of colonic polyps     2/99 adenoma, 4/03 negative   • Type 2 diabetes mellitus, uncontrolled ([facility]    • Uncontrolled type 2 diabetes mellitus with diabetic polyneuropathy, with long-term current use of insulin ([facility].) 10/6/2016   • Forrest Carter • RADIOFREQUENCY ABLATION OF SACROILIAC JOINT Right 7/20/2016    Performed by Sunni Feng MD at [facility] MAIN OR   • [address] Left 7/18/2016    Performed by Sunni Feng MD at [facility] MAIN OR   • REVISE MEDIAN N/CARPAL TU Approx Degree of Impairment: 46.58%   Standardized Score (AM-PAC Scale): 43.63   CMS Modifier (G-Code): CK    FUNCTIONAL ABILITY STATUS  Gait Assessment   Gait Assistance: Supervision  Distance (ft): 150  Assistive Device: Rolling walker  Pattern: Shuffle continued IP PT, so that patient may achieve highest functional independence/return to baseline. DISCHARGE RECOMMENDATIONS  PT Discharge Recommendations: Home with home health PT     PLAN  PT Treatment Plan: Bed mobility; Endurance; Patient education;F * No active hospital problems.  *      Past Medical History  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     idiopathic angioedema   • Back problem     sciatica   • Carpal tunnel syndrome    • CORONARY ARTERY DI Performed by Jaylin Nguyen MD at [facility] MAIN OR   • LUMBAR FACET INJECTION Right 4/18/2016    Performed by Onesimo Pereira MD at [facility] MAIN OR   • LUMBAR FACET INJECTION Left 4/4/2016    Performed by Onesimo Pereira MD at [facility] MAIN OR   • LUMBAR INTERLAMINAR COGNITION  · Overall Cognitive Status:  WFL - within functional limits    RANGE OF MOTION AND STRENGTH ASSESSMENT  Upper extremity ROM and strength are within functional limits see OT eval    Lower extremity ROM is within functional limits     Lower extr and bed mobility on next session. PPE worn: mask and gloves.     Exercise/Education Provided:  Bed mobility  Functional activity tolerated  Gait training  Transfer training    Patient End of Session: Up in chair;Needs met;Call light within reach;RN aware of rolling at assistance level: modified independent     Goal #4 Negotiate 1 flight of stairs with sup. Goal #5    Goal #6    Goal Comments: Goals established on 8/18/2020[KB. 1]     Attribution Bloom    KB. 1 - Ina Stacy PT on 8/18/2020  1:52 PM • Type 2 diabetes mellitus, uncontrolled ([facility]    • Uncontrolled type 2 diabetes mellitus with diabetic polyneuropathy, with long-term current use of insulin ([facility] 10/6/2016   • Unspecified hereditary and idiopathic peripheral neuropathy    • Vitamin D def Performed by Ana Kearney MD at [facility] MAIN OR   • [address] Left 7/18/2016    Performed by Ana Kearney MD at [facility] MAIN OR   • REVISE MEDIAN N/CARPAL TUNNEL SURG      left carpal tunnel release   • SACROILIAC JOINT I Patient agreeable to therapy, spouse also present for session. Patient did not recall spine precautions, OT re-educated her on these. Patient declined getting dressed. She stood up with SBA and transferred to edge of bed.  She demonstrated doffing of brac Patient will recall all spinal precautions and incorporate into ADLs  Patient will don/doff brace with supervision-MET 8/19[MM. 1]       Attribution Bloom    MM. 1 - Catrina Yusuf OT on 8/19/2020  2:39 PM  MM. 2 - Catrina Yusuf OT on 8/19/2020  2:47 P • Type 2 diabetes mellitus, uncontrolled ([facility]    • Uncontrolled type 2 diabetes mellitus with diabetic polyneuropathy, with long-term current use of insulin ([facility] 10/6/2016   • Unspecified hereditary and idiopathic peripheral neuropathy    • Vitamin D def left carpal tunnel release   • SACROILIAC JOINT INJECTION BILATERAL Bilateral 3/21/2016    Performed by Verónica Borden MD at [facility] MAIN OR   • [address] Bilateral 6/9/2015    Performed by Verónica Borden MD at [facility] MAIN OR   • T AM-PAC ‘6-Clicks’ Inpatient Daily Activity Short Form  How much help from another person does the patient currently need…  -   Putting on and taking off regular lower body clothing?: A Little  -   Bathing (including washing, rinsing, drying)?: A Little  - Patient was educated to have supervision for initial shower at home for safety. Understanding was voiced. Nurse aware of session.     Patient End of Session: Up in chair;Needs met;Call light within reach;RN aware of session/findings;Bracing education provid problem-focused assessments, limited treatment options    Overall Complexity LOW     OT Discharge Recommendations: Home with home health PT/OT; Intermittent Supervision  OT Device Recommendations: None    PLAN  OT Treatment Plan: Balance activities; Energy c 9/1/2020 1:20 PM Baljit Mullins [facility]  SPAL    9/29/2020 1:00 PM Keturah Murdock MD [address]  SPAL    11/5/2020 10:00 AM Katharine Blanca MD [address] ENDOCRINOLOGY [facility]      Multidisciplinary Problems

## (undated) NOTE — IP AVS SNAPSHOT
[address]            (For Outpatient Use Only) Initial Admit Date: 8/17/2020   Inpt/Obs Admit Date: Inpt: 8/17/20 / Obs: N/A   Discharge Date:    Nany Castillo:  [de-identified]   MRN: [de-identified]   CSN: [id number]   CEID: [id number] Subscriber ID: [id number] Pt Rel to Subscriber: SELF   TERTIARY INSURANCE   Payor:  Plan:    Group Number:  Insurance Type:    Subscriber Name:  Subscriber :    Subscriber ID:  Pt Rel to Subscriber:    Hospital Account Financial Class: Medicare    Aug

## (undated) NOTE — MR AVS SNAPSHOT
[address]  [phone]               Thank you for choosing us for your health care visit with Saravanan Pulido PT. We are glad to serve you and happy to provide you with this summary of your visit.   Please he the building. For security purposes, please check in with the reception staff at every visit.                                           Jun 01, 2017  1:15 PM   POSTOP with Erlinda Fernandes MD   [address] ([place] at [address] [place] [address] [place] TAKE 1 OR 2 TABLETS BY MOUTH EVERY 6 HOURS AS NEEDED   Commonly known as:  ULTRAM           Vitamin D 2000 units Tabs   Take 2,000 Units by mouth daily.                    MyChart     Visit MyChart  You can access your MyChart to more actively manage your h

## (undated) NOTE — LETTER
[facility] Testing Department  Phone: [phone]  Right Fax: [phone]  [address] Monalisa Boateng RN Date: 4/18/19    Patient Name: Ronel Carlin  Surgery Date: 4/29/2019    CSN: [id number]  Medical Record: [id number]   DO

## (undated) NOTE — LETTER
[facility] Testing Department  Phone: [phone]  Right Fax: [phone]  [facility] 20 By: RACHEL Rollins RN Date: 8/12/20    Patient Name: Summer [facility]  Surgery Date: 8/17/2020    CSN: [id number]  Medical Record: [id number]   D

## (undated) NOTE — IP AVS SNAPSHOT
[address]            (For Outpatient Use Only) Initial Admit Date: 2023   Inpt/Obs Admit Date: Inpt: 23 / Obs: N/A   Discharge Date:    Galina Cook:  [de-identified]   MRN: [de-identified]   CSN: [id number]   CEID: [id number]        ENCOUNTER  Patient Class: Inpatient Admitting Provider: Kain Euceda MD [facility]-A   Hospital Service: Ortho/Spine Attending Provider: Kain Euceda MD   Bed: 376-A   Visit Type:   Referring Physician: No ref. provider found Billing Flag:    Admit Diagnosis: ? PATIENT  Legal Name:   Joelle Wilson   Legal Sex: Female  Gender ID: Female             Pref Name:   Cortez Palmer PCP:  Audrey Yang MD Home: [phone]   Address:  [facility] : 1940 (82 yrs) Mobile: [phone]         [facility]: [facility] [address] Marital:  Language: Ena park: Yeny SSN4: [id number] Yarsanism: Hinduism - [employer]*     Race: White Ethnicity: Non  Or [address] [place] [address]   Name Relationship Legal Guardian? Home Phone Work Phone Mobile Phone   1.  Rafat Ward  2. *No Contact Specified* Spouse      [phone]       GUARANTOR  Guarantor: Kiera Bonnertrinity : 1940 Home Phone: [phone]   Address: [facility]  Sex: Female Work Phone:    [facility]el[facility] Espinoza    Rel. to Patient: Self Guarantor ID: [id number]   GUARANTOR EMPLOYER   Employer:  Status: RETIRED     COVERAGE  PRIMARY INSURANCE   Payor: MEDICARE Plan: MEDICARE PART A&B   Group Number: [place] Insurance Type: INDEMNITY   Subscriber Name: Diamond Hurt : 1940   Subscriber ID: [id number] Pt Rel to Subscriber: Self   SECONDARY INSURANCE   Payor: [address]: [facility] INDEMNITY   Group Number: [id number] Insurance Type: [address] Name: Diamond Hurt : 1940   Subscriber ID: [id number] Pt Rel to Subscriber: SELF   TERTIARY INSURANCE   Payor:  Plan: Group Number:  Insurance Type:    Subscriber Name:  Subscriber :    Subscriber ID:  Pt Rel to Subscriber:    Hospital Account Financial Class: Medicare    2023

## (undated) NOTE — MR AVS SNAPSHOT
[address]  [phone]               Thank you for choosing us for your health care visit with Eddie Bell PT. We are glad to serve you and happy to provide you with this summary of your visit.   Please he the building. For security purposes, please check in with the reception staff at every visit.                                           Apr 11, 2017 11:00 AM   FOLLOW UP with Dede Akbar MD   [address] ([place] at [facility] ) Cipro [Ciprofloxacin] Hives    Insulin Aspart, Human Analog     Achiness.  HA , head pains    Insulin Lispro, Human     Achiness, HA, head pain    Tylenol [Acetaminophen] Swelling                   Current Medications          This list is accurate as of: Summaries. If you've been to the Emergency Department or your doctor's office, you can view your past visit information in [employer] by going to Visits < Visit Summaries. [employer] questions? Call [phone] for help.   [employer] is NOT to be used for urge

## (undated) NOTE — LETTER
[facility] Testing Department  Phone: [phone]  [address] By:  Tyler Lewis RN Date: 3/1/17    Patient Name: rFanc Bhardwaj  Surgery Date: 3/10/2017    CSN: [id number]  Medical Record: [id number]   : 1940 - A: 78 y